# Patient Record
Sex: FEMALE | Race: WHITE | NOT HISPANIC OR LATINO | Employment: FULL TIME | URBAN - NONMETROPOLITAN AREA
[De-identification: names, ages, dates, MRNs, and addresses within clinical notes are randomized per-mention and may not be internally consistent; named-entity substitution may affect disease eponyms.]

---

## 2017-04-20 ENCOUNTER — TRANSCRIBE ORDERS (OUTPATIENT)
Dept: ADMINISTRATIVE | Facility: HOSPITAL | Age: 57
End: 2017-04-20

## 2017-04-20 DIAGNOSIS — Z12.31 ENCOUNTER FOR SCREENING MAMMOGRAM FOR MALIGNANT NEOPLASM OF BREAST: Primary | ICD-10-CM

## 2017-04-20 DIAGNOSIS — M85.80 OSTEOPENIA: Primary | ICD-10-CM

## 2017-04-27 ENCOUNTER — HOSPITAL ENCOUNTER (OUTPATIENT)
Dept: MAMMOGRAPHY | Facility: HOSPITAL | Age: 57
Discharge: HOME | End: 2017-04-27
Attending: INTERNAL MEDICINE | Admitting: INTERNAL MEDICINE

## 2017-04-27 ENCOUNTER — HOSPITAL ENCOUNTER (OUTPATIENT)
Dept: BONE DENSITY | Facility: HOSPITAL | Age: 57
Discharge: HOME | End: 2017-04-27
Attending: INTERNAL MEDICINE

## 2017-04-27 DIAGNOSIS — Z12.31 ENCOUNTER FOR SCREENING MAMMOGRAM FOR MALIGNANT NEOPLASM OF BREAST: ICD-10-CM

## 2017-04-27 DIAGNOSIS — M85.80 OSTEOPENIA: ICD-10-CM

## 2017-04-27 PROCEDURE — 77080 DXA BONE DENSITY AXIAL: CPT

## 2017-04-27 PROCEDURE — G0202 SCR MAMMO BI INCL CAD: HCPCS

## 2017-04-27 PROCEDURE — 77063 BREAST TOMOSYNTHESIS BI: CPT

## 2017-05-24 ENCOUNTER — HOSPITAL ENCOUNTER (OUTPATIENT)
Dept: ULTRASOUND IMAGING | Age: 57
Discharge: HOME | End: 2017-05-24
Payer: COMMERCIAL

## 2017-05-24 DIAGNOSIS — R74.01 ELEVATED TRANSAMINASE LEVEL: ICD-10-CM

## 2017-05-24 DIAGNOSIS — R74.8 ELEVATED LIVER ENZYMES: ICD-10-CM

## 2017-05-24 LAB
HAV IGM SER IA-ACNC: NORMAL
HEPATITIS B CORE IGM ANTIBODY: NORMAL
HEPATITIS B SURFACE ANTIGEN INTERPRETATION: NORMAL
HEPATITIS C ANTIBODY INTERPRETATION: NORMAL

## 2017-05-24 PROCEDURE — 76705 ECHO EXAM OF ABDOMEN: CPT

## 2017-06-08 ENCOUNTER — OFFICE VISIT (OUTPATIENT)
Dept: GASTROENTEROLOGY | Age: 57
End: 2017-06-08
Payer: COMMERCIAL

## 2017-06-08 VITALS
HEIGHT: 63 IN | OXYGEN SATURATION: 96 % | HEART RATE: 88 BPM | WEIGHT: 123 LBS | SYSTOLIC BLOOD PRESSURE: 130 MMHG | BODY MASS INDEX: 21.79 KG/M2 | DIASTOLIC BLOOD PRESSURE: 80 MMHG

## 2017-06-08 DIAGNOSIS — R74.01 TRANSAMINITIS: ICD-10-CM

## 2017-06-08 DIAGNOSIS — R74.01 TRANSAMINITIS: Primary | ICD-10-CM

## 2017-06-08 DIAGNOSIS — K76.0 FATTY LIVER DISEASE, NONALCOHOLIC: ICD-10-CM

## 2017-06-08 LAB
ALBUMIN SERPL-MCNC: 3.7 G/DL (ref 3.5–5.2)
ALP BLD-CCNC: 67 U/L (ref 35–104)
ALPHA FETOPROTEIN: 4.9 NG/ML (ref 0–8.3)
ALT SERPL-CCNC: 87 U/L (ref 5–33)
ANION GAP SERPL CALCULATED.3IONS-SCNC: 16 MMOL/L (ref 7–19)
AST SERPL-CCNC: 82 U/L (ref 5–32)
BILIRUB SERPL-MCNC: <0.2 MG/DL (ref 0.2–1.2)
BILIRUBIN DIRECT: 0.1 MG/DL (ref 0–0.3)
BILIRUBIN, INDIRECT: 0.1 MG/DL (ref 0.1–1)
BUN BLDV-MCNC: 9 MG/DL (ref 6–20)
CALCIUM SERPL-MCNC: 8.9 MG/DL (ref 8.6–10)
CHLORIDE BLD-SCNC: 105 MMOL/L (ref 98–111)
CO2: 25 MMOL/L (ref 22–29)
CREAT SERPL-MCNC: 0.5 MG/DL (ref 0.5–0.9)
FERRITIN: 70.3 NG/ML (ref 13–150)
GAMMA GLUTAMYL TRANSFERASE: 18 U/L (ref 7–54)
GFR NON-AFRICAN AMERICAN: >60
GLUCOSE BLD-MCNC: 64 MG/DL (ref 74–109)
HCT VFR BLD CALC: 40.6 % (ref 37–47)
HEMOGLOBIN: 12.5 G/DL (ref 12–16)
INR BLD: 0.97 (ref 0.88–1.18)
IRON SATURATION: 30 % (ref 14–50)
IRON: 84 UG/DL (ref 37–145)
MCH RBC QN AUTO: 27.4 PG (ref 27–31)
MCHC RBC AUTO-ENTMCNC: 30.8 G/DL (ref 33–37)
MCV RBC AUTO: 89 FL (ref 81–99)
PDW BLD-RTO: 12.9 % (ref 11.5–14.5)
PLATELET # BLD: 244 K/UL (ref 130–400)
PMV BLD AUTO: 9.4 FL (ref 9.4–12.3)
POTASSIUM SERPL-SCNC: 4.5 MMOL/L (ref 3.5–5)
PROTHROMBIN TIME: 12.8 SEC (ref 12–14.6)
RBC # BLD: 4.56 M/UL (ref 4.2–5.4)
SODIUM BLD-SCNC: 146 MMOL/L (ref 136–145)
TOTAL IRON BINDING CAPACITY: 279 UG/DL (ref 250–400)
TOTAL PROTEIN: 6.4 G/DL (ref 6.6–8.7)
TSH SERPL DL<=0.05 MIU/L-ACNC: 1.9 UIU/ML (ref 0.27–4.2)
WBC # BLD: 5.1 K/UL (ref 4.8–10.8)

## 2017-06-08 PROCEDURE — 99203 OFFICE O/P NEW LOW 30 MIN: CPT | Performed by: NURSE PRACTITIONER

## 2017-06-08 RX ORDER — IBANDRONATE SODIUM 150 MG/1
TABLET, FILM COATED ORAL
Refills: 11 | COMMUNITY
Start: 2017-04-28 | End: 2017-11-21 | Stop reason: SDUPTHER

## 2017-06-08 RX ORDER — ROSUVASTATIN CALCIUM 20 MG/1
20 TABLET, COATED ORAL DAILY
COMMUNITY
End: 2017-06-16 | Stop reason: SDUPTHER

## 2017-06-08 RX ORDER — VENLAFAXINE HYDROCHLORIDE 75 MG/1
75 CAPSULE, EXTENDED RELEASE ORAL DAILY
COMMUNITY
End: 2017-11-21 | Stop reason: SDUPTHER

## 2017-06-08 ASSESSMENT — ENCOUNTER SYMPTOMS
RECTAL PAIN: 0
SORE THROAT: 0
BACK PAIN: 0
NAUSEA: 0
ABDOMINAL PAIN: 0
COUGH: 0
PHOTOPHOBIA: 0
WHEEZING: 0
VOMITING: 0
CHOKING: 0
DIARRHEA: 0
CONSTIPATION: 0
ABDOMINAL DISTENTION: 0
ANAL BLEEDING: 0
BLOOD IN STOOL: 0

## 2017-06-09 LAB
ALPHA-1 ANTITRYPSIN: 154 MG/DL (ref 90–200)
ANA IGG, ELISA: NORMAL
CERULOPLASMIN: 27 MG/DL (ref 17–54)
F-ACTIN AB IGG: 12 UNITS (ref 0–19)
MITOCHONDRIAL M2 AB, IGG: 2 UNITS (ref 0–20)

## 2017-06-19 RX ORDER — ROSUVASTATIN CALCIUM 20 MG/1
20 TABLET, COATED ORAL DAILY
Qty: 90 TABLET | Refills: 1 | Status: SHIPPED | OUTPATIENT
Start: 2017-06-19 | End: 2017-11-21 | Stop reason: SDUPTHER

## 2017-09-02 ENCOUNTER — APPOINTMENT (OUTPATIENT)
Dept: GENERAL RADIOLOGY | Age: 57
End: 2017-09-02
Payer: COMMERCIAL

## 2017-09-02 ENCOUNTER — HOSPITAL ENCOUNTER (EMERGENCY)
Age: 57
Discharge: HOME | End: 2017-09-02
Payer: COMMERCIAL

## 2017-09-02 VITALS
TEMPERATURE: 98 F | OXYGEN SATURATION: 99 % | HEIGHT: 63 IN | BODY MASS INDEX: 21.26 KG/M2 | SYSTOLIC BLOOD PRESSURE: 132 MMHG | HEART RATE: 68 BPM | DIASTOLIC BLOOD PRESSURE: 78 MMHG | WEIGHT: 120 LBS | RESPIRATION RATE: 20 BRPM

## 2017-09-02 DIAGNOSIS — M72.2 PLANTAR FASCIITIS, BILATERAL: Primary | ICD-10-CM

## 2017-09-02 PROCEDURE — 73630 X-RAY EXAM OF FOOT: CPT

## 2017-09-02 PROCEDURE — 99283 EMERGENCY DEPT VISIT LOW MDM: CPT | Performed by: NURSE PRACTITIONER

## 2017-09-02 PROCEDURE — 99283 EMERGENCY DEPT VISIT LOW MDM: CPT

## 2017-09-02 RX ORDER — METHYLPREDNISOLONE 4 MG/1
TABLET ORAL
Qty: 1 KIT | Refills: 0 | Status: SHIPPED | OUTPATIENT
Start: 2017-09-02 | End: 2017-09-08

## 2017-09-02 ASSESSMENT — PAIN SCALES - GENERAL: PAINLEVEL_OUTOF10: 0

## 2017-11-04 PROBLEM — E55.9 VITAMIN D DEFICIENCY: Status: ACTIVE | Noted: 2017-11-04

## 2017-11-04 PROBLEM — Z12.12 SCREENING FOR COLORECTAL CANCER: Status: ACTIVE | Noted: 2017-11-04

## 2017-11-04 PROBLEM — F41.1 GENERALIZED ANXIETY DISORDER: Status: ACTIVE | Noted: 2017-11-04

## 2017-11-04 PROBLEM — M85.89 OSTEOPENIA OF MULTIPLE SITES: Status: ACTIVE | Noted: 2017-11-04

## 2017-11-04 PROBLEM — Z12.11 SCREENING FOR COLORECTAL CANCER: Status: ACTIVE | Noted: 2017-11-04

## 2017-11-04 PROBLEM — C48.0 RETROPERITONEAL SARCOMA (HCC): Status: ACTIVE | Noted: 2017-11-04

## 2017-11-04 PROBLEM — Z85.3 HISTORY OF BREAST CANCER: Status: ACTIVE | Noted: 2017-11-04

## 2017-11-04 PROBLEM — E78.00 PURE HYPERCHOLESTEROLEMIA: Status: ACTIVE | Noted: 2017-11-04

## 2017-11-04 PROBLEM — F33.2 ENDOGENOUS DEPRESSION (HCC): Status: ACTIVE | Noted: 2017-11-04

## 2017-11-08 DIAGNOSIS — R74.01 ELEVATED TRANSAMINASE LEVEL: ICD-10-CM

## 2017-11-08 DIAGNOSIS — E78.00 PURE HYPERCHOLESTEROLEMIA: ICD-10-CM

## 2017-11-08 DIAGNOSIS — E55.9 VITAMIN D DEFICIENCY: Primary | ICD-10-CM

## 2017-11-08 DIAGNOSIS — K76.0 FATTY LIVER DISEASE, NONALCOHOLIC: ICD-10-CM

## 2017-11-14 DIAGNOSIS — E55.9 VITAMIN D DEFICIENCY: ICD-10-CM

## 2017-11-14 DIAGNOSIS — E78.00 PURE HYPERCHOLESTEROLEMIA: ICD-10-CM

## 2017-11-14 DIAGNOSIS — R74.01 ELEVATED TRANSAMINASE LEVEL: ICD-10-CM

## 2017-11-14 DIAGNOSIS — K76.0 FATTY LIVER DISEASE, NONALCOHOLIC: ICD-10-CM

## 2017-11-14 LAB
ALBUMIN SERPL-MCNC: 4.1 G/DL (ref 3.5–5.2)
ALP BLD-CCNC: 71 U/L (ref 35–104)
ALT SERPL-CCNC: 22 U/L (ref 5–33)
ANION GAP SERPL CALCULATED.3IONS-SCNC: 13 MMOL/L (ref 7–19)
AST SERPL-CCNC: 27 U/L (ref 5–32)
BILIRUB SERPL-MCNC: 0.3 MG/DL (ref 0.2–1.2)
BUN BLDV-MCNC: 14 MG/DL (ref 6–20)
CALCIUM SERPL-MCNC: 9 MG/DL (ref 8.6–10)
CHLORIDE BLD-SCNC: 106 MMOL/L (ref 98–111)
CHOLESTEROL, TOTAL: 178 MG/DL (ref 160–199)
CO2: 25 MMOL/L (ref 22–29)
CREAT SERPL-MCNC: 0.6 MG/DL (ref 0.5–0.9)
GFR NON-AFRICAN AMERICAN: >60
GLUCOSE BLD-MCNC: 99 MG/DL (ref 74–109)
HDLC SERPL-MCNC: 66 MG/DL (ref 65–121)
LDL CHOLESTEROL CALCULATED: 97 MG/DL
POTASSIUM SERPL-SCNC: 4.7 MMOL/L (ref 3.5–5)
SODIUM BLD-SCNC: 144 MMOL/L (ref 136–145)
TOTAL PROTEIN: 6.8 G/DL (ref 6.6–8.7)
TRIGL SERPL-MCNC: 76 MG/DL (ref 0–149)
VITAMIN D 25-HYDROXY: 39.7 NG/ML

## 2017-11-21 ENCOUNTER — OFFICE VISIT (OUTPATIENT)
Dept: INTERNAL MEDICINE | Age: 57
End: 2017-11-21
Payer: COMMERCIAL

## 2017-11-21 VITALS
HEART RATE: 65 BPM | WEIGHT: 127 LBS | DIASTOLIC BLOOD PRESSURE: 80 MMHG | SYSTOLIC BLOOD PRESSURE: 130 MMHG | BODY MASS INDEX: 22.5 KG/M2 | HEIGHT: 63 IN | OXYGEN SATURATION: 97 %

## 2017-11-21 DIAGNOSIS — Z23 IMMUNIZATION DUE: ICD-10-CM

## 2017-11-21 DIAGNOSIS — F41.1 GENERALIZED ANXIETY DISORDER: ICD-10-CM

## 2017-11-21 DIAGNOSIS — F33.2 ENDOGENOUS DEPRESSION (HCC): ICD-10-CM

## 2017-11-21 DIAGNOSIS — M77.42 METATARSALGIA OF LEFT FOOT: ICD-10-CM

## 2017-11-21 DIAGNOSIS — R74.01 ELEVATED TRANSAMINASE LEVEL: ICD-10-CM

## 2017-11-21 DIAGNOSIS — E55.9 VITAMIN D DEFICIENCY: ICD-10-CM

## 2017-11-21 DIAGNOSIS — E78.00 PURE HYPERCHOLESTEROLEMIA: Primary | ICD-10-CM

## 2017-11-21 PROCEDURE — 90471 IMMUNIZATION ADMIN: CPT | Performed by: INTERNAL MEDICINE

## 2017-11-21 PROCEDURE — 99214 OFFICE O/P EST MOD 30 MIN: CPT | Performed by: INTERNAL MEDICINE

## 2017-11-21 PROCEDURE — 90686 IIV4 VACC NO PRSV 0.5 ML IM: CPT | Performed by: INTERNAL MEDICINE

## 2017-11-21 RX ORDER — ERGOCALCIFEROL 1.25 MG/1
50000 CAPSULE ORAL WEEKLY
Qty: 12 CAPSULE | Refills: 4 | Status: SHIPPED | OUTPATIENT
Start: 2017-11-21 | End: 2018-05-09 | Stop reason: SDUPTHER

## 2017-11-21 RX ORDER — ERGOCALCIFEROL 1.25 MG/1
50000 CAPSULE ORAL WEEKLY
COMMUNITY
End: 2017-11-21 | Stop reason: SDUPTHER

## 2017-11-21 RX ORDER — VENLAFAXINE HYDROCHLORIDE 75 MG/1
75 CAPSULE, EXTENDED RELEASE ORAL DAILY
Qty: 90 CAPSULE | Refills: 1 | Status: SHIPPED | OUTPATIENT
Start: 2017-11-21 | End: 2018-05-09 | Stop reason: SDUPTHER

## 2017-11-21 RX ORDER — IBANDRONATE SODIUM 150 MG/1
TABLET, FILM COATED ORAL
Qty: 3 TABLET | Refills: 4 | Status: SHIPPED | OUTPATIENT
Start: 2017-11-21 | End: 2018-05-09 | Stop reason: SDUPTHER

## 2017-11-21 RX ORDER — ROSUVASTATIN CALCIUM 20 MG/1
20 TABLET, COATED ORAL DAILY
Qty: 90 TABLET | Refills: 1 | Status: SHIPPED | OUTPATIENT
Start: 2017-11-21 | End: 2018-05-09 | Stop reason: SDUPTHER

## 2017-11-21 ASSESSMENT — PATIENT HEALTH QUESTIONNAIRE - PHQ9
SUM OF ALL RESPONSES TO PHQ9 QUESTIONS 1 & 2: 0
1. LITTLE INTEREST OR PLEASURE IN DOING THINGS: 0
SUM OF ALL RESPONSES TO PHQ QUESTIONS 1-9: 0
2. FEELING DOWN, DEPRESSED OR HOPELESS: 0

## 2017-11-21 ASSESSMENT — ENCOUNTER SYMPTOMS
COUGH: 0
ABDOMINAL PAIN: 0
CHEST TIGHTNESS: 0
WHEEZING: 0
SORE THROAT: 0
CONSTIPATION: 0

## 2017-11-21 NOTE — PROGRESS NOTES
Chief Complaint   Patient presents with    6 Month Follow-Up     History of presenting illness:  Janene Ayala is a 62 y.o. female who presents today for follow up on her chronic medical conditions as noted below. Pure hypercholesterolemia-Patient  has tried to follow diet recommendations. Has been taking  cholesterol lowering medication as prescribed and does not report any side effects.     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     Overview Note:     DX 2007; post extensive surgery/ hysterectomy      Osteopenia of multiple sites 11/04/2017     Overview Note:     2013 Lspine -2.4/hip neck -2.3      Screening for colorectal cancer 11/04/2017     Overview Note:     2016/ 5 yrs      History of breast cancer 11/04/2017     Overview Note:     2008 LEFT/ post partial mastectomy      Elevated transaminase level 06/08/2017    Fatty liver disease, nonalcoholic 08/21/3530    Hx of colonic polyps      Past Medical History:   Diagnosis Date    Breast cancer ([facility] [address]     Cancer ([facility] [address]     liposcaarcoma, peritoneal      Past Surgical History:   Procedure Laterality Date    BREAST LUMPECTOMY Left     CHOLECYSTECTOMY      COLONOSCOPY  12/09/2016    Dr Isamar Raza access, normall, 5 yr recall w/Dr Devang Finley    MASTECTOMY, PARTIAL Left 2008    left breast cancer    OR COLONOSCOPY FLX DX W/COLLJ SPEC WHEN PFRMD N/A 12/9/2016    COLONOSCOPY DIAGNOSTIC OR SCREENING performed by Mariano Saravia DO at [address] [facility]    CHRISTIANE AND BSO      TONSILLECTOMY       Current Outpatient Prescriptions   Medication Sig Dispense Refill    rosuvastatin (CRESTOR) 20 MG tablet Take 1 tablet by mouth daily 90 tablet 1    ibandronate (BONIVA) 150 MG tablet TAKE 1 TABLET BY MOUTH ONCE MONTHLY 3 tablet 4    venlafaxine (EFFEXOR XR) 75 MG extended release capsule Take 1 capsule by mouth daily 90 capsule 1    vitamin D (ERGOCALCIFEROL) 15385 units CAPS capsule Take 1 capsule by mouth once a week 12 capsule 4     No current facility-administered medications for this visit. Allergies   Allergen Reactions    Adhesive Tape Swelling    Sulfa Antibiotics Swelling     Social History   Substance Use Topics    Smoking status: Former Smoker     Packs/day: 0.50     Years: 35.00     Quit date: 2016    Smokeless tobacco: Never Used    Alcohol use Yes      Family History   Problem Relation Age of Onset    Alzheimer's Disease Mother     Breast Cancer Mother 48    Heart Disease Father     Diabetes Father     Cancer Brother     Colon Cancer Neg Hx     Colon Polyps Neg Hx     Liver Cancer Neg Hx     Liver Disease Neg Hx     Esophageal Cancer Neg Hx     Rectal Cancer Neg Hx     Stomach Cancer Neg Hx        Review of Systems   Constitutional: Negative for chills, fatigue and fever. HENT: Negative for congestion, ear pain, nosebleeds, postnasal drip and sore throat. Respiratory: Negative for cough, chest tightness and wheezing. Cardiovascular: Negative for chest pain, palpitations and leg swelling. Gastrointestinal: Negative for abdominal pain and constipation. Genitourinary: Negative for dysuria and urgency. Musculoskeletal: Negative. Negative for arthralgias. Skin: Negative for rash. Neurological: Negative for dizziness and headaches. Psychiatric/Behavioral: Negative.       Vitals:    11/21/17 0901   BP: 130/80   Site: Left Arm   Position: Sitting   Pulse: 65   SpO2: 97% Weight: 127 lb (57.6 kg)   Height: 5' 3\" (1.6 m)     Body mass index is 22.5 kg/m². Physical Exam   Constitutional: She is oriented to person, place, and time. She appears well-developed. No distress. HENT:   Head: Normocephalic and atraumatic. Nose: Nose normal.   Mouth/Throat: Oropharynx is clear and moist.   Eyes: Conjunctivae are normal. Pupils are equal, round, and reactive to light. Right eye exhibits no discharge. Left eye exhibits no discharge. No scleral icterus. Neck: Normal range of motion. No JVD present. No thyromegaly present. Cardiovascular: Normal rate and regular rhythm. No murmur heard. Pulmonary/Chest: Breath sounds normal. She has no wheezes. She has no rales. She exhibits no tenderness. Abdominal: Bowel sounds are normal. She exhibits no distension. There is no guarding. Musculoskeletal: She exhibits no edema. Co metatarsal area pain dorsally over left foot   Lymphadenopathy:     She has no cervical adenopathy. Neurological: She is oriented to person, place, and time. She has normal reflexes. No cranial nerve deficit. Coordination normal.   Skin: Skin is dry. No rash noted. She is not diaphoretic. No erythema.    Psychiatric: Her behavior is normal. Judgment and thought content normal.       Lab Review   Orders Only on 11/14/2017   Component Date Value    Sodium 11/14/2017 144     Potassium 11/14/2017 4.7     Chloride 11/14/2017 106     CO2 11/14/2017 25     Anion Gap 11/14/2017 13     Glucose 11/14/2017 99     BUN 11/14/2017 14     CREATININE 11/14/2017 0.6     GFR Non- 11/14/2017 >60     Calcium 11/14/2017 9.0     Total Protein 11/14/2017 6.8     Alb 11/14/2017 4.1     Total Bilirubin 11/14/2017 0.3     Alkaline Phosphatase 11/14/2017 71     ALT 11/14/2017 22     AST 11/14/2017 27     Cholesterol, Total 11/14/2017 178     Triglycerides 11/14/2017 76     HDL 11/14/2017 66     LDL Calculated 11/14/2017 97     Vit D, 25-Hydroxy

## 2017-11-21 NOTE — PATIENT INSTRUCTIONS
good.  How is high cholesterol treated? The goal of treatment is to reduce your chances of having a heart attack or stroke. The goal is not to lower your cholesterol numbers only. · You may make lifestyle changes, such as eating healthy foods, not smoking, losing weight, and being more active. · You may have to take medicine. Follow-up care is a key part of your treatment and safety. Be sure to make and go to all appointments, and call your doctor if you are having problems. It's also a good idea to know your test results and keep a list of the medicines you take. Where can you learn more? Go to [email or web address] org and sign in to your [employer] account. Enter G519 in the ApptheGame box to learn more about \"Learning About High Cholesterol. \"     If you do not have an account, please click on the \"Sign Up Now\" link. Current as of: April 3, 2017  Content Version: 11.3  © 0202-4044 [employer]. Care instructions adapted under license by [facility] ([facility]). If you have questions about a medical condition or this instruction, always ask your healthcare professional. [address] any warranty or liability for your use of this information.

## 2017-11-29 ENCOUNTER — TELEPHONE (OUTPATIENT)
Dept: INTERNAL MEDICINE | Age: 57
End: 2017-11-29

## 2017-11-29 NOTE — TELEPHONE ENCOUNTER
Voicemail: They received 2 rx for vit d, 50,000/week and 1,000/week.  Which one do they need to fill?  11/29 7680

## 2017-12-04 ENCOUNTER — TELEPHONE (OUTPATIENT)
Dept: INTERNAL MEDICINE | Age: 57
End: 2017-12-04

## 2017-12-04 NOTE — TELEPHONE ENCOUNTER
Voicemail:  The pharmacy has two Rx's for vit d with two different strengths and they need to know which one she is on.  12/4 6064

## 2017-12-13 ENCOUNTER — OFFICE VISIT (OUTPATIENT)
Dept: PODIATRY | Facility: CLINIC | Age: 57
End: 2017-12-13

## 2017-12-13 VITALS
BODY MASS INDEX: 23.19 KG/M2 | DIASTOLIC BLOOD PRESSURE: 88 MMHG | HEIGHT: 62 IN | HEART RATE: 77 BPM | SYSTOLIC BLOOD PRESSURE: 132 MMHG | OXYGEN SATURATION: 98 % | WEIGHT: 126 LBS

## 2017-12-13 DIAGNOSIS — G57.62 PLANTAR NEUROMA, LEFT: ICD-10-CM

## 2017-12-13 DIAGNOSIS — G57.61 PLANTAR NEUROMA, RIGHT: ICD-10-CM

## 2017-12-13 DIAGNOSIS — M25.80 SESAMOIDITIS: ICD-10-CM

## 2017-12-13 DIAGNOSIS — M21.622 TAILOR'S BUNION OF LEFT FOOT: ICD-10-CM

## 2017-12-13 DIAGNOSIS — M79.671 FOOT PAIN, BILATERAL: Primary | ICD-10-CM

## 2017-12-13 DIAGNOSIS — M21.621 TAILOR'S BUNION OF RIGHT FOOT: ICD-10-CM

## 2017-12-13 DIAGNOSIS — M79.672 FOOT PAIN, BILATERAL: Primary | ICD-10-CM

## 2017-12-13 PROCEDURE — 64455 NJX AA&/STRD PLTR COM DG NRV: CPT | Performed by: PODIATRIST

## 2017-12-13 PROCEDURE — 99203 OFFICE O/P NEW LOW 30 MIN: CPT | Performed by: PODIATRIST

## 2017-12-13 RX ORDER — TRIAMCINOLONE ACETONIDE 40 MG/ML
10 INJECTION, SUSPENSION INTRA-ARTICULAR; INTRAMUSCULAR ONCE
Status: DISCONTINUED | OUTPATIENT
Start: 2017-12-13 | End: 2017-12-13

## 2017-12-13 RX ORDER — DEXAMETHASONE SODIUM PHOSPHATE 10 MG/ML
5 INJECTION, SOLUTION INTRAMUSCULAR; INTRAVENOUS ONCE
Status: COMPLETED | OUTPATIENT
Start: 2017-12-13 | End: 2017-12-13

## 2017-12-13 RX ORDER — DEXAMETHASONE SODIUM PHOSPHATE 10 MG/ML
10 INJECTION, SOLUTION INTRAMUSCULAR; INTRAVENOUS ONCE
Status: COMPLETED | OUTPATIENT
Start: 2017-12-13 | End: 2017-12-13

## 2017-12-13 RX ORDER — BUPIVACAINE HYDROCHLORIDE 5 MG/ML
0.5 INJECTION, SOLUTION PERINEURAL ONCE
Status: COMPLETED | OUTPATIENT
Start: 2017-12-13 | End: 2017-12-13

## 2017-12-13 RX ORDER — TRIAMCINOLONE ACETONIDE 40 MG/ML
10 INJECTION, SUSPENSION INTRA-ARTICULAR; INTRAMUSCULAR ONCE
Status: COMPLETED | OUTPATIENT
Start: 2017-12-13 | End: 2017-12-13

## 2017-12-13 RX ADMIN — DEXAMETHASONE SODIUM PHOSPHATE 5 MG: 10 INJECTION, SOLUTION INTRAMUSCULAR; INTRAVENOUS at 11:47

## 2017-12-13 RX ADMIN — DEXAMETHASONE SODIUM PHOSPHATE 10 MG: 10 INJECTION, SOLUTION INTRAMUSCULAR; INTRAVENOUS at 11:45

## 2017-12-13 RX ADMIN — BUPIVACAINE HYDROCHLORIDE 0.5 ML: 5 INJECTION, SOLUTION PERINEURAL at 11:45

## 2017-12-13 RX ADMIN — TRIAMCINOLONE ACETONIDE 10 MG: 40 INJECTION, SUSPENSION INTRA-ARTICULAR; INTRAMUSCULAR at 16:27

## 2017-12-13 RX ADMIN — DEXAMETHASONE SODIUM PHOSPHATE 5 MG: 10 INJECTION, SOLUTION INTRAMUSCULAR; INTRAVENOUS at 11:46

## 2017-12-13 RX ADMIN — BUPIVACAINE HYDROCHLORIDE 0.5 ML: 5 INJECTION, SOLUTION PERINEURAL at 11:44

## 2017-12-13 RX ADMIN — TRIAMCINOLONE ACETONIDE 10 MG: 40 INJECTION, SUSPENSION INTRA-ARTICULAR; INTRAMUSCULAR at 16:30

## 2017-12-13 RX ADMIN — TRIAMCINOLONE ACETONIDE 10 MG: 40 INJECTION, SUSPENSION INTRA-ARTICULAR; INTRAMUSCULAR at 16:29

## 2017-12-13 NOTE — PROGRESS NOTES
Procedure   Nerve Block  Date/Time: 12/13/2017 5:21 PM  Performed by: CECILIA LO  Authorized by: CECILIA LO   Indications: pain relief  Body area: lower extremity  Nerve: plantar  Laterality: left (3rd interspace)    Sedation:  Patient sedated: no  Preparation: Alcohol.  Patient position: sitting  Needle size: 24 G  Location technique: anatomical landmarks  Local Anesthetic: bupivacaine 0.5% without epinephrine  Anesthetic total: 0.5 mL  Outcome: pain improved  Patient tolerance: Patient tolerated the procedure well with no immediate complications  Comments: 0.5cc Dexamethasone, 0.25 Kenalog 40

## 2017-12-13 NOTE — PROGRESS NOTES
Procedure   Nerve Block  Date/Time: 12/13/2017 5:19 PM  Performed by: CECILIA LO  Authorized by: CECILIA LO   Indications: pain relief  Body area: lower extremity  Nerve: plantar  Laterality: left (2nd interspace)    Sedation:  Patient sedated: no  Preparation: alcohol.  Patient position: sitting  Needle size: 24 G  Location technique: anatomical landmarks  Local Anesthetic: bupivacaine 0.5% without epinephrine  Anesthetic total: 0.5 mL  Outcome: pain improved  Patient tolerance: Patient tolerated the procedure well with no immediate complications  Comments: 0.5cc Dexamethasone, 0.25 Kenalog 40

## 2017-12-13 NOTE — PATIENT INSTRUCTIONS
Roberts Neuralgia  Roberts neuralgia is a type of foot pain in the area closest to your toes. This area is sometimes called the ball of your foot. Roberts neuralgia occurs when a branch of a nerve in your foot (digital nerve) becomes compressed.   When this happens over a long period of time, the nerve can thicken (neuroma) and cause pain. This usually occurs between the third and fourth toe. Roberts neuralgia can come and go but may get worse over time.   CAUSES  Your digital nerve can become compressed and stretched at a point where it passes under a thick band of tissue that connects your toes (intermetatarsal ligament). Roberts neuralgia can be caused by mild repetitive damage in this area. This type of damage can result from:   · Activities such as running or jumping.  · Wearing shoes that are too tight.  RISK FACTORS  You may be at risk for Roberts neuralgia if you:  · Are female.  · Wear high heels.  · Wear shoes that are narrow or tight.  · Participate in activities that stretch your toes. These include:  ¨ Running.  ¨ Ballet.  ¨ Long-distance walking.  SIGNS AND SYMPTOMS  The first symptom of Roberts neuralgia is pain that spreads from the ball of your foot to your toes. It may feel like you are walking on a marble. Pain usually gets worse with walking and goes away at night. Other symptoms may include numbness and cramping of your toes.  DIAGNOSIS   Your health care provider will do a physical exam. When doing the exam, your health care provider may:   · Squeeze your foot just behind your toe.  · Ask you to move your toes to check for pain.  You may also have tests on your foot to confirm the diagnosis. These may include:   · An X-ray.  · An MRI.  TREATMENT   Treatment for Roberts neuralgia may be as simple as changing the kind of shoes you wear. Other treatments may include:  · Wearing a supportive pad (orthosis) under the front of your foot. This lifts your toe bones and takes pressure off the nerve.  · Getting  injections of numbing medicine and anti-inflammatory medicine (steroid) in the nerve.  · Having surgery to remove part of the thickened nerve.  HOME CARE INSTRUCTIONS   · Take medicine only as directed by your health care provider.  · Wear soft-soled shoes with a wide toe area.  · Stop activities that may be causing pain.  · Elevate your foot when resting.  · Massage your foot.  · Apply ice to the injured area:      Put ice in a plastic bag.    Place a towel between your skin and the bag.    Leave the ice on for 20 minutes, 2-3 times a day.    · Keep all follow-up visits as directed by your health care provider. This is important.  SEEK MEDICAL CARE IF:  · Home care instructions are not helping you get better.  · Your symptoms change or get worse.     This information is not intended to replace advice given to you by your health care provider. Make sure you discuss any questions you have with your health care provider.     Document Released: 03/26/2002 Document Revised: 01/08/2016 Document Reviewed: 02/18/2015  [employer] Interactive Patient Education ©2017 [employer]

## 2017-12-13 NOTE — PROGRESS NOTES
[facility] - PODIATRY    Today's Date: 12/13/17    Patient Name: Jayla Graves  MRN: [id number]  CSN: [id number]  PCP: Malgorzata Avila MD  Referring Provider: Malgorzata Avila MD    SUBJECTIVE     Chief Complaint   Patient presents with   • Left Foot - Pain     Patient is complaining of severe pain since April. 6/10 on a pain scale. She describes the pain as stabbing. No previous treatment. Per Patient she lost balance in April and landed on the balls of her feet. Previously tried OTC inserts.    • Right Foot - Pain     HPI: Jayla Graves, a 57 y.o.female, comes to clinic as a(n) new patient complaining of foot pain. Patient has h/o anxiety, breast cancer, depression, fatty liver, hypercholesterolemia, osteopenia, retroperiotneal sarcoma, vit D Def. Relates that since April she has had increased pain to the forefoot of each foot. Notes that in April her mother was in a rehab facility and she would walk with her for most of the day. She also lost her balance at one point, landing on the ball of her feet. Since last time she has tried multiple different therapies such as different shoes, OTC inserts, metatarsal pads, NSAIDs, etc, all without relief. Notes that the left foot is slightly more painful than right. Admits pain at 6/10 level and described as stabbing and throbbing. Denies any constitutional symptoms. No other pedal complaints at this time.    Past Medical History:   Diagnosis Date   • Anxiety    • Breast cancer 2008    left   • Depression    • Fatty liver disease, nonalcoholic    • History of colon polyps    • Hx of radiation therapy 2008   • Hypercholesterolemia    • Osteopenia    • Retroperitoneal sarcoma    • Vitamin D deficiency      Past Surgical History:   Procedure Laterality Date   • BILATERAL SALPINGO OOPHORECTOMY     • BREAST BIOPSY Left 2008    positive   • BREAST LUMPECTOMY Left 2008   • BREAST LUMPECTOMY Left    • CHOLECYSTECTOMY     • COLONOSCOPY     • MASTECTOMY  PARTIAL / LUMPECTOMY Left    • TONSILLECTOMY     • TOTAL ABDOMINAL HYSTERECTOMY       Family History   Problem Relation Age of Onset   • Alzheimer's disease Mother    • Cancer Mother      breast   • Heart disease Father    • Diabetes Father    • Cancer Brother    • Breast cancer Neg Hx      Social History     Social History   • Marital status:      Spouse name: N/A   • Number of children: N/A   • Years of education: N/A     Occupational History   • Not on file.     Social History Main Topics   • Smoking status: Former Smoker     Packs/day: 0.75     Years: 25.00     Types: Cigarettes     Quit date: 10/13/2017   • Smokeless tobacco: Never Used   • Alcohol use Yes      Comment: Rarely   • Drug use: No   • Sexual activity: Defer     Other Topics Concern   • Not on file     Social History Narrative     Allergies   Allergen Reactions   • Adhesive Tape Swelling   • Sulfa Antibiotics Swelling     Current Outpatient Prescriptions   Medication Sig Dispense Refill   • ibandronate (BONIVA) 150 MG tablet TAKE 1 TABLET BY MOUTH ONCE MONTHLY     • rosuvastatin (CRESTOR) 20 MG tablet Take 20 mg by mouth.     • venlafaxine XR (EFFEXOR-XR) 75 MG 24 hr capsule Take 75 mg by mouth.     • vitamin D (ERGOCALCIFEROL) 62029 units capsule capsule Take  by mouth.       Current Facility-Administered Medications   Medication Dose Route Frequency Provider Last Rate Last Dose   • bupivacaine (MARCAINE) 0.5 % injection 0.5 mL  0.5 mL Injection Once Sadi Alberts, DPM       • bupivacaine (MARCAINE) 0.5 % injection 0.5 mL  0.5 mL Injection Once Cornwalljaret Alberts, DPM       • bupivacaine (MARCAINE) 0.5 % injection 0.5 mL  0.5 mL Injection Once Cornwalljaret Alberts, DPM       • dexamethasone sodium phosphate injection 5 mg  5 mg Intra-articular Once Sadijaret Alberts, DPM       • dexamethasone sodium phosphate injection 5 mg  5 mg Intra-articular Once Cornwalljaret Alberts, DPM       • triamcinolone acetonide (KENALOG) injection 10 mg  10 mg  Intra-articular Once Sadi Alberts DPM       • triamcinolone acetonide (KENALOG-40) injection 10 mg  10 mg Intra-articular Once Sadi Alberts DPM       • triamcinolone acetonide (KENALOG-40) injection 10 mg  10 mg Intra-articular Once Sadi Alberts DPM         Review of Systems   Constitutional: Negative for chills and fever.   HENT: Negative for congestion.    Respiratory: Negative for shortness of breath.    Cardiovascular: Negative for chest pain and leg swelling.   Gastrointestinal: Negative for constipation, diarrhea, nausea and vomiting.   Musculoskeletal:        Foot pain   Skin: Negative for wound.   Neurological: Negative for numbness.       OBJECTIVE     Vitals:    12/13/17 0943   BP: 132/88   Pulse: 77   SpO2: 98%       PHYSICAL EXAM  GEN:   A&Ox3, NAD. Pt presents to clinic ambulating without assistance and wearing Casual Shoes.      NEURO:   Protective sensation intact to 10/10 sites Right foot, 10/10 site Left foot using [place] monofilament  Light touch sensation present  No Tinel's or Villeux sign.    VASC:  Skin temperature Warm to Warm proximal to distal kristin  DP pulses 2/4 Right, 2/4 Left  PT pulses 2/4 Right, 2/4 Left  CFT <3 sec kristin  Pedal hair growth absent  no edema noted kristin  Varicosities absent kristin    MUSC/SKEL:  Muscle Strength Right foot Dorsiflexors 5/5, Plantarflexors 5/5, Evertors 5/5, Invertors 5/5  Muscle Strength Left foot Dorsiflexors 5/5, Plantarflexors 5/5, Evertors 5/5, Invertors 5/5  ROM of the 1st MTP is full without pain or crepitus  ROM of the MTJ is full without pain or crepitus    ROM of the STJ is full without pain or crepitus    ROM of the ankle joint is full without pain or crepitus    POP of plantar distal interspaces 2,3,4 bilateral L>R. Pain in forefoot compression. No deviation of digits or palpable click.  POP of plantar right 1st MTPJ at tibial sesamoid  POP of bilateral lateral 5th MTPJ with increase bony prominence.  Rectus foot type   Gait  pattern: Normal  No gross pedal musculoskeletal deformities noted.     DERM:  Pedal nails x10 are within normal limits of length and thickness  Webspaces are Clean, Dry, and Intact  Skin is normal in turgor and texture with no open wounds or sores appreciated.      RADIOLOGY/NUCLEAR:  No results found.    LABORATORY/CULTURE RESULTS:      PATHOLOGY RESULTS:       ASSESSMENT/PLAN     Jayla was seen today for pain and pain.    Diagnoses and all orders for this visit:    Foot pain, bilateral  -     XR Foot 3+ View Bilateral; Future  -     Nerve Block  -     Nerve Block  -     Nerve Block  -     bupivacaine (MARCAINE) 0.5 % injection 0.5 mL; Inject 0.5 mL as directed 1 (One) Time.  -     bupivacaine (MARCAINE) 0.5 % injection 0.5 mL; Inject 0.5 mL as directed 1 (One) Time.  -     bupivacaine (MARCAINE) 0.5 % injection 0.5 mL; Inject 0.5 mL as directed 1 (One) Time.  -     triamcinolone acetonide (KENALOG-40) injection 10 mg; Inject 0.25 mL into the joint 1 (One) Time.  -     triamcinolone acetonide (KENALOG-40) injection 10 mg; Inject 0.25 mL into the joint 1 (One) Time.  -     triamcinolone acetonide (KENALOG) injection 10 mg; Inject 1 mL into the joint 1 (One) Time.  -     dexamethasone sodium phosphate injection 5 mg; Inject 0.5 mL into the joint 1 (One) Time.  -     dexamethasone sodium phosphate injection 5 mg; Inject 0.5 mL into the joint 1 (One) Time.    Tailor's bunion of right foot  -     bupivacaine (MARCAINE) 0.5 % injection 0.5 mL; Inject 0.5 mL as directed 1 (One) Time.  -     bupivacaine (MARCAINE) 0.5 % injection 0.5 mL; Inject 0.5 mL as directed 1 (One) Time.  -     bupivacaine (MARCAINE) 0.5 % injection 0.5 mL; Inject 0.5 mL as directed 1 (One) Time.  -     triamcinolone acetonide (KENALOG-40) injection 10 mg; Inject 0.25 mL into the joint 1 (One) Time.  -     triamcinolone acetonide (KENALOG-40) injection 10 mg; Inject 0.25 mL into the joint 1 (One) Time.  -     triamcinolone acetonide (KENALOG)  injection 10 mg; Inject 1 mL into the joint 1 (One) Time.  -     dexamethasone sodium phosphate injection 5 mg; Inject 0.5 mL into the joint 1 (One) Time.  -     dexamethasone sodium phosphate injection 5 mg; Inject 0.5 mL into the joint 1 (One) Time.    Tailor's bunion of left foot  -     bupivacaine (MARCAINE) 0.5 % injection 0.5 mL; Inject 0.5 mL as directed 1 (One) Time.  -     bupivacaine (MARCAINE) 0.5 % injection 0.5 mL; Inject 0.5 mL as directed 1 (One) Time.  -     bupivacaine (MARCAINE) 0.5 % injection 0.5 mL; Inject 0.5 mL as directed 1 (One) Time.  -     triamcinolone acetonide (KENALOG-40) injection 10 mg; Inject 0.25 mL into the joint 1 (One) Time.  -     triamcinolone acetonide (KENALOG-40) injection 10 mg; Inject 0.25 mL into the joint 1 (One) Time.  -     triamcinolone acetonide (KENALOG) injection 10 mg; Inject 1 mL into the joint 1 (One) Time.  -     dexamethasone sodium phosphate injection 5 mg; Inject 0.5 mL into the joint 1 (One) Time.  -     dexamethasone sodium phosphate injection 5 mg; Inject 0.5 mL into the joint 1 (One) Time.    Plantar neuroma, left  -     Nerve Block  -     Nerve Block  -     Nerve Block  -     bupivacaine (MARCAINE) 0.5 % injection 0.5 mL; Inject 0.5 mL as directed 1 (One) Time.  -     bupivacaine (MARCAINE) 0.5 % injection 0.5 mL; Inject 0.5 mL as directed 1 (One) Time.  -     bupivacaine (MARCAINE) 0.5 % injection 0.5 mL; Inject 0.5 mL as directed 1 (One) Time.  -     triamcinolone acetonide (KENALOG-40) injection 10 mg; Inject 0.25 mL into the joint 1 (One) Time.  -     triamcinolone acetonide (KENALOG-40) injection 10 mg; Inject 0.25 mL into the joint 1 (One) Time.  -     triamcinolone acetonide (KENALOG) injection 10 mg; Inject 1 mL into the joint 1 (One) Time.  -     dexamethasone sodium phosphate injection 5 mg; Inject 0.5 mL into the joint 1 (One) Time.  -     dexamethasone sodium phosphate injection 5 mg; Inject 0.5 mL into the joint 1 (One) Time.    Plantar  neuroma, right  -     bupivacaine (MARCAINE) 0.5 % injection 0.5 mL; Inject 0.5 mL as directed 1 (One) Time.  -     bupivacaine (MARCAINE) 0.5 % injection 0.5 mL; Inject 0.5 mL as directed 1 (One) Time.  -     bupivacaine (MARCAINE) 0.5 % injection 0.5 mL; Inject 0.5 mL as directed 1 (One) Time.  -     triamcinolone acetonide (KENALOG-40) injection 10 mg; Inject 0.25 mL into the joint 1 (One) Time.  -     triamcinolone acetonide (KENALOG-40) injection 10 mg; Inject 0.25 mL into the joint 1 (One) Time.  -     triamcinolone acetonide (KENALOG) injection 10 mg; Inject 1 mL into the joint 1 (One) Time.  -     dexamethasone sodium phosphate injection 5 mg; Inject 0.5 mL into the joint 1 (One) Time.  -     dexamethasone sodium phosphate injection 5 mg; Inject 0.5 mL into the joint 1 (One) Time.    Sesamoiditis  -     bupivacaine (MARCAINE) 0.5 % injection 0.5 mL; Inject 0.5 mL as directed 1 (One) Time.  -     bupivacaine (MARCAINE) 0.5 % injection 0.5 mL; Inject 0.5 mL as directed 1 (One) Time.  -     bupivacaine (MARCAINE) 0.5 % injection 0.5 mL; Inject 0.5 mL as directed 1 (One) Time.  -     triamcinolone acetonide (KENALOG-40) injection 10 mg; Inject 0.25 mL into the joint 1 (One) Time.  -     triamcinolone acetonide (KENALOG-40) injection 10 mg; Inject 0.25 mL into the joint 1 (One) Time.  -     triamcinolone acetonide (KENALOG) injection 10 mg; Inject 1 mL into the joint 1 (One) Time.  -     dexamethasone sodium phosphate injection 5 mg; Inject 0.5 mL into the joint 1 (One) Time.  -     dexamethasone sodium phosphate injection 5 mg; Inject 0.5 mL into the joint 1 (One) Time.      Comprehensive lower extremity examination and evaluation was performed.  Discussed findings and treatment plan including risks, benefits, and treatment options with patient in detail. Patient agreed with treatment plan.  After written consent obtained, Neuroma injection x3 into left foot interspaces 2,3,4.    Advised to continue use of  metatarsal pads or gel inserts for added relief to plantar foot.   If pain improves, pt wishes to have same injections to right foot.  Discussed surgical excision if conservative therapy fails.  Xray to evaluate structural abnormality  An After Visit Summary was printed and given to the patient at discharge, including (if requested) any available informative/educational handouts regarding diagnosis, treatment, or medications. All questions were answered to patient/family satisfaction. Should symptoms fail to improve or worsen they agree to call or return to clinic or to go to the Emergency Department. Discussed the importance of following up with any needed screening tests/labs/specialist appointments and any requested follow-up recommended by me today. Importance of maintaining follow-up discussed and patient accepts that missed appointments can delay diagnosis and potentially lead to worsening of conditions.  Return in about 1 month (around 1/13/2018)., or sooner if acute issues arise.        This document has been electronically signed by Sadi Alberts DPM on December 13, 2017 11:32 AM

## 2017-12-13 NOTE — PROGRESS NOTES
Procedure   Nerve Block  Date/Time: 12/13/2017 5:22 PM  Performed by: CECILIA LO  Authorized by: CECILIA LO   Indications: pain relief  Body area: lower extremity  Nerve: plantar  Laterality: left (4th interspace)    Sedation:  Patient sedated: no  Preparation: alcohol.  Patient position: sitting  Needle size: 24 G  Location technique: anatomical landmarks  Local Anesthetic: bupivacaine 0.5% without epinephrine  Anesthetic total: 0.5 mL  Outcome: pain improved  Patient tolerance: Patient tolerated the procedure well with no immediate complications  Comments: 0.5cc Dexamethasone, 0.25 Kenalog 40

## 2018-01-26 ENCOUNTER — HOSPITAL ENCOUNTER (OUTPATIENT)
Dept: GENERAL RADIOLOGY | Facility: HOSPITAL | Age: 58
Discharge: HOME | End: 2018-01-26
Attending: PODIATRIST | Admitting: PODIATRIST

## 2018-01-26 DIAGNOSIS — M79.672 FOOT PAIN, BILATERAL: ICD-10-CM

## 2018-01-26 DIAGNOSIS — M79.671 FOOT PAIN, BILATERAL: ICD-10-CM

## 2018-01-26 PROCEDURE — 73630 X-RAY EXAM OF FOOT: CPT

## 2018-01-29 NOTE — PROGRESS NOTES
Patient was advised of xray results and verbalized understanding. She stated that all questions were answered. Patient was advised to call our office if she had any further questions or concerns. Patient scheduled follow up appt.

## 2018-02-09 ENCOUNTER — OFFICE VISIT (OUTPATIENT)
Dept: PODIATRY | Facility: CLINIC | Age: 58
End: 2018-02-09

## 2018-02-09 VITALS
OXYGEN SATURATION: 96 % | HEART RATE: 84 BPM | SYSTOLIC BLOOD PRESSURE: 102 MMHG | WEIGHT: 129 LBS | BODY MASS INDEX: 22.86 KG/M2 | DIASTOLIC BLOOD PRESSURE: 78 MMHG | HEIGHT: 63 IN

## 2018-02-09 DIAGNOSIS — G57.62 PLANTAR NEUROMA, LEFT: Primary | ICD-10-CM

## 2018-02-09 DIAGNOSIS — M21.622 TAILOR'S BUNION OF LEFT FOOT: ICD-10-CM

## 2018-02-09 DIAGNOSIS — M21.621 TAILOR'S BUNION OF RIGHT FOOT: ICD-10-CM

## 2018-02-09 DIAGNOSIS — G57.61 PLANTAR NEUROMA, RIGHT: ICD-10-CM

## 2018-02-09 PROCEDURE — 99212 OFFICE O/P EST SF 10 MIN: CPT | Performed by: PODIATRIST

## 2018-02-09 NOTE — PROGRESS NOTES
[facility] - PODIATRY    Today's Date: 02/09/18    Patient Name: Jayla Graves  MRN: [id number]  CSN: [id number]  PCP: Malgorzata Avila MD  Referring Provider: No ref. provider found    SUBJECTIVE     Chief Complaint   Patient presents with   • Left Foot - Follow-up     Patient states she is here for her follow up. She denies any foot pain today.    • Right Foot - Follow-up     HPI: Jayla Graves, a 57 y.o.female, comes to clinic as a(n) established patient complaining of foot pain. Patient has h/o anxiety, breast cancer, depression, fatty liver, hypercholesterolemia, osteopenia, retroperiotneal sarcoma, vit D Def. States that since her last appt when she had injections, her pain has almost completely resolved. States that evne her right foot pain has gone away and does not wish to have injections today. Had xrays done. Denies pain today. Denies any constitutional symptoms. No other pedal complaints at this time.    Past Medical History:   Diagnosis Date   • Anxiety    • Breast cancer 2008    left   • Depression    • Fatty liver disease, nonalcoholic    • History of colon polyps    • Hx of radiation therapy 2008   • Hypercholesterolemia    • Osteopenia    • Retroperitoneal sarcoma    • Vitamin D deficiency      Past Surgical History:   Procedure Laterality Date   • BILATERAL SALPINGO OOPHORECTOMY     • BREAST BIOPSY Left 2008    positive   • BREAST LUMPECTOMY Left 2008   • BREAST LUMPECTOMY Left    • CHOLECYSTECTOMY     • COLONOSCOPY     • MASTECTOMY PARTIAL / LUMPECTOMY Left    • TONSILLECTOMY     • TOTAL ABDOMINAL HYSTERECTOMY       Family History   Problem Relation Age of Onset   • Alzheimer's disease Mother    • Cancer Mother      breast   • Heart disease Father    • Diabetes Father    • Cancer Brother    • Breast cancer Neg Hx      Social History     Social History   • Marital status:      Spouse name: N/A   • Number of children: N/A   • Years of education: N/A      Occupational History   • Not on file.     Social History Main Topics   • Smoking status: Former Smoker     Packs/day: 0.75     Years: 25.00     Types: Cigarettes     Quit date: 10/13/2017   • Smokeless tobacco: Never Used   • Alcohol use Yes      Comment: Rarely   • Drug use: No   • Sexual activity: Defer     Other Topics Concern   • Not on file     Social History Narrative     Allergies   Allergen Reactions   • Adhesive Tape Swelling   • Sulfa Antibiotics Swelling     Current Outpatient Prescriptions   Medication Sig Dispense Refill   • ibandronate (BONIVA) 150 MG tablet TAKE 1 TABLET BY MOUTH ONCE MONTHLY     • rosuvastatin (CRESTOR) 20 MG tablet Take 20 mg by mouth.     • venlafaxine XR (EFFEXOR-XR) 75 MG 24 hr capsule Take 75 mg by mouth.     • vitamin D (ERGOCALCIFEROL) 00029 units capsule capsule Take  by mouth.       No current facility-administered medications for this visit.      Review of Systems   Constitutional: Negative for chills and fever.   HENT: Negative for congestion.    Respiratory: Negative for shortness of breath.    Cardiovascular: Negative for chest pain and leg swelling.   Gastrointestinal: Negative for constipation, diarrhea, nausea and vomiting.   Musculoskeletal:        Foot pain   Skin: Negative for wound.   Neurological: Negative for numbness.       OBJECTIVE     Vitals:    02/09/18 1100   BP: 102/78   Pulse: 84   SpO2: 96%       PHYSICAL EXAM  GEN:   A&Ox3, NAD. Pt presents to clinic ambulating without assistance and wearing Casual Shoes.      NEURO:   Protective sensation intact to 10/10 sites Right foot, 10/10 site Left foot using [place]onia monofilament  Light touch sensation present  No Tinel's or Villeux sign.    VASC:  Skin temperature Warm to Warm proximal to distal kristin  DP pulses 2/4 Right, 2/4 Left  PT pulses 2/4 Right, 2/4 Left  CFT <3 sec kristin  Pedal hair growth absent  no edema noted kristin  Varicosities absent kristin    MUSC/SKEL:  Muscle Strength Right foot  Dorsiflexors 5/5, Plantarflexors 5/5, Evertors 5/5, Invertors 5/5  Muscle Strength Left foot Dorsiflexors 5/5, Plantarflexors 5/5, Evertors 5/5, Invertors 5/5  ROM of the 1st MTP is full without pain or crepitus  ROM of the MTJ is full without pain or crepitus    ROM of the STJ is full without pain or crepitus    ROM of the ankle joint is full without pain or crepitus    No POP of plantar distal interspaces 2,3,4 bilateral L>R. No pain in forefoot compression. No deviation of digits or palpable click.  No POP of plantar right 1st MTPJ at tibial sesamoid  Minimal POP of bilateral lateral 5th MTPJ with increase bony prominence.  Rectus foot type   Gait pattern: Normal  No gross pedal musculoskeletal deformities noted.     DERM:  Pedal nails x10 are within normal limits of length and thickness  Webspaces are Clean, Dry, and Intact  Skin is normal in turgor and texture with no open wounds or sores appreciated.      RADIOLOGY/NUCLEAR:  Xr Foot 3+ View Bilateral    Result Date: 1/26/2018  Narrative: EXAMINATION: XR FOOT 3+ VW BILATERAL-  1/26/2018 3:18 PM EST  HISTORY: Foot pain. Tailors bunion  COMPARISON:None  TECHNIQUE:6 views: AP lateral and oblique projection imaging of both feet were obtained. Lateral imaging obtained obtained with weightbearing.  FINDINGS:  There is a right-sided tailors bunion with focal bony protuberance along the head of the fifth metatarsal.  There is mild left-sided tailors bunion changes also noted with mild bony protuberance along the head of the left fifth metatarsal.  The bony structures are intact. The joint spaces are maintained. There is no fracture.  There are no focal blastic or lytic lesions.      Impression: 1. No acute bony abnormality. 2. Small tailor bunions, greater on the right. This report was finalized on 01/26/2018 14:22 by Dr. Dc Pina MD.      LABORATORY/CULTURE RESULTS:      PATHOLOGY RESULTS:       ASSESSMENT/PLAN     Jayla was seen today for follow-up and  follow-up.    Diagnoses and all orders for this visit:    Plantar neuroma, left    Plantar neuroma, right    Tailor's bunion of right foot    Tailor's bunion of left foot    Comprehensive lower extremity examination and evaluation was performed.  Discussed findings and treatment plan including risks, benefits, and treatment options with patient in detail. Patient agreed with treatment plan.  No further treatment at this time as she is asymptomatic.   Advised to continue use of metatarsal pads or gel inserts for added relief to plantar foot.   Xrays reviewed with patient  No treatment at this time for tailor's bunions.   An After Visit Summary was printed and given to the patient at discharge, including (if requested) any available informative/educational handouts regarding diagnosis, treatment, or medications. All questions were answered to patient/family satisfaction. Should symptoms fail to improve or worsen they agree to call or return to clinic or to go to the Emergency Department. Discussed the importance of following up with any needed screening tests/labs/specialist appointments and any requested follow-up recommended by me today. Importance of maintaining follow-up discussed and patient accepts that missed appointments can delay diagnosis and potentially lead to worsening of conditions.  Return if symptoms worsen or fail to improve., or sooner if acute issues arise.        This document has been electronically signed by Sadi Alberts DPM on February 9, 2018 11:31 AM

## 2018-04-12 PROBLEM — Z12.12 SCREENING FOR COLORECTAL CANCER: Status: RESOLVED | Noted: 2017-11-04 | Resolved: 2018-04-12

## 2018-04-12 PROBLEM — Z12.11 SCREENING FOR COLORECTAL CANCER: Status: RESOLVED | Noted: 2017-11-04 | Resolved: 2018-04-12

## 2018-05-09 ENCOUNTER — OFFICE VISIT (OUTPATIENT)
Dept: INTERNAL MEDICINE | Age: 58
End: 2018-05-09
Payer: COMMERCIAL

## 2018-05-09 VITALS
SYSTOLIC BLOOD PRESSURE: 110 MMHG | WEIGHT: 128 LBS | RESPIRATION RATE: 18 BRPM | HEART RATE: 87 BPM | HEIGHT: 63 IN | BODY MASS INDEX: 22.68 KG/M2 | OXYGEN SATURATION: 97 % | DIASTOLIC BLOOD PRESSURE: 78 MMHG

## 2018-05-09 DIAGNOSIS — M85.89 OSTEOPENIA OF MULTIPLE SITES: ICD-10-CM

## 2018-05-09 DIAGNOSIS — Z12.31 SCREENING MAMMOGRAM, ENCOUNTER FOR: ICD-10-CM

## 2018-05-09 DIAGNOSIS — E55.9 VITAMIN D DEFICIENCY: ICD-10-CM

## 2018-05-09 DIAGNOSIS — Z00.00 ANNUAL PHYSICAL EXAM: Primary | ICD-10-CM

## 2018-05-09 DIAGNOSIS — L98.9 SKIN LESION OF BACK: ICD-10-CM

## 2018-05-09 DIAGNOSIS — E78.00 PURE HYPERCHOLESTEROLEMIA: ICD-10-CM

## 2018-05-09 PROCEDURE — 99396 PREV VISIT EST AGE 40-64: CPT | Performed by: INTERNAL MEDICINE

## 2018-05-09 RX ORDER — VENLAFAXINE HYDROCHLORIDE 75 MG/1
75 CAPSULE, EXTENDED RELEASE ORAL DAILY
Qty: 90 CAPSULE | Refills: 1 | Status: SHIPPED | OUTPATIENT
Start: 2018-05-09 | End: 2018-10-12 | Stop reason: SDUPTHER

## 2018-05-09 RX ORDER — IBANDRONATE SODIUM 150 MG/1
TABLET, FILM COATED ORAL
Qty: 3 TABLET | Refills: 4 | Status: ON HOLD
Start: 2018-05-09 | End: 2019-07-13 | Stop reason: ALTCHOICE

## 2018-05-09 RX ORDER — ROSUVASTATIN CALCIUM 20 MG/1
20 TABLET, COATED ORAL DAILY
Qty: 90 TABLET | Refills: 1 | Status: SHIPPED | OUTPATIENT
Start: 2018-05-09 | End: 2018-10-12 | Stop reason: SDUPTHER

## 2018-05-09 RX ORDER — ERGOCALCIFEROL 1.25 MG/1
50000 CAPSULE ORAL WEEKLY
Qty: 12 CAPSULE | Refills: 4 | Status: SHIPPED | OUTPATIENT
Start: 2018-05-09 | End: 2019-07-18 | Stop reason: SDUPTHER

## 2018-05-09 ASSESSMENT — ENCOUNTER SYMPTOMS
EYE REDNESS: 0
VOICE CHANGE: 0
EYE PAIN: 0
WHEEZING: 0
BLOOD IN STOOL: 0
SORE THROAT: 0
COUGH: 0
DIARRHEA: 0
VOMITING: 0
COLOR CHANGE: 0
ROS SKIN COMMENTS: SKIN LESIONS ON BACK
SINUS PRESSURE: 0
CONSTIPATION: 0
ABDOMINAL PAIN: 0
NAUSEA: 0
TROUBLE SWALLOWING: 0
CHEST TIGHTNESS: 0

## 2018-06-08 PROBLEM — Z12.31 SCREENING MAMMOGRAM, ENCOUNTER FOR: Status: RESOLVED | Noted: 2018-05-09 | Resolved: 2018-06-08

## 2018-06-08 PROBLEM — Z00.00 ANNUAL PHYSICAL EXAM: Status: RESOLVED | Noted: 2018-05-09 | Resolved: 2018-06-08

## 2018-06-18 ENCOUNTER — HOSPITAL ENCOUNTER (OUTPATIENT)
Age: 58
Setting detail: OBSERVATION
Discharge: HOME | End: 2018-06-19
Attending: EMERGENCY MEDICINE | Admitting: INTERNAL MEDICINE
Payer: COMMERCIAL

## 2018-06-18 ENCOUNTER — APPOINTMENT (OUTPATIENT)
Dept: GENERAL RADIOLOGY | Age: 58
End: 2018-06-18
Payer: COMMERCIAL

## 2018-06-18 DIAGNOSIS — R07.9 CHEST PAIN, UNSPECIFIED TYPE: Primary | ICD-10-CM

## 2018-06-18 LAB
ALBUMIN SERPL-MCNC: 4.2 G/DL (ref 3.5–5.2)
ALP BLD-CCNC: 87 U/L (ref 35–104)
ALT SERPL-CCNC: 63 U/L (ref 5–33)
ANION GAP SERPL CALCULATED.3IONS-SCNC: 16 MMOL/L (ref 7–19)
AST SERPL-CCNC: 139 U/L (ref 5–32)
BASOPHILS ABSOLUTE: 0 K/UL (ref 0–0.2)
BASOPHILS RELATIVE PERCENT: 0.5 % (ref 0–1)
BILIRUB SERPL-MCNC: <0.2 MG/DL (ref 0.2–1.2)
BUN BLDV-MCNC: 19 MG/DL (ref 6–20)
CALCIUM SERPL-MCNC: 9 MG/DL (ref 8.6–10)
CHLORIDE BLD-SCNC: 100 MMOL/L (ref 98–111)
CO2: 23 MMOL/L (ref 22–29)
CREAT SERPL-MCNC: 0.6 MG/DL (ref 0.5–0.9)
EOSINOPHILS ABSOLUTE: 0.2 K/UL (ref 0–0.6)
EOSINOPHILS RELATIVE PERCENT: 3.2 % (ref 0–5)
GFR NON-AFRICAN AMERICAN: >60
GLUCOSE BLD-MCNC: 107 MG/DL (ref 74–109)
HCT VFR BLD CALC: 41.3 % (ref 37–47)
HEMOGLOBIN: 13 G/DL (ref 12–16)
LYMPHOCYTES ABSOLUTE: 2.2 K/UL (ref 1.1–4.5)
LYMPHOCYTES RELATIVE PERCENT: 36.9 % (ref 20–40)
MCH RBC QN AUTO: 27.1 PG (ref 27–31)
MCHC RBC AUTO-ENTMCNC: 31.5 G/DL (ref 33–37)
MCV RBC AUTO: 86.2 FL (ref 81–99)
MONOCYTES ABSOLUTE: 0.4 K/UL (ref 0–0.9)
MONOCYTES RELATIVE PERCENT: 6.8 % (ref 0–10)
NEUTROPHILS ABSOLUTE: 3.1 K/UL (ref 1.5–7.5)
NEUTROPHILS RELATIVE PERCENT: 52.4 % (ref 50–65)
PDW BLD-RTO: 13 % (ref 11.5–14.5)
PERFORMED ON: NORMAL
PLATELET # BLD: 301 K/UL (ref 130–400)
PMV BLD AUTO: 9.4 FL (ref 9.4–12.3)
POC TROPONIN I: 0 NG/ML (ref 0–0.08)
POTASSIUM SERPL-SCNC: 3.7 MMOL/L (ref 3.5–5)
RBC # BLD: 4.79 M/UL (ref 4.2–5.4)
SODIUM BLD-SCNC: 139 MMOL/L (ref 136–145)
TOTAL PROTEIN: 6.5 G/DL (ref 6.6–8.7)
TROPONIN: 0.02 NG/ML (ref 0–0.03)
WBC # BLD: 5.9 K/UL (ref 4.8–10.8)

## 2018-06-18 PROCEDURE — 71045 X-RAY EXAM CHEST 1 VIEW: CPT

## 2018-06-18 PROCEDURE — 84484 ASSAY OF TROPONIN QUANT: CPT

## 2018-06-18 PROCEDURE — 93005 ELECTROCARDIOGRAM TRACING: CPT

## 2018-06-18 PROCEDURE — 85025 COMPLETE CBC W/AUTO DIFF WBC: CPT

## 2018-06-18 PROCEDURE — 36415 COLL VENOUS BLD VENIPUNCTURE: CPT

## 2018-06-18 PROCEDURE — 99285 EMERGENCY DEPT VISIT HI MDM: CPT

## 2018-06-18 PROCEDURE — 80053 COMPREHEN METABOLIC PANEL: CPT

## 2018-06-18 RX ORDER — ASPIRIN 325 MG
325 TABLET ORAL ONCE
Status: COMPLETED | OUTPATIENT
Start: 2018-06-18 | End: 2018-06-19

## 2018-06-18 RX ORDER — NITROGLYCERIN 0.4 MG/1
0.4 TABLET SUBLINGUAL EVERY 5 MIN PRN
Status: DISCONTINUED | OUTPATIENT
Start: 2018-06-18 | End: 2018-06-19 | Stop reason: HOSPADM

## 2018-06-18 ASSESSMENT — ENCOUNTER SYMPTOMS
RHINORRHEA: 0
VOMITING: 0
SINUS PRESSURE: 0
WHEEZING: 0
CHEST TIGHTNESS: 0
ABDOMINAL PAIN: 0
APNEA: 0
CONSTIPATION: 0
EYE PAIN: 0
SHORTNESS OF BREATH: 0
COUGH: 0
SORE THROAT: 0
DIARRHEA: 0
NAUSEA: 0
ABDOMINAL DISTENTION: 0

## 2018-06-18 ASSESSMENT — PAIN SCALES - GENERAL: PAINLEVEL_OUTOF10: 8

## 2018-06-19 VITALS
OXYGEN SATURATION: 95 % | BODY MASS INDEX: 23.07 KG/M2 | HEIGHT: 63 IN | DIASTOLIC BLOOD PRESSURE: 66 MMHG | RESPIRATION RATE: 18 BRPM | WEIGHT: 130.2 LBS | TEMPERATURE: 97.3 F | HEART RATE: 89 BPM | SYSTOLIC BLOOD PRESSURE: 116 MMHG

## 2018-06-19 PROBLEM — R07.89 CHEST PRESSURE: Status: ACTIVE | Noted: 2018-06-19

## 2018-06-19 LAB
TROPONIN: 0.02 NG/ML (ref 0–0.03)
TROPONIN: <0.01 NG/ML (ref 0–0.03)

## 2018-06-19 PROCEDURE — 99220 PR INITIAL OBSERVATION CARE/DAY 70 MINUTES: CPT | Performed by: INTERNAL MEDICINE

## 2018-06-19 PROCEDURE — G0378 HOSPITAL OBSERVATION PER HR: HCPCS

## 2018-06-19 PROCEDURE — 6370000000 HC RX 637 (ALT 250 FOR IP): Performed by: PHYSICIAN ASSISTANT

## 2018-06-19 PROCEDURE — 36415 COLL VENOUS BLD VENIPUNCTURE: CPT

## 2018-06-19 PROCEDURE — 6360000002 HC RX W HCPCS: Performed by: INTERNAL MEDICINE

## 2018-06-19 PROCEDURE — 2580000003 HC RX 258: Performed by: INTERNAL MEDICINE

## 2018-06-19 PROCEDURE — 93350 STRESS TTE ONLY: CPT

## 2018-06-19 PROCEDURE — 2580000003 HC RX 258: Performed by: PHYSICIAN ASSISTANT

## 2018-06-19 PROCEDURE — 99999 PR OFFICE/OUTPT VISIT,PROCEDURE ONLY: CPT | Performed by: INTERNAL MEDICINE

## 2018-06-19 PROCEDURE — 99285 EMERGENCY DEPT VISIT HI MDM: CPT | Performed by: PHYSICIAN ASSISTANT

## 2018-06-19 PROCEDURE — 84484 ASSAY OF TROPONIN QUANT: CPT

## 2018-06-19 RX ORDER — SODIUM CHLORIDE 0.9 % (FLUSH) 0.9 %
10 SYRINGE (ML) INJECTION PRN
Status: DISCONTINUED | OUTPATIENT
Start: 2018-06-19 | End: 2018-06-19 | Stop reason: HOSPADM

## 2018-06-19 RX ORDER — SODIUM CHLORIDE 9 MG/ML
INJECTION, SOLUTION INTRAVENOUS
Status: COMPLETED | OUTPATIENT
Start: 2018-06-19 | End: 2018-06-19

## 2018-06-19 RX ORDER — NITROGLYCERIN 0.4 MG/1
0.4 TABLET SUBLINGUAL EVERY 5 MIN PRN
Status: DISCONTINUED | OUTPATIENT
Start: 2018-06-19 | End: 2018-06-19 | Stop reason: HOSPADM

## 2018-06-19 RX ORDER — ASPIRIN 325 MG
325 TABLET ORAL DAILY
Status: DISCONTINUED | OUTPATIENT
Start: 2018-06-19 | End: 2018-06-19 | Stop reason: HOSPADM

## 2018-06-19 RX ORDER — ONDANSETRON 2 MG/ML
4 INJECTION INTRAMUSCULAR; INTRAVENOUS EVERY 6 HOURS PRN
Status: DISCONTINUED | OUTPATIENT
Start: 2018-06-19 | End: 2018-06-19 | Stop reason: HOSPADM

## 2018-06-19 RX ORDER — SODIUM CHLORIDE 0.9 % (FLUSH) 0.9 %
10 SYRINGE (ML) INJECTION EVERY 12 HOURS SCHEDULED
Status: DISCONTINUED | OUTPATIENT
Start: 2018-06-19 | End: 2018-06-19 | Stop reason: HOSPADM

## 2018-06-19 RX ORDER — DOBUTAMINE HYDROCHLORIDE 200 MG/100ML
10 INJECTION INTRAVENOUS CONTINUOUS PRN
Status: DISCONTINUED | OUTPATIENT
Start: 2018-06-19 | End: 2018-06-19

## 2018-06-19 RX ADMIN — ASPIRIN 325 MG ORAL TABLET 325 MG: 325 PILL ORAL at 00:06

## 2018-06-19 RX ADMIN — SODIUM CHLORIDE 250 ML: 9 INJECTION, SOLUTION INTRAVENOUS at 14:40

## 2018-06-19 RX ADMIN — Medication 10 ML: at 09:59

## 2018-06-19 RX ADMIN — NITROGLYCERIN 0.4 MG: 0.4 TABLET SUBLINGUAL at 00:06

## 2018-06-19 RX ADMIN — Medication 10 ML: at 14:30

## 2018-06-19 RX ADMIN — DOBUTAMINE HYDROCHLORIDE 10 MCG/KG/MIN: 200 INJECTION INTRAVENOUS at 14:42

## 2018-06-19 RX ADMIN — ASPIRIN 325 MG ORAL TABLET 325 MG: 325 PILL ORAL at 09:59

## 2018-06-19 ASSESSMENT — PAIN SCALES - GENERAL: PAINLEVEL_OUTOF10: 0

## 2018-06-22 LAB
EKG P AXIS: 63 DEGREES
EKG P AXIS: 72 DEGREES
EKG P-R INTERVAL: 110 MS
EKG P-R INTERVAL: 120 MS
EKG Q-T INTERVAL: 394 MS
EKG Q-T INTERVAL: 460 MS
EKG QRS DURATION: 100 MS
EKG QRS DURATION: 96 MS
EKG QTC CALCULATION (BAZETT): 429 MS
EKG QTC CALCULATION (BAZETT): 460 MS
EKG T AXIS: 54 DEGREES
EKG T AXIS: 58 DEGREES

## 2018-06-26 ENCOUNTER — HOSPITAL ENCOUNTER (OUTPATIENT)
Dept: GENERAL RADIOLOGY | Age: 58
Discharge: HOME | End: 2018-06-26
Payer: COMMERCIAL

## 2018-06-26 ENCOUNTER — OFFICE VISIT (OUTPATIENT)
Dept: INTERNAL MEDICINE | Age: 58
End: 2018-06-26
Payer: COMMERCIAL

## 2018-06-26 VITALS
HEIGHT: 63 IN | DIASTOLIC BLOOD PRESSURE: 72 MMHG | OXYGEN SATURATION: 96 % | BODY MASS INDEX: 23.18 KG/M2 | HEART RATE: 84 BPM | SYSTOLIC BLOOD PRESSURE: 118 MMHG | WEIGHT: 130.8 LBS

## 2018-06-26 DIAGNOSIS — K76.0 FATTY LIVER DISEASE, NONALCOHOLIC: ICD-10-CM

## 2018-06-26 DIAGNOSIS — R74.01 ELEVATED AST (SGOT): ICD-10-CM

## 2018-06-26 DIAGNOSIS — E55.9 VITAMIN D DEFICIENCY: ICD-10-CM

## 2018-06-26 DIAGNOSIS — R07.89 CHEST WALL PAIN: Primary | ICD-10-CM

## 2018-06-26 DIAGNOSIS — R07.89 OTHER CHEST PAIN: ICD-10-CM

## 2018-06-26 DIAGNOSIS — K21.9 GASTROESOPHAGEAL REFLUX DISEASE, ESOPHAGITIS PRESENCE NOT SPECIFIED: ICD-10-CM

## 2018-06-26 DIAGNOSIS — R07.89 CHEST WALL PAIN: ICD-10-CM

## 2018-06-26 LAB
ALBUMIN SERPL-MCNC: 4.2 G/DL (ref 3.5–5.2)
ALP BLD-CCNC: 78 U/L (ref 35–104)
ALT SERPL-CCNC: 36 U/L (ref 5–33)
AST SERPL-CCNC: 35 U/L (ref 5–32)
BILIRUB SERPL-MCNC: <0.2 MG/DL (ref 0.2–1.2)
BILIRUBIN DIRECT: 0.1 MG/DL (ref 0–0.3)
BILIRUBIN, INDIRECT: 0.1 MG/DL (ref 0.1–1)
GAMMA GLUTAMYL TRANSFERASE: 24 U/L (ref 7–54)
TOTAL PROTEIN: 6.4 G/DL (ref 6.6–8.7)
VITAMIN D 25-HYDROXY: 34.1 NG/ML

## 2018-06-26 PROCEDURE — 99214 OFFICE O/P EST MOD 30 MIN: CPT | Performed by: INTERNAL MEDICINE

## 2018-06-26 PROCEDURE — 71046 X-RAY EXAM CHEST 2 VIEWS: CPT

## 2018-06-26 ASSESSMENT — ENCOUNTER SYMPTOMS
ABDOMINAL PAIN: 0
CHEST TIGHTNESS: 0
WHEEZING: 0
COUGH: 0
SORE THROAT: 0
CONSTIPATION: 0

## 2018-07-19 ENCOUNTER — TELEPHONE (OUTPATIENT)
Dept: CARDIOLOGY | Age: 58
End: 2018-07-19

## 2018-07-20 ENCOUNTER — TELEPHONE (OUTPATIENT)
Dept: CARDIOLOGY | Age: 58
End: 2018-07-20

## 2018-07-27 ENCOUNTER — HOSPITAL ENCOUNTER (OUTPATIENT)
Dept: WOMENS IMAGING | Age: 58
Discharge: HOME | End: 2018-07-27
Payer: COMMERCIAL

## 2018-07-27 DIAGNOSIS — Z12.31 SCREENING MAMMOGRAM, ENCOUNTER FOR: ICD-10-CM

## 2018-07-27 PROCEDURE — 77063 BREAST TOMOSYNTHESIS BI: CPT

## 2018-08-31 ENCOUNTER — OFFICE VISIT (OUTPATIENT)
Dept: URGENT CARE | Age: 58
End: 2018-08-31
Payer: COMMERCIAL

## 2018-08-31 VITALS
BODY MASS INDEX: 23.14 KG/M2 | HEIGHT: 63 IN | WEIGHT: 130.6 LBS | OXYGEN SATURATION: 96 % | RESPIRATION RATE: 16 BRPM | TEMPERATURE: 98.2 F | HEART RATE: 88 BPM | SYSTOLIC BLOOD PRESSURE: 133 MMHG | DIASTOLIC BLOOD PRESSURE: 87 MMHG

## 2018-08-31 DIAGNOSIS — H65.01 RIGHT ACUTE SEROUS OTITIS MEDIA, RECURRENCE NOT SPECIFIED: Primary | ICD-10-CM

## 2018-08-31 PROCEDURE — 99214 OFFICE O/P EST MOD 30 MIN: CPT | Performed by: NURSE PRACTITIONER

## 2018-08-31 RX ORDER — LORATADINE AND PSEUDOEPHEDRINE 10; 240 MG/1; MG/1
1 TABLET, EXTENDED RELEASE ORAL DAILY
Status: ON HOLD | COMMUNITY
End: 2019-07-13 | Stop reason: ALTCHOICE

## 2018-08-31 RX ORDER — FLUTICASONE PROPIONATE 50 MCG
1 SPRAY, SUSPENSION (ML) NASAL DAILY
Qty: 1 BOTTLE | Refills: 0 | Status: SHIPPED | OUTPATIENT
Start: 2018-08-31 | End: 2022-03-21

## 2018-08-31 RX ORDER — AMOXICILLIN 500 MG/1
500 CAPSULE ORAL 2 TIMES DAILY
Qty: 20 CAPSULE | Refills: 0 | Status: SHIPPED | OUTPATIENT
Start: 2018-08-31 | End: 2018-09-10

## 2018-08-31 RX ORDER — IBUPROFEN 800 MG/1
800 TABLET ORAL EVERY 8 HOURS PRN
Qty: 30 TABLET | Refills: 0 | Status: SHIPPED | OUTPATIENT
Start: 2018-08-31 | End: 2019-07-18

## 2018-08-31 ASSESSMENT — ENCOUNTER SYMPTOMS
VOMITING: 0
DIARRHEA: 0
NAUSEA: 0
COUGH: 0
ABDOMINAL PAIN: 0
SORE THROAT: 1
RHINORRHEA: 0

## 2018-08-31 NOTE — PROGRESS NOTES
[address] [facility]  [address] [facility]  [address] [place] [id number]  Dept: [phone]  Loc: [phone]    Brittney Crook is a 62 y.o. female who presents today for her medical conditions/complaints as noted below. Brittney Crook is c/o of Otalgia (right)        HPI:     Otalgia    There is pain in the right ear. This is a new problem. The current episode started in the past 7 days. The problem occurs constantly. There has been no fever. The pain is at a severity of 7/10. Associated symptoms include a sore throat. Pertinent negatives include no abdominal pain, coughing, diarrhea, ear discharge, headaches, hearing loss, neck pain, rash, rhinorrhea or vomiting. She has tried nothing for the symptoms.        Past Medical History:   Diagnosis Date    Anxiety     Breast cancer ([address]     Cancer ([address]     liposcaarcoma, peritoneal    Hyperlipidemia       Past Surgical History:   Procedure Laterality Date    BREAST LUMPECTOMY Left     CHOLECYSTECTOMY  2008    COLONOSCOPY  12/09/2016    Dr Joel Terry access, normall, 5 yr recall w/Dr Adalberto Justice    MASTECTOMY, PARTIAL Left 2008    left breast cancer    [place] COLONOSCOPY FLX DX W/COLLJ SPEC WHEN PFRMD N/A 12/9/2016    COLONOSCOPY DIAGNOSTIC OR SCREENING performed by Francisco J Payan DO at [address] [facility]    CHRISTIANE AND ROMY drew normal PAP's prior to hysterectomy    TONSILLECTOMY         Family History   Problem Relation Age of Onset    Alzheimer's Disease Mother     Breast Cancer Mother 48    Heart Disease Father     Diabetes Father     Cancer Brother     Colon Cancer Neg Hx     Colon Polyps Neg Hx     Liver Cancer Neg Hx     Liver Disease Neg Hx     Esophageal Cancer Neg Hx     Rectal Cancer Neg Hx     Stomach Cancer Neg Hx        Social History   Substance Use Topics    Smoking status: Former Smoker     Packs/day: 0.50     Years: 35.00     Quit date: 2016    Smokeless tobacco: Never Used    Alcohol use Yes      Current Outpatient Prescriptions   Medication Sig Dispense Refill    loratadine-pseudoephedrine (CLARITIN-D 24 HOUR)  MG per extended release tablet Take 1 tablet by mouth daily      amoxicillin (AMOXIL) 500 MG capsule Take 1 capsule by mouth 2 times daily for 10 days 20 capsule 0    ibuprofen (ADVIL;MOTRIN) 800 MG tablet Take 1 tablet by mouth every 8 hours as needed for Pain 30 tablet 0    fluticasone (FLONASE) 50 MCG/ACT nasal spray 1 spray by Nasal route daily for 10 days 1 Bottle 0    ibandronate (BONIVA) 150 MG tablet TAKE 1 TABLET BY MOUTH ONCE MONTHLY 3 tablet 4    rosuvastatin (CRESTOR) 20 MG tablet Take 1 tablet by mouth daily 90 tablet 1    venlafaxine (EFFEXOR XR) 75 MG extended release capsule Take 1 capsule by mouth daily 90 capsule 1    vitamin D (ERGOCALCIFEROL) 54287 units CAPS capsule Take 1 capsule by mouth once a week 12 capsule 4     No current facility-administered medications for this visit. Allergies   Allergen Reactions    Adhesive Tape Swelling    Sulfa Antibiotics Swelling       Health Maintenance   Topic Date Due    HIV screen  05/17/1975    DTaP/Tdap/Td vaccine (1 - Tdap) 05/17/1979    Shingles Vaccine (1 of 2 - 2 Dose Series) 05/17/2010    Flu vaccine (1) 09/01/2018    Breast cancer screen  07/27/2019    Colon cancer screen colonoscopy  12/09/2021    Lipid screen  11/14/2022    Hepatitis C screen  Completed       Subjective:      Review of Systems   Constitutional: Negative for chills and fever. HENT: Positive for ear pain and sore throat. Negative for ear discharge, hearing loss and rhinorrhea. Respiratory: Negative for cough. Gastrointestinal: Negative for abdominal pain, diarrhea, nausea and vomiting. Musculoskeletal: Negative for neck pain. Skin: Negative for rash. Neurological: Negative for headaches. All other systems reviewed and are negative.       Objective:     Physical Exam   Constitutional: She is oriented to Si spray by Nasal route daily for 10 days     Dispense:  1 Bottle     Refill:  0       Patient given educational materials - see patient instructions. Discussed use, benefit, and side effects of prescribed medications. All patient questions answered. Pt voiced understanding. Reviewed health maintenance. Instructed to continue current medications, diet and exercise. Patient agreed with treatment plan. Follow up as directed. Patient Instructions       Patient Education        Middle Ear Fluid: Care Instructions  Your Care Instructions    Fluid often builds up inside the ear during a cold or allergies. Usually the fluid drains away, but sometimes a small tube in the ear, called the eustachian tube, stays blocked for months. Symptoms of fluid buildup may include:  · Popping, ringing, or a feeling of fullness or pressure in the ear. · Trouble hearing. · Balance problems and dizziness. In most cases, you can treat yourself at home. Follow-up care is a key part of your treatment and safety. Be sure to make and go to all appointments, and call your doctor if you are having problems. It's also a good idea to know your test results and keep a list of the medicines you take. How can you care for yourself at home? · In most cases, the fluid clears up within a few months without treatment. You may need more tests if the fluid does not clear up after 3 months. · If your doctor prescribed antibiotics, take them as directed. Do not stop taking them just because you feel better. You need to take the full course of antibiotics. When should you call for help? Call your doctor now or seek immediate medical care if:    · You have symptoms of infection, such as:  ¨ Increased pain, swelling, warmth, or redness. ¨ Pus draining from the area. ¨ A fever.    Watch closely for changes in your health, and be sure to contact your doctor if:    · You notice changes in hearing.     · You do not get better as expected.

## 2018-08-31 NOTE — PATIENT INSTRUCTIONS
Patient Education        Middle Ear Fluid: Care Instructions  Your Care Instructions    Fluid often builds up inside the ear during a cold or allergies. Usually the fluid drains away, but sometimes a small tube in the ear, called the eustachian tube, stays blocked for months. Symptoms of fluid buildup may include:  · Popping, ringing, or a feeling of fullness or pressure in the ear. · Trouble hearing. · Balance problems and dizziness. In most cases, you can treat yourself at home. Follow-up care is a key part of your treatment and safety. Be sure to make and go to all appointments, and call your doctor if you are having problems. It's also a good idea to know your test results and keep a list of the medicines you take. How can you care for yourself at home? · In most cases, the fluid clears up within a few months without treatment. You may need more tests if the fluid does not clear up after 3 months. · If your doctor prescribed antibiotics, take them as directed. Do not stop taking them just because you feel better. You need to take the full course of antibiotics. When should you call for help? Call your doctor now or seek immediate medical care if:    · You have symptoms of infection, such as:  ¨ Increased pain, swelling, warmth, or redness. ¨ Pus draining from the area. ¨ A fever.    Watch closely for changes in your health, and be sure to contact your doctor if:    · You notice changes in hearing.     · You do not get better as expected. Where can you learn more? Go to https://[employer]. org and sign in to your [employer] account. Enter Y686 in the [facility] box to learn more about \"Middle Ear Fluid: Care Instructions. \"     If you do not have an account, please click on the \"Sign Up Now\" link. Current as of: May 12, 2017  Content Version: 11.7  © 2203-6308 [employer]. Care instructions adapted under license by [facility] ([facility]).  If you have questions about a medical condition or this instruction, always ask your healthcare professional. [address]  any warranty or liability for your use of this information. 1. Continue claritin and start taking flonase nasal spray  2. Antibiotic as directed  3. Warm compresses  4. Ibuprofen 800mg three times a day as needed for pain   5. Return to clinic with new or worsening symptoms. Follow up with PCP as needed.

## 2018-10-12 RX ORDER — ROSUVASTATIN CALCIUM 20 MG/1
20 TABLET, COATED ORAL DAILY
Qty: 90 TABLET | Refills: 1 | Status: SHIPPED | OUTPATIENT
Start: 2018-10-12 | End: 2019-07-18 | Stop reason: SDUPTHER

## 2018-10-12 RX ORDER — VENLAFAXINE HYDROCHLORIDE 75 MG/1
75 CAPSULE, EXTENDED RELEASE ORAL DAILY
Qty: 30 CAPSULE | Refills: 0 | Status: SHIPPED | OUTPATIENT
Start: 2018-10-12 | End: 2018-10-12 | Stop reason: SDUPTHER

## 2018-10-12 RX ORDER — VENLAFAXINE HYDROCHLORIDE 75 MG/1
75 CAPSULE, EXTENDED RELEASE ORAL DAILY
Qty: 90 CAPSULE | Refills: 1 | Status: SHIPPED | OUTPATIENT
Start: 2018-10-12 | End: 2019-07-18 | Stop reason: SDUPTHER

## 2019-07-13 ENCOUNTER — HOSPITAL ENCOUNTER (OUTPATIENT)
Age: 59
Setting detail: OBSERVATION
LOS: 1 days | Discharge: HOME | End: 2019-07-14
Attending: EMERGENCY MEDICINE | Admitting: INTERNAL MEDICINE
Payer: COMMERCIAL

## 2019-07-13 ENCOUNTER — APPOINTMENT (OUTPATIENT)
Dept: CT IMAGING | Age: 59
End: 2019-07-13
Payer: COMMERCIAL

## 2019-07-13 ENCOUNTER — APPOINTMENT (OUTPATIENT)
Dept: GENERAL RADIOLOGY | Age: 59
End: 2019-07-13
Payer: COMMERCIAL

## 2019-07-13 DIAGNOSIS — R74.8 LIVER ENZYME ELEVATION: Primary | ICD-10-CM

## 2019-07-13 DIAGNOSIS — R77.8 ELEVATED TROPONIN: ICD-10-CM

## 2019-07-13 DIAGNOSIS — R07.9 CHEST PAIN, UNSPECIFIED TYPE: ICD-10-CM

## 2019-07-13 PROBLEM — I24.9 ACS (ACUTE CORONARY SYNDROME) (HCC): Status: ACTIVE | Noted: 2019-07-13

## 2019-07-13 LAB
ALBUMIN SERPL-MCNC: 3.9 G/DL (ref 3.5–5.2)
ALP BLD-CCNC: 107 U/L (ref 35–104)
ALT SERPL-CCNC: 122 U/L (ref 5–33)
ANION GAP SERPL CALCULATED.3IONS-SCNC: 12 MMOL/L (ref 7–19)
AST SERPL-CCNC: 328 U/L (ref 5–32)
BILIRUB SERPL-MCNC: 0.4 MG/DL (ref 0.2–1.2)
BUN BLDV-MCNC: 12 MG/DL (ref 6–20)
CALCIUM SERPL-MCNC: 8.6 MG/DL (ref 8.6–10)
CHLORIDE BLD-SCNC: 104 MMOL/L (ref 98–111)
CO2: 24 MMOL/L (ref 22–29)
CREAT SERPL-MCNC: <0.5 MG/DL (ref 0.5–0.9)
D DIMER: 0.69 UG/ML FEU (ref 0–0.48)
GFR NON-AFRICAN AMERICAN: >60
GLUCOSE BLD-MCNC: 123 MG/DL (ref 74–109)
HCT VFR BLD CALC: 41.7 % (ref 37–47)
HEMOGLOBIN: 13.3 G/DL (ref 12–16)
MCH RBC QN AUTO: 27.8 PG (ref 27–31)
MCHC RBC AUTO-ENTMCNC: 31.9 G/DL (ref 33–37)
MCV RBC AUTO: 87.2 FL (ref 81–99)
PDW BLD-RTO: 13 % (ref 11.5–14.5)
PLATELET # BLD: 264 K/UL (ref 130–400)
PMV BLD AUTO: 9.4 FL (ref 9.4–12.3)
POTASSIUM SERPL-SCNC: 4 MMOL/L (ref 3.5–5)
RBC # BLD: 4.78 M/UL (ref 4.2–5.4)
SODIUM BLD-SCNC: 140 MMOL/L (ref 136–145)
TOTAL PROTEIN: 6 G/DL (ref 6.6–8.7)
TROPONIN: 0.03 NG/ML (ref 0–0.03)
TROPONIN: 0.04 NG/ML (ref 0–0.03)
TROPONIN: 0.04 NG/ML (ref 0–0.03)
TROPONIN: 0.05 NG/ML (ref 0–0.03)
WBC # BLD: 6.4 K/UL (ref 4.8–10.8)

## 2019-07-13 PROCEDURE — 99285 EMERGENCY DEPT VISIT HI MDM: CPT | Performed by: EMERGENCY MEDICINE

## 2019-07-13 PROCEDURE — 2580000003 HC RX 258: Performed by: INTERNAL MEDICINE

## 2019-07-13 PROCEDURE — 84484 ASSAY OF TROPONIN QUANT: CPT

## 2019-07-13 PROCEDURE — 93005 ELECTROCARDIOGRAM TRACING: CPT

## 2019-07-13 PROCEDURE — 85379 FIBRIN DEGRADATION QUANT: CPT

## 2019-07-13 PROCEDURE — 6360000004 HC RX CONTRAST MEDICATION: Performed by: EMERGENCY MEDICINE

## 2019-07-13 PROCEDURE — G0378 HOSPITAL OBSERVATION PER HR: HCPCS

## 2019-07-13 PROCEDURE — 6360000002 HC RX W HCPCS: Performed by: INTERNAL MEDICINE

## 2019-07-13 PROCEDURE — 36415 COLL VENOUS BLD VENIPUNCTURE: CPT

## 2019-07-13 PROCEDURE — 96372 THER/PROPH/DIAG INJ SC/IM: CPT

## 2019-07-13 PROCEDURE — 80053 COMPREHEN METABOLIC PANEL: CPT

## 2019-07-13 PROCEDURE — 85027 COMPLETE CBC AUTOMATED: CPT

## 2019-07-13 PROCEDURE — 99285 EMERGENCY DEPT VISIT HI MDM: CPT

## 2019-07-13 PROCEDURE — 99220 PR INITIAL OBSERVATION CARE/DAY 70 MINUTES: CPT | Performed by: INTERNAL MEDICINE

## 2019-07-13 PROCEDURE — 71275 CT ANGIOGRAPHY CHEST: CPT

## 2019-07-13 PROCEDURE — 6370000000 HC RX 637 (ALT 250 FOR IP): Performed by: EMERGENCY MEDICINE

## 2019-07-13 PROCEDURE — 71046 X-RAY EXAM CHEST 2 VIEWS: CPT

## 2019-07-13 PROCEDURE — 6370000000 HC RX 637 (ALT 250 FOR IP): Performed by: INTERNAL MEDICINE

## 2019-07-13 RX ORDER — VENLAFAXINE HYDROCHLORIDE 75 MG/1
75 CAPSULE, EXTENDED RELEASE ORAL DAILY
Status: DISCONTINUED | OUTPATIENT
Start: 2019-07-14 | End: 2019-07-14 | Stop reason: HOSPADM

## 2019-07-13 RX ORDER — ASPIRIN 325 MG
325 TABLET ORAL ONCE
Status: COMPLETED | OUTPATIENT
Start: 2019-07-13 | End: 2019-07-13

## 2019-07-13 RX ORDER — ERGOCALCIFEROL 1.25 MG/1
50000 CAPSULE ORAL WEEKLY
Status: DISCONTINUED | OUTPATIENT
Start: 2019-07-13 | End: 2019-07-14 | Stop reason: HOSPADM

## 2019-07-13 RX ORDER — SODIUM CHLORIDE 0.9 % (FLUSH) 0.9 %
10 SYRINGE (ML) INJECTION PRN
Status: DISCONTINUED | OUTPATIENT
Start: 2019-07-13 | End: 2019-07-13 | Stop reason: SDUPTHER

## 2019-07-13 RX ORDER — SODIUM CHLORIDE 0.9 % (FLUSH) 0.9 %
10 SYRINGE (ML) INJECTION EVERY 12 HOURS SCHEDULED
Status: DISCONTINUED | OUTPATIENT
Start: 2019-07-13 | End: 2019-07-13 | Stop reason: SDUPTHER

## 2019-07-13 RX ORDER — SODIUM CHLORIDE 0.9 % (FLUSH) 0.9 %
10 SYRINGE (ML) INJECTION EVERY 12 HOURS SCHEDULED
Status: DISCONTINUED | OUTPATIENT
Start: 2019-07-13 | End: 2019-07-14 | Stop reason: SDUPTHER

## 2019-07-13 RX ORDER — ROSUVASTATIN CALCIUM 10 MG/1
20 TABLET, COATED ORAL DAILY
Status: DISCONTINUED | OUTPATIENT
Start: 2019-07-14 | End: 2019-07-14 | Stop reason: HOSPADM

## 2019-07-13 RX ORDER — IBUPROFEN 400 MG/1
800 TABLET ORAL EVERY 8 HOURS PRN
Status: DISCONTINUED | OUTPATIENT
Start: 2019-07-13 | End: 2019-07-14 | Stop reason: HOSPADM

## 2019-07-13 RX ORDER — SODIUM CHLORIDE 0.9 % (FLUSH) 0.9 %
10 SYRINGE (ML) INJECTION PRN
Status: DISCONTINUED | OUTPATIENT
Start: 2019-07-13 | End: 2019-07-14 | Stop reason: SDUPTHER

## 2019-07-13 RX ORDER — ASPIRIN 325 MG
325 TABLET ORAL DAILY
Status: DISCONTINUED | OUTPATIENT
Start: 2019-07-14 | End: 2019-07-14 | Stop reason: HOSPADM

## 2019-07-13 RX ORDER — FLUTICASONE PROPIONATE 50 MCG
1 SPRAY, SUSPENSION (ML) NASAL DAILY PRN
Status: DISCONTINUED | OUTPATIENT
Start: 2019-07-13 | End: 2019-07-14 | Stop reason: HOSPADM

## 2019-07-13 RX ADMIN — ENOXAPARIN SODIUM 40 MG: 40 INJECTION SUBCUTANEOUS at 13:51

## 2019-07-13 RX ADMIN — ERGOCALCIFEROL 50000 UNITS: 1.25 CAPSULE ORAL at 18:22

## 2019-07-13 RX ADMIN — IOPAMIDOL 90 ML: 755 INJECTION, SOLUTION INTRAVENOUS at 10:30

## 2019-07-13 RX ADMIN — ASPIRIN 325 MG: 325 TABLET, COATED ORAL at 12:37

## 2019-07-13 RX ADMIN — Medication 10 ML: at 20:15

## 2019-07-13 ASSESSMENT — ENCOUNTER SYMPTOMS
EYE PAIN: 0
NAUSEA: 1
VOMITING: 0
DIARRHEA: 0
ABDOMINAL PAIN: 0
SHORTNESS OF BREATH: 0

## 2019-07-13 ASSESSMENT — PAIN SCALES - GENERAL: PAINLEVEL_OUTOF10: 6

## 2019-07-13 ASSESSMENT — PAIN DESCRIPTION - LOCATION: LOCATION: CHEST

## 2019-07-13 NOTE — ED PROVIDER NOTES
Diagnosis Date    Anxiety     [facility]     Cancer ([facility]     liposcaarcoma, peritoneal    Hyperlipidemia          SURGICAL HISTORY       Past Surgical History:   Procedure Laterality Date    BREAST LUMPECTOMY Left     CHOLECYSTECTOMY  2008    COLONOSCOPY  12/09/2016    Dr Riccardo Hall access, normall, 5 yr recall w/Dr Denver Hopkins    MASTECTOMY, PARTIAL Left 2008    left breast cancer    [place] COLONOSCOPY FLX DX W/COLLJ SPEC WHEN PFRMD N/A 12/9/2016    COLONOSCOPY DIAGNOSTIC OR SCREENING performed by Coty Barclay DO at [facility]    CHRISTIANE AND ROMY      aldenice normal PAP's prior to hysterectomy    TONSILLECTOMY           CURRENT MEDICATIONS       Previous Medications    FLUTICASONE (FLONASE) 50 MCG/ACT NASAL SPRAY    1 spray by Nasal route daily for 10 days    IBANDRONATE (BONIVA) 150 MG TABLET    TAKE 1 TABLET BY MOUTH ONCE MONTHLY    IBUPROFEN (ADVIL;MOTRIN) 800 MG TABLET    Take 1 tablet by mouth every 8 hours as needed for Pain    LORATADINE-PSEUDOEPHEDRINE (CLARITIN-D 24 HOUR)  MG PER EXTENDED RELEASE TABLET    Take 1 tablet by mouth daily    ROSUVASTATIN (CRESTOR) 20 MG TABLET    Take 1 tablet by mouth daily    VENLAFAXINE (EFFEXOR XR) 75 MG EXTENDED RELEASE CAPSULE    Take 1 capsule by mouth daily    VITAMIN D (ERGOCALCIFEROL) 21781 UNITS CAPS CAPSULE    Take 1 capsule by mouth once a week       ALLERGIES     Adhesive tape and Sulfa antibiotics    FAMILY HISTORY       Family History   Problem Relation Age of Onset    Alzheimer's Disease Mother     Breast Cancer Mother 48    Heart Disease Father     Diabetes Father     Cancer Brother     Colon Cancer Neg Hx     Colon Polyps Neg Hx     Liver Cancer Neg Hx     Liver Disease Neg Hx     Esophageal Cancer Neg Hx     Rectal Cancer Neg Hx     Stomach Cancer Neg Hx           SOCIAL HISTORY       Social History     Socioeconomic History    Marital status:      Spouse name: Not on file    Number of children: Not on file    the following components:    MCHC 31.9 (*)     All other components within normal limits   COMPREHENSIVE METABOLIC PANEL - Abnormal; Notable for the following components:    Glucose 123 (*)     Total Protein 6.0 (*)     Alkaline Phosphatase 107 (*)      (*)      (*)     All other components within normal limits   TROPONIN - Abnormal; Notable for the following components:    Troponin 0.04 (*)     All other components within normal limits   TROPONIN - Abnormal; Notable for the following components:    Troponin 0.04 (*)     All other components within normal limits       All other labs were within normal range or not returned as of this dictation. EMERGENCY DEPARTMENT COURSE and DIFFERENTIALDIAGNOSIS/MDM:   Vitals:    Vitals:    07/13/19 0931 07/13/19 1050 07/13/19 1101 07/13/19 1131   BP: 135/86 139/84 (!) 141/94 133/84   Pulse: 84 83 84 75   Resp: 19 15     Temp:       TempSrc:       SpO2: 90% 94% 94% 91%   Weight:       Height:           MDM  Patient has no unilateral leg swelling or pain. No history of DVT or PE. No shortness of breath. I think PE very unlikely. Patient denies any abdominal pain. Liver enzymes are bit elevated. She has no right upper quadrant pain or tenderness. Abdomen soft and nontender on exam.  She does not take excess amounts of acetaminophen and does not drink alcohol. Tells me that she has been told by her PCP the liver enzymes have been high in the past.  She has had thorough work-up for this including liver scan and testing for hepatitis in the past but no etiology for elevated LFTs was ever found. She said that they continue to fluctuate up and down but no clear answer for this has ever been found. She has no abdominal pain right upper quadrant tenderness on exam today so I do not see indication for emergent imaging of the abdomen. Patient resting comfortably with no complaints at this time. Troponin borderline elevated at 0.04.   Patient's case discussed

## 2019-07-13 NOTE — ED NOTES
Pt and family updated on [facility], [address] [facility]  07/13/19 8068

## 2019-07-13 NOTE — ED NOTES
ASSESSMENT:    SKIN:  Warm, dry, pink. Cap refill < 2 sec  CARDIAC:  S1 S2 noted. Cp since 0830. No n/v  LUNGS: clear upper and lower lobes. Respirations even and unlabored. ABDOMEN: bowel sounds noted upper and lower quadrants. Soft and tender. EXTREMITIES: bilateral DP and PT. No edema noted. Pt alert and oriented x4. Pupils equal and reactive. No distress noted. Side rails up and call light within reach.        Candace Rodriguez RN  07/13/19 1502

## 2019-07-13 NOTE — ED NOTES
Patient placed in a gown  Patient placed on cardiac monitor, continuous pulse oximeter, and NIBP monitor.  Monitor alarms on.         Sandra Bullard RN  07/13/19 2496

## 2019-07-13 NOTE — H&P
1. Presenting problem / symptom    Chest discomfort of ? etiology  are worsening   The chest discomfort is was not exertional and does not appear to represent myocardial ischemia. Nonetheless, She has the following risk factors for the presence of coronary artery disease:    Risk Factor Yes / No / Unknown       Gender Female   Cigarette Use No, but did use in the past    Family History of Cardiovascular Disease Yes: heart disease, diabetes   Diabetes Mellitus no   Hypercholesteremia yes   Hypertension no         She also has the following cardiac history:    Specialty Problems        Cardiology Problems    Pure hypercholesterolemia               Yes: cardiac catheterization Continue current medications:     Yes:            2. Acute coronary syndrome Initial presentation during this evaluation   Review and summation of old records:    2016  SE negative for myocardial ischemia  18  SE negative for myocardial ischemia  19 ACS FERNANDO RISK Score 2 (angina, + troponin ), AUC indication 3, AUC score 7      Yes: as per the cardiac catheterization Continue current medications:    Yes:            3. Systemic arterial hypertension Initial presentation during this evaluation Systolic (34VTG), KT , Min:133 , DQQ:296    Diastolic (94HES), TQL:83, Min:74, Max:94   No Continue current medications:       yes         DISCUSSION AND PLAN:    1. I had a detailed discussion with the patient and / or family regarding the historical points, physical examination findings, and any diagnostic results supporting the admission diagnosis. The patient was educated on care and need for admission. questions were invited and answered. Patient and / or family shows understanding of admission information and agrees to follow. 2. Continue present medications except for changes as noted above    3. Continue to monitor rhythm    4. Further orders per clinical course.         Date of the Proposed Procedure:  19 Proposed Procedure:  Selective left heart and coronary arteriography with possible percutaneous coronary interventon, (femoral approach), 07/14/19      :  MARYANN Leigh MD    Indications:  7/13/19 ACS FERNANDO RISK Score 2 (angina, + troponin ), AUC indication 3, AUC score 7    American Society of Anesthesiologists (ASA) Classification:  III    Plan of Sedation:  Moderate Sedation    Mallampati Classification:  II    I have examined this patient on 07/13/19  in PCU in the presence of Juan Antonio Wilkins RN and find no interval changes since the original History and Physical / Consult as noted written by myself, on 07/13/19     With the constellation of symptoms and these findings, I recommend cardiac catheterization and possibility of percutaneous coronary intervention. I discussed with her  in detail  the risks, benefits and alternatives to this procedure. The risks mentioned to her include but are not limited to:  vascular complications in ~ 3%, stroke <1%, renal dysfunction <5%, myocardial infarction <1%, coronary dissection <1%, need for emergency open heart surgery <1, and death <1% . She appeared to understand, had no questions, and agreed to proceed with this plan. Additionally, there are risks associated with moderate sedation which includes transient drop in blood pressure and need for assisted ventilation. This procedure is scheduled for 07/14/19 .     Ihsan Lee MD

## 2019-07-14 VITALS
RESPIRATION RATE: 16 BRPM | BODY MASS INDEX: 22.98 KG/M2 | TEMPERATURE: 98.2 F | SYSTOLIC BLOOD PRESSURE: 140 MMHG | OXYGEN SATURATION: 96 % | WEIGHT: 129.7 LBS | DIASTOLIC BLOOD PRESSURE: 80 MMHG | HEART RATE: 78 BPM | HEIGHT: 63 IN

## 2019-07-14 LAB
ALBUMIN SERPL-MCNC: 3.6 G/DL (ref 3.5–5.2)
ALP BLD-CCNC: 100 U/L (ref 35–104)
ALT SERPL-CCNC: 162 U/L (ref 5–33)
ANION GAP SERPL CALCULATED.3IONS-SCNC: 10 MMOL/L (ref 7–19)
AST SERPL-CCNC: 157 U/L (ref 5–32)
BILIRUB SERPL-MCNC: <0.2 MG/DL (ref 0.2–1.2)
BUN BLDV-MCNC: 14 MG/DL (ref 6–20)
CALCIUM SERPL-MCNC: 8.3 MG/DL (ref 8.6–10)
CHLORIDE BLD-SCNC: 104 MMOL/L (ref 98–111)
CO2: 27 MMOL/L (ref 22–29)
CREAT SERPL-MCNC: 0.6 MG/DL (ref 0.5–0.9)
GFR NON-AFRICAN AMERICAN: >60
GLUCOSE BLD-MCNC: 98 MG/DL (ref 74–109)
LV EF: 60 %
LVEF MODALITY: NORMAL
POTASSIUM SERPL-SCNC: 4.4 MMOL/L (ref 3.5–5)
SODIUM BLD-SCNC: 141 MMOL/L (ref 136–145)
TOTAL PROTEIN: 5.4 G/DL (ref 6.6–8.7)
TROPONIN: 0.05 NG/ML (ref 0–0.03)

## 2019-07-14 PROCEDURE — 92928 PRQ TCAT PLMT NTRAC ST 1 LES: CPT | Performed by: INTERNAL MEDICINE

## 2019-07-14 PROCEDURE — 93458 L HRT ARTERY/VENTRICLE ANGIO: CPT | Performed by: INTERNAL MEDICINE

## 2019-07-14 PROCEDURE — 99152 MOD SED SAME PHYS/QHP 5/>YRS: CPT | Performed by: INTERNAL MEDICINE

## 2019-07-14 PROCEDURE — C1894 INTRO/SHEATH, NON-LASER: HCPCS

## 2019-07-14 PROCEDURE — C1760 CLOSURE DEV, VASC: HCPCS

## 2019-07-14 PROCEDURE — 80053 COMPREHEN METABOLIC PANEL: CPT

## 2019-07-14 PROCEDURE — 6370000000 HC RX 637 (ALT 250 FOR IP): Performed by: INTERNAL MEDICINE

## 2019-07-14 PROCEDURE — 99024 POSTOP FOLLOW-UP VISIT: CPT | Performed by: INTERNAL MEDICINE

## 2019-07-14 PROCEDURE — 6360000004 HC RX CONTRAST MEDICATION: Performed by: INTERNAL MEDICINE

## 2019-07-14 PROCEDURE — 2580000003 HC RX 258: Performed by: INTERNAL MEDICINE

## 2019-07-14 PROCEDURE — 36415 COLL VENOUS BLD VENIPUNCTURE: CPT

## 2019-07-14 PROCEDURE — 6360000002 HC RX W HCPCS

## 2019-07-14 PROCEDURE — 2709999900 HC NON-CHARGEABLE SUPPLY

## 2019-07-14 PROCEDURE — G0378 HOSPITAL OBSERVATION PER HR: HCPCS

## 2019-07-14 PROCEDURE — 84484 ASSAY OF TROPONIN QUANT: CPT

## 2019-07-14 PROCEDURE — 93005 ELECTROCARDIOGRAM TRACING: CPT

## 2019-07-14 RX ORDER — SODIUM CHLORIDE 0.9 % (FLUSH) 0.9 %
10 SYRINGE (ML) INJECTION PRN
Status: DISCONTINUED | OUTPATIENT
Start: 2019-07-14 | End: 2019-07-14 | Stop reason: HOSPADM

## 2019-07-14 RX ORDER — SODIUM CHLORIDE 0.9 % (FLUSH) 0.9 %
10 SYRINGE (ML) INJECTION EVERY 12 HOURS SCHEDULED
Status: DISCONTINUED | OUTPATIENT
Start: 2019-07-14 | End: 2019-07-14 | Stop reason: HOSPADM

## 2019-07-14 RX ADMIN — VENLAFAXINE HYDROCHLORIDE 75 MG: 75 CAPSULE, EXTENDED RELEASE ORAL at 07:46

## 2019-07-14 RX ADMIN — IOPAMIDOL 76 ML: 612 INJECTION, SOLUTION INTRAVENOUS at 11:30

## 2019-07-14 RX ADMIN — Medication 10 ML: at 07:46

## 2019-07-14 RX ADMIN — ROSUVASTATIN CALCIUM 20 MG: 10 TABLET, FILM COATED ORAL at 07:45

## 2019-07-14 ASSESSMENT — PAIN SCALES - GENERAL: PAINLEVEL_OUTOF10: 0

## 2019-07-14 NOTE — DISCHARGE SUMMARY
[address] [facility]    Discharge Summary          I personally saw the patient and rounded with:  Ray Chavez, on  7/14/19      The observations documented in this note, including the assessment and plan are mine              Patient ID: Rosie Chu      Patient's PCP: Shelly Calderón MD    Admit Date: 7/13/2019     Discharge Date:  07/14/19     Admitting Physician:  Mauricio Gifford MD       Discharge Physician: Mauricio Gifford MD     Discharge Diagnoses:    1. Chest discomfort of ? Etiology, doubt myocardial ischemia, cardiac catheterization, 7/14/19, mild CAD, normal left ventricular function    12/30/2016  SE negative for myocardial ischemia  6/19/18  SE negative for myocardial ischemia  7/13/19 ACS FERNANDO RISK Score 2 (angina, + troponin ), AUC indication 3, AUC score 7   7/14/19  Cath  Mild CAD, normal LVFX    2. Hypercholesteremia      Cardiac Specific Diagnoses:    Specialty Problems        Cardiology Problems    ACS (acute coronary syndrome) ([address]        Pure hypercholesterolemia                The patient was seen and examined on day of discharge and this discharge summary is in conjunction with any daily progress note from day of discharge. History of Present Illness:     Rosie Chu is a 61 y.o. female who presents to [facility] ED / PCU # [id number] with symptoms / signs / problem or diagnosis of chest discomfort.      She is normal mood and affect, alert and orientated x 3, judgement and insight appear appropriate.          Features of this History of Present Illness include (if applicable):     Context of the Presentation:  She presented with chest discomfort. She was feeding her Mother this am at a [address] [place] [address],[address] when she suddenly developed chest discomfort, diaphoresis, tingling in her hands. It was a pressure like sensation that went into her back. She sat down and it gradually went away after about 15-20 minutes.   She then went home and layed on the couch and the symptoms returned. Her  then asked her what to do and she said bring her to the ED. She is now pain free but the troponins are borderline at 0.04. The ekg shows non specific repolarization abnormalities.        1. Location: The chest discomfort was located in the central chest with radiation to the back, throat and left shoulder. 2. Duration: The chest discomfort began suddenly and lasted minutes   3. Quality: The chest discomfort was described as pressure, fullness and tightness. It was not crushing.          4. Severity: It was described as moderate. 5. Timing The symptoms began while feeding her mother. 6. Modifying Factors: The chest discomfort was not worse with activity. There was not partial relief with rest.        7. Associated Signs and Symptoms: There was  associated manifestations such as nausea, vomiting, diaphoresis, presyncope, syncope, dizzyness, weakness, cold sweats, or shortness of air. 8. Context: As noted above in the context of the presentation.         When being examined this afternoon, in PCU # [id number], there was not symptoms of exertional chest discomfort, unusual or change in shortness of air, presyncope or syncope. Hospital Course:     After admission, the patient's cardiac enzymes peaked at 0.05    the patient subsequently underwent cardiac catheteriztion according to the following criteria:  7/13/19 ACS FERNANDO RISK Score 2 (angina, + troponin ), AUC indication 3, AUC score 7 , Diagnostic Catheterization Appropriate Use Criteria Description, [facility] 2012;[id number]. Selective left heart and coronary arteriography was preformed, which revealed:    1. Successful femoral artery ultrasound  2. Successful femoral artery arteriogram  3. Supervision of the administration of moderate conscious sedation  4. Mild coronary artery disease  5. Left ventricular function is normal  6. Hypertensive blood pressure response. There were no apparent complications.

## 2019-07-16 ENCOUNTER — TELEPHONE (OUTPATIENT)
Dept: INTERNAL MEDICINE | Age: 59
End: 2019-07-16

## 2019-07-17 LAB
EKG P AXIS: 60 DEGREES
EKG P AXIS: 76 DEGREES
EKG P-R INTERVAL: 118 MS
EKG P-R INTERVAL: 120 MS
EKG Q-T INTERVAL: 400 MS
EKG Q-T INTERVAL: 428 MS
EKG QRS DURATION: 100 MS
EKG QRS DURATION: 98 MS
EKG QTC CALCULATION (BAZETT): 436 MS
EKG QTC CALCULATION (BAZETT): 449 MS
EKG T AXIS: 44 DEGREES
EKG T AXIS: 45 DEGREES

## 2019-07-18 ENCOUNTER — OFFICE VISIT (OUTPATIENT)
Dept: INTERNAL MEDICINE | Age: 59
End: 2019-07-18
Payer: COMMERCIAL

## 2019-07-18 VITALS
DIASTOLIC BLOOD PRESSURE: 74 MMHG | HEART RATE: 78 BPM | SYSTOLIC BLOOD PRESSURE: 126 MMHG | WEIGHT: 129 LBS | HEIGHT: 63 IN | OXYGEN SATURATION: 98 % | BODY MASS INDEX: 22.86 KG/M2

## 2019-07-18 DIAGNOSIS — K76.0 FATTY LIVER DISEASE, NONALCOHOLIC: ICD-10-CM

## 2019-07-18 DIAGNOSIS — I25.10 MILD CORONARY ARTERY DISEASE: Primary | ICD-10-CM

## 2019-07-18 DIAGNOSIS — R74.01 ELEVATED TRANSAMINASE LEVEL: ICD-10-CM

## 2019-07-18 DIAGNOSIS — R07.89 CHEST PRESSURE: ICD-10-CM

## 2019-07-18 DIAGNOSIS — E78.00 PURE HYPERCHOLESTEROLEMIA: ICD-10-CM

## 2019-07-18 PROCEDURE — 99496 TRANSJ CARE MGMT HIGH F2F 7D: CPT | Performed by: INTERNAL MEDICINE

## 2019-07-18 RX ORDER — IBANDRONATE SODIUM 150 MG/1
150 TABLET, FILM COATED ORAL
COMMUNITY
End: 2019-07-18 | Stop reason: SDUPTHER

## 2019-07-18 RX ORDER — ERGOCALCIFEROL 1.25 MG/1
50000 CAPSULE ORAL WEEKLY
Qty: 12 CAPSULE | Refills: 0 | Status: SHIPPED | OUTPATIENT
Start: 2019-07-18 | End: 2020-10-14 | Stop reason: SDUPTHER

## 2019-07-18 RX ORDER — VENLAFAXINE HYDROCHLORIDE 75 MG/1
75 CAPSULE, EXTENDED RELEASE ORAL DAILY
Qty: 90 CAPSULE | Refills: 0 | Status: SHIPPED | OUTPATIENT
Start: 2019-07-18 | End: 2020-10-14 | Stop reason: SDUPTHER

## 2019-07-18 RX ORDER — ROSUVASTATIN CALCIUM 20 MG/1
20 TABLET, COATED ORAL DAILY
Qty: 90 TABLET | Refills: 0 | Status: SHIPPED | OUTPATIENT
Start: 2019-07-18 | End: 2020-10-29 | Stop reason: SINTOL

## 2019-07-18 RX ORDER — IBANDRONATE SODIUM 150 MG/1
150 TABLET, FILM COATED ORAL
Qty: 6 TABLET | Refills: 0 | Status: SHIPPED | OUTPATIENT
Start: 2019-07-18 | End: 2020-10-14 | Stop reason: SDUPTHER

## 2019-07-18 ASSESSMENT — ENCOUNTER SYMPTOMS
SORE THROAT: 0
WHEEZING: 0
COUGH: 0
CONSTIPATION: 0
CHEST TIGHTNESS: 0
ABDOMINAL PAIN: 0

## 2019-07-18 NOTE — PROGRESS NOTES
ischemia  7/13/19 ACS FERNANDO RISK Score 2 (angina, + troponin ), AUC indication 3, AUC score 7   7/14/19  Cath  Mild CAD, normal LVFX     2. Hypercholesteremia    Medications are reconciled and reviewed. Inpatient course: Discharge summary reviewed- as above    Current status:stable    Review of Systems   Constitutional: Positive for fatigue. Negative for chills and fever. HENT: Negative for congestion, ear pain, nosebleeds, postnasal drip and sore throat. Respiratory: Negative for cough, chest tightness and wheezing. Cardiovascular: Positive for chest pain. Negative for palpitations and leg swelling. Gastrointestinal: Negative for abdominal pain and constipation. Genitourinary: Negative for dysuria and urgency. Musculoskeletal: Negative. Negative for arthralgias. Skin: Negative for rash. Neurological: Negative for dizziness and headaches. Psychiatric/Behavioral: Negative. Physical Exam   Constitutional: She is oriented to person, place, and time. She appears well-developed and well-nourished. HENT:   Right Ear: External ear normal.   Left Ear: External ear normal.   Mouth/Throat: Oropharynx is clear and moist. No oropharyngeal exudate. Eyes: Pupils are equal, round, and reactive to light. Conjunctivae are normal.   Neck: Neck supple. No JVD present. No thyromegaly present. Cardiovascular: Normal rate and normal heart sounds. No murmur heard. Pulmonary/Chest: Breath sounds normal. No respiratory distress. She has no wheezes. She has no rales. She exhibits no tenderness. Abdominal: Soft. Bowel sounds are normal.   Musculoskeletal: Normal range of motion. Lymphadenopathy:     She has no cervical adenopathy. Neurological: She is oriented to person, place, and time. Skin: Skin is warm. No rash noted.      Lab Results   Component Value Date     07/14/2019    K 4.4 07/14/2019     07/14/2019    CO2 27 07/14/2019    BUN 14 07/14/2019    CREATININE 0.6 07/14/2019    GLUCOSE 98 07/14/2019    CALCIUM 8.3 07/14/2019     Lab Results   Component Value Date    WBC 6.4 07/13/2019    WBC 5.9 06/18/2018    WBC 5.1 06/08/2017    HGB 13.3 07/13/2019    HGB 13.0 06/18/2018    HGB 12.5 06/08/2017    HCT 41.7 07/13/2019    HCT 41.3 06/18/2018    HCT 40.6 06/08/2017    MCV 87.2 07/13/2019    MCV 86.2 06/18/2018    MCV 89.0 06/08/2017     07/13/2019     06/18/2018     06/08/2017     Lab Results   Component Value Date    CHOL 178 11/14/2017    TRIG 76 11/14/2017    HDL 66 11/14/2017       Assessment/Plan:    Mild coronary artery disease  Admission for chest pressure  Cath ++ mild CAD  Asa 81 mg daily    Pure hypercholesterolemia- needs repeat lab  Plan ecpe in 8 weeks- ldl goal now 70-80 with new dx cad      Elevated transaminase level  Fatty liver disease, nonalcoholic  Previous us 0608 ++ fatty liver  Entire evaluation 2017 - labs negative for other liver issues  LFT elevation last week way above her BL  repeat today  May need repeat liver us if remains elevated        Diagnostic test results reviewed    Patient risk of morbidity and mortality: low    This is high complexity TCM visit  Patient was called within 2 business days of discharge    Family able to provide care to patient at home  No other needs including PT, OT needs detected at this time  No additional provider follow-ups needed at this time          Return in about 8 weeks (around 9/12/2019) for Medication check.

## 2019-07-19 DIAGNOSIS — R74.01 ELEVATED TRANSAMINASE LEVEL: Primary | ICD-10-CM

## 2019-07-19 LAB
ALBUMIN SERPL-MCNC: 4.3 G/DL (ref 3.5–5.2)
ALP BLD-CCNC: 94 U/L (ref 35–104)
ALT SERPL-CCNC: 44 U/L (ref 5–33)
AST SERPL-CCNC: 25 U/L (ref 5–32)
BILIRUB SERPL-MCNC: <0.2 MG/DL (ref 0.2–1.2)
BILIRUBIN DIRECT: 0.1 MG/DL (ref 0–0.3)
BILIRUBIN, INDIRECT: 0.1 MG/DL (ref 0.1–1)
TOTAL PROTEIN: 6.4 G/DL (ref 6.6–8.7)

## 2019-09-13 DIAGNOSIS — I25.10 MILD CORONARY ARTERY DISEASE: ICD-10-CM

## 2019-09-13 DIAGNOSIS — K76.0 FATTY LIVER DISEASE, NONALCOHOLIC: ICD-10-CM

## 2019-09-13 DIAGNOSIS — E78.00 PURE HYPERCHOLESTEROLEMIA: ICD-10-CM

## 2019-09-13 DIAGNOSIS — R07.89 CHEST PRESSURE: ICD-10-CM

## 2019-09-13 DIAGNOSIS — R74.01 ELEVATED TRANSAMINASE LEVEL: ICD-10-CM

## 2019-09-13 LAB
ALBUMIN SERPL-MCNC: 3.9 G/DL (ref 3.5–5.2)
ALP BLD-CCNC: 79 U/L (ref 35–104)
ALT SERPL-CCNC: 23 U/L (ref 5–33)
ANION GAP SERPL CALCULATED.3IONS-SCNC: 11 MMOL/L (ref 7–19)
AST SERPL-CCNC: 25 U/L (ref 5–32)
BASOPHILS ABSOLUTE: 0.1 K/UL (ref 0–0.2)
BASOPHILS RELATIVE PERCENT: 0.8 % (ref 0–1)
BILIRUB SERPL-MCNC: <0.2 MG/DL (ref 0.2–1.2)
BILIRUBIN URINE: NEGATIVE
BLOOD, URINE: NEGATIVE
BUN BLDV-MCNC: 15 MG/DL (ref 6–20)
CALCIUM SERPL-MCNC: 8.8 MG/DL (ref 8.6–10)
CHLORIDE BLD-SCNC: 105 MMOL/L (ref 98–111)
CHOLESTEROL, TOTAL: 156 MG/DL (ref 160–199)
CLARITY: CLEAR
CO2: 27 MMOL/L (ref 22–29)
COLOR: YELLOW
CREAT SERPL-MCNC: 0.6 MG/DL (ref 0.5–0.9)
EOSINOPHILS ABSOLUTE: 0.7 K/UL (ref 0–0.6)
EOSINOPHILS RELATIVE PERCENT: 10.8 % (ref 0–5)
GFR NON-AFRICAN AMERICAN: >60
GLUCOSE BLD-MCNC: 99 MG/DL (ref 74–109)
GLUCOSE URINE: NEGATIVE MG/DL
HCT VFR BLD CALC: 43.8 % (ref 37–47)
HDLC SERPL-MCNC: 67 MG/DL (ref 65–121)
HEMOGLOBIN: 13.4 G/DL (ref 12–16)
IMMATURE GRANULOCYTES #: 0 K/UL
KETONES, URINE: NEGATIVE MG/DL
LDL CHOLESTEROL CALCULATED: 71 MG/DL
LEUKOCYTE ESTERASE, URINE: NEGATIVE
LYMPHOCYTES ABSOLUTE: 1.4 K/UL (ref 1.1–4.5)
LYMPHOCYTES RELATIVE PERCENT: 21.3 % (ref 20–40)
MCH RBC QN AUTO: 27.8 PG (ref 27–31)
MCHC RBC AUTO-ENTMCNC: 30.6 G/DL (ref 33–37)
MCV RBC AUTO: 90.9 FL (ref 81–99)
MONOCYTES ABSOLUTE: 0.5 K/UL (ref 0–0.9)
MONOCYTES RELATIVE PERCENT: 8.2 % (ref 0–10)
NEUTROPHILS ABSOLUTE: 3.8 K/UL (ref 1.5–7.5)
NEUTROPHILS RELATIVE PERCENT: 58.7 % (ref 50–65)
NITRITE, URINE: NEGATIVE
PDW BLD-RTO: 12.9 % (ref 11.5–14.5)
PH UA: 6 (ref 5–8)
PLATELET # BLD: 274 K/UL (ref 130–400)
PMV BLD AUTO: 9.4 FL (ref 9.4–12.3)
POTASSIUM SERPL-SCNC: 4.7 MMOL/L (ref 3.5–5)
PROTEIN UA: NEGATIVE MG/DL
RBC # BLD: 4.82 M/UL (ref 4.2–5.4)
SODIUM BLD-SCNC: 143 MMOL/L (ref 136–145)
SPECIFIC GRAVITY UA: 1.02 (ref 1–1.03)
TOTAL PROTEIN: 6.1 G/DL (ref 6.6–8.7)
TRIGL SERPL-MCNC: 92 MG/DL (ref 0–149)
TSH SERPL DL<=0.05 MIU/L-ACNC: 2.05 UIU/ML (ref 0.27–4.2)
UROBILINOGEN, URINE: 0.2 E.U./DL
VITAMIN D 25-HYDROXY: 47.5 NG/ML
WBC # BLD: 6.4 K/UL (ref 4.8–10.8)

## 2020-07-25 ENCOUNTER — OFFICE VISIT (OUTPATIENT)
Age: 60
End: 2020-07-25
Payer: COMMERCIAL

## 2020-07-25 VITALS
OXYGEN SATURATION: 95 % | HEART RATE: 105 BPM | SYSTOLIC BLOOD PRESSURE: 122 MMHG | WEIGHT: 130 LBS | BODY MASS INDEX: 23.92 KG/M2 | HEIGHT: 62 IN | DIASTOLIC BLOOD PRESSURE: 84 MMHG | TEMPERATURE: 97.6 F

## 2020-07-25 PROCEDURE — 99213 OFFICE O/P EST LOW 20 MIN: CPT | Performed by: PHYSICIAN ASSISTANT

## 2020-07-25 RX ORDER — AMOXICILLIN AND CLAVULANATE POTASSIUM 875; 125 MG/1; MG/1
1 TABLET, FILM COATED ORAL 2 TIMES DAILY
Qty: 20 TABLET | Refills: 0 | Status: SHIPPED | OUTPATIENT
Start: 2020-07-25 | End: 2020-08-04

## 2020-07-25 ASSESSMENT — ENCOUNTER SYMPTOMS
SINUS PAIN: 1
SORE THROAT: 0
SINUS PRESSURE: 1
COUGH: 0
SHORTNESS OF BREATH: 0

## 2020-07-25 NOTE — PROGRESS NOTES
05/[birth year]/1979    Shingles Vaccine (1 of 2) 05/17/2010    Breast cancer screen  07/27/2019    Flu vaccine (1) 09/01/2020    Lipid screen  09/13/2020    Colon cancer screen colonoscopy  12/09/2021    Hepatitis C screen  Completed    Hepatitis A vaccine  Aged Out    Hepatitis B vaccine  Aged Out    Hib vaccine  Aged Out    Meningococcal (ACWY) vaccine  Aged Out    Pneumococcal 0-64 years Vaccine  Aged Out       Subjective:      Review of Systems   Constitutional: Negative for chills, diaphoresis, fatigue and fever. HENT: Positive for congestion, ear pain, postnasal drip, sinus pressure, sinus pain and sneezing. Negative for sore throat. Respiratory: Negative for cough and shortness of breath. All other systems reviewed and are negative. Objective:     Physical Exam  Constitutional:       Appearance: Normal appearance. HENT:      Head: Normocephalic and atraumatic. Right Ear: External ear normal. A middle ear effusion is present. Left Ear: Tympanic membrane, ear canal and external ear normal.      Nose:      Right Sinus: Maxillary sinus tenderness and frontal sinus tenderness present. Left Sinus: Maxillary sinus tenderness and frontal sinus tenderness present. Eyes:      Extraocular Movements: Extraocular movements intact. Conjunctiva/sclera: Conjunctivae normal.      Pupils: Pupils are equal, round, and reactive to light. Cardiovascular:      Rate and Rhythm: Normal rate and regular rhythm. Pulmonary:      Effort: Pulmonary effort is normal.      Breath sounds: Normal breath sounds. Skin:     General: Skin is warm and dry. Neurological:      General: No focal deficit present. Mental Status: She is alert and oriented to person, place, and time. Psychiatric:         Mood and Affect: Mood normal.         Behavior: Behavior normal.         Thought Content:  Thought content normal.         Judgment: Judgment normal.       /84   Pulse 105   Temp 97.6 °F (36.4

## 2020-09-29 ENCOUNTER — APPOINTMENT (OUTPATIENT)
Dept: CT IMAGING | Facility: HOSPITAL | Age: 60
End: 2020-09-29

## 2020-09-29 ENCOUNTER — APPOINTMENT (OUTPATIENT)
Dept: GENERAL RADIOLOGY | Facility: HOSPITAL | Age: 60
End: 2020-09-29

## 2020-09-29 ENCOUNTER — HOSPITAL ENCOUNTER (OUTPATIENT)
Facility: HOSPITAL | Age: 60
Setting detail: OBSERVATION
Discharge: HOME | End: 2020-10-01
Attending: INTERNAL MEDICINE | Admitting: INTERNAL MEDICINE

## 2020-09-29 DIAGNOSIS — J32.9 SINUSITIS, UNSPECIFIED CHRONICITY, UNSPECIFIED LOCATION: ICD-10-CM

## 2020-09-29 DIAGNOSIS — R42 DIZZINESS: Primary | ICD-10-CM

## 2020-09-29 DIAGNOSIS — I10 HYPERTENSION, UNSPECIFIED TYPE: ICD-10-CM

## 2020-09-29 DIAGNOSIS — T81.82XA SUBCUTANEOUS EMPHYSEMA RESULTING FROM A PROCEDURE, INITIAL ENCOUNTER: ICD-10-CM

## 2020-09-29 DIAGNOSIS — R42 LIGHTHEADED: ICD-10-CM

## 2020-09-29 DIAGNOSIS — R45.89 ANXIOUS APPEARANCE: ICD-10-CM

## 2020-09-29 DIAGNOSIS — R79.89 POSITIVE D DIMER: ICD-10-CM

## 2020-09-29 PROBLEM — R07.9 CHEST PAIN: Status: ACTIVE | Noted: 2020-09-29

## 2020-09-29 LAB
ALBUMIN SERPL-MCNC: 4.4 G/DL (ref 3.5–5.2)
ALBUMIN/GLOB SERPL: 2 G/DL
ALP SERPL-CCNC: 129 U/L (ref 39–117)
ALT SERPL W P-5'-P-CCNC: 224 U/L (ref 1–33)
AMPHET+METHAMPHET UR QL: NEGATIVE
AMPHETAMINES UR QL: NEGATIVE
ANION GAP SERPL CALCULATED.3IONS-SCNC: 11 MMOL/L (ref 5–15)
APTT PPP: 30.4 SECONDS (ref 24.1–35)
AST SERPL-CCNC: 608 U/L (ref 1–32)
BARBITURATES UR QL SCN: NEGATIVE
BASOPHILS # BLD AUTO: 0.06 10*3/MM3 (ref 0–0.2)
BASOPHILS NFR BLD AUTO: 0.6 % (ref 0–1.5)
BENZODIAZ UR QL SCN: NEGATIVE
BILIRUB SERPL-MCNC: 0.5 MG/DL (ref 0–1.2)
BILIRUB UR QL STRIP: NEGATIVE
BUN SERPL-MCNC: 12 MG/DL (ref 8–23)
BUN/CREAT SERPL: 26.1 (ref 7–25)
BUPRENORPHINE SERPL-MCNC: NEGATIVE NG/ML
CALCIUM SPEC-SCNC: 9.1 MG/DL (ref 8.6–10.5)
CANNABINOIDS SERPL QL: NEGATIVE
CHLORIDE SERPL-SCNC: 101 MMOL/L (ref 98–107)
CK SERPL-CCNC: 148 U/L (ref 20–180)
CLARITY UR: CLEAR
CO2 SERPL-SCNC: 24 MMOL/L (ref 22–29)
COCAINE UR QL: NEGATIVE
COLOR UR: YELLOW
CREAT SERPL-MCNC: 0.46 MG/DL (ref 0.57–1)
D DIMER PPP FEU-MCNC: 4.43 MG/L (FEU) (ref 0–0.5)
D-LACTATE SERPL-SCNC: 0.9 MMOL/L (ref 0.5–2)
DEPRECATED RDW RBC AUTO: 40.3 FL (ref 37–54)
EOSINOPHIL # BLD AUTO: 0.3 10*3/MM3 (ref 0–0.4)
EOSINOPHIL NFR BLD AUTO: 3.1 % (ref 0.3–6.2)
ERYTHROCYTE [DISTWIDTH] IN BLOOD BY AUTOMATED COUNT: 13.1 % (ref 12.3–15.4)
GFR SERPL CREATININE-BSD FRML MDRD: 139 ML/MIN/1.73
GLOBULIN UR ELPH-MCNC: 2.2 GM/DL
GLUCOSE SERPL-MCNC: 121 MG/DL (ref 65–99)
GLUCOSE UR STRIP-MCNC: NEGATIVE MG/DL
HCT VFR BLD AUTO: 44.5 % (ref 34–46.6)
HGB BLD-MCNC: 14.6 G/DL (ref 12–15.9)
HGB UR QL STRIP.AUTO: NEGATIVE
HOLD SPECIMEN: NORMAL
IMM GRANULOCYTES # BLD AUTO: 0.02 10*3/MM3 (ref 0–0.05)
IMM GRANULOCYTES NFR BLD AUTO: 0.2 % (ref 0–0.5)
INR PPP: 0.99 (ref 0.91–1.09)
KETONES UR QL STRIP: ABNORMAL
LEUKOCYTE ESTERASE UR QL STRIP.AUTO: NEGATIVE
LYMPHOCYTES # BLD AUTO: 1.04 10*3/MM3 (ref 0.7–3.1)
LYMPHOCYTES NFR BLD AUTO: 10.6 % (ref 19.6–45.3)
MAGNESIUM SERPL-MCNC: 2.2 MG/DL (ref 1.6–2.4)
MCH RBC QN AUTO: 27.8 PG (ref 26.6–33)
MCHC RBC AUTO-ENTMCNC: 32.8 G/DL (ref 31.5–35.7)
MCV RBC AUTO: 84.6 FL (ref 79–97)
METHADONE UR QL SCN: NEGATIVE
MONOCYTES # BLD AUTO: 0.71 10*3/MM3 (ref 0.1–0.9)
MONOCYTES NFR BLD AUTO: 7.3 % (ref 5–12)
NEUTROPHILS NFR BLD AUTO: 7.66 10*3/MM3 (ref 1.7–7)
NEUTROPHILS NFR BLD AUTO: 78.2 % (ref 42.7–76)
NITRITE UR QL STRIP: NEGATIVE
NRBC BLD AUTO-RTO: 0 /100 WBC (ref 0–0.2)
OPIATES UR QL: NEGATIVE
OXYCODONE UR QL SCN: NEGATIVE
PCP UR QL SCN: NEGATIVE
PH UR STRIP.AUTO: 5.5 [PH] (ref 5–8)
PLATELET # BLD AUTO: 323 10*3/MM3 (ref 140–450)
PMV BLD AUTO: 8.8 FL (ref 6–12)
POTASSIUM SERPL-SCNC: 4.2 MMOL/L (ref 3.5–5.2)
PROPOXYPH UR QL: NEGATIVE
PROT SERPL-MCNC: 6.6 G/DL (ref 6–8.5)
PROT UR QL STRIP: NEGATIVE
PROTHROMBIN TIME: 12.7 SECONDS (ref 11.9–14.6)
RBC # BLD AUTO: 5.26 10*6/MM3 (ref 3.77–5.28)
SARS-COV-2 RDRP RESP QL NAA+PROBE: NOT DETECTED
SODIUM SERPL-SCNC: 136 MMOL/L (ref 136–145)
SP GR UR STRIP: 1.02 (ref 1–1.03)
T4 FREE SERPL-MCNC: 1.49 NG/DL (ref 0.93–1.7)
TRICYCLICS UR QL SCN: NEGATIVE
TROPONIN T SERPL-MCNC: 0.02 NG/ML (ref 0–0.03)
TROPONIN T SERPL-MCNC: 0.02 NG/ML (ref 0–0.03)
TSH SERPL DL<=0.05 MIU/L-ACNC: 1.08 UIU/ML (ref 0.27–4.2)
UROBILINOGEN UR QL STRIP: ABNORMAL
WBC # BLD AUTO: 9.79 10*3/MM3 (ref 3.4–10.8)
WHOLE BLOOD HOLD SPECIMEN: NORMAL
WHOLE BLOOD HOLD SPECIMEN: NORMAL

## 2020-09-29 PROCEDURE — 96375 TX/PRO/DX INJ NEW DRUG ADDON: CPT

## 2020-09-29 PROCEDURE — 87040 BLOOD CULTURE FOR BACTERIA: CPT | Performed by: PHYSICIAN ASSISTANT

## 2020-09-29 PROCEDURE — C9803 HOPD COVID-19 SPEC COLLECT: HCPCS

## 2020-09-29 PROCEDURE — 84100 ASSAY OF PHOSPHORUS: CPT | Performed by: INTERNAL MEDICINE

## 2020-09-29 PROCEDURE — 25010000002 LORAZEPAM PER 2 MG: Performed by: PHYSICIAN ASSISTANT

## 2020-09-29 PROCEDURE — 70450 CT HEAD/BRAIN W/O DYE: CPT

## 2020-09-29 PROCEDURE — 71045 X-RAY EXAM CHEST 1 VIEW: CPT

## 2020-09-29 PROCEDURE — 82550 ASSAY OF CK (CPK): CPT | Performed by: INTERNAL MEDICINE

## 2020-09-29 PROCEDURE — 84439 ASSAY OF FREE THYROXINE: CPT | Performed by: INTERNAL MEDICINE

## 2020-09-29 PROCEDURE — G0378 HOSPITAL OBSERVATION PER HR: HCPCS

## 2020-09-29 PROCEDURE — 71275 CT ANGIOGRAPHY CHEST: CPT

## 2020-09-29 PROCEDURE — 80053 COMPREHEN METABOLIC PANEL: CPT | Performed by: PHYSICIAN ASSISTANT

## 2020-09-29 PROCEDURE — 84484 ASSAY OF TROPONIN QUANT: CPT | Performed by: PHYSICIAN ASSISTANT

## 2020-09-29 PROCEDURE — 93010 ELECTROCARDIOGRAM REPORT: CPT | Performed by: INTERNAL MEDICINE

## 2020-09-29 PROCEDURE — 74177 CT ABD & PELVIS W/CONTRAST: CPT

## 2020-09-29 PROCEDURE — 93005 ELECTROCARDIOGRAM TRACING: CPT | Performed by: EMERGENCY MEDICINE

## 2020-09-29 PROCEDURE — 84443 ASSAY THYROID STIM HORMONE: CPT | Performed by: INTERNAL MEDICINE

## 2020-09-29 PROCEDURE — 84439 ASSAY OF FREE THYROXINE: CPT | Performed by: PHYSICIAN ASSISTANT

## 2020-09-29 PROCEDURE — 83735 ASSAY OF MAGNESIUM: CPT | Performed by: PHYSICIAN ASSISTANT

## 2020-09-29 PROCEDURE — 84145 PROCALCITONIN (PCT): CPT | Performed by: INTERNAL MEDICINE

## 2020-09-29 PROCEDURE — 96365 THER/PROPH/DIAG IV INF INIT: CPT

## 2020-09-29 PROCEDURE — 85730 THROMBOPLASTIN TIME PARTIAL: CPT | Performed by: PHYSICIAN ASSISTANT

## 2020-09-29 PROCEDURE — 81003 URINALYSIS AUTO W/O SCOPE: CPT | Performed by: PHYSICIAN ASSISTANT

## 2020-09-29 PROCEDURE — 85379 FIBRIN DEGRADATION QUANT: CPT | Performed by: PHYSICIAN ASSISTANT

## 2020-09-29 PROCEDURE — 84443 ASSAY THYROID STIM HORMONE: CPT | Performed by: PHYSICIAN ASSISTANT

## 2020-09-29 PROCEDURE — 87635 SARS-COV-2 COVID-19 AMP PRB: CPT | Performed by: PHYSICIAN ASSISTANT

## 2020-09-29 PROCEDURE — 83605 ASSAY OF LACTIC ACID: CPT | Performed by: PHYSICIAN ASSISTANT

## 2020-09-29 PROCEDURE — 0 IOPAMIDOL PER 1 ML: Performed by: PHYSICIAN ASSISTANT

## 2020-09-29 PROCEDURE — 80306 DRUG TEST PRSMV INSTRMNT: CPT | Performed by: PHYSICIAN ASSISTANT

## 2020-09-29 PROCEDURE — 93005 ELECTROCARDIOGRAM TRACING: CPT | Performed by: PHYSICIAN ASSISTANT

## 2020-09-29 PROCEDURE — 85610 PROTHROMBIN TIME: CPT | Performed by: PHYSICIAN ASSISTANT

## 2020-09-29 PROCEDURE — 99285 EMERGENCY DEPT VISIT HI MDM: CPT

## 2020-09-29 PROCEDURE — 85025 COMPLETE CBC W/AUTO DIFF WBC: CPT | Performed by: PHYSICIAN ASSISTANT

## 2020-09-29 PROCEDURE — 25010000002 CEFTRIAXONE PER 250 MG: Performed by: PHYSICIAN ASSISTANT

## 2020-09-29 RX ORDER — ASPIRIN 81 MG/1
81 TABLET ORAL DAILY
COMMUNITY

## 2020-09-29 RX ORDER — LORAZEPAM 2 MG/ML
0.5 INJECTION INTRAMUSCULAR ONCE
Status: COMPLETED | OUTPATIENT
Start: 2020-09-29 | End: 2020-09-29

## 2020-09-29 RX ORDER — CLONIDINE HYDROCHLORIDE 0.1 MG/1
0.1 TABLET ORAL ONCE
Status: COMPLETED | OUTPATIENT
Start: 2020-09-29 | End: 2020-09-29

## 2020-09-29 RX ORDER — HYDROXYZINE HYDROCHLORIDE 50 MG/ML
50 INJECTION, SOLUTION INTRAMUSCULAR ONCE
Status: DISCONTINUED | OUTPATIENT
Start: 2020-09-29 | End: 2020-09-29

## 2020-09-29 RX ORDER — AZITHROMYCIN 250 MG/1
1000 TABLET, FILM COATED ORAL ONCE
Status: COMPLETED | OUTPATIENT
Start: 2020-09-29 | End: 2020-09-29

## 2020-09-29 RX ORDER — SODIUM CHLORIDE 0.9 % (FLUSH) 0.9 %
10 SYRINGE (ML) INJECTION AS NEEDED
Status: DISCONTINUED | OUTPATIENT
Start: 2020-09-29 | End: 2020-10-01 | Stop reason: HOSPADM

## 2020-09-29 RX ORDER — HYDRALAZINE HYDROCHLORIDE 20 MG/ML
20 INJECTION INTRAMUSCULAR; INTRAVENOUS ONCE
Status: DISCONTINUED | OUTPATIENT
Start: 2020-09-29 | End: 2020-09-29

## 2020-09-29 RX ORDER — ASPIRIN 81 MG/1
324 TABLET, CHEWABLE ORAL ONCE
Status: COMPLETED | OUTPATIENT
Start: 2020-09-29 | End: 2020-09-29

## 2020-09-29 RX ADMIN — IOPAMIDOL 100 ML: 755 INJECTION, SOLUTION INTRAVENOUS at 20:58

## 2020-09-29 RX ADMIN — AZITHROMYCIN 1000 MG: 250 TABLET, FILM COATED ORAL at 22:29

## 2020-09-29 RX ADMIN — CLONIDINE HYDROCHLORIDE 0.1 MG: 0.1 TABLET ORAL at 20:42

## 2020-09-29 RX ADMIN — SODIUM CHLORIDE 1000 ML: 9 INJECTION, SOLUTION INTRAVENOUS at 18:37

## 2020-09-29 RX ADMIN — ASPIRIN 81 MG 162 MG: 81 TABLET ORAL at 18:43

## 2020-09-29 RX ADMIN — SODIUM CHLORIDE 1 G: 900 INJECTION INTRAVENOUS at 21:41

## 2020-09-29 RX ADMIN — LORAZEPAM 0.5 MG: 2 INJECTION INTRAMUSCULAR; INTRAVENOUS at 22:11

## 2020-09-30 ENCOUNTER — APPOINTMENT (OUTPATIENT)
Dept: ULTRASOUND IMAGING | Facility: HOSPITAL | Age: 60
End: 2020-09-30

## 2020-09-30 ENCOUNTER — APPOINTMENT (OUTPATIENT)
Dept: CARDIOLOGY | Facility: HOSPITAL | Age: 60
End: 2020-09-30

## 2020-09-30 PROBLEM — Z85.3 HISTORY OF BREAST CANCER: Status: ACTIVE | Noted: 2017-11-04

## 2020-09-30 PROBLEM — I10 ESSENTIAL HYPERTENSION: Status: ACTIVE | Noted: 2020-09-30

## 2020-09-30 PROBLEM — K76.0 FATTY LIVER DISEASE, NONALCOHOLIC: Status: ACTIVE | Noted: 2017-06-08

## 2020-09-30 PROBLEM — R79.89 POSITIVE D DIMER: Status: ACTIVE | Noted: 2020-09-30

## 2020-09-30 PROBLEM — F33.2 ENDOGENOUS DEPRESSION: Status: ACTIVE | Noted: 2017-11-04

## 2020-09-30 PROBLEM — I25.10 MILD CORONARY ARTERY DISEASE: Status: ACTIVE | Noted: 2019-07-18

## 2020-09-30 PROBLEM — E55.9 VITAMIN D DEFICIENCY: Status: ACTIVE | Noted: 2017-11-04

## 2020-09-30 PROBLEM — J42 CHRONIC BRONCHITIS: Status: ACTIVE | Noted: 2020-09-30

## 2020-09-30 PROBLEM — E78.00 PURE HYPERCHOLESTEROLEMIA: Status: ACTIVE | Noted: 2017-11-04

## 2020-09-30 PROBLEM — R42 LIGHTHEADEDNESS: Status: ACTIVE | Noted: 2020-09-30

## 2020-09-30 PROBLEM — F41.1 GENERALIZED ANXIETY DISORDER: Status: ACTIVE | Noted: 2017-11-04

## 2020-09-30 PROBLEM — C48.0 RETROPERITONEAL SARCOMA (HCC): Status: ACTIVE | Noted: 2017-11-04

## 2020-09-30 PROBLEM — J44.1 COPD EXACERBATION (HCC): Status: ACTIVE | Noted: 2020-09-30

## 2020-09-30 PROBLEM — J32.9 SINUSITIS: Status: ACTIVE | Noted: 2020-09-30

## 2020-09-30 PROBLEM — R74.01 ELEVATED TRANSAMINASE LEVEL: Status: ACTIVE | Noted: 2017-06-08

## 2020-09-30 LAB
ALBUMIN SERPL-MCNC: 3.8 G/DL (ref 3.5–5.2)
ALBUMIN/GLOB SERPL: 2.1 G/DL
ALP SERPL-CCNC: 103 U/L (ref 39–117)
ALT SERPL W P-5'-P-CCNC: 160 U/L (ref 1–33)
ANION GAP SERPL CALCULATED.3IONS-SCNC: 9 MMOL/L (ref 5–15)
AST SERPL-CCNC: 173 U/L (ref 1–32)
BASOPHILS # BLD AUTO: 0.03 10*3/MM3 (ref 0–0.2)
BASOPHILS NFR BLD AUTO: 0.5 % (ref 0–1.5)
BH CV STRESS BP STAGE 1: NORMAL
BH CV STRESS BP STAGE 2: NORMAL
BH CV STRESS BP STAGE 3: NORMAL
BH CV STRESS DURATION MIN STAGE 1: 3
BH CV STRESS DURATION MIN STAGE 2: 3
BH CV STRESS DURATION MIN STAGE 3: 0
BH CV STRESS DURATION SEC STAGE 1: 0
BH CV STRESS DURATION SEC STAGE 2: 0
BH CV STRESS DURATION SEC STAGE 3: 12
BH CV STRESS ECHO POST STRESS EJECTION FRACTION EF: 65 %
BH CV STRESS GRADE STAGE 1: 10
BH CV STRESS GRADE STAGE 2: 12
BH CV STRESS GRADE STAGE 3: 14
BH CV STRESS HR STAGE 1: 109
BH CV STRESS HR STAGE 2: 125
BH CV STRESS HR STAGE 3: 131
BH CV STRESS METS STAGE 1: 5
BH CV STRESS METS STAGE 2: 7.5
BH CV STRESS METS STAGE 3: 10
BH CV STRESS PROTOCOL 1: NORMAL
BH CV STRESS RECOVERY BP: NORMAL MMHG
BH CV STRESS RECOVERY HR: 86 BPM
BH CV STRESS SPEED STAGE 1: 1.7
BH CV STRESS SPEED STAGE 2: 2.5
BH CV STRESS SPEED STAGE 3: 3.4
BH CV STRESS STAGE 1: 1
BH CV STRESS STAGE 2: 2
BH CV STRESS STAGE 3: 3
BILIRUB SERPL-MCNC: 0.3 MG/DL (ref 0–1.2)
BUN SERPL-MCNC: 10 MG/DL (ref 8–23)
BUN/CREAT SERPL: 21.3 (ref 7–25)
CALCIUM SPEC-SCNC: 8.5 MG/DL (ref 8.6–10.5)
CHLORIDE SERPL-SCNC: 105 MMOL/L (ref 98–107)
CHOLEST SERPL-MCNC: 206 MG/DL (ref 0–200)
CO2 SERPL-SCNC: 24 MMOL/L (ref 22–29)
CREAT SERPL-MCNC: 0.47 MG/DL (ref 0.57–1)
DEPRECATED RDW RBC AUTO: 39.9 FL (ref 37–54)
EOSINOPHIL # BLD AUTO: 0.55 10*3/MM3 (ref 0–0.4)
EOSINOPHIL NFR BLD AUTO: 9.6 % (ref 0.3–6.2)
ERYTHROCYTE [DISTWIDTH] IN BLOOD BY AUTOMATED COUNT: 13 % (ref 12.3–15.4)
GFR SERPL CREATININE-BSD FRML MDRD: 135 ML/MIN/1.73
GLOBULIN UR ELPH-MCNC: 1.8 GM/DL
GLUCOSE SERPL-MCNC: 114 MG/DL (ref 65–99)
HAV IGM SERPL QL IA: NORMAL
HBA1C MFR BLD: 5.8 % (ref 4.8–5.6)
HBV CORE IGM SERPL QL IA: NORMAL
HBV SURFACE AG SERPL QL IA: NORMAL
HCT VFR BLD AUTO: 39.8 % (ref 34–46.6)
HCV AB SER DONR QL: NORMAL
HDLC SERPL-MCNC: 63 MG/DL (ref 40–60)
HGB BLD-MCNC: 12.9 G/DL (ref 12–15.9)
HOLD SPECIMEN: NORMAL
IMM GRANULOCYTES # BLD AUTO: 0.01 10*3/MM3 (ref 0–0.05)
IMM GRANULOCYTES NFR BLD AUTO: 0.2 % (ref 0–0.5)
LDLC SERPL CALC-MCNC: 123 MG/DL (ref 0–100)
LDLC/HDLC SERPL: 1.96 {RATIO}
LV EF 2D ECHO EST: 55 %
LYMPHOCYTES # BLD AUTO: 1.13 10*3/MM3 (ref 0.7–3.1)
LYMPHOCYTES NFR BLD AUTO: 19.8 % (ref 19.6–45.3)
MAXIMAL PREDICTED HEART RATE: 160 BPM
MCH RBC QN AUTO: 27.3 PG (ref 26.6–33)
MCHC RBC AUTO-ENTMCNC: 32.4 G/DL (ref 31.5–35.7)
MCV RBC AUTO: 84.3 FL (ref 79–97)
MONOCYTES # BLD AUTO: 0.46 10*3/MM3 (ref 0.1–0.9)
MONOCYTES NFR BLD AUTO: 8.1 % (ref 5–12)
NEUTROPHILS NFR BLD AUTO: 3.52 10*3/MM3 (ref 1.7–7)
NEUTROPHILS NFR BLD AUTO: 61.8 % (ref 42.7–76)
NRBC BLD AUTO-RTO: 0 /100 WBC (ref 0–0.2)
PERCENT MAX PREDICTED HR: 81.88 %
PHOSPHATE SERPL-MCNC: 3.3 MG/DL (ref 2.5–4.5)
PLATELET # BLD AUTO: 282 10*3/MM3 (ref 140–450)
PMV BLD AUTO: 8.8 FL (ref 6–12)
POTASSIUM SERPL-SCNC: 3.9 MMOL/L (ref 3.5–5.2)
PROCALCITONIN SERPL-MCNC: 0.03 NG/ML (ref 0–0.25)
PROT SERPL-MCNC: 5.6 G/DL (ref 6–8.5)
RBC # BLD AUTO: 4.72 10*6/MM3 (ref 3.77–5.28)
SODIUM SERPL-SCNC: 138 MMOL/L (ref 136–145)
STRESS BASELINE BP: NORMAL MMHG
STRESS BASELINE HR: 77 BPM
STRESS PERCENT HR: 96 %
STRESS POST ESTIMATED WORKLOAD: 10 METS
STRESS POST EXERCISE DUR MIN: 6 MIN
STRESS POST EXERCISE DUR SEC: 12 SEC
STRESS POST PEAK BP: NORMAL MMHG
STRESS POST PEAK HR: 131 BPM
STRESS TARGET HR: 136 BPM
T4 FREE SERPL-MCNC: 1.22 NG/DL (ref 0.93–1.7)
TRIGL SERPL-MCNC: 98 MG/DL (ref 0–150)
TROPONIN T SERPL-MCNC: 0.02 NG/ML (ref 0–0.03)
TSH SERPL DL<=0.05 MIU/L-ACNC: 1.4 UIU/ML (ref 0.27–4.2)
VLDLC SERPL-MCNC: 19.6 MG/DL
WBC # BLD AUTO: 5.7 10*3/MM3 (ref 3.4–10.8)

## 2020-09-30 PROCEDURE — 94799 UNLISTED PULMONARY SVC/PX: CPT

## 2020-09-30 PROCEDURE — 93010 ELECTROCARDIOGRAM REPORT: CPT | Performed by: INTERNAL MEDICINE

## 2020-09-30 PROCEDURE — 84484 ASSAY OF TROPONIN QUANT: CPT | Performed by: INTERNAL MEDICINE

## 2020-09-30 PROCEDURE — 76705 ECHO EXAM OF ABDOMEN: CPT

## 2020-09-30 PROCEDURE — 94640 AIRWAY INHALATION TREATMENT: CPT

## 2020-09-30 PROCEDURE — G0378 HOSPITAL OBSERVATION PER HR: HCPCS

## 2020-09-30 PROCEDURE — 85025 COMPLETE CBC W/AUTO DIFF WBC: CPT | Performed by: INTERNAL MEDICINE

## 2020-09-30 PROCEDURE — 80074 ACUTE HEPATITIS PANEL: CPT | Performed by: INTERNAL MEDICINE

## 2020-09-30 PROCEDURE — 93350 STRESS TTE ONLY: CPT | Performed by: INTERNAL MEDICINE

## 2020-09-30 PROCEDURE — 96375 TX/PRO/DX INJ NEW DRUG ADDON: CPT

## 2020-09-30 PROCEDURE — 83036 HEMOGLOBIN GLYCOSYLATED A1C: CPT | Performed by: INTERNAL MEDICINE

## 2020-09-30 PROCEDURE — 25010000002 PERFLUTREN 6.52 MG/ML SUSPENSION: Performed by: INTERNAL MEDICINE

## 2020-09-30 PROCEDURE — 93356 MYOCRD STRAIN IMG SPCKL TRCK: CPT | Performed by: INTERNAL MEDICINE

## 2020-09-30 PROCEDURE — 25010000002 METHYLPREDNISOLONE PER 125 MG: Performed by: INTERNAL MEDICINE

## 2020-09-30 PROCEDURE — 93352 ADMIN ECG CONTRAST AGENT: CPT | Performed by: INTERNAL MEDICINE

## 2020-09-30 PROCEDURE — 93306 TTE W/DOPPLER COMPLETE: CPT

## 2020-09-30 PROCEDURE — 93005 ELECTROCARDIOGRAM TRACING: CPT | Performed by: INTERNAL MEDICINE

## 2020-09-30 PROCEDURE — 93356 MYOCRD STRAIN IMG SPCKL TRCK: CPT

## 2020-09-30 PROCEDURE — 93350 STRESS TTE ONLY: CPT

## 2020-09-30 PROCEDURE — 80061 LIPID PANEL: CPT | Performed by: INTERNAL MEDICINE

## 2020-09-30 PROCEDURE — 93976 VASCULAR STUDY: CPT

## 2020-09-30 PROCEDURE — 99203 OFFICE O/P NEW LOW 30 MIN: CPT | Performed by: NURSE PRACTITIONER

## 2020-09-30 PROCEDURE — 96372 THER/PROPH/DIAG INJ SC/IM: CPT

## 2020-09-30 PROCEDURE — 80053 COMPREHEN METABOLIC PANEL: CPT | Performed by: INTERNAL MEDICINE

## 2020-09-30 PROCEDURE — 96361 HYDRATE IV INFUSION ADD-ON: CPT

## 2020-09-30 PROCEDURE — 93306 TTE W/DOPPLER COMPLETE: CPT | Performed by: INTERNAL MEDICINE

## 2020-09-30 PROCEDURE — 93018 CV STRESS TEST I&R ONLY: CPT | Performed by: INTERNAL MEDICINE

## 2020-09-30 PROCEDURE — 63710000001 PREDNISONE PER 1 MG: Performed by: INTERNAL MEDICINE

## 2020-09-30 PROCEDURE — 25010000002 ENOXAPARIN PER 10 MG: Performed by: INTERNAL MEDICINE

## 2020-09-30 PROCEDURE — 93017 CV STRESS TEST TRACING ONLY: CPT

## 2020-09-30 RX ORDER — AZELASTINE 1 MG/ML
2 SPRAY, METERED NASAL 2 TIMES DAILY
Status: DISCONTINUED | OUTPATIENT
Start: 2020-09-30 | End: 2020-10-01 | Stop reason: HOSPADM

## 2020-09-30 RX ORDER — ROSUVASTATIN CALCIUM 20 MG/1
40 TABLET, COATED ORAL DAILY
COMMUNITY
End: 2020-10-01 | Stop reason: HOSPADM

## 2020-09-30 RX ORDER — ASPIRIN 81 MG/1
81 TABLET ORAL DAILY
Status: DISCONTINUED | OUTPATIENT
Start: 2020-09-30 | End: 2020-10-01 | Stop reason: HOSPADM

## 2020-09-30 RX ORDER — NITROGLYCERIN 0.4 MG/1
0.4 TABLET SUBLINGUAL
Status: DISCONTINUED | OUTPATIENT
Start: 2020-09-30 | End: 2020-10-01 | Stop reason: HOSPADM

## 2020-09-30 RX ORDER — SODIUM CHLORIDE 0.9 % (FLUSH) 0.9 %
10 SYRINGE (ML) INJECTION EVERY 12 HOURS SCHEDULED
Status: DISCONTINUED | OUTPATIENT
Start: 2020-09-30 | End: 2020-10-01 | Stop reason: HOSPADM

## 2020-09-30 RX ORDER — VENLAFAXINE 75 MG/1
75 TABLET ORAL 2 TIMES DAILY
COMMUNITY
End: 2020-10-01 | Stop reason: HOSPADM

## 2020-09-30 RX ORDER — FLUTICASONE PROPIONATE 50 MCG
2 SPRAY, SUSPENSION (ML) NASAL DAILY
Status: DISCONTINUED | OUTPATIENT
Start: 2020-09-30 | End: 2020-10-01 | Stop reason: HOSPADM

## 2020-09-30 RX ORDER — ONDANSETRON 2 MG/ML
4 INJECTION INTRAMUSCULAR; INTRAVENOUS EVERY 6 HOURS PRN
Status: DISCONTINUED | OUTPATIENT
Start: 2020-09-30 | End: 2020-10-01 | Stop reason: HOSPADM

## 2020-09-30 RX ORDER — ONDANSETRON 4 MG/1
4 TABLET, FILM COATED ORAL EVERY 6 HOURS PRN
Status: DISCONTINUED | OUTPATIENT
Start: 2020-09-30 | End: 2020-10-01 | Stop reason: HOSPADM

## 2020-09-30 RX ORDER — CEFDINIR 300 MG/1
300 CAPSULE ORAL EVERY 12 HOURS SCHEDULED
Status: DISCONTINUED | OUTPATIENT
Start: 2020-09-30 | End: 2020-09-30

## 2020-09-30 RX ORDER — GUAIFENESIN 600 MG/1
1200 TABLET, EXTENDED RELEASE ORAL EVERY 12 HOURS SCHEDULED
Status: DISCONTINUED | OUTPATIENT
Start: 2020-09-30 | End: 2020-10-01 | Stop reason: HOSPADM

## 2020-09-30 RX ORDER — METHYLPREDNISOLONE SODIUM SUCCINATE 125 MG/2ML
60 INJECTION, POWDER, LYOPHILIZED, FOR SOLUTION INTRAMUSCULAR; INTRAVENOUS ONCE
Status: COMPLETED | OUTPATIENT
Start: 2020-09-30 | End: 2020-09-30

## 2020-09-30 RX ORDER — SODIUM CHLORIDE 0.9 % (FLUSH) 0.9 %
10 SYRINGE (ML) INJECTION AS NEEDED
Status: DISCONTINUED | OUTPATIENT
Start: 2020-09-30 | End: 2020-10-01 | Stop reason: HOSPADM

## 2020-09-30 RX ORDER — SODIUM CHLORIDE 9 MG/ML
100 INJECTION, SOLUTION INTRAVENOUS CONTINUOUS
Status: DISCONTINUED | OUTPATIENT
Start: 2020-09-30 | End: 2020-09-30

## 2020-09-30 RX ORDER — ROSUVASTATIN CALCIUM 20 MG/1
40 TABLET, COATED ORAL DAILY
Status: DISCONTINUED | OUTPATIENT
Start: 2020-09-30 | End: 2020-09-30

## 2020-09-30 RX ORDER — AMOXICILLIN AND CLAVULANATE POTASSIUM 875; 125 MG/1; MG/1
1 TABLET, FILM COATED ORAL EVERY 12 HOURS SCHEDULED
Status: DISCONTINUED | OUTPATIENT
Start: 2020-09-30 | End: 2020-10-01 | Stop reason: HOSPADM

## 2020-09-30 RX ORDER — IPRATROPIUM BROMIDE AND ALBUTEROL SULFATE 2.5; .5 MG/3ML; MG/3ML
3 SOLUTION RESPIRATORY (INHALATION)
Status: DISCONTINUED | OUTPATIENT
Start: 2020-09-30 | End: 2020-09-30

## 2020-09-30 RX ORDER — ERGOCALCIFEROL 1.25 MG/1
50000 CAPSULE ORAL WEEKLY
COMMUNITY

## 2020-09-30 RX ORDER — VENLAFAXINE 37.5 MG/1
75 TABLET ORAL 2 TIMES DAILY WITH MEALS
Status: DISCONTINUED | OUTPATIENT
Start: 2020-09-30 | End: 2020-10-01 | Stop reason: HOSPADM

## 2020-09-30 RX ORDER — PREDNISONE 20 MG/1
40 TABLET ORAL
Status: DISCONTINUED | OUTPATIENT
Start: 2020-09-30 | End: 2020-10-01 | Stop reason: HOSPADM

## 2020-09-30 RX ADMIN — CEFDINIR 300 MG: 300 CAPSULE ORAL at 10:25

## 2020-09-30 RX ADMIN — IPRATROPIUM BROMIDE AND ALBUTEROL SULFATE 3 ML: 2.5; .5 SOLUTION RESPIRATORY (INHALATION) at 07:16

## 2020-09-30 RX ADMIN — ASPIRIN 81 MG: 81 TABLET ORAL at 09:15

## 2020-09-30 RX ADMIN — VENLAFAXINE HYDROCHLORIDE 75 MG: 37.5 TABLET ORAL at 09:15

## 2020-09-30 RX ADMIN — AZELASTINE HYDROCHLORIDE 2 SPRAY: 137 SPRAY, METERED NASAL at 20:51

## 2020-09-30 RX ADMIN — PREDNISONE 40 MG: 20 TABLET ORAL at 09:15

## 2020-09-30 RX ADMIN — PERFLUTREN 8.48 MG: 6.52 INJECTION, SUSPENSION INTRAVENOUS at 07:50

## 2020-09-30 RX ADMIN — GUAIFENESIN 1200 MG: 600 TABLET, EXTENDED RELEASE ORAL at 03:00

## 2020-09-30 RX ADMIN — GUAIFENESIN 1200 MG: 600 TABLET, EXTENDED RELEASE ORAL at 09:15

## 2020-09-30 RX ADMIN — ENOXAPARIN SODIUM 40 MG: 40 INJECTION SUBCUTANEOUS at 09:16

## 2020-09-30 RX ADMIN — SODIUM CHLORIDE, PRESERVATIVE FREE 10 ML: 5 INJECTION INTRAVENOUS at 20:52

## 2020-09-30 RX ADMIN — SODIUM CHLORIDE, PRESERVATIVE FREE 10 ML: 5 INJECTION INTRAVENOUS at 09:16

## 2020-09-30 RX ADMIN — FLUTICASONE PROPIONATE 2 SPRAY: 50 SPRAY, METERED NASAL at 09:15

## 2020-09-30 RX ADMIN — METHYLPREDNISOLONE SODIUM SUCCINATE 60 MG: 125 INJECTION, POWDER, FOR SOLUTION INTRAMUSCULAR; INTRAVENOUS at 03:00

## 2020-09-30 RX ADMIN — METOPROLOL TARTRATE 25 MG: 25 TABLET, FILM COATED ORAL at 03:00

## 2020-09-30 RX ADMIN — SODIUM CHLORIDE 100 ML/HR: 9 INJECTION, SOLUTION INTRAVENOUS at 03:01

## 2020-09-30 RX ADMIN — AMOXICILLIN AND CLAVULANATE POTASSIUM 1 TABLET: 875; 125 TABLET, FILM COATED ORAL at 20:51

## 2020-09-30 RX ADMIN — METOPROLOL TARTRATE 25 MG: 25 TABLET, FILM COATED ORAL at 20:51

## 2020-09-30 RX ADMIN — GUAIFENESIN 1200 MG: 600 TABLET, EXTENDED RELEASE ORAL at 20:51

## 2020-09-30 RX ADMIN — SODIUM CHLORIDE, PRESERVATIVE FREE 10 ML: 5 INJECTION INTRAVENOUS at 03:01

## 2020-09-30 RX ADMIN — METOPROLOL TARTRATE 25 MG: 25 TABLET, FILM COATED ORAL at 09:15

## 2020-09-30 RX ADMIN — VENLAFAXINE HYDROCHLORIDE 75 MG: 37.5 TABLET ORAL at 17:51

## 2020-10-01 VITALS
RESPIRATION RATE: 16 BRPM | HEIGHT: 63 IN | HEART RATE: 79 BPM | DIASTOLIC BLOOD PRESSURE: 78 MMHG | SYSTOLIC BLOOD PRESSURE: 145 MMHG | BODY MASS INDEX: 22.79 KG/M2 | OXYGEN SATURATION: 93 % | WEIGHT: 128.6 LBS | TEMPERATURE: 98.1 F

## 2020-10-01 LAB
ALBUMIN SERPL-MCNC: 3.9 G/DL (ref 3.5–5.2)
ALBUMIN/GLOB SERPL: 2.4 G/DL
ALP SERPL-CCNC: 81 U/L (ref 39–117)
ALT SERPL W P-5'-P-CCNC: 84 U/L (ref 1–33)
ANION GAP SERPL CALCULATED.3IONS-SCNC: 9 MMOL/L (ref 5–15)
AST SERPL-CCNC: 40 U/L (ref 1–32)
BH CV ECHO MEAS - AO MAX PG (FULL): 2.4 MMHG
BH CV ECHO MEAS - AO MAX PG: 6.4 MMHG
BH CV ECHO MEAS - AO MEAN PG (FULL): 2 MMHG
BH CV ECHO MEAS - AO MEAN PG: 4 MMHG
BH CV ECHO MEAS - AO ROOT AREA (BSA CORRECTED): 1.8
BH CV ECHO MEAS - AO ROOT AREA: 6.2 CM^2
BH CV ECHO MEAS - AO ROOT DIAM: 2.8 CM
BH CV ECHO MEAS - AO V2 MAX: 126 CM/SEC
BH CV ECHO MEAS - AO V2 MEAN: 91.7 CM/SEC
BH CV ECHO MEAS - AO V2 VTI: 33.6 CM
BH CV ECHO MEAS - AVA(I,A): 2.4 CM^2
BH CV ECHO MEAS - AVA(I,D): 2.4 CM^2
BH CV ECHO MEAS - AVA(V,A): 2.2 CM^2
BH CV ECHO MEAS - AVA(V,D): 2.2 CM^2
BH CV ECHO MEAS - BSA(HAYCOCK): 1.6 M^2
BH CV ECHO MEAS - BSA: 1.6 M^2
BH CV ECHO MEAS - BZI_BMI: 22.7 KILOGRAMS/M^2
BH CV ECHO MEAS - BZI_METRIC_HEIGHT: 160 CM
BH CV ECHO MEAS - BZI_METRIC_WEIGHT: 58.1 KG
BH CV ECHO MEAS - EDV(CUBED): 42.9 ML
BH CV ECHO MEAS - EDV(MOD-SP4): 62.7 ML
BH CV ECHO MEAS - EDV(TEICH): 50.9 ML
BH CV ECHO MEAS - EF(CUBED): 65.3 %
BH CV ECHO MEAS - EF(MOD-SP4): 65.7 %
BH CV ECHO MEAS - EF(TEICH): 57.8 %
BH CV ECHO MEAS - ESV(CUBED): 14.9 ML
BH CV ECHO MEAS - ESV(MOD-SP4): 21.5 ML
BH CV ECHO MEAS - ESV(TEICH): 21.4 ML
BH CV ECHO MEAS - FS: 29.7 %
BH CV ECHO MEAS - IVS/LVPW: 0.89
BH CV ECHO MEAS - IVSD: 1.1 CM
BH CV ECHO MEAS - LA DIMENSION: 3 CM
BH CV ECHO MEAS - LA/AO: 1.1
BH CV ECHO MEAS - LAT PEAK E' VEL: 8.9 CM/SEC
BH CV ECHO MEAS - LV DIASTOLIC VOL/BSA (35-75): 39.2 ML/M^2
BH CV ECHO MEAS - LV MASS(C)D: 137.5 GRAMS
BH CV ECHO MEAS - LV MASS(C)DI: 86 GRAMS/M^2
BH CV ECHO MEAS - LV MAX PG: 3.9 MMHG
BH CV ECHO MEAS - LV MEAN PG: 2 MMHG
BH CV ECHO MEAS - LV SYSTOLIC VOL/BSA (12-30): 13.4 ML/M^2
BH CV ECHO MEAS - LV V1 MAX: 99 CM/SEC
BH CV ECHO MEAS - LV V1 MEAN: 74.1 CM/SEC
BH CV ECHO MEAS - LV V1 VTI: 28.1 CM
BH CV ECHO MEAS - LVIDD: 3.5 CM
BH CV ECHO MEAS - LVIDS: 2.5 CM
BH CV ECHO MEAS - LVLD AP4: 6.8 CM
BH CV ECHO MEAS - LVLS AP4: 5.2 CM
BH CV ECHO MEAS - LVOT AREA (M): 2.8 CM^2
BH CV ECHO MEAS - LVOT AREA: 2.8 CM^2
BH CV ECHO MEAS - LVOT DIAM: 1.9 CM
BH CV ECHO MEAS - LVPWD: 1.3 CM
BH CV ECHO MEAS - MED PEAK E' VEL: 8.4 CM/SEC
BH CV ECHO MEAS - MR MAX PG: 128.1 MMHG
BH CV ECHO MEAS - MR MAX VEL: 566 CM/SEC
BH CV ECHO MEAS - MR MEAN PG: 94 MMHG
BH CV ECHO MEAS - MR MEAN VEL: 460 CM/SEC
BH CV ECHO MEAS - MR VTI: 207 CM
BH CV ECHO MEAS - MV A MAX VEL: 62.5 CM/SEC
BH CV ECHO MEAS - MV DEC SLOPE: 293 CM/SEC^2
BH CV ECHO MEAS - MV DEC TIME: 0.25 SEC
BH CV ECHO MEAS - MV E MAX VEL: 74.2 CM/SEC
BH CV ECHO MEAS - MV E/A: 1.2
BH CV ECHO MEAS - RAP SYSTOLE: 10 MMHG
BH CV ECHO MEAS - RVSP: 43.2 MMHG
BH CV ECHO MEAS - SI(AO): 129.3 ML/M^2
BH CV ECHO MEAS - SI(CUBED): 17.5 ML/M^2
BH CV ECHO MEAS - SI(LVOT): 49.8 ML/M^2
BH CV ECHO MEAS - SI(MOD-SP4): 25.8 ML/M^2
BH CV ECHO MEAS - SI(TEICH): 18.4 ML/M^2
BH CV ECHO MEAS - SV(AO): 206.9 ML
BH CV ECHO MEAS - SV(CUBED): 28 ML
BH CV ECHO MEAS - SV(LVOT): 79.7 ML
BH CV ECHO MEAS - SV(MOD-SP4): 41.2 ML
BH CV ECHO MEAS - SV(TEICH): 29.4 ML
BH CV ECHO MEAS - TR MAX VEL: 288 CM/SEC
BH CV ECHO MEASUREMENTS AVERAGE E/E' RATIO: 8.58
BILIRUB SERPL-MCNC: 0.2 MG/DL (ref 0–1.2)
BUN SERPL-MCNC: 19 MG/DL (ref 8–23)
BUN/CREAT SERPL: 35.8 (ref 7–25)
CALCIUM SPEC-SCNC: 8.3 MG/DL (ref 8.6–10.5)
CHLORIDE SERPL-SCNC: 110 MMOL/L (ref 98–107)
CO2 SERPL-SCNC: 23 MMOL/L (ref 22–29)
CREAT SERPL-MCNC: 0.53 MG/DL (ref 0.57–1)
GFR SERPL CREATININE-BSD FRML MDRD: 118 ML/MIN/1.73
GLOBULIN UR ELPH-MCNC: 1.6 GM/DL
GLUCOSE SERPL-MCNC: 146 MG/DL (ref 65–99)
LEFT ATRIUM VOLUME INDEX: 14.1 ML/M2
LEFT ATRIUM VOLUME: 22.5 CM3
MAXIMAL PREDICTED HEART RATE: 160 BPM
POTASSIUM SERPL-SCNC: 4.2 MMOL/L (ref 3.5–5.2)
PROT SERPL-MCNC: 5.5 G/DL (ref 6–8.5)
SODIUM SERPL-SCNC: 142 MMOL/L (ref 136–145)
STRESS TARGET HR: 136 BPM

## 2020-10-01 PROCEDURE — 63710000001 PREDNISONE PER 1 MG: Performed by: INTERNAL MEDICINE

## 2020-10-01 PROCEDURE — 25010000002 ENOXAPARIN PER 10 MG: Performed by: INTERNAL MEDICINE

## 2020-10-01 PROCEDURE — G0378 HOSPITAL OBSERVATION PER HR: HCPCS

## 2020-10-01 PROCEDURE — 80053 COMPREHEN METABOLIC PANEL: CPT | Performed by: INTERNAL MEDICINE

## 2020-10-01 PROCEDURE — 96372 THER/PROPH/DIAG INJ SC/IM: CPT

## 2020-10-01 RX ORDER — AMOXICILLIN AND CLAVULANATE POTASSIUM 875; 125 MG/1; MG/1
1 TABLET, FILM COATED ORAL EVERY 12 HOURS SCHEDULED
Qty: 26 TABLET | Refills: 0 | Status: SHIPPED | OUTPATIENT
Start: 2020-10-01 | End: 2020-10-14

## 2020-10-01 RX ORDER — PREDNISONE 10 MG/1
10 TABLET ORAL DAILY
Qty: 10 TABLET | Refills: 0 | Status: SHIPPED | OUTPATIENT
Start: 2020-10-01 | End: 2022-04-05

## 2020-10-01 RX ORDER — VENLAFAXINE 75 MG/1
75 TABLET ORAL 2 TIMES DAILY WITH MEALS
Qty: 30 TABLET | Refills: 2 | Status: SHIPPED | OUTPATIENT
Start: 2020-10-01 | End: 2022-04-05 | Stop reason: DRUGHIGH

## 2020-10-01 RX ORDER — GUAIFENESIN 600 MG/1
1200 TABLET, EXTENDED RELEASE ORAL EVERY 12 HOURS SCHEDULED
Qty: 60 TABLET | Refills: 0 | Status: SHIPPED | OUTPATIENT
Start: 2020-10-01 | End: 2022-04-05

## 2020-10-01 RX ORDER — FLUTICASONE PROPIONATE 50 MCG
2 SPRAY, SUSPENSION (ML) NASAL DAILY
Qty: 16 G | Refills: 3 | Status: SHIPPED | OUTPATIENT
Start: 2020-10-02 | End: 2020-10-30 | Stop reason: SDUPTHER

## 2020-10-01 RX ORDER — AZELASTINE 1 MG/ML
2 SPRAY, METERED NASAL 2 TIMES DAILY
Qty: 30 ML | Refills: 3 | Status: SHIPPED | OUTPATIENT
Start: 2020-10-01 | End: 2020-10-30 | Stop reason: SDUPTHER

## 2020-10-01 RX ADMIN — SODIUM CHLORIDE, PRESERVATIVE FREE 10 ML: 5 INJECTION INTRAVENOUS at 08:05

## 2020-10-01 RX ADMIN — GUAIFENESIN 1200 MG: 600 TABLET, EXTENDED RELEASE ORAL at 08:05

## 2020-10-01 RX ADMIN — METOPROLOL TARTRATE 25 MG: 25 TABLET, FILM COATED ORAL at 08:05

## 2020-10-01 RX ADMIN — AMOXICILLIN AND CLAVULANATE POTASSIUM 1 TABLET: 875; 125 TABLET, FILM COATED ORAL at 08:05

## 2020-10-01 RX ADMIN — VENLAFAXINE HYDROCHLORIDE 75 MG: 37.5 TABLET ORAL at 08:05

## 2020-10-01 RX ADMIN — PREDNISONE 40 MG: 20 TABLET ORAL at 08:05

## 2020-10-01 RX ADMIN — ASPIRIN 81 MG: 81 TABLET ORAL at 08:05

## 2020-10-01 RX ADMIN — FLUTICASONE PROPIONATE 2 SPRAY: 50 SPRAY, METERED NASAL at 08:05

## 2020-10-01 RX ADMIN — ENOXAPARIN SODIUM 40 MG: 40 INJECTION SUBCUTANEOUS at 08:05

## 2020-10-01 RX ADMIN — AZELASTINE HYDROCHLORIDE 2 SPRAY: 137 SPRAY, METERED NASAL at 08:05

## 2020-10-01 NOTE — PLAN OF CARE
Goal Outcome Evaluation:  Plan of Care Reviewed With: patient  Progress: improving  Outcome Summary: No C/O pain this shift. A & O x 4. Stable on RA. Safety maintained. Possible home today. Will continue to monitor.

## 2020-10-02 ENCOUNTER — TELEPHONE (OUTPATIENT)
Dept: INTERNAL MEDICINE | Age: 60
End: 2020-10-02

## 2020-10-02 ENCOUNTER — READMISSION MANAGEMENT (OUTPATIENT)
Dept: CALL CENTER | Facility: HOSPITAL | Age: 60
End: 2020-10-02

## 2020-10-02 NOTE — OUTREACH NOTE
Prep Survey      Responses   [facility] facility patient discharged from?  [facility]   Is LACE score < 7 ?  No   Eligibility  Readm Mgmt   Discharge diagnosis  Chest pain, transaminitis   Does the patient have one of the following disease processes/diagnoses(primary or secondary)?  Other   Does the patient have Home health ordered?  No   Is there a DME ordered?  No   Comments regarding appointments  f/u appts made   Prep survey completed?  Yes          Ann Collier RN

## 2020-10-02 NOTE — TELEPHONE ENCOUNTER
[facility] Trans[facility] Initial Follow Up Call    Outreach made within 2 business days of discharge: Yes    Patient: Vincent Aguilar   Patient : 1960 MRN: [id number]           Reason for Admission: Admitted 20 for dizziness, hypertension   Discharge Date: 10/01/20  Final Discharge Diagnoses: Active Hospital Problems   Diagnosis    Chest pain    Sinusitis    Chronic bronchitis (CMS/HCC)    Lightheadedness    Essential hypertension    Positive D dimer    Mild coronary artery disease    Pure hypercholesterolemia    Generalized anxiety disorder    Vitamin D deficiency    Fatty liver disease, nonalcoholic    Elevated transaminase level      Spoke with: 2nd attempt to reach patient, no answer.  I was unable to leave a message with intent of my call as voicemail has not been set up.      Discharge department/facility: [facility]      Scheduled appointment with PCP within 7-14 days    Follow Up  Future Appointments   Date Time Provider Noah Gaviria   10/8/2020 12:30 PM IRASEMA Sneed [facility] [address], [place]

## 2020-10-04 LAB
BACTERIA SPEC AEROBE CULT: NORMAL
BACTERIA SPEC AEROBE CULT: NORMAL

## 2020-10-06 ENCOUNTER — READMISSION MANAGEMENT (OUTPATIENT)
Dept: CALL CENTER | Facility: HOSPITAL | Age: 60
End: 2020-10-06

## 2020-10-06 ENCOUNTER — OFFICE VISIT (OUTPATIENT)
Dept: INTERNAL MEDICINE | Age: 60
End: 2020-10-06

## 2020-10-06 VITALS
WEIGHT: 129 LBS | DIASTOLIC BLOOD PRESSURE: 90 MMHG | SYSTOLIC BLOOD PRESSURE: 178 MMHG | HEART RATE: 64 BPM | HEIGHT: 62 IN | BODY MASS INDEX: 23.74 KG/M2

## 2020-10-06 DIAGNOSIS — K76.0 FATTY LIVER DISEASE, NONALCOHOLIC: ICD-10-CM

## 2020-10-06 LAB
ALBUMIN SERPL-MCNC: 3.8 G/DL (ref 3.5–5.2)
ALP BLD-CCNC: 74 U/L (ref 35–104)
ALT SERPL-CCNC: 42 U/L (ref 5–33)
ANION GAP SERPL CALCULATED.3IONS-SCNC: 12 MMOL/L (ref 7–19)
AST SERPL-CCNC: 15 U/L (ref 5–32)
BILIRUB SERPL-MCNC: <0.2 MG/DL (ref 0.2–1.2)
BUN BLDV-MCNC: 19 MG/DL (ref 8–23)
CALCIUM SERPL-MCNC: 8.5 MG/DL (ref 8.8–10.2)
CHLORIDE BLD-SCNC: 101 MMOL/L (ref 98–111)
CO2: 27 MMOL/L (ref 22–29)
CREAT SERPL-MCNC: 0.6 MG/DL (ref 0.5–0.9)
GFR AFRICAN AMERICAN: >59
GFR NON-AFRICAN AMERICAN: >60
GLUCOSE BLD-MCNC: 84 MG/DL (ref 74–109)
POTASSIUM SERPL-SCNC: 3.5 MMOL/L (ref 3.5–5)
SODIUM BLD-SCNC: 140 MMOL/L (ref 136–145)
TOTAL PROTEIN: 5.8 G/DL (ref 6.6–8.7)

## 2020-10-06 PROCEDURE — 1111F DSCHRG MED/CURRENT MED MERGE: CPT | Performed by: NURSE PRACTITIONER

## 2020-10-06 PROCEDURE — 99495 TRANSJ CARE MGMT MOD F2F 14D: CPT | Performed by: NURSE PRACTITIONER

## 2020-10-06 RX ORDER — ASPIRIN 81 MG/1
81 TABLET ORAL DAILY
COMMUNITY

## 2020-10-06 RX ORDER — LISINOPRIL 10 MG/1
10 TABLET ORAL DAILY
Qty: 90 TABLET | Refills: 1 | Status: SHIPPED | OUTPATIENT
Start: 2020-10-06 | End: 2020-10-14 | Stop reason: SDUPTHER

## 2020-10-06 RX ORDER — AMOXICILLIN AND CLAVULANATE POTASSIUM 875; 125 MG/1; MG/1
1 TABLET, FILM COATED ORAL EVERY 12 HOURS SCHEDULED
COMMUNITY
Start: 2020-10-01 | End: 2020-10-14 | Stop reason: CLARIF

## 2020-10-06 RX ORDER — BUSPIRONE HYDROCHLORIDE 10 MG/1
10 TABLET ORAL 3 TIMES DAILY
Qty: 90 TABLET | Refills: 0 | Status: SHIPPED | OUTPATIENT
Start: 2020-10-06 | End: 2020-11-05

## 2020-10-06 NOTE — PATIENT INSTRUCTIONS
1.  Hypertension;    Start lisinopril 10 mg daily   Start this right now; Then keep eye on blood pressure the next few days   If remains over 140/90 (either number) then start taking 2 in the am or even 3 if continues to be elevated ; If it goes below 100 (the sravani number) then cut the lisinopril in 1/2;    2. Anxiety;  buspar 10 mg ;  Start this tonight; Take the whole pill;  I just want to be sure you are not going to get sleepy from it; if you do you can 1/2 that ; So you can take up to 3 a day and you can decide when you want to take then;  I think likely it will be am then around lunch and then in the evening or at bedtime;    3.  Elevated LFT;   Recheck today hospitalist stopped the statin

## 2020-10-06 NOTE — PROGRESS NOTES
Post-Discharge Transitional Care Management Services or Hospital Follow Up      Jyothi De Leon   YOB: 1960    Date of Office Visit:  10/6/2020  Date of Hospital Admission: 9/29/2020  Date of Hospital Discharge: 10/2/2020    Care management risk score Rising risk (score 2-5) and Complex Care (Scores >=6): 1     Non face to face  following discharge, date last encounter closed (first attempt may have been earlier): 10/2/2020  1:16 PM 10/2/2020  1:16 PM    Call initiated 2 business days of discharge: Yes    Patient Active Problem List   Diagnosis    Hx of colonic polyps    Elevated transaminase level    Fatty liver disease, nonalcoholic    Vitamin D deficiency    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[facility]    Generalized anxiety disorder    Pure hypercholesterolemia    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[facility]    Osteopenia of multiple sites    History of breast cancer    Chest pain    Chest pressure    ACS (acute coronary syndrome) ([facility]    Mild coronary artery disease       Allergies   Allergen Reactions    Adhesive Tape Swelling    Sulfa Antibiotics Swelling     Other reaction(s): Unknown       Medications listed as ordered at the time of discharge from hospital  Yes     Medications marked \"taking\" at this time  Outpatient Medications Marked as Taking for the 10/6/20 encounter (Office Visit) with VAZQUEZ Paula   Medication Sig Dispense Refill    metoprolol tartrate (LOPRESSOR) 25 MG tablet Take 25 mg by mouth every 12 hours      aspirin EC 81 MG EC tablet Take 81 mg by mouth daily      amoxicillin-clavulanate (AUGMENTIN) 875-125 MG per tablet Take 1 tablet by mouth every 12 hours      lisinopril (PRINIVIL;ZESTRIL) 10 MG tablet Take 1 tablet by mouth daily 90 tablet 1    busPIRone (BUSPAR) 10 MG tablet Take 1 tablet by mouth 3 times daily 90 tablet 0    venlafaxine (EFFEXOR XR) 75 MG extended release capsule Take 1 capsule by mouth daily 90 capsule 0    vitamin D (ERGOCALCIFEROL) 05347 units CAPS capsule Take 1 capsule by mouth once a week 12 capsule 0    ibandronate (BONIVA) 150 MG tablet Take 1 tablet by mouth every 30 days 6 tablet 0    fluticasone (FLONASE) 50 MCG/ACT nasal spray 1 spray by Nasal route daily for 10 days (Patient taking differently: 1 spray by Nasal route daily as needed ) 1 Bottle 0        Medications patient taking as of now reconciled against medications ordered at time of hospital discharge: Yes    Chief Complaint   Patient presents with    Follow-Up from [address] [place] [address]     Patient is here for follow up from [facility] 9/29 for high blood pressure. Patient states cardiac workup was all normal per patient. History of Present illness - Follow up of Hospital diagnosis(es):   1. Hypertension  ; she was admitted with accelerated hypertension; She was started on metoprolol; She is still running high;   Ct head chest abd no clots with elevated d dimer;  Renal ultrasound was normal;   She had treadmill; that was normal   She has never had blp issues in the past   2. Acute sinusitis  Finishing augmentin   Inpatient course: Discharge summary reviewed- see chart. 3.  Anxiety feeling the day of the admission   4. Elevated lft; Was taken off statin     Interval history/Current status:   1. Hypertension;  Still anand   2. Acute sinusitis;  Continue meds   3. Anxiety; Worsening   4. Elevated LFT; Recheck today      Vitals:    10/06/20 0805 10/06/20 0807 10/06/20 0808   BP: (!) 182/101 (!) 178/99 (!) 178/90   Pulse: 65 64    Weight: 129 lb (58.5 kg)     Height: 5' 2\" (1.575 m)       Body mass index is 23.59 kg/m². Wt Readings from Last 3 Encounters:   10/06/20 129 lb (58.5 kg)   07/25/20 130 lb (59 kg)   07/18/19 129 lb (58.5 kg)     BP Readings from Last 3 Encounters:   10/06/20 (!) 178/90   07/25/20 122/84   07/18/19 126/74       A comprehensive review of systems was negative except for what was noted in the HPI.     Physical Exam:  General Appearance: alert and oriented to person, place and time, well developed and well- nourished, in no acute distress  Skin: warm and dry, no rash or erythema  Head: normocephalic and atraumatic  Eyes: pupils equal, round, and reactive to light, extraocular eye movements intact, conjunctivae normal  ENT: tympanic membrane, external ear and ear canal normal bilaterally, nose without deformity, nasal mucosa and turbinates normal without polyps  Neck: supple and non-tender without mass, no thyromegaly or thyroid nodules, no cervical lymphadenopathy  Pulmonary/Chest: clear to auscultation bilaterally- no wheezes, rales or rhonchi, normal air movement, no respiratory distress  Cardiovascular: normal rate, regular rhythm, normal S1 and S2, no murmurs, rubs, clicks, or gallops, distal pulses intact, no carotid bruits  Abdomen: soft, non-tender, non-distended, normal bowel sounds, no masses or organomegaly  Extremities: no cyanosis, clubbing or edema  Musculoskeletal: normal range of motion, no joint swelling, deformity or tenderness  Neurologic: reflexes normal and symmetric, no cranial nerve deficit, gait, coordination and speech normal    Assessment/Plan:  1. Fatty liver disease, nonalcoholic  Labs today   2. Elevated LFT's  Statin on hold       1. Hypertension;    Start lisinopril 10 mg daily   Start this right now; Then keep eye on blood pressure the next few days   If remains over 140/90 (either number) then start taking 2 in the am   If it goes below 100 (the sravani number) then cut the lisinopril in 1/2;    2. Anxiety;  buspar 10 mg ;  Start this tonight; Take the whole pill;  I just want to be sure you are not going to get sleepy from it; if you do you can 1/2 that ; So you can take up to 3 a day and you can decide when you want to take then;  I think likely it will be am then around lunch and then in the evening or at bedtime;    3.  Elevated LFT;   Recheck today hospitalist stopped the statin     Medical Decision Making: moderate complexity

## 2020-10-06 NOTE — OUTREACH NOTE
Medical Week 1 Survey      Responses   [facility] patient discharged from?  [place]   Does the patient have one of the following disease processes/diagnoses(primary or secondary)?  Other   Week 1 attempt successful?  Yes   Call start time  1621   Call end time  1627   Discharge diagnosis  Chest pain, transaminitis   Is patient permission given to speak with other caregiver?  No   List who call center can speak with  Speak with patient   Meds reviewed with patient/caregiver?  Yes   Is the patient having any side effects they believe may be caused by any medication additions or changes?  No   Does the patient have all medications ordered at discharge?  Yes   Is the patient taking all medications as directed (includes completed medication regime)?  Yes   Medication comments  New B/P medication started today, Lisinopril   Does the patient have a primary care provider?   Yes   Does the patient have an appointment with their PCP within 7 days of discharge?  Yes   Has the patient kept scheduled appointments due by today?  Yes   Comments  PCP today 10/06/2020 will f/u with PCP again next week   Has home health visited the patient within 72 hours of discharge?  N/A   Psychosocial issues?  No   Comments  207/117 last night     184/105 today in office   Did the patient receive a copy of their discharge instructions?  Yes   Nursing interventions  Reviewed instructions with patient   What is the patient's perception of their health status since discharge?  Improving   Is the patient/caregiver able to teach back signs and symptoms related to disease process for when to call PCP?  Yes   Is the patient/caregiver able to teach back signs and symptoms related to disease process for when to call 911?  Yes   Is the patient/caregiver able to teach back the hierarchy of who to call/visit for symptoms/problems? PCP, Specialist, Home health nurse, Urgent Care, ED, 911  Yes   Week 1 call completed?  Yes          Ofelia Marie RN

## 2020-10-14 ENCOUNTER — OFFICE VISIT (OUTPATIENT)
Dept: INTERNAL MEDICINE | Age: 60
End: 2020-10-14
Payer: COMMERCIAL

## 2020-10-14 VITALS
OXYGEN SATURATION: 98 % | WEIGHT: 130 LBS | SYSTOLIC BLOOD PRESSURE: 128 MMHG | HEART RATE: 89 BPM | RESPIRATION RATE: 18 BRPM | BODY MASS INDEX: 23.04 KG/M2 | DIASTOLIC BLOOD PRESSURE: 78 MMHG | HEIGHT: 63 IN

## 2020-10-14 DIAGNOSIS — R79.89 ELEVATED LIVER FUNCTION TESTS: ICD-10-CM

## 2020-10-14 DIAGNOSIS — Z00.00 ANNUAL PHYSICAL EXAM: ICD-10-CM

## 2020-10-14 DIAGNOSIS — R59.9 ENLARGED LYMPH NODES: ICD-10-CM

## 2020-10-14 DIAGNOSIS — I10 ESSENTIAL HYPERTENSION: ICD-10-CM

## 2020-10-14 DIAGNOSIS — M85.89 OSTEOPENIA OF MULTIPLE SITES: ICD-10-CM

## 2020-10-14 DIAGNOSIS — R03.0 ELEVATED BLOOD PRESSURE READING: ICD-10-CM

## 2020-10-14 DIAGNOSIS — Z12.31 ENCOUNTER FOR SCREENING MAMMOGRAM FOR BREAST CANCER: ICD-10-CM

## 2020-10-14 DIAGNOSIS — R93.89 ABNORMAL CT OF THE CHEST: ICD-10-CM

## 2020-10-14 DIAGNOSIS — I25.10 MILD CORONARY ARTERY DISEASE: ICD-10-CM

## 2020-10-14 LAB
ALBUMIN SERPL-MCNC: 3.9 G/DL (ref 3.5–5.2)
ALP BLD-CCNC: 86 U/L (ref 35–104)
ALT SERPL-CCNC: 21 U/L (ref 5–33)
ANION GAP SERPL CALCULATED.3IONS-SCNC: 10 MMOL/L (ref 7–19)
AST SERPL-CCNC: 18 U/L (ref 5–32)
BILIRUB SERPL-MCNC: <0.2 MG/DL (ref 0.2–1.2)
BUN BLDV-MCNC: 11 MG/DL (ref 8–23)
CALCIUM SERPL-MCNC: 8.7 MG/DL (ref 8.8–10.2)
CHLORIDE BLD-SCNC: 102 MMOL/L (ref 98–111)
CO2: 29 MMOL/L (ref 22–29)
CREAT SERPL-MCNC: 0.5 MG/DL (ref 0.5–0.9)
GFR AFRICAN AMERICAN: >59
GFR NON-AFRICAN AMERICAN: >60
GLUCOSE BLD-MCNC: 94 MG/DL (ref 74–109)
POTASSIUM SERPL-SCNC: 4.5 MMOL/L (ref 3.5–5)
SODIUM BLD-SCNC: 141 MMOL/L (ref 136–145)
TOTAL PROTEIN: 6.1 G/DL (ref 6.6–8.7)

## 2020-10-14 PROCEDURE — 99396 PREV VISIT EST AGE 40-64: CPT | Performed by: INTERNAL MEDICINE

## 2020-10-14 PROCEDURE — 99214 OFFICE O/P EST MOD 30 MIN: CPT | Performed by: INTERNAL MEDICINE

## 2020-10-14 RX ORDER — ERGOCALCIFEROL 1.25 MG/1
50000 CAPSULE ORAL WEEKLY
Qty: 12 CAPSULE | Refills: 1 | Status: SHIPPED | OUTPATIENT
Start: 2020-10-14 | End: 2021-01-19 | Stop reason: SDUPTHER

## 2020-10-14 RX ORDER — IBANDRONATE SODIUM 150 MG/1
150 TABLET, FILM COATED ORAL
Qty: 3 TABLET | Refills: 1 | Status: SHIPPED | OUTPATIENT
Start: 2020-10-14 | End: 2021-01-19 | Stop reason: SDUPTHER

## 2020-10-14 RX ORDER — LISINOPRIL 10 MG/1
10 TABLET ORAL 2 TIMES DAILY
Qty: 180 TABLET | Refills: 0 | Status: SHIPPED | OUTPATIENT
Start: 2020-10-14 | End: 2020-11-04 | Stop reason: SDUPTHER

## 2020-10-14 RX ORDER — VENLAFAXINE HYDROCHLORIDE 75 MG/1
75 CAPSULE, EXTENDED RELEASE ORAL 2 TIMES DAILY
Qty: 180 CAPSULE | Refills: 1 | Status: SHIPPED | OUTPATIENT
Start: 2020-10-14 | End: 2020-12-29

## 2020-10-14 ASSESSMENT — ENCOUNTER SYMPTOMS
VOMITING: 0
NAUSEA: 0
COUGH: 0
EYE PAIN: 0
COLOR CHANGE: 0
SINUS PRESSURE: 0
BLOOD IN STOOL: 0
WHEEZING: 0
CHEST TIGHTNESS: 0
SORE THROAT: 0
VOICE CHANGE: 0
EYE REDNESS: 0
TROUBLE SWALLOWING: 0
DIARRHEA: 0
ABDOMINAL PAIN: 0
CONSTIPATION: 0

## 2020-10-14 NOTE — PROGRESS NOTES
report that during this meeting she was feeling anxious, and even stated \"I do not know if it was a panic attack\". Patient was found to have markedly elevated liver function tests with an AST of 608 and an ALT of 224. Total bilirubin and alkaline phosphatase were essentially normal. Her d-dimer was also found to be elevated at 4.43. In the emergency room a CT scan of the head in addition to a CT angiogram of the chest and CT of the abdomen and pelvis were performed. Those results are noted above. Patient was admitted to the hospitalist service. Continuous telemetry monitoring was implemented and patient had no acute events on telemetry. She was taken for cardiac stress testing in addition to echocardiogram. Stress testing was low risk for ischemia. Echocardiogram results were also relatively unremarkable, and I did discuss the \"abnormal left ventricular strain of -16.6%\" with Dr. Lulú Joseph this morning. He reports that this is an acceptable value, and does not require additional work-up at this time, but would provide value in the future if a repeat echocardiogram shows a change in this value. Patient was started on a low-dose of beta-blocker during this hospitalization, not only to assist with blood pressure control, but also in the hopes that it may have some benefit with anxiety. Her most recent blood pressure trend is as follows:    Patient does have a home blood pressure cuff and will keep an eye on her blood pressures as an outpatient. She will be prescribed a low-dose of metoprolol. Given her elevated d-dimer and abnormal liver function test, I did perform an abdominal vascular ultrasound which is also negative. Her liver function tests have continued to improve on a daily basis, however this will require follow-up on an outpatient basis. An acute hepatitis panel was also negative. She did have an element of hepatic steatosis on imaging.  I have not started her back on her statin at this time in the setting of transaminitis. I would like her to follow-up with her primary care provider in 1 week for reassessment. Patient also reports that she has been plagued by sinus troubles for the past few months that have not been relieved despite using different over-the-counter remedies. ENT did evaluate patient during this hospitalization. She will complete a 14-day course of Augmentin, a short prednisone taper, in addition to Flonase and Astelin nasal sprays. Plans for follow-up in the ENT clinic in 3-4 weeks. All in all, patient is doing significantly better and has had no recurrence of symptoms of lightheadedness, dizziness, chest pain, or chest pressure. Plan for discharge today with close outpatient follow-up with her primary care provider within 1 week for posthospitalization assessment regarding the above-mentioned issues. If she has ongoing symptoms of lightheadedness moving forward, would consider outpatient Holter monitoring as well. She has had no events on telemetry during this hospitalization. She returns here fu  She brings in blood pressure readings  Her blood pressures have been in good range and she has been taking lisinopril 10 twice daily and metoprolol 25 twice daily  She overall has been feeling well and has had no recurrent symptoms that triggered hospital admission end of September 2020    She is also here for follow-up regarding her chronic medical problems  Pure hypercholesterolemia-Patient  has tried to follow diet recommendations. Has been taking  cholesterol lowering medication as prescribed and does not report any side effects.     Vitamin D deficiency-She has been taking vitamin D supplement 1000 IU daily     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HCC)  Generalized anxiety disorder-she has been taking Effexor 75 mg daily      Diagnostic test result review from [facility] September 2020  Echocardiogram  · Left ventricular ejection fraction appears to be 56 - 60%.  Left   ventricular systolic function is normal.  · Abnormal left ventricular global longitudinal left ventricular strain of   -16.6%  · Indeterminate left ventricular diastolic function  · Mild pulmonary hypertension    Ultrasound abdominal, vascular  Impression:    1.  Patent portal and hepatic veins. 2.  Normal-appearing liver. No biliary ductal dilatation. This report was finalized on 09/30/2020 14:27 by Dr. Selena Peng MD.    Ultrasound abdomen, limited  Impression:    1.  Patent portal and hepatic veins. 2.  Normal-appearing liver. No biliary ductal dilatation. This report was finalized on 09/30/2020 14:27 by Dr. Selena Peng MD.    Stress echocardiogram  · Estimated left ventricular EF = 55% Left ventricular systolic function   is normal.  · Low risk stress test for stress induced myocardial ischemia    CT abdomen and pelvis  1. Wall thickening of the stomach and duodenum a.m. part. Artifactual  due to underdistention and peristalsis, however an infectious  inflammatory process is also considered. No gastric outlet obstruction. No free air or abscess. 2. Hepatic steatosis. Focal fatty infiltration increased along the  falciform ligament. 3. Prior cholecystectomy. Similar prominence of the bile ducts when  compared to a 2012 CT exam likely reservoir effect. No common bile duct  stone identified. 4. Enhancement of the bilateral uroepithelium of the nondilated renal  collecting systems. Correlation for urinary tract infection recommended. This report was finalized on 09/29/2020 21:31 by Dr. Anurag Bonner MD.    CT head  Paranasal sinusitis    CT angiogram of chest  1. No pulmonary emboli. 2. Increasing peribronchial thickening with borderline prominent hilar  lymph nodes. Bronchitis favored. Basilar atelectasis. 3. Moderate emphysema.   This report was finalized on 09/29/2020 21:25 by Dr. Anurag Bonner MD.    Lab results  Significantly elevated ALT and AST on admission to 224/608  At the time of discharge on 10/1/2084/40  Kidney function normal    A1c elevated 5.8  CBC normal    Hepatitis panel negative    Lipid panel     Thyroid levels normal    Urinalysis normal  Patient Active Problem List    Diagnosis Date Noted    ACS (acute coronary syndrome) ([facility] 07/13/2019     Priority: High    Chest pressure 06/19/2018     Priority: High    Mild coronary artery disease 07/18/2019    Chest pain 06/18/2018 12/30/2016  SE negative for myocardial ischemia  6/19/18  SE negative for myocardial ischemia  7/13/19 ACS FERNANDO RISK Score 2 (angina, + troponin ), AUC indication 3, AUC score 7   7/14/19  Cath  Mild CAD, normal LVFX      Vitamin D deficiency 11/04/2017    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[facility] 11/04/2017    Generalized anxiety disorder 11/04/2017    Pure hypercholesterolemia 11/04/2017    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; post extensive surgery/ hysterectomy      Osteopenia of multiple sites 11/04/2017 2013 Lspine -2.4/hip neck -2.3; 5/17 Lspine -1.1; hip neck -2.2      History of breast cancer 11/04/2017     2008 LEFT/ post partial mastectomy      Elevated transaminase level 06/08/2017    Fatty liver disease, nonalcoholic 99/13/4332    Hx of colonic polyps        Past Medical History:   Diagnosis Date    Anxiety     Breast cancer ([facility]     Cancer ([facility]     liposcaarcoma, peritoneal    Hyperlipidemia     Mild coronary artery disease 7/18/2019       Past Surgical History:   Procedure Laterality Date    BREAST LUMPECTOMY Left     CHOLECYSTECTOMY  2008    COLONOSCOPY  12/09/2016    Dr Chaitanya Dugan access, [place], 5 yr recall w/Dr Fleming Sample    MASTECTOMY, PARTIAL Left 2008    left breast cancer    OR COLONOSCOPY FLX DX W/COLLJ SPEC WHEN PFRMD N/A 12/9/2016    COLONOSCOPY DIAGNOSTIC OR SCREENING performed by Latha Sung DO at [address] [facility]    CHRISTIANE AND ROMY drew normal PAP's prior to hysterectomy    TONSILLECTOMY         Current Outpatient Medications   Medication Sig Relationships    Social connections     Talks on phone: None     Gets together: None     Attends [employer] service: None     Active member of club or organization: None     Attends meetings of clubs or organizations: None     Relationship status: None    Intimate partner violence     Fear of current or ex partner: None     Emotionally abused: None     Physically abused: None     Forced sexual activity: None   Other Topics Concern    None   Social History Narrative    None     Family History   Problem Relation Age of Onset    Alzheimer's Disease Mother     Breast Cancer Mother 48    Heart Disease Father     Diabetes Father     Cancer Brother     Colon Cancer Neg Hx     Colon Polyps Neg Hx     Liver Cancer Neg Hx     Liver Disease Neg Hx     Esophageal Cancer Neg Hx     Rectal Cancer Neg Hx     Stomach Cancer Neg Hx           Past Surgical History:   Procedure Laterality Date    BREAST LUMPECTOMY Left     CHOLECYSTECTOMY  2008    COLONOSCOPY  12/09/2016    Dr Dominic Enriquez access, [place], 5 yr recall w/Dr Iman Sarmiento    MASTECTOMY, PARTIAL Left 2008    left breast cancer    IN COLONOSCOPY FLX DX W/COLLJ SPEC WHEN PFRMD N/A 12/9/2016    COLONOSCOPY DIAGNOSTIC OR SCREENING performed by Adolfo Varela DO at [address] [facility]    CHRISTIANE AND BSO      aldenice normal PAP's prior to hysterectomy    TONSILLECTOMY           Lab Review   Orders Only on 10/06/2020   Component Date Value    Sodium 10/06/2020 140     Potassium 10/06/2020 3.5     Chloride 10/06/2020 101     CO2 10/06/2020 27     Anion Gap 10/06/2020 12     Glucose 10/06/2020 84     BUN 10/06/2020 19     CREATININE 10/06/2020 0.6     GFR Non- 10/06/2020 >60     GFR  10/06/2020 >59     Calcium 10/06/2020 8.5*    Total Protein 10/06/2020 5.8*    Alb 10/06/2020 3.8     Total Bilirubin 10/06/2020 <0.2     Alkaline Phosphatase 10/06/2020 74     ALT 10/06/2020 42*    AST 10/06/2020 15          Review of Systems Constitutional: Positive for fatigue. Negative for chills and fever. HENT: Negative for congestion, ear pain, postnasal drip, sinus pressure, sore throat, trouble swallowing and voice change. Eyes: Negative for pain, redness and visual disturbance. Respiratory: Negative for cough, chest tightness and wheezing. Cardiovascular: Negative for chest pain, palpitations and leg swelling. Gastrointestinal: Negative for abdominal pain, blood in stool, constipation, diarrhea, nausea and vomiting. Endocrine: Negative for polydipsia and polyuria. Genitourinary: Negative for dysuria, enuresis, flank pain, frequency and urgency. Musculoskeletal: Negative for arthralgias, gait problem and joint swelling. Skin: Negative for color change and rash. Neurological: Negative for dizziness, tremors, syncope, facial asymmetry, speech difficulty, weakness, numbness and headaches. Psychiatric/Behavioral: Negative for agitation, behavioral problems, confusion, sleep disturbance and suicidal ideas. The patient is not nervous/anxious. Vitals:    10/14/20 1402   BP: 128/78   Site: Left Upper Arm   Position: Sitting   Cuff Size: Large Adult   Pulse: 89   Resp: 18   SpO2: 98%   Weight: 130 lb (59 kg)   Height: 5' 3\" (1.6 m)      Wt Readings from Last 3 Encounters:   10/14/20 130 lb (59 kg)   10/06/20 129 lb (58.5 kg)   07/25/20 130 lb (59 kg)   Body mass index is 23.03 kg/m². BP Readings from Last 3 Encounters:   10/14/20 128/78   10/06/20 (!) 178/90   07/25/20 122/84       Physical Exam  Constitutional:       General: She is not in acute distress. Appearance: She is well-developed. She is not diaphoretic. HENT:      Head: Normocephalic and atraumatic. Nose: Nose normal.   Eyes:      General: No scleral icterus. Right eye: No discharge. Left eye: No discharge. Conjunctiva/sclera: Conjunctivae normal.      Pupils: Pupils are equal, round, and reactive to light.    Neck: Musculoskeletal: Normal range of motion. Thyroid: No thyromegaly. Vascular: No JVD. Cardiovascular:      Rate and Rhythm: Normal rate and regular rhythm. Heart sounds: No murmur. Pulmonary:      Breath sounds: Normal breath sounds. No wheezing or rales. Chest:      Chest wall: No tenderness. Abdominal:      General: Bowel sounds are normal. There is no distension. Tenderness: There is no guarding. Lymphadenopathy:      Cervical: No cervical adenopathy. Skin:     General: Skin is dry. Findings: No erythema or rash. Neurological:      Mental Status: She is oriented to person, place, and time. Cranial Nerves: No cranial nerve deficit. Coordination: Coordination normal.      Deep Tendon Reflexes: Reflexes are normal and symmetric. Psychiatric:         Behavior: Behavior normal.         Thought Content: Thought content normal.         Judgment: Judgment normal.     Breast exam  Bilateral breast exam- symmetric, no nodules, no lymphadenopathy, no nipple discharge              ASSESSMENT/PLAN    ANNUAL PHYSICAL  * mammogram- schedule  * PAP not needed  * cscope 12/16- repeat 5 yrs  *  BD 5/17 - repeat due- schedule    LIVER FX ELEVATION/ABNORMAL CT abd-pelvis  Prior history of retroperitoneal sarcoma 2007    Recent admission with vague complaint of sudden onset of nausea/vague chest discomfort and at admission significant elevation of liver function tests with  and  which significantly improved during admission and remained slightly elevated at the time of the discharge  This patient also had similar scenario during the admission to [facility] summer 2019 when she had presented with vague chest discomfort and with significantly elevated liver function test that subsequently completely normalized  No clear etiology for the liver function test elevation was found  Patient also has abnormal CT abdomen and pelvis that shows  1.  Wall thickening of the stomach and duodenum a.m. part. Artifactual  due to underdistention and peristalsis, however an infectious  inflammatory process is also considered. No gastric outlet obstruction. No free air or abscess. 2. Hepatic steatosis. Focal fatty infiltration increased along the  falciform ligament. Vascular abdominal ultrasound and abdominal ultrasound both did not show any significant liver/vascular abnormalities  At this time we will proceed with GI referral for opinion regarding   #1 unusual presentation for significant elevated liver function tests that now has happened twice-July 2019 and September 2020  #2. Abnormal CT scan showing wall thickening of the stomach and duodenum  ( note-has history of retroperitoneal sarcoma 2007/underwent surgeries and radiation)    Bilateral hilar lymph node enlargement- borderline  1. No pulmonary emboli. 2. Increasing peribronchial thickening with borderline prominent hilar  lymph nodes. Bronchitis favored. Basilar atelectasis.   3. Moderate emphysema    PLAN:  Obtain Chest with contrast for evaluation in 3 months    Abnormal left ventricular global longitudinal left ventricular strain of   -16.6% per echo 9/2021  Mild Coronary artery disease as per cardiac cath July 2019  Obtain cardiology consultation for opinion    Hypertension with elevated blood pressure readings during recent admission  New diagnosis  Blood pressure now is in good range  Patient will continue lisinopril 10 twice daily and metoprolol 25 twice daily  Asked her to send me blood pressure readings in about 2 weeks     Pure hypercholesterolemia-  LDL during recent admission 9/2020-elevated at 123  Patient admits that she has not been taking her Crestor regularly  Her baseline LDL prior to starting lipid-lowering treatment was over 200  Her Crestor was placed on hold by hospitalist since her liver tests at the time was significantly elevated  Will await until patient has seen gastroenterology and then restart her Crestor since heart cath has shown mild chronic disease in the past and her baseline LDL prior to starting lipid-lowering therapy was over 190      Vitamin D deficiency-vitamin D level was well, 39.7, patient will continue current vitamin D dose 50,000 IU weekly will have lab drawn next week     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[address]  Generalized anxiety disorder-  she has been doing better, she will continue Effexor 75 mg daily         Orders Placed This Encounter   Procedures    AMANDA DIGITAL SCREEN W OR WO CAD BILATERAL    DEXA BONE DENSITY 2 SITES    CT CHEST W CONTRAST    Comprehensive Metabolic Panel    Comprehensive Metabolic Panel    CBC Auto Differential    Lipid Panel    Vitamin D 25 Hydroxy   Estrellita Haines MD, Gastroenterology, Malia Funes MD, Cardiology, [place]     New Prescriptions    No medications on file      There are no Patient Instructions on file for this visit. Return in about 3 months (around 1/14/2021) for Medication check. EMR Dragon/transcription disclaimer:Significant part of this  encounter note is electronic transcription/translation of spoken language to printed text. The electronic translation of spoken language may beerroneous, or at times, nonsensical words or phrases may be inadvertently transcribed.  Although I have reviewed the note for such errors, some may still exist.

## 2020-10-27 ENCOUNTER — HOSPITAL ENCOUNTER (OUTPATIENT)
Dept: WOMENS IMAGING | Age: 60
Discharge: HOME | End: 2020-10-27
Payer: COMMERCIAL

## 2020-10-27 PROCEDURE — 77080 DXA BONE DENSITY AXIAL: CPT

## 2020-10-27 PROCEDURE — 77063 BREAST TOMOSYNTHESIS BI: CPT

## 2020-10-29 ENCOUNTER — OFFICE VISIT (OUTPATIENT)
Dept: CARDIOLOGY | Age: 60
End: 2020-10-29
Payer: COMMERCIAL

## 2020-10-29 VITALS
HEIGHT: 63 IN | WEIGHT: 131 LBS | DIASTOLIC BLOOD PRESSURE: 82 MMHG | BODY MASS INDEX: 23.21 KG/M2 | SYSTOLIC BLOOD PRESSURE: 138 MMHG | HEART RATE: 64 BPM

## 2020-10-29 PROCEDURE — 99214 OFFICE O/P EST MOD 30 MIN: CPT | Performed by: CLINICAL NURSE SPECIALIST

## 2020-10-29 PROCEDURE — 93000 ELECTROCARDIOGRAM COMPLETE: CPT | Performed by: CLINICAL NURSE SPECIALIST

## 2020-10-29 NOTE — PATIENT INSTRUCTIONS
Continue ongoing good blood pressure control with goal less than 130/80 at rest  Stay off statin - work with PCP to monitor your liver enzymes   Follow up in 6-9 mos With Dr. Parth Griggs   Call with any questions or concerns  Follow up with Sharon Anna MD for non cardiac problems  Report any new problems  Cardiovascular Fitness-Exercise as tolerated. Strive for 30 minutes of exercise most days of the week. Cardiac / Healthy Diet  Continue current medications as directed  Continue plan of treatment  It is always recommended that you bring your medications bottles with you to each visit - this is for your safety!

## 2020-10-29 NOTE — PROGRESS NOTES
[id number] [facility]  [facility] [place] [address] [address]  Phone: [phone]  Fax: [phone]    OFFICE VISIT:  10/29/2020    Christy Saas - : 1960    Reason For Visit:  Manny Caldwell is a 61 y.o. female who is here for New Patient (NTP     no cardiac symptoms) and Hypertension  Patient had acute coronary syndrome 2019 underwent heart catheterization 2019 that showed mild nonocclusive coronary disease with normal LV systolic function. Patient was admitted to [facility] [place] 2020 with episode of chest pain, lightheadedness while at work. Patient presented to urgent care and transferred to emergency room. Main complaint was dizziness. Found to have very elevated AST//224 elevated D-dimer  Patient has stress test that was negative for ischemia and 2D echo that was unremarkable with the exception of left ventricular strain of -16.6%. Cardiologist there did not think this was a concern. Beta-blocker was added due to hypertension. Her statin was withheld and liver enzymes lowering    Seen in follow-up by her primary care 10/6/2020. Blood pressure still elevated and lisinopril increased  Here in follow-up to discuss echo. 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 No chemotherapy. She was followed at [facility] for 10 years with annual CT scans. No recurrence and doing well    She reports the summer she has had some intermittent headache especially behind her left eye. She did not check her blood pressure. She had been on cholesterol medicine however this is  2nd time she had elevation in her liver enzymes. Now off the Crestor  Headaches have resolved. Overall she is feeling better.   Blood pressures been running 130-140/70-80s      Subjective  Manny Caldwell denies exertional chest pain, shortness of breath, orthopnea, paroxysmal Cancer Neg Hx     Stomach Cancer Neg Hx      Social History     Tobacco Use    Smoking status: Former Smoker     Packs/day: 0.50     Years: 35.00     Pack years: 17.50     Last attempt to quit: 2016     Years since quittin.8    Smokeless tobacco: Never Used   Substance Use Topics    Alcohol use: Yes      Current Outpatient Medications   Medication Sig Dispense Refill    ibandronate (BONIVA) 150 MG tablet Take 1 tablet by mouth every 30 days 3 tablet 1    vitamin D (ERGOCALCIFEROL) 1.25 MG (89846 UT) CAPS capsule Take 1 capsule by mouth once a week 12 capsule 1    venlafaxine (EFFEXOR XR) 75 MG extended release capsule Take 1 capsule by mouth 2 times daily 180 capsule 1    lisinopril (PRINIVIL;ZESTRIL) 10 MG tablet Take 1 tablet by mouth 2 times daily 180 tablet 0    metoprolol tartrate (LOPRESSOR) 25 MG tablet Take 25 mg by mouth every 12 hours      aspirin EC 81 MG EC tablet Take 81 mg by mouth daily      busPIRone (BUSPAR) 10 MG tablet Take 1 tablet by mouth 3 times daily 90 tablet 0    fluticasone (FLONASE) 50 MCG/ACT nasal spray 1 spray by Nasal route daily for 10 days (Patient taking differently: 1 spray by Nasal route daily as needed ) 1 Bottle 0     No current facility-administered medications for this visit. Allergies: Adhesive tape and Sulfa antibiotics    Review of Systems  Constitutional - no significant activity change, appetite change, or unexpected weight change. No fever, chills or diaphoresis. No fatigue. HEENT - no significant rhinorrhea or epistaxis. No tinnitus or significant hearing loss. Eyes - no sudden vision change or amaurosis. Respiratory - no significant wheezing, stridor, apnea or cough. No dyspnea on exertion or shortness of breath. Cardiovascular - no exertional chest pain, orthopnea or PND. No sensation of arrhythmia or slow heart rate. No claudication or leg edema. Gastrointestinal - no abdominal swelling or pain. No blood in stool.  No severe constipation, diarrhea, nausea, or vomiting. Genitourinary - no difficulty urinating, dysuria, frequency, or urgency. No flank pain or hematuria. Musculoskeletal - no back pain, gait disturbance, or myalgia. Skin - no color change or rash. No pallor. No new surgical incision. Neurologic - no speech difficulty, facial asymmetry or lateralizing weakness. No seizures, presyncope, syncope, or significant dizziness. Hematologic - no easy bruising or excessive bleeding. Psychiatric - no severe anxiety or insomnia. No confusion. All other review of systems are negative. Objective  Vital Signs - /82   Pulse 64   Ht 5' 3\" (1.6 m)   Wt 131 lb (59.4 kg)   BMI 23.21 kg/m²   General - Leesa Monteiro is alert, cooperative, and pleasant. Well groomed. No acute distress. Body habitus is normal.  HEENT - The head is normocephalic. No circumoral cyanosis. Dentition is normal.   EYES -  No Xanthelasma, no arcus senilis, no conjunctival hemorrhages or discharge. Neck - Supple, without increased jugular venous pressures. No carotid bruits. No mass. Respiratory - Lungs are clear bilaterally. No wheezes or rales. Normal effort without use of accessory muscles. Cardiovascular - Heart has regular rhythm and rate. No murmurs, rubs or gallops. + pedal pulses and no varicosities. Abdominal -  Soft, nontender, nondistended. Bowel sounds are intact. Extremities - No clubbing, cyanosis, or  edema. Musculoskeletal -  No clubbing . No Osler's nodes. Gait normal .  No kyphosis or scoliosis. Skin -  no statis ulcers or dermatitis. Neurological - No focal signs are identified. Oriented to person, place and time. Psychiatric -  Appropriate affect and mood. Assessment:     Diagnosis Orders   1. Essential hypertension  EKG 12 lead   2. Pure hypercholesterolemia     3. Mild coronary artery disease  EKG 12 lead   4.  Abnormal echocardiogram       Data:  BP Readings from Last 3 Encounters: 10/29/20 138/82   10/14/20 128/78   10/06/20 (!) 178/90    Pulse Readings from Last 3 Encounters:   10/29/20 64   10/14/20 89   10/06/20 64        Wt Readings from Last 3 Encounters:   10/29/20 131 lb (59.4 kg)   10/14/20 130 lb (59 kg)   10/06/20 129 lb (58.5 kg)     From [facility] in September   Left ventricular ejection fraction appears to be 56 - 60%. Left   ventricular systolic function is normal.  · Abnormal left ventricular global longitudinal left ventricular strain of   -16.6%  · Indeterminate left ventricular diastolic function  · Mild pulmonary hypertension    Reviewed echo with Dr. Eleazar Cuevas in office. Reserved LV function with questionable relevance of strain. She does have some diastolic dysfunction and recommendations would be ongoing good blood pressure control  Currently on beta-blocker and ACE inhibitor- recently up titrated    EKG today shows normal sinus rhythm with a rate of 64    Agree with her staying off statins at this time. She is had 2 experiences with extremely high liver enzymes on statin. Continue to follow with primary care for management    States taking medications as prescribed  Stable cardiovascular status. No evidence of overt heart failure, angina or dysrhythmia. Plan  Continue ongoing good blood pressure control with goal less than 130/80 at rest  Stay off statin -   Follow up in 6-9 mos With Dr. Rita Cartagena  Call with any questions or concerns  Follow up with Nadir Bryson MD for non cardiac problems  Report any new problems  Cardiovascular Fitness-Exercise as tolerated. Strive for 30 minutes of exercise most days of the week. Cardiac / Healthy Diet  Continue current medications as directed  Continue plan of treatment  It is always recommended that you bring your medications bottles with you to each visit - this is for your safety!        VAZQUE[facility] Man    EMR dragon/transcription disclaimer: Much of this encounter note is electronic transcription/translation of spoken language to printed tach. Electronic translation of spoken language may be erroneous, or at times, nonsensical words or phrases may be inadvertently transcribed.  Although, I have reviewed the note for such errors, some may still exist.

## 2020-10-30 ENCOUNTER — OFFICE VISIT (OUTPATIENT)
Dept: OTOLARYNGOLOGY | Facility: CLINIC | Age: 60
End: 2020-10-30

## 2020-10-30 VITALS
DIASTOLIC BLOOD PRESSURE: 87 MMHG | TEMPERATURE: 97.3 F | WEIGHT: 130.4 LBS | SYSTOLIC BLOOD PRESSURE: 137 MMHG | HEART RATE: 83 BPM | BODY MASS INDEX: 23.11 KG/M2 | HEIGHT: 63 IN

## 2020-10-30 DIAGNOSIS — J30.9 ALLERGIC RHINITIS, UNSPECIFIED SEASONALITY, UNSPECIFIED TRIGGER: ICD-10-CM

## 2020-10-30 DIAGNOSIS — J32.4 PANSINUSITIS, UNSPECIFIED CHRONICITY: Primary | ICD-10-CM

## 2020-10-30 PROCEDURE — 99213 OFFICE O/P EST LOW 20 MIN: CPT | Performed by: NURSE PRACTITIONER

## 2020-10-30 RX ORDER — AZELASTINE 1 MG/ML
2 SPRAY, METERED NASAL 2 TIMES DAILY
Qty: 1 EACH | Refills: 11 | Status: SHIPPED | OUTPATIENT
Start: 2020-10-30 | End: 2020-11-14

## 2020-10-30 RX ORDER — FLUTICASONE PROPIONATE 50 MCG
2 SPRAY, SUSPENSION (ML) NASAL DAILY
Qty: 16 G | Refills: 11 | Status: SHIPPED | OUTPATIENT
Start: 2020-10-30 | End: 2022-04-05 | Stop reason: DRUGHIGH

## 2020-10-30 NOTE — PROGRESS NOTES
PRIMARY CARE PROVIDER: Malgorzata Avila MD  REFERRING PROVIDER: No ref. provider found    Chief Complaint   Patient presents with   • Sinus Problem       Subjective   History of Present Illness:  Jayla Graves is a  60 y.o. female who we initially saw in the hospital as a consult. She had complaints of left sided nasal congestion, nasal obstruction, nasal drainage, and postnasal drip.  She complained of facial pressure and facial pain localized to the left maxillary sinus.  Her symptoms began in May.  They had been relatively constant and moderate to severe in severity.  She was previously treated with Afrin, Claritin, nasal saline spray, and a 7-day course of amoxicillin without any improvement in symptoms. She has a history of sinusitis approximately once per year for the last few years. This usually resolves with an antibiotic and steroid shot.  She also reports a history of seasonal allergies.  While in the hospital, CT scan of the head was performed revealing paranasal sinusitis.  She was treated with a 14-day course of Augmentin, oral steroids, Flonase, Astelin, and Mucinex.  She reports a complete resolution in symptoms after completion of these medications.  She is still currently taking Flonase and Astelin which she feels has significantly improved her symptoms.  Today, she denies any current related complaints.      Review of Systems:  Review of Systems   Constitutional: Negative for chills and fever.   HENT: Negative for congestion, ear discharge, ear pain, rhinorrhea and sore throat.    Respiratory: Negative for cough and shortness of breath.    Cardiovascular: Negative for chest pain.   Gastrointestinal: Negative for diarrhea, nausea and vomiting.       Past History:  Past Medical History:   Diagnosis Date   • Anxiety    • Breast cancer (CMS/HCC) 2008    left   • Depression    • Fatty liver disease, nonalcoholic    • History of colon polyps    • Hx of radiation therapy 2008   • Hypercholesterolemia     • Osteopenia    • 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CMS/HCC)    • Vitamin D deficiency      Past Surgical History:   Procedure Laterality Date   • BILATERAL SALPINGO OOPHORECTOMY     • BREAST BIOPSY Left 2008    positive   • BREAST LUMPECTOMY Left 2008   • BREAST LUMPECTOMY Left    • CARDIAC CATHETERIZATION      2018/2019 pt unsure. done by dr calderon @ [facility]   • CHOLECYSTECTOMY     • COLONOSCOPY     • MASTECTOMY PARTIAL / LUMPECTOMY Left    • TONSILLECTOMY     • TOTAL ABDOMINAL HYSTERECTOMY       Family History   Problem Relation Age of Onset   • Alzheimer's disease Mother    • Cancer Mother         breast   • Heart disease Father    • Diabetes Father    • Cancer Brother    • Breast cancer Neg Hx      Social History     Tobacco Use   • Smoking status: Former Smoker     Packs/day: 0.75     Years: 25.00     Pack years: 18.75     Types: Cigarettes     Quit date: 10/13/2017     Years since quitting: 3.0   • Smokeless tobacco: Never Used   Substance Use Topics   • Alcohol use: Yes     Comment: Rarely   • Drug use: No     Allergies:  Adhesive tape and Sulfa antibiotics    Current Outpatient Medications:   •  aspirin 81 MG EC tablet, Take 81 mg by mouth Daily., Disp: , Rfl:   •  azelastine (ASTELIN) 0.1 % nasal spray, 2 sprays into the nostril(s) as directed by provider 2 (Two) Times a Day for 30 doses., Disp: 1 each, Rfl: 11  •  fluticasone (FLONASE) 50 MCG/ACT nasal spray, 2 sprays by Each Nare route Daily for 30 days., Disp: 16 g, Rfl: 11  •  guaiFENesin (MUCINEX) 600 MG 12 hr tablet, Take 2 tablets by mouth Every 12 (Twelve) Hours., Disp: 60 tablet, Rfl: 0  •  Ibandronate Sodium (BONIVA PO), Take 150 mg by mouth Every 30 (Thirty) Days., Disp: , Rfl:   •  metoprolol tartrate (LOPRESSOR) 25 MG tablet, Take 1 tablet by mouth Every 12 (Twelve) Hours., Disp: 60 tablet, Rfl: 2  •  predniSONE (DELTASONE) 10 MG tablet, Take 4 tablets by mouth X 1 day, 3 tabs by mouth X 1 day, 2 tablets X 1 day, 1 tablet X 1 day, stop, Disp: 10  tablet, Rfl: 0  •  venlafaxine (EFFEXOR) 75 MG tablet, Take 1 tablet by mouth 2 (Two) Times a Day With Meals., Disp: 30 tablet, Rfl: 2  •  vitamin D (ERGOCALCIFEROL) 1.25 MG (11679 UT) capsule capsule, Take 50,000 Units by mouth 1 (One) Time Per Week. Monday, Disp: , Rfl:       Objective     Vital Signs:  Temp:  [97.3 °F (36.3 °C)] 97.3 °F (36.3 °C)  Heart Rate:  [83] 83  BP: (137)/(87) 137/87    Physical Exam:  Physical Exam  CONSTITUTIONAL: well nourished, well-developed, alert, oriented, in no acute distress   COMMUNICATION AND VOICE: able to communicate normally, normal voice quality  HEAD: normocephalic, no lesions, atraumatic, no tenderness, no masses   FACE: appearance normal, no lesions, no tenderness, no deformities, facial motion symmetric  SALIVARY GLANDS: parotid glands with no tenderness, no swelling, no masses, submandibular glands with normal size, nontender  EYES: ocular motility normal, eyelids normal, orbits normal, no proptosis, conjunctiva normal , pupils equal, round   EARS:  Hearing: response to conversational voice normal bilaterally   External Ears: auricles without lesions  Otoscopic: tympanic membrane appearance normal, no lesions, no perforation, normal mobility, no fluid  NOSE:  External Nose: structure normal, no tenderness on palpation, no nasal discharge, no lesions, no evidence of trauma, nostrils patent   Intranasal Exam: nasal mucosa inflammation vestibule within normal limits, inferior turbinate normal, nasal septum with mild left-sided deviation  ORAL:  Lips: upper and lower lips without lesion   Teeth: dentition within normal limits for age   Gums: gingivae healthy   Oral Mucosa: oral mucosa normal, no mucosal lesions   Floor of Mouth: Warthin’s duct patent, mucosa normal  Tongue: lingual mucosa normal without lesions, normal tongue mobility   Palate: soft and hard palates with normal mucosa and structure  Oropharynx: oropharyngeal mucosa normal  NECK: neck appearance normal, no  masses or tenderness  LYMPH NODES: no lymphadenopathy  CHEST/RESPIRATORY: respiratory effort normal, normal breath sounds   CARDIOVASCULAR: rate and rhythm normal, extremities without cyanosis or edema    NEUROLOGIC/PSYCHIATRIC: oriented to time, place and person, mood normal, affect appropriate, CN II-XII intact grossly    Results Reviewed:          Assessment   Assessment:  1. Pansinusitis, unspecified chronicity    2. Allergic rhinitis, unspecified seasonality, unspecified trigger        Plan   Plan:    New Medications Ordered This Visit   Medications   • azelastine (ASTELIN) 0.1 % nasal spray     Si sprays into the nostril(s) as directed by provider 2 (Two) Times a Day for 30 doses.     Dispense:  1 each     Refill:  11   • fluticasone (FLONASE) 50 MCG/ACT nasal spray     Si sprays by Each Nare route Daily for 30 days.     Dispense:  16 g     Refill:  11     Continue nasal sprays. The proper use of nasal inhalers was discussed including the need for regular use and build up of drug levels before full effects.  We will continue to monitor for recurrent symptoms.  She was instructed to call or return for any problems or worsening symptoms.  Follow-up in 1 year or sooner should problems arise    There are no Patient Instructions on file for this visit.  Return in about 1 year (around 10/30/2021), or if symptoms worsen or fail to improve, for Recheck.    My findings and recommendations were discussed and questions were answered.     Beckie Dodd, MAHOGANY  10/30/20  13:02 CDT

## 2020-11-02 ENCOUNTER — TELEPHONE (OUTPATIENT)
Dept: CARDIOLOGY | Age: 60
End: 2020-11-02

## 2020-11-02 NOTE — TELEPHONE ENCOUNTER
PT called stating her BP has been running high   168/94  175/105  170/103  165/105  Pt wants to know if she needs to increase her meds.

## 2020-11-02 NOTE — TELEPHONE ENCOUNTER
Adrian Jesnen,  Can you confirm that she is taking Lisinopril 10 mg BID. If this is correct, advise to take (1) tab AM and (2) tabs PM.  Have her call back BP readings in one week or can do telephone visit to discuss.   Thanks, Bettie Egan

## 2020-11-02 NOTE — TELEPHONE ENCOUNTER
Called and spoke with patient, confirmed patient is taking Lisinopril 10 mg BID, advised to change to 1 tablet in AM and 2 tablet in PM.  Patient verbally understood and will let us know how her bp is running in 1 week.

## 2020-11-04 RX ORDER — LISINOPRIL 20 MG/1
20 TABLET ORAL 2 TIMES DAILY
Qty: 180 TABLET | Refills: 3 | Status: SHIPPED | OUTPATIENT
Start: 2020-11-04 | End: 2020-11-17 | Stop reason: ALTCHOICE

## 2020-11-04 NOTE — TELEPHONE ENCOUNTER
Patient notified to take lisinopril 20 mg BID and call back readings in 1 week. She voiced understanding. Med updated and refill sent to [facility] CO[facility] to sign.

## 2020-11-04 NOTE — TELEPHONE ENCOUNTER
Britni Centeno,  Have her increase Lisinopril to 20 mg twice daily. Advise to call back BP log in one week.   Thanks, Carole Evans

## 2020-11-04 NOTE — TELEPHONE ENCOUNTER
Patient states that BP today is as follows:    0600 156/101  1000 186/110  1230 178/111    Only symptom is a headache. Not taking any decongestants.     Takes medicine between 4498-3587 and 1503-7452    Lisinopril 10 mg QAM and 20 mg QPM  Metoprolol 25 mg BID     Please advise

## 2020-11-17 ENCOUNTER — PATIENT MESSAGE (OUTPATIENT)
Dept: INTERNAL MEDICINE | Age: 60
End: 2020-11-17

## 2020-11-17 RX ORDER — IRBESARTAN 300 MG/1
300 TABLET ORAL DAILY
Qty: 30 TABLET | Refills: 5 | Status: SHIPPED | OUTPATIENT
Start: 2020-11-17 | End: 2021-05-19

## 2020-11-17 NOTE — TELEPHONE ENCOUNTER
From: Delma Sebastian  To: Selena Puentes MD  Sent: 11/17/2020 12:28 PM CST  Subject: Non-Urgent Medical Question    Dr. Erica Lilly contacted the Cardiological group twice regarding my blood pressure and they have not responded. Attached is my BP readings for the last 10 days. I get bad headaches in the afternoons and alot of pressure behind my left eye until I take my 6pm meds. Do i need yo increase my medication?

## 2020-11-17 NOTE — TELEPHONE ENCOUNTER
Spoke to pt and advised they have responded to her and what they said she is going to log in and respond to them

## 2020-11-18 ENCOUNTER — TELEPHONE (OUTPATIENT)
Dept: GASTROENTEROLOGY | Age: 60
End: 2020-11-18

## 2020-11-18 ENCOUNTER — OFFICE VISIT (OUTPATIENT)
Dept: GASTROENTEROLOGY | Age: 60
End: 2020-11-18
Payer: COMMERCIAL

## 2020-11-18 VITALS
HEIGHT: 63 IN | SYSTOLIC BLOOD PRESSURE: 138 MMHG | OXYGEN SATURATION: 99 % | WEIGHT: 129 LBS | BODY MASS INDEX: 22.86 KG/M2 | HEART RATE: 67 BPM | DIASTOLIC BLOOD PRESSURE: 80 MMHG

## 2020-11-18 DIAGNOSIS — R74.01 TRANSAMINITIS: ICD-10-CM

## 2020-11-18 PROBLEM — Z78.9 ALCOHOL CONSUMPTION OF ONE TO FOUR DRINKS PER WEEK: Status: ACTIVE | Noted: 2020-11-18

## 2020-11-18 PROBLEM — K76.0 FATTY LIVER: Status: ACTIVE | Noted: 2020-11-18

## 2020-11-18 PROBLEM — R93.5 ABNORMAL CT OF THE ABDOMEN: Status: ACTIVE | Noted: 2020-11-18

## 2020-11-18 LAB
ALBUMIN SERPL-MCNC: 4.5 G/DL (ref 3.5–5.2)
ALP BLD-CCNC: 82 U/L (ref 35–104)
ALT SERPL-CCNC: 13 U/L (ref 5–33)
ANION GAP SERPL CALCULATED.3IONS-SCNC: 10 MMOL/L (ref 7–19)
AST SERPL-CCNC: 17 U/L (ref 5–32)
BILIRUB SERPL-MCNC: <0.2 MG/DL (ref 0.2–1.2)
BILIRUBIN DIRECT: 0.1 MG/DL (ref 0–0.3)
BILIRUBIN, INDIRECT: 0.1 MG/DL (ref 0.1–1)
BUN BLDV-MCNC: 23 MG/DL (ref 8–23)
CALCIUM SERPL-MCNC: 8.5 MG/DL (ref 8.8–10.2)
CHLORIDE BLD-SCNC: 101 MMOL/L (ref 98–111)
CO2: 25 MMOL/L (ref 22–29)
CREAT SERPL-MCNC: 0.8 MG/DL (ref 0.5–0.9)
FERRITIN: 58.1 NG/ML (ref 13–150)
GAMMA GLUTAMYL TRANSFERASE: 18 U/L (ref 7–54)
GFR AFRICAN AMERICAN: >59
GFR NON-AFRICAN AMERICAN: >60
GLUCOSE BLD-MCNC: 86 MG/DL (ref 74–109)
HAV IGM SER IA-ACNC: NORMAL
HCT VFR BLD CALC: 42.6 % (ref 37–47)
HEMOGLOBIN: 13.3 G/DL (ref 12–16)
HEPATITIS B CORE IGM ANTIBODY: NORMAL
HEPATITIS B SURFACE ANTIGEN INTERPRETATION: NORMAL
HEPATITIS C ANTIBODY INTERPRETATION: NORMAL
INR BLD: 1.06 (ref 0.88–1.18)
IRON SATURATION: 24 % (ref 14–50)
IRON: 78 UG/DL (ref 37–145)
MCH RBC QN AUTO: 27.8 PG (ref 27–31)
MCHC RBC AUTO-ENTMCNC: 31.2 G/DL (ref 33–37)
MCV RBC AUTO: 88.9 FL (ref 81–99)
PDW BLD-RTO: 12.7 % (ref 11.5–14.5)
PLATELET # BLD: 286 K/UL (ref 130–400)
PMV BLD AUTO: 9.6 FL (ref 9.4–12.3)
POTASSIUM SERPL-SCNC: 3.7 MMOL/L (ref 3.5–5)
PROTHROMBIN TIME: 13.7 SEC (ref 12–14.6)
RBC # BLD: 4.79 M/UL (ref 4.2–5.4)
SODIUM BLD-SCNC: 136 MMOL/L (ref 136–145)
TOTAL IRON BINDING CAPACITY: 323 UG/DL (ref 250–400)
TOTAL PROTEIN: 6.5 G/DL (ref 6.6–8.7)
WBC # BLD: 6.7 K/UL (ref 4.8–10.8)

## 2020-11-18 PROCEDURE — 99215 OFFICE O/P EST HI 40 MIN: CPT | Performed by: NURSE PRACTITIONER

## 2020-11-18 ASSESSMENT — ENCOUNTER SYMPTOMS
VOICE CHANGE: 0
DIARRHEA: 0
SORE THROAT: 0
NAUSEA: 0
BLOOD IN STOOL: 0
ABDOMINAL DISTENTION: 0
PHOTOPHOBIA: 0
BACK PAIN: 0
CONSTIPATION: 0
VOMITING: 0
ABDOMINAL PAIN: 0
SHORTNESS OF BREATH: 0
COUGH: 0
TROUBLE SWALLOWING: 0
RECTAL PAIN: 0
ANAL BLEEDING: 0

## 2020-11-18 NOTE — TELEPHONE ENCOUNTER
No she most certainly does NOT need a paracentesis. She needs a liver biopsy. Let Dondra Schirmer take care of this if she needs to.

## 2020-11-18 NOTE — TELEPHONE ENCOUNTER
I went to schedule this pt's liver bx as she was checking out from todays visit. I spoke to Bernard at the scheduling dept and she had to call over to prep and holding to schedule the biopsy. Bernard came back and said that the woman she spoke to (she believes her name was Mohan Armenta) said that the [address] that is put into the chart is wrong, it needs to be [address] W/OUT ALBUMIN. Bernard said that she kept telling Mohan [facility] that it was for a liver bx, but Volney List kept repeating that it needs to be this other order. I asked Kwadwo Kvng about it due to [facility] being in a room with a pt at the time and Kwadwo Calderon said to hold off on scheduling and wait to ask [facility]. We want to make sure the order is correct before scheduling pt. The pt voiced understanding. I gave her a liver bx packet on instructions and what to expect. Also, the pt stated that she can do the liver bx anytime after Dec. 10th and we can contact her through [address] [place] [address] [phone] at anytime.

## 2020-11-18 NOTE — PROGRESS NOTES
Subjective:      Ortiz Davis is a60 y.o. female  Chief Complaint   Patient presents with    Abnormal CT     and elevated liver enzymes, referred from PCP       HPI  PCP: Jes Mcdonald MD  Referring Provider: Giancarlo Rosales MD  Pt referred from PCP. For abnormal CT of stomach and elevated liver enzymes. CT at [facility] 9/2020 revealed wall thickening of stomach and duodenum. Also revealed a fatty liver. Labs 9/2020 at [facility] revealed ALT of 224, , total bili and alk phos essentially normal. Pt admits to drinking ETOH prior to her labs. Normally drinks wine on the weekends . 1-2 glasses. But prior to the labs she had been drinking more than normal, a few glasses of wine every single night for over a week. Pt reports to me her liver enzymes have fluctuated between normal and high on and off for 3 years. Review of labs in 7/2019 reveals ALT of 122, AST of 328, and essentially normal alk phos and total bili. She denies any known liver disease. No familial hx of liver disease. No herbal supplement use. No antibiotics prior to recent transaminitis episode or any new med that would raise suspicion for etiology being DILI related. She has no GI complaints at all. GI scope reports in history per MA per OV policy, I reviewed this. Family HX:    Pt denies family hx of colon polyps, colon CA, inflammatory bowel dx, gastric CA and esophageal CA.     Past Medical History:   Diagnosis Date    Anxiety     [facility] ([facility]     Cancer ([facility]     liposcaarcoma, peritoneal    Hyperlipidemia     Mild coronary artery disease 7/18/2019          Past Surgical History:   Procedure Laterality Date    BREAST LUMPECTOMY Left     CHOLECYSTECTOMY  2008    MASTECTOMY, PARTIAL Left 2008    left breast cancer    PA COLONOSCOPY FLX DX W/COLLJ SPEC WHEN PFRMD N/A 12/9/2016    Dr Gualberto Shah access, normall, 5 yr recall w/Dr Haven GRANDE AND ROMY drew normal PAP's prior to hysterectomy    TONSILLECTOMY         Social History     Socioeconomic History    Marital status:      Spouse name: None    Number of children: None    Years of education: None    Highest education level: None   Occupational History    None   Social Needs    Financial resource strain: None    Food insecurity     Worry: None     Inability: None    Transportation needs     Medical: None     Non-medical: None   Tobacco Use    Smoking status: Former Smoker     Packs/day: 0.50     Years: 35.00     Pack years: 17.50     Last attempt to quit: 2016     Years since quittin.8    Smokeless tobacco: Never Used   Substance and Sexual Activity    Alcohol use:  Yes     Alcohol/week: 2.0 standard drinks     Types: 2 Glasses of wine per week     Comment: weekends    Drug use: No    Sexual activity: None   Lifestyle    Physical activity     Days per week: None     Minutes per session: None    Stress: None   Relationships    Social connections     Talks on phone: None     Gets together: None     Attends Mandaen service: None     Active member of club or organization: None     Attends meetings of clubs or organizations: None     Relationship status: None    Intimate partner violence     Fear of current or ex partner: None     Emotionally abused: None     Physically abused: None     Forced sexual activity: None   Other Topics Concern    None   Social History Narrative    None       Allergies   Allergen Reactions    Adhesive Tape Swelling    Sulfa Antibiotics Swelling     Other reaction(s): Unknown       Current Outpatient Medications   Medication Sig Dispense Refill    irbesartan (AVAPRO) 300 MG tablet Take 1 tablet by mouth daily 30 tablet 5    ibandronate (BONIVA) 150 MG tablet Take 1 tablet by mouth every 30 days 3 tablet 1    vitamin D (ERGOCALCIFEROL) 1.25 MG (23743 UT) CAPS capsule Take 1 capsule by mouth once a week 12 capsule 1    venlafaxine (EFFEXOR XR) 75 MG extended release capsule Take 1 capsule by mouth 2 times daily 180 capsule 1    metoprolol tartrate (LOPRESSOR) 25 MG tablet Take 25 mg by mouth every 12 hours      aspirin EC 81 MG EC tablet Take 81 mg by mouth daily      fluticasone (FLONASE) 50 MCG/ACT nasal spray 1 spray by Nasal route daily for 10 days (Patient taking differently: 1 spray by Nasal route daily as needed ) 1 Bottle 0     No current facility-administered medications for this visit. Review of Systems   Constitutional: Negative for appetite change, fatigue, fever and unexpected weight change. HENT: Negative for sore throat, trouble swallowing and voice change. Eyes: Negative for photophobia and visual disturbance. Respiratory: Negative for cough and shortness of breath. Cardiovascular: Negative for chest pain, palpitations and leg swelling. Gastrointestinal: Negative for abdominal distention, abdominal pain, anal bleeding, blood in stool, constipation, diarrhea, nausea, rectal pain and vomiting. Genitourinary: Negative for hematuria. Musculoskeletal: Negative for arthralgias, back pain and neck pain. Allergic/Immunologic: Negative for immunocompromised state. Neurological: Negative for syncope and weakness. Hematological: Negative for adenopathy. Does not bruise/bleed easily. Psychiatric/Behavioral: Positive for dysphoric mood. Negative for sleep disturbance. The patient is nervous/anxious. All other systems reviewed and are negative. Objective:     Physical Exam  Vitals signs and nursing note reviewed. Constitutional:       General: She is not in acute distress. Appearance: She is well-developed. Comments: /80   Pulse 67   Ht 5' 3\" (1.6 m)   Wt 129 lb (58.5 kg)   SpO2 99%   BMI 22.85 kg/m²    HENT:      Head: Normocephalic and atraumatic. Right Ear: External ear normal.      Left Ear: External ear normal.   Eyes:      General: No scleral icterus.      Conjunctiva/sclera: Conjunctivae normal.      Pupils: Pupils are equal, round, and reactive to light. Neck:      Musculoskeletal: Normal range of motion and neck supple. Thyroid: No thyromegaly. Cardiovascular:      Rate and Rhythm: Normal rate and regular rhythm. Heart sounds: Normal heart sounds. No murmur. No friction rub. No gallop. Comments: No edema noted to abhay lower extremities   Pulmonary:      Effort: Pulmonary effort is normal. No respiratory distress. Breath sounds: Normal breath sounds. Abdominal:      General: Bowel sounds are normal. There is no distension. Palpations: Abdomen is soft. Tenderness: There is no abdominal tenderness. There is no rebound. Comments: Hepatosplenomegaly not appreciated   Musculoskeletal: Normal range of motion. General: No deformity. Comments: Normal gait on exam   Neurological:      Mental Status: She is alert and oriented to person, place, and time. Cranial Nerves: No cranial nerve deficit. Psychiatric:         Judgment: Judgment normal.           Assessment:       Diagnosis Orders   1. Transaminitis  EBV ANTIBODY PANEL 2    CMV IgM    Alpha-1-Antitrypsin    Sarah Titer IgG by IFA Reflex    Basic Metabolic Panel    CBC    Ceruloplasmin    F-actin (smooth muscle) antibody w/ reflex to titer    Ferritin    Gamma GT    Protime-INR    Mitochondrial antibodies, M2    Liver-Kidney Microsome (LKM-1) Ab (IgG)    Iron and TIBC    Hepatitis Panel, Acute    Hepatic Function Panel    US BIOPSY LIVER PERCUTANEOUS   2. Abnormal CT of the abdomen  ESOPHAGOSCOPY / EGD   3. Fatty liver     4. Alcohol consumption of one to four drinks per week           Plan:      1. Will do EGD for eval of abnormal CT  Schedule outpatient endoscopy. Patient advised no Aspirin, Fish Oil, Vit E or NSAIDs 5 (five) days before procedure. Follow-up Visit: per Dr. Melanie Terry  Pt Education:   Risks, benefits, and alternatives to endoscopy were discussed.  Patient voices understanding of risks of, but not limited to, perforation, bleeding, and infection. The risk of perforation is increased with esophageal dilatation. All questions answered to patient's satisfaction. Patient is agreable to proceed. 2. Labs and liver bx. F/u in 5-6 weeks to discuss results. I discussed with her the liver biopsy procedure in detail as well as the potential risks. Also advised her if labs unrevealing I will be referring her to hepatology for expert opinion and also for a hepatology pathologist to review the liver biopsy slides. She voices understanding. 3. Avoid ETOH.

## 2020-11-18 NOTE — PATIENT INSTRUCTIONS
You are going to have an Endoscopy and here are some basic instructions:    Nothing to eat or drink after midnight EXCEPT:  PLEASE TAKE MEDICATION(S) FOR HIGH BLOOD PRESSURE, SEIZURES, HEART, AND THYROID WITH A SIP OF WATER AT LEAST 2 HOURS PRIOR TO ARRIVAL TIME.   YOU MAY ALSO TAKE ANY INHALERS YOU ARE PRESCRIBED. You will not be able to drive for 24 hours after the procedure due to sedation. Bring a  with you the day of the procedure. No aspirin, ibuprofen, naproxen, fish oil or vitamin E for 5 days before procedure. Continue current medications. If you are on blood thinners, clearance from the prescribing physician will be obtained before your procedure is scheduled. Increased [email or web address] may be associated with discontinuation of your blood thinner and include, but not limited to, stroke, TIA, or cardiac event. If biopsies are taken during the procedure they will be sent to a pathologist for analysis. You will be notified by mail of the pathology results in 2-3 weeks. Your physician may also schedule a follow up appointment with the nurse practitioner to discuss pathology, symptoms or to check if you have had any problems related to your procedure. If you prefer not to return to the office after your procedure please discuss this with your physician on the day of your procedure.

## 2020-11-19 NOTE — TELEPHONE ENCOUNTER
Thank you Samra.  Please let us know if that happens again and we can enter in a safe care for this if need be- AA

## 2020-11-19 NOTE — TELEPHONE ENCOUNTER
I called and talked to [facility] at the Scheduling Dept and she talked to Jaylen Shukla at [employer]. She said she isn't sure why that happened yesterday, but we got the pt scheduled for 12/14/20 due to the pt wanting the appt after 12/10/20.

## 2020-11-20 LAB
ALPHA-1 ANTITRYPSIN: 143 MG/DL (ref 90–200)
CERULOPLASMIN: 25 MG/DL (ref 16–45)
LIVER-KIDNEY MICROSOME-1 AB IGG: 1.1 U (ref 0–24.9)

## 2020-11-21 LAB
ANTINUCLEAR AB INTERPRETIVE COMMENT: NORMAL
ANTINUCLEAR ANTIBODY, HEP-2, IGG: NORMAL
CYTOMEGALOVIRUS IGM ANTIBODY: 25.9 AU/ML
EPSTEIN-BARR VCA IGG: 443 U/ML (ref 0–21.9)
EPSTEIN-BARR VCA IGM: <10 U/ML (ref 0–43.9)
F-ACTIN AB IGG: 13 UNITS (ref 0–19)
MITOCHONDRIAL M2 AB, IGG: 1.8 UNITS (ref 0–24.9)

## 2020-12-01 ENCOUNTER — TELEMEDICINE (OUTPATIENT)
Dept: CARDIOLOGY | Age: 60
End: 2020-12-01
Payer: COMMERCIAL

## 2020-12-01 PROCEDURE — 99213 OFFICE O/P EST LOW 20 MIN: CPT | Performed by: CLINICAL NURSE SPECIALIST

## 2020-12-01 RX ORDER — METOPROLOL TARTRATE 50 MG/1
50 TABLET, FILM COATED ORAL EVERY 12 HOURS SCHEDULED
Qty: 60 TABLET | Refills: 5 | Status: SHIPPED | OUTPATIENT
Start: 2020-12-01 | End: 2021-03-03 | Stop reason: SDUPTHER

## 2020-12-01 RX ORDER — METOPROLOL TARTRATE 50 MG/1
25 TABLET, FILM COATED ORAL EVERY 12 HOURS SCHEDULED
Qty: 60 TABLET | Refills: 5 | Status: SHIPPED | OUTPATIENT
Start: 2020-12-01 | End: 2020-12-01

## 2020-12-01 NOTE — PROGRESS NOTES
2020    TELEHEALTH EVALUATION -- Audio/Visual (During JEICK-25 public health emergency)  Patient located in their home, provider located at [facility] cardiology office    HPI:    James Rahman (:  1960) has requested an audio/video evaluation for the following concern(s):    Patient was seen and evaluated as new patient 10/29/2020 by me. In September admitted to [facility] [place] with hypertension and dizziness. Elevated liver enzymes and D-dimer. At that time stress test was negative for ischemia and 2D echo that was unremarkable  And a blocker was added for hypertension  DAPT was held and liver enzymes were decreasing  Lisinopril was increased 10/6/2020 by primary care    Patient phone 11/10/2020 with blood pressure readings elevated. Addressed by VAZQUEZ Hernandez. Lisinopril was changed to Avapro 300 mg daily    Follow-up today for blood pressure management. She reports her   BP still running high  140-170/  Only 4 times her BP has been < 140  HR running 70-90  When her blood pressure is elevated she can feel it behind her eye and in her head. She denies any chest pain or shortness of breath. Denies palpitations or any significant edema or change in activity tolerance. Chief Complaint   Patient presents with    Follow-up     Patient has issues with htn    Hypertension         Review of Systems    Prior to Visit Medications    Medication Sig Taking?  Authorizing Provider   metoprolol tartrate (LOPRESSOR) 50 MG tablet Take 1 tablet by mouth every 12 hours Yes VAZQUEZ Gallardo   irbesartan (AVAPRO) 300 MG tablet Take 1 tablet by mouth daily Yes VAZQUEZ King   ibandronate (BONIVA) 150 MG tablet Take 1 tablet by mouth every 30 days Yes Jose Elias Duarte MD   vitamin D (ERGOCALCIFEROL) 1.25 MG (02092 UT) CAPS capsule Take 1 capsule by mouth once a week Yes Jose Elias Duarte MD   venlafaxine (EFFEXOR XR) 75 MG extended release capsule Take 1 capsule by mouth 2 times daily Yes Brandy Jessica Rizvi MD   aspirin EC 81 MG EC tablet Take 81 mg by mouth daily Yes Historical Provider, MD   fluticasone (FLONASE) 50 MCG/ACT nasal spray 1 spray by Nasal route daily for 10 days  Patient taking differently: 1 spray by Nasal route daily as needed  Yes VAZQUEZ Lim       Social History     Tobacco Use    Smoking status: Former Smoker     Packs/day: 0.50     Years: 35.00     Pack years: 17.50     Last attempt to quit: 2016     Years since quittin.9    Smokeless tobacco: Never Used   Substance Use Topics    Alcohol use:  Yes     Alcohol/week: 2.0 standard drinks     Types: 2 Glasses of wine per week     Comment: weekends    Drug use: No        Allergies   Allergen Reactions    Adhesive Tape Swelling    Sulfa Antibiotics Swelling     Other reaction(s): Unknown   ,   Past Medical History:   Diagnosis Date    Anxiety     [facility] ([facility]     Cancer ([facility]     liposcaarcoma, peritoneal    Hyperlipidemia     Mild coronary artery disease 2019   ,   Past Surgical History:   Procedure Laterality Date    BREAST LUMPECTOMY Left     CHOLECYSTECTOMY      MASTECTOMY, PARTIAL Left 2008    left breast cancer    OR COLONOSCOPY FLX DX W/COLLJ SPEC WHEN PFRMD N/A 2016    Dr Dejah alberts, [place], 5 yr recall w/Dr Georges [facility] AND [facility]      viki normal PAP's prior to hysterectomy    TONSILLECTOMY     ,   Family History   Problem Relation Age of Onset    Alzheimer's Disease Mother     Breast Cancer Mother 48    Heart Disease Father     Diabetes Father     Cancer Brother     Colon Cancer Neg Hx     Colon Polyps Neg Hx     Liver Cancer Neg Hx     Liver Disease Neg Hx     Esophageal Cancer Neg Hx     Rectal Cancer Neg Hx     Stomach Cancer Neg Hx        PHYSICAL EXAMINATION:  [ INSTRUCTIONS:  \"[x]\" Indicates a positive item  \"[]\" Indicates a negative item  -- DELETE ALL ITEMS NOT EXAMINED]  Vital Signs: (As obtained by patient/caregiver or practitioner observation)    Blood pressure- 160/90 Heart rate- 80   Respiratory rate-    Temperature-  Pulse oximetry-     Constitutional: [x] Appears well-developed and well-nourished [x] No apparent distress      [] Abnormal-   Mental status  [x] Alert and awake  [x] Oriented to person/place/time [x]Able to follow commands      Eyes:  EOM    [x]  Normal  [] Abnormal-  Sclera  [x]  Normal  [] Abnormal -         Discharge [x]  None visible  [] Abnormal -    HENT:   [x] Normocephalic, atraumatic. [] Abnormal   [] Mouth/Throat: Mucous membranes are moist.     External Ears [x] Normal  [] Abnormal-     Neck: [x] No visualized mass     Pulmonary/Chest: [x] Respiratory effort normal.  [x] No visualized signs of difficulty breathing or respiratory distress        [] Abnormal-      Musculoskeletal:   [x] Normal gait with no signs of ataxia         [x] Normal range of motion of neck        [] Abnormal-       Neurological:        [x] No Facial Asymmetry (Cranial nerve 7 motor function) (limited exam to video visit)          [x] No gaze palsy        [] Abnormal-         Skin:        [x] No significant exanthematous lesions or discoloration noted on facial skin         [] Abnormal-            Psychiatric:       [x] Normal Affect [x] No Hallucinations        [] Abnormal-     Other pertinent observable physical exam findings-     Due to this being a TeleHealth encounter, evaluation of the following organ systems is limited: Vitals/Constitutional/EENT/Resp/CV/GI//MS/Neuro/Skin/Heme-Lymph-Imm. ASSESSMENT/PLAN:  1. Essential hypertension  Blood pressure still not at goal despite changing her ACE inhibitor over to ARB. Currently on prothree 100 mg along with Lopressor 25 mg twice a day. We will crease to 50 mg twice a day and have her monitor    2. Mild coronary artery disease  Stable      Return in about 4 weeks (around 12/29/2020). An  electronic signature was used to authenticate this note.     --VAZQUEZ Valente on 12/1/2020 at

## 2020-12-06 ENCOUNTER — OFFICE VISIT (OUTPATIENT)
Age: 60
End: 2020-12-06

## 2020-12-06 VITALS — OXYGEN SATURATION: 97 % | TEMPERATURE: 97.8 F | HEART RATE: 58 BPM

## 2020-12-06 LAB — SARS-COV-2, PCR: NOT DETECTED

## 2020-12-09 ENCOUNTER — ANESTHESIA EVENT (OUTPATIENT)
Dept: OPERATING ROOM | Age: 60
End: 2020-12-09

## 2020-12-10 ENCOUNTER — HOSPITAL ENCOUNTER (OUTPATIENT)
Age: 60
Setting detail: OUTPATIENT SURGERY
Discharge: HOME | End: 2020-12-10
Attending: INTERNAL MEDICINE | Admitting: INTERNAL MEDICINE
Payer: COMMERCIAL

## 2020-12-10 ENCOUNTER — ANESTHESIA (OUTPATIENT)
Dept: OPERATING ROOM | Age: 60
End: 2020-12-10

## 2020-12-10 ENCOUNTER — HOSPITAL ENCOUNTER (OUTPATIENT)
Age: 60
Setting detail: SPECIMEN
Discharge: HOME | End: 2020-12-10
Payer: COMMERCIAL

## 2020-12-10 ENCOUNTER — APPOINTMENT (OUTPATIENT)
Dept: OPERATING ROOM | Age: 60
End: 2020-12-10

## 2020-12-10 VITALS
HEIGHT: 63 IN | SYSTOLIC BLOOD PRESSURE: 103 MMHG | OXYGEN SATURATION: 98 % | WEIGHT: 130 LBS | TEMPERATURE: 98.1 F | RESPIRATION RATE: 18 BRPM | HEART RATE: 55 BPM | BODY MASS INDEX: 23.04 KG/M2 | DIASTOLIC BLOOD PRESSURE: 54 MMHG

## 2020-12-10 VITALS — SYSTOLIC BLOOD PRESSURE: 144 MMHG | DIASTOLIC BLOOD PRESSURE: 87 MMHG | OXYGEN SATURATION: 97 %

## 2020-12-10 PROCEDURE — 88342 IMHCHEM/IMCYTCHM 1ST ANTB: CPT

## 2020-12-10 PROCEDURE — 88305 TISSUE EXAM BY PATHOLOGIST: CPT

## 2020-12-10 PROCEDURE — 43239 EGD BIOPSY SINGLE/MULTIPLE: CPT

## 2020-12-10 PROCEDURE — 43239 EGD BIOPSY SINGLE/MULTIPLE: CPT | Performed by: INTERNAL MEDICINE

## 2020-12-10 RX ORDER — LIDOCAINE HYDROCHLORIDE 10 MG/ML
INJECTION, SOLUTION INFILTRATION; PERINEURAL PRN
Status: DISCONTINUED | OUTPATIENT
Start: 2020-12-10 | End: 2020-12-10 | Stop reason: SDUPTHER

## 2020-12-10 RX ORDER — SODIUM CHLORIDE 9 MG/ML
INJECTION, SOLUTION INTRAVENOUS CONTINUOUS
Status: DISCONTINUED | OUTPATIENT
Start: 2020-12-10 | End: 2020-12-10 | Stop reason: HOSPADM

## 2020-12-10 RX ORDER — PROPOFOL 10 MG/ML
INJECTION, EMULSION INTRAVENOUS PRN
Status: DISCONTINUED | OUTPATIENT
Start: 2020-12-10 | End: 2020-12-10 | Stop reason: SDUPTHER

## 2020-12-10 RX ORDER — PANTOPRAZOLE SODIUM 40 MG/1
40 TABLET, DELAYED RELEASE ORAL DAILY
Qty: 30 TABLET | Refills: 11 | Status: SHIPPED | OUTPATIENT
Start: 2020-12-10 | End: 2021-01-19 | Stop reason: SDUPTHER

## 2020-12-10 RX ADMIN — SODIUM CHLORIDE: 9 INJECTION, SOLUTION INTRAVENOUS at 09:38

## 2020-12-10 RX ADMIN — PROPOFOL 150 MG: 10 INJECTION, EMULSION INTRAVENOUS at 10:28

## 2020-12-10 RX ADMIN — LIDOCAINE HYDROCHLORIDE 30 MG: 10 INJECTION, SOLUTION INFILTRATION; PERINEURAL at 10:28

## 2020-12-10 NOTE — ANESTHESIA PRE PROCEDURE
Department of Anesthesiology  Preprocedure Note       Name:  Andreea Quiroz   Age:  61 y.o.  :  1960                                          MRN:  [id number]         Date:  12/10/2020      Surgeon: Bi Gomez):  Negin Herring MD    Procedure: Procedure(s):  EGD ESOPHAGOGASTRODUODENOSCOPY    Medications prior to admission:   Prior to Admission medications    Medication Sig Start Date End Date Taking? Authorizing Provider   metoprolol tartrate (LOPRESSOR) 50 MG tablet Take 1 tablet by mouth every 12 hours 20  Yes VAZQUEZ Valente   irbesartan (AVAPRO) 300 MG tablet Take 1 tablet by mouth daily 20  Yes VAZQUEZ Antonio   venlafaxine (EFFEXOR XR) 75 MG extended release capsule Take 1 capsule by mouth 2 times daily 10/14/20  Yes Margarette Simon MD   aspirin EC 81 MG EC tablet Take 81 mg by mouth daily   Yes Historical Provider, MD   ibandronate (BONIVA) 150 MG tablet Take 1 tablet by mouth every 30 days 10/14/20   Mragarette Simon MD   vitamin D (ERGOCALCIFEROL) 1.25 MG (76944 UT) CAPS capsule Take 1 capsule by mouth once a week 10/14/20   Margarette Simon MD   fluticasone Obadiah Long) 50 MCG/ACT nasal spray 1 spray by Nasal route daily for 10 days  Patient taking differently: 1 spray by Nasal route daily as needed  18  VAZQUEZ Reddy       Current medications:    Current Facility-Administered Medications   Medication Dose Route Frequency Provider Last Rate Last Dose    0.9 % sodium chloride infusion   Intravenous Continuous Negin Herring MD           Allergies:     Allergies   Allergen Reactions    Adhesive Tape Swelling    Sulfa Antibiotics Swelling     Other reaction(s): Unknown       Problem List:    Patient Active Problem List   Diagnosis Code    Hx of colonic polyps Z86.010    Elevated transaminase level R74.01    Fatty liver disease, nonalcoholic N49.4    Vitamin D deficiency E55.9    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    Generalized anxiety disorder F41.1    Pure hypercholesterolemia E78.00    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 C48.0    Osteopenia of multiple sites M85.89    History of breast cancer Z85.3    Chest pain R07.9    Chest pressure R07.89    ACS (acute coronary syndrome) (HCC) I24.9    Mild coronary artery disease I25.10    Transaminitis R74.01    Abnormal CT of the abdomen R93.5    Fatty liver K76.0    Alcohol consumption of one to four drinks per week Z78.9       Past Medical History:        Diagnosis Date    Acid reflux     Anxiety     [facility]     Cancer ([facility]     liposcaarcoma, peritoneal    Hyperlipidemia     Hypertension     Mild coronary artery disease 2019       Past Surgical History:        Procedure Laterality Date    BREAST LUMPECTOMY Left     CHOLECYSTECTOMY      COLONOSCOPY      HYSTERECTOMY      MASTECTOMY, PARTIAL Left 2008    left breast cancer    OR COLONOSCOPY FLX DX W/COLLJ SPEC WHEN PFRMD N/A 2016    Dr Davis Push access, normall, 5 yr recall w/Dr Merrill Amos    [facility] AND [facility]      alwasy normal PAP's prior to hysterectomy    TONSILLECTOMY         Social History:    Social History     Tobacco Use    Smoking status: Former Smoker     Packs/day: 0.50     Years: 35.00     Pack years: 17.50     Last attempt to quit: 2016     Years since quittin.9    Smokeless tobacco: Never Used   Substance Use Topics    Alcohol use:  Yes     Alcohol/week: 2.0 standard drinks     Types: 2 Glasses of wine per week     Comment: weekends                                Counseling given: Not Answered      Vital Signs (Current):   Vitals:    12/10/20 0933   BP: 124/73   Pulse: 58   Resp: 18   Temp: 98.1 °F (36.7 °C)   TempSrc: Temporal   SpO2: 97%   Weight: 130 lb (59 kg)   Height: 5' 3\" (1.6 m)                                              BP Readings from Last 3 Encounters:   12/10/20 124/73   20 138/80   10/29/20 138/82       NPO Status: Time of last liquid consumption: 2300                        Time of last solid psychiatric history:            GI/Hepatic/Renal: Neg GI/Hepatic/Renal ROS  (+) GERD:, liver disease:,           Endo/Other: Negative Endo/Other ROS                    Abdominal:           Vascular: negative vascular ROS. Anesthesia Plan      general and TIVA     ASA 3       Induction: intravenous. Anesthetic plan and risks discussed with patient. Plan discussed with CRNA.                   VAZQUEZ Mayers - SHWETHA   12/10/2020

## 2020-12-10 NOTE — OP NOTE
Endoscopic Procedure Note    Patient: Erna Sanderson: 1960  Med Rec#: [id number] Acc#: [id number]     Primary Care Provider Terrance Houston MD  Referring Provider: Brent SHUKLA     Endoscopist: Mirtha Machado MD    Date of Procedure:  12/10/2020    Procedure:   1. EGD with biopsy    Indications:   1. Abnormal imaging on CT with \"thickening to stomach/duodenum\"  2. dyspepsia    Anesthesia:  Sedation was administered by anesthesia who monitored the patient during the procedure. Estimated Blood Loss: minimal    Procedure:   After reviewing the patient's chart and obtaining informed consent, the patient was placed in the left lateral decubitus position. A forward-viewing Olympus endoscope was lubricated and inserted through the mouth into the oropharynx. Under direct visualization, the upper esophagus was intubated. The scope was advanced to the level of the third portion of duodenum Scope was slowly withdrawn with careful inspection of the mucosal surfaces. The scope was retroflexed for inspection of the gastric fundus and incisura. Findings and maneuvers are listed in impression below. The patient tolerated the procedure well. The scope was removed. There were no immediate complications. Findings:   Esophagus: normal  There is no hiatal hernia present. Stomach:  abnormal: mild mucosal changes suggestive of gastritis noted -  Gastric biopsies were taken from the antrum and body to rule out Helicobacter pylori infection. Duodenum: normal      IMPRESSION:  1. No obvious mucosal disease noted- biopsied for gastritis and Hpylori       RECOMMENDATIONS:    1. Await path results, the patient will be contacted in 7-10 days with biopsy results. 2.  PPI daily  3. NSAID avoidance  4. Follow up with SHANNON SHUKLA in 3 months regarding elevated liver tests. The results were discussed with the patient and family. A copy of the images obtained were given to the patient.      Ival Lines, MD  12/10/2020  10:39 AM

## 2020-12-10 NOTE — H&P
30 days 10/14/20   Esthela Diaz MD   vitamin D (ERGOCALCIFEROL) 1.25 MG (23481 UT) CAPS capsule Take 1 capsule by mouth once a week 10/14/20   Esthela Diaz MD   fluticasone [facility]) 50 MCG/ACT nasal spray 1 spray by Nasal route daily for 10 days  Patient taking differently: 1 spray by Nasal route daily as needed  18  VAZQUEZ Tan       Past Medical History:  Past Medical History:   Diagnosis Date    Acid reflux     Anxiety     [facility] [address]     Cancer ([facility] [address]     liposcaarcoma, peritoneal    Hyperlipidemia     Hypertension     Mild coronary artery disease 2019       Past Surgical History:  Past Surgical History:   Procedure Laterality Date    BREAST LUMPECTOMY Left     CHOLECYSTECTOMY      COLONOSCOPY      HYSTERECTOMY      MASTECTOMY, PARTIAL Left 2008    left breast cancer    [place] COLONOSCOPY FLX DX W/COLLJ SPEC WHEN PFRMD N/A 2016    Dr Dejah Hurtado access, normall, 5 yr recall w/Dr Jasmina GRANDE AND ROMY drew normal PAP's prior to hysterectomy    TONSILLECTOMY         Social History:  Social History     Tobacco Use    Smoking status: Former Smoker     Packs/day: 0.50     Years: 35.00     Pack years: 17.50     Last attempt to quit: 2016     Years since quittin.9    Smokeless tobacco: Never Used   Substance Use Topics    Alcohol use: Yes     Alcohol/week: 2.0 standard drinks     Types: 2 Glasses of wine per week     Comment: weekends    Drug use: No       Vital Signs:   Vitals:    12/10/20 0933   BP: 124/73   Pulse: 58   Resp: 18   Temp: 98.1 °F (36.7 °C)   SpO2: 97%        Physical Exam:  Cardiac:  [x]WNL  []Comments:  Pulmonary:  [x]WNL   []Comments:  Neuro/Mental Status:  [x]WNL  []Comments:  Abdominal:  [x]WNL    []Comments:  Other:   []WNL  []Comments:    Informed Consent:  The risks and benefits of the procedure have been discussed with either the patient or if they cannot consent, their representative.     Assessment:  Patient examined and appropriate for planned sedation and procedure. Plan:  Proceed with planned sedation and procedure as above. Gonzalo Griffiths am scribing for and in the presence of Dr. Oswaldo Barron MD.  Electronically signed by Breanna Fiore RN on 12/10/2020 at 9:43 AM    I personally performed the services described in this documentation as scribed by Breanna Fiore, and it appears accurate and complete.      Oswaldo Barron MD  12/10/2020

## 2020-12-14 ENCOUNTER — HOSPITAL ENCOUNTER (OUTPATIENT)
Dept: ULTRASOUND IMAGING | Age: 60
Discharge: HOME | End: 2020-12-14
Payer: COMMERCIAL

## 2020-12-14 VITALS
SYSTOLIC BLOOD PRESSURE: 117 MMHG | WEIGHT: 129 LBS | DIASTOLIC BLOOD PRESSURE: 64 MMHG | BODY MASS INDEX: 22.86 KG/M2 | HEART RATE: 55 BPM | RESPIRATION RATE: 20 BRPM | HEIGHT: 63 IN | OXYGEN SATURATION: 98 %

## 2020-12-14 PROCEDURE — 88307 TISSUE EXAM BY PATHOLOGIST: CPT

## 2020-12-14 PROCEDURE — 47000 NEEDLE BIOPSY OF LIVER PERQ: CPT

## 2020-12-14 PROCEDURE — 88313 SPECIAL STAINS GROUP 2: CPT

## 2020-12-14 ASSESSMENT — PAIN - FUNCTIONAL ASSESSMENT: PAIN_FUNCTIONAL_ASSESSMENT: 0-10

## 2020-12-14 ASSESSMENT — PAIN DESCRIPTION - DESCRIPTORS
DESCRIPTORS: ACHING
DESCRIPTORS: ACHING

## 2020-12-14 ASSESSMENT — PAIN DESCRIPTION - LOCATION: LOCATION: SHOULDER

## 2020-12-14 ASSESSMENT — PAIN DESCRIPTION - ORIENTATION: ORIENTATION: RIGHT

## 2020-12-14 ASSESSMENT — PAIN SCALES - GENERAL: PAINLEVEL_OUTOF10: 3

## 2020-12-14 NOTE — PROGRESS NOTES
patient here today for ultrasound guided liver biopsy. With mask in place, the patient was escorted to room and procedure verified and medications and allergies reviewed and verified. Patient states her last asprin was  5 days ago. Procedure explained and patient oriented to room and call light. Labs were within the 30 day area and were not drawn today. Discharge instructions were reviewed with the patient and she verbalized understanding of instruction. [facility] in ultrasound notified of patient status and awaiting transport at this time.

## 2020-12-14 NOTE — PROGRESS NOTES
Patient returned from ultrasound on right side. Vitals stable on  Return and puncture site clean and dry. Patient complains of pain in right shoulder and a heated blanket was applied to her shoulder for comfort. Orders received and call light in place.

## 2020-12-29 ENCOUNTER — TELEPHONE (OUTPATIENT)
Dept: INTERNAL MEDICINE | Age: 60
End: 2020-12-29

## 2020-12-29 RX ORDER — VENLAFAXINE HYDROCHLORIDE 150 MG/1
150 CAPSULE, EXTENDED RELEASE ORAL DAILY
Qty: 30 CAPSULE | Refills: 5 | Status: SHIPPED | OUTPATIENT
Start: 2020-12-29 | End: 2021-01-19 | Stop reason: SDUPTHER

## 2020-12-29 NOTE — TELEPHONE ENCOUNTER
Pt's insurance will not pay for the 75mg BID of the Effexor, but will pay for the 150mg daily. Script has been pended for you to sign off on.

## 2021-01-04 ENCOUNTER — TELEPHONE (OUTPATIENT)
Dept: CARDIOLOGY CLINIC | Age: 61
End: 2021-01-04

## 2021-01-05 ENCOUNTER — TELEMEDICINE (OUTPATIENT)
Dept: CARDIOLOGY CLINIC | Age: 61
End: 2021-01-05
Payer: COMMERCIAL

## 2021-01-05 DIAGNOSIS — I25.10 MILD CORONARY ARTERY DISEASE: ICD-10-CM

## 2021-01-05 DIAGNOSIS — I10 ESSENTIAL HYPERTENSION: Primary | ICD-10-CM

## 2021-01-05 PROCEDURE — 99213 OFFICE O/P EST LOW 20 MIN: CPT | Performed by: CLINICAL NURSE SPECIALIST

## 2021-01-05 NOTE — PROGRESS NOTES
.  2021    TELEHEALTH EVALUATION -- Audio/Visual (During VOFVI-83 public health emergency)  Patient located in their home, provider located at [facility]    HPI:    Alba Melendrez (:  1960) has requested an audio/video evaluation for the following concern(s):  Patient was seen and evaluated as new patient 10/29/2020 by me. In September admitted to [facility] with hypertension and dizziness. Elevated liver enzymes and D-dimer. At that time stress test was negative for ischemia and 2D echo that was unremarkable    Lisinopril was increased by primary care and then with ongoing elevated blood pressure he was changed to Avapro 300 mg daily. Last visit 2020 patient still having elevated blood pressures with significant headaches when  blood pressure greater than 160  Metoprolol was increased to 50 mg twice a day. Follow-up video visit today. She reports  BP better 150-155/90s  Headaches better - none in 3 weeks     Denies any other symptoms such as shortness of breath chest pain or swelling. She denies any arrhythmia. Heart rate running in the 60s to 80s    Chief Complaint   Patient presents with    Follow-up     no cardiac symptoms    Hypertension         Review of Systems    Prior to Visit Medications    Medication Sig Taking? Authorizing Provider   venlafaxine (EFFEXOR XR) 150 MG extended release capsule Take 1 capsule by mouth daily Yes Chuckie Galeazzi, MD   pantoprazole (PROTONIX) 40 MG tablet Take 1 tablet by mouth daily Take daily first thing in the morning on an empty stomach.  Yes Germaine Christopher MD   metoprolol tartrate (LOPRESSOR) 50 MG tablet Take 1 tablet by mouth every 12 hours Yes VAZQUEZ Patterson   irbesartan (AVAPRO) 300 MG tablet Take 1 tablet by mouth daily Yes VAZQUEZ Hamilton   ibandronate (BONIVA) 150 MG tablet Take 1 tablet by mouth every 30 days Yes Chuckie Galeazzi, MD   vitamin D (ERGOCALCIFEROL) 1.25 MG (82611 UT) CAPS capsule Take 1 capsule by mouth once a week Yes Alan Arriaza MD   aspirin EC 81 MG EC tablet Take 81 mg by mouth daily Yes Historical Provider, MD   fluticasone (FLONASE) 50 MCG/ACT nasal spray 1 spray by Nasal route daily for 10 days  Patient taking differently: 1 spray by Nasal route daily as needed  Yes VAZQUEZ Serna       Social History     Tobacco Use    Smoking status: Former Smoker     Packs/day: 0.50     Years: 35.00     Pack years: 17.50     Quit date:      Years since quittin.0    Smokeless tobacco: Never Used   Substance Use Topics    Alcohol use:  Yes     Alcohol/week: 2.0 standard drinks     Types: 2 Glasses of wine per week     Comment: weekends    Drug use: No        Allergies   Allergen Reactions    Adhesive Tape Swelling    Sulfa Antibiotics Swelling     Other reaction(s): Unknown   ,   Past Medical History:   Diagnosis Date    Acid reflux     Anxiety     Breast [facility] ([facility] [address]     Cancer ([facility] [address]     liposcaarcoma, peritoneal    Hyperlipidemia     Hypertension     Mild coronary artery disease 2019   ,   Past Surgical History:   Procedure Laterality Date    BREAST LUMPECTOMY Left     CHOLECYSTECTOMY      COLONOSCOPY      HYSTERECTOMY      MASTECTOMY, PARTIAL Left 2008    left breast cancer    [place] COLONOSCOPY FLX DX W/COLLJ SPEC WHEN PFRMD N/A 2016    Dr Terell Zimmer access, normall, 5 yr recall w/Dr Em GRANDE AND SAMIAO      viki normal PAP's prior to hysterectomy    TONSILLECTOMY      UPPER GASTROINTESTINAL ENDOSCOPY N/A 12/10/2020    Dr KARSON Levy-Gastritis    US GUIDED LIVER BIOPSY PERCUTANEOUS  2020    US GUIDED LIVER BIOPSY PERCUTANEOUS 2020 [facility] ULTRASOUND   ,   Family History   Problem Relation Age of Onset    Alzheimer's Disease Mother     Breast Cancer Mother 48    Heart Disease Father     Diabetes Father     Cancer Brother     Colon Cancer Neg Hx     Colon Polyps Neg Hx     Liver Cancer Neg Hx     Liver Disease Neg Hx     Esophageal Cancer Neg Hx     Rectal Cancer Neg Hx     Stomach Cancer Neg Hx        PHYSICAL EXAMINATION:  [ INSTRUCTIONS:  \"[x]\" Indicates a positive item  \"[]\" Indicates a negative item  -- DELETE ALL ITEMS NOT EXAMINED]  Vital Signs: (As obtained by patient/caregiver or practitioner observation)    Blood pressure- 155/95 Heart rate- 65   Respiratory rate-    Temperature-  Pulse oximetry-     Constitutional: [x] Appears well-developed and well-nourished [] No apparent distress      [] Abnormal-   Mental status  [] Alert and awake  [x] Oriented to person/place/time []Able to follow commands      Eyes:  EOM    [x]  Normal  [] Abnormal-  Sclera  [x]  Normal  [] Abnormal -         Discharge [x]  None visible  [] Abnormal -    HENT:   [x] Normocephalic, atraumatic. [] Abnormal   [x] Mouth/Throat: Mucous membranes are moist.     External Ears [x] Normal  [] Abnormal-     Neck: [x] No visualized mass     Pulmonary/Chest: [x] Respiratory effort normal.  [x] No visualized signs of difficulty breathing or respiratory distress        [] Abnormal-      Musculoskeletal:   [x] Normal gait with no signs of ataxia         [x] Normal range of motion of neck        [] Abnormal-       Neurological:        [x] No Facial Asymmetry (Cranial nerve 7 motor function) (limited exam to video visit)          [] No gaze palsy        [] Abnormal-         Skin:        [x] No significant exanthematous lesions or discoloration noted on facial skin         [] Abnormal-            Psychiatric:       [x] Normal Affect [] No Hallucinations        [] Abnormal-     Other pertinent observable physical exam findings-     Due to this being a TeleHealth encounter, evaluation of the following organ systems is limited: Vitals/Constitutional/EENT/Resp/CV/GI//MS/Neuro/Skin/Heme-Lymph-Imm. ASSESSMENT/PLAN:  1. Essential hypertension  Blood pressure still not at goal but is improving. Currently on Avapro 300 and metoprolol 50 twice a day.   With heart rates adequate we will increase her metoprolol to 75 mg twice a day. She will continue to monitor her blood pressure and heart rate. If heart rate becomes bradycardic and symptomatic we can try adding HCTZ instead  2. Mild coronary artery disease  On medical management. Off statin due to elevated liver enzymes previously      BP Readings from Last 3 Encounters:   12/14/20 117/64   12/10/20 (!) 144/87   12/10/20 (!) 103/54    Pulse Readings from Last 3 Encounters:   12/14/20 55   12/10/20 55   12/06/20 58          Return in about 4 weeks (around 2/2/2021). An  electronic signature was used to authenticate this note. --VAZQUEZ Bradford on 1/5/2021 at 12:08 PM        Pursuant to the emergency declaration under the [facility] waiver authority and the [employer] Act, this Virtual  Visit was conducted, with patient's consent, to reduce the patient's risk of exposure to COVID-19 and provide continuity of care for an established patient. Medical assistantVianey phone patient prior to visit to verify medications and go over complaints and update chart. Services were provided through a video synchronous discussion virtually to substitute for in-person clinic visit.

## 2021-01-15 ENCOUNTER — HOSPITAL ENCOUNTER (OUTPATIENT)
Dept: CT IMAGING | Age: 61
Discharge: HOME | End: 2021-01-15
Payer: COMMERCIAL

## 2021-01-15 DIAGNOSIS — R59.9 ENLARGED LYMPH NODES: ICD-10-CM

## 2021-01-15 DIAGNOSIS — R93.89 ABNORMAL CT OF THE CHEST: ICD-10-CM

## 2021-01-15 PROCEDURE — 71260 CT THORAX DX C+: CPT

## 2021-01-15 PROCEDURE — 6360000004 HC RX CONTRAST MEDICATION: Performed by: INTERNAL MEDICINE

## 2021-01-15 RX ADMIN — IOPAMIDOL 60 ML: 755 INJECTION, SOLUTION INTRAVENOUS at 09:04

## 2021-01-19 ENCOUNTER — OFFICE VISIT (OUTPATIENT)
Dept: INTERNAL MEDICINE | Age: 61
End: 2021-01-19
Payer: COMMERCIAL

## 2021-01-19 VITALS
OXYGEN SATURATION: 97 % | DIASTOLIC BLOOD PRESSURE: 80 MMHG | RESPIRATION RATE: 18 BRPM | HEIGHT: 63 IN | WEIGHT: 137 LBS | HEART RATE: 61 BPM | BODY MASS INDEX: 24.27 KG/M2 | SYSTOLIC BLOOD PRESSURE: 130 MMHG

## 2021-01-19 DIAGNOSIS — R93.5 ABNORMAL CT OF THE ABDOMEN: ICD-10-CM

## 2021-01-19 DIAGNOSIS — R74.01 ELEVATED TRANSAMINASE LEVEL: ICD-10-CM

## 2021-01-19 DIAGNOSIS — R79.89 ELEVATED LIVER FUNCTION TESTS: ICD-10-CM

## 2021-01-19 DIAGNOSIS — Z12.31 ENCOUNTER FOR SCREENING MAMMOGRAM FOR BREAST CANCER: ICD-10-CM

## 2021-01-19 DIAGNOSIS — R59.9 ENLARGED LYMPH NODES: ICD-10-CM

## 2021-01-19 DIAGNOSIS — R93.1 ABNORMAL ECHOCARDIOGRAM: ICD-10-CM

## 2021-01-19 DIAGNOSIS — M85.89 OSTEOPENIA OF MULTIPLE SITES: ICD-10-CM

## 2021-01-19 DIAGNOSIS — I25.10 MILD CORONARY ARTERY DISEASE: ICD-10-CM

## 2021-01-19 DIAGNOSIS — E78.00 PURE HYPERCHOLESTEROLEMIA: ICD-10-CM

## 2021-01-19 DIAGNOSIS — E55.9 VITAMIN D DEFICIENCY: ICD-10-CM

## 2021-01-19 DIAGNOSIS — Z00.00 ANNUAL PHYSICAL EXAM: ICD-10-CM

## 2021-01-19 DIAGNOSIS — R91.1 NODULE OF RIGHT LUNG: ICD-10-CM

## 2021-01-19 DIAGNOSIS — I10 ESSENTIAL HYPERTENSION: Primary | ICD-10-CM

## 2021-01-19 DIAGNOSIS — R03.0 ELEVATED BLOOD PRESSURE READING: ICD-10-CM

## 2021-01-19 DIAGNOSIS — Z11.4 SCREENING FOR HIV (HUMAN IMMUNODEFICIENCY VIRUS): ICD-10-CM

## 2021-01-19 DIAGNOSIS — I10 ESSENTIAL HYPERTENSION: ICD-10-CM

## 2021-01-19 DIAGNOSIS — R93.89 ABNORMAL CT OF THE CHEST: ICD-10-CM

## 2021-01-19 DIAGNOSIS — K76.0 FATTY LIVER DISEASE, NONALCOHOLIC: ICD-10-CM

## 2021-01-19 LAB
ALBUMIN SERPL-MCNC: 4.2 G/DL (ref 3.5–5.2)
ALP BLD-CCNC: 80 U/L (ref 35–104)
ALT SERPL-CCNC: 16 U/L (ref 5–33)
ANION GAP SERPL CALCULATED.3IONS-SCNC: 8 MMOL/L (ref 7–19)
AST SERPL-CCNC: 19 U/L (ref 5–32)
BASOPHILS ABSOLUTE: 0 K/UL (ref 0–0.2)
BASOPHILS RELATIVE PERCENT: 0.4 % (ref 0–1)
BILIRUB SERPL-MCNC: <0.2 MG/DL (ref 0.2–1.2)
BUN BLDV-MCNC: 15 MG/DL (ref 8–23)
CALCIUM SERPL-MCNC: 8.8 MG/DL (ref 8.8–10.2)
CHLORIDE BLD-SCNC: 106 MMOL/L (ref 98–111)
CHOLESTEROL, TOTAL: 260 MG/DL (ref 160–199)
CO2: 28 MMOL/L (ref 22–29)
CREAT SERPL-MCNC: 0.5 MG/DL (ref 0.5–0.9)
EOSINOPHILS ABSOLUTE: 0.7 K/UL (ref 0–0.6)
EOSINOPHILS RELATIVE PERCENT: 8.8 % (ref 0–5)
GFR AFRICAN AMERICAN: >59
GFR NON-AFRICAN AMERICAN: >60
GLUCOSE BLD-MCNC: 93 MG/DL (ref 74–109)
HCT VFR BLD CALC: 42.2 % (ref 37–47)
HDLC SERPL-MCNC: 69 MG/DL (ref 65–121)
HEMOGLOBIN: 13.2 G/DL (ref 12–16)
IMMATURE GRANULOCYTES #: 0 K/UL
LDL CHOLESTEROL CALCULATED: 168 MG/DL
LYMPHOCYTES ABSOLUTE: 1.5 K/UL (ref 1.1–4.5)
LYMPHOCYTES RELATIVE PERCENT: 18.7 % (ref 20–40)
MCH RBC QN AUTO: 28.1 PG (ref 27–31)
MCHC RBC AUTO-ENTMCNC: 31.3 G/DL (ref 33–37)
MCV RBC AUTO: 90 FL (ref 81–99)
MONOCYTES ABSOLUTE: 0.6 K/UL (ref 0–0.9)
MONOCYTES RELATIVE PERCENT: 7.3 % (ref 0–10)
NEUTROPHILS ABSOLUTE: 5 K/UL (ref 1.5–7.5)
NEUTROPHILS RELATIVE PERCENT: 64.5 % (ref 50–65)
PDW BLD-RTO: 13.2 % (ref 11.5–14.5)
PLATELET # BLD: 274 K/UL (ref 130–400)
PMV BLD AUTO: 10 FL (ref 9.4–12.3)
POTASSIUM SERPL-SCNC: 4.3 MMOL/L (ref 3.5–5)
RBC # BLD: 4.69 M/UL (ref 4.2–5.4)
SODIUM BLD-SCNC: 142 MMOL/L (ref 136–145)
TOTAL PROTEIN: 6.4 G/DL (ref 6.6–8.7)
TRIGL SERPL-MCNC: 117 MG/DL (ref 0–149)
VITAMIN D 25-HYDROXY: 28.3 NG/ML
WBC # BLD: 7.8 K/UL (ref 4.8–10.8)

## 2021-01-19 PROCEDURE — 99214 OFFICE O/P EST MOD 30 MIN: CPT | Performed by: INTERNAL MEDICINE

## 2021-01-19 RX ORDER — PANTOPRAZOLE SODIUM 40 MG/1
40 TABLET, DELAYED RELEASE ORAL DAILY
Qty: 30 TABLET | Refills: 5 | Status: SHIPPED | OUTPATIENT
Start: 2021-01-19 | End: 2021-07-19 | Stop reason: CLARIF

## 2021-01-19 RX ORDER — VENLAFAXINE HYDROCHLORIDE 150 MG/1
150 CAPSULE, EXTENDED RELEASE ORAL DAILY
Qty: 30 CAPSULE | Refills: 5 | Status: SHIPPED | OUTPATIENT
Start: 2021-01-19 | End: 2021-05-21

## 2021-01-19 RX ORDER — ERGOCALCIFEROL 1.25 MG/1
50000 CAPSULE ORAL WEEKLY
Qty: 12 CAPSULE | Refills: 1 | Status: SHIPPED | OUTPATIENT
Start: 2021-01-19 | End: 2021-08-12

## 2021-01-19 RX ORDER — IBANDRONATE SODIUM 150 MG/1
150 TABLET, FILM COATED ORAL
Qty: 3 TABLET | Refills: 1 | Status: SHIPPED | OUTPATIENT
Start: 2021-01-19 | End: 2021-03-07 | Stop reason: SDUPTHER

## 2021-01-19 ASSESSMENT — ENCOUNTER SYMPTOMS
CHEST TIGHTNESS: 0
WHEEZING: 0
CONSTIPATION: 0
COUGH: 0
SORE THROAT: 0
ABDOMINAL PAIN: 0

## 2021-01-19 NOTE — PROGRESS NOTES
Chief Complaint   Patient presents with    3 Month Follow-Up     History of presenting illness:  Lisa Mcmahon is a60 y.o. female who presents today for follow up on her chronic medical conditions as noted below.     Pure hypercholesterolemia-Patient  has tried to follow diet recommendations.      Vitamin D deficiency-She has been taking vitamin D supplement 1000 IU daily     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HCC)  Generalized anxiety disorder-she has been taking Effexor 75 mg daily     Fatty liver per test results fall 2020  Liver tests were elevated during the admission fall 2020    Patient Active Problem List    Diagnosis Date Noted    ACS (acute coronary syndrome) ([facility] 07/13/2019     Priority: High    Chest pressure 06/19/2018     Priority: High    Transaminitis 11/18/2020    Abnormal CT of the abdomen 11/18/2020    Fatty liver 11/18/2020    Alcohol consumption of one to four drinks per week 11/18/2020    Mild coronary artery disease 07/18/2019    Chest pain 06/18/2018     Overview Note:     12/30/2016  SE negative for myocardial ischemia  6/19/18  SE negative for myocardial ischemia  7/13/19 ACS FERNANDO RISK Score 2 (angina, + troponin ), AUC indication 3, AUC score 7   7/14/19  Cath  Mild CAD, normal LVFX      Vitamin D deficiency 11/04/2017    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[facility] [address] 11/04/2017    Generalized anxiety disorder 11/04/2017    Pure hypercholesterolemia 11/04/2017    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     Overview Note:     DX 2007; post extensive surgery/ hysterectomy      Osteopenia of multiple sites 11/04/2017     Overview Note:     2013 Lspine -2.4/hip neck -2.3; 5/17 Lspine -1.1; hip neck -2.2  10/2020 hip neck -2.0/ L spine -2.1  boniva started 2017      History of breast cancer 11/04/2017     Overview Note:     2008 LEFT/ post partial mastectomy      Elevated transaminase level 06/08/2017    Fatty liver disease, nonalcoholic 52/37/2087    Hx of colonic polyps      Past Medical History:   Diagnosis Date    Acid reflux     Anxiety     Breast cancer ([facility] [address]     Cancer ([facility] [address]     liposcaarcoma, peritoneal    Hyperlipidemia     Hypertension     Mild coronary artery disease 7/18/2019      Past Surgical History:   Procedure Laterality Date    BREAST LUMPECTOMY Left     CHOLECYSTECTOMY  2008    COLONOSCOPY      HYSTERECTOMY      MASTECTOMY, PARTIAL Left 2008    left breast cancer    [place] COLONOSCOPY FLX DX W/COLLJ SPEC WHEN PFRMD N/A 12/09/2016    Dr Raul Middleton access, normall, 5 yr recall w/Dr Alex Cheryroom    CHRISTIANE AND BSO      alwasy normal PAP's prior to hysterectomy    TONSILLECTOMY      UPPER GASTROINTESTINAL ENDOSCOPY N/A 12/10/2020    Dr Abena Levy-Gastritis   Analia Forth LIVER BIOPSY PERCUTANEOUS  12/14/2020    US GUIDED LIVER BIOPSY PERCUTANEOUS 12/14/2020 [facility] ULTRASOUND     Current Outpatient Medications   Medication Sig Dispense Refill    vitamin D (ERGOCALCIFEROL) 1.25 MG (92623 UT) CAPS capsule Take 1 capsule by mouth once a week 12 capsule 1    venlafaxine (EFFEXOR XR) 150 MG extended release capsule Take 1 capsule by mouth daily 30 capsule 5    pantoprazole (PROTONIX) 40 MG tablet Take 1 tablet by mouth daily Take daily first thing in the morning on an empty stomach. 30 tablet 5    ibandronate (BONIVA) 150 MG tablet Take 1 tablet by mouth every 30 days 3 tablet 1    metoprolol tartrate (LOPRESSOR) 50 MG tablet Take 1 tablet by mouth every 12 hours (Patient taking differently: Take 75 mg by mouth every 12 hours ) 60 tablet 5    irbesartan (AVAPRO) 300 MG tablet Take 1 tablet by mouth daily 30 tablet 5    aspirin EC 81 MG EC tablet Take 81 mg by mouth daily      fluticasone (FLONASE) 50 MCG/ACT nasal spray 1 spray by Nasal route daily for 10 days (Patient taking differently: 1 spray by Nasal route daily as needed ) 1 Bottle 0     No current facility-administered medications for this visit.       Allergies   Allergen Reactions    Adhesive Tape Swelling    Sulfa Antibiotics Swelling     Other reaction(s): Unknown     Social History     Tobacco Use    Smoking status: Former Smoker     Packs/day: 0.50     Years: 35.00     Pack years: 17.50     Quit date:      Years since quittin.0    Smokeless tobacco: Never Used   Substance Use Topics    Alcohol use: Yes     Alcohol/week: 2.0 standard drinks     Types: 2 Glasses of wine per week     Comment: weekends      Family History   Problem Relation Age of Onset    Alzheimer's Disease Mother     Breast Cancer Mother 48    Heart Disease Father     Diabetes Father     Cancer Brother     Colon Cancer Neg Hx     Colon Polyps Neg Hx     Liver Cancer Neg Hx     Liver Disease Neg Hx     Esophageal Cancer Neg Hx     Rectal Cancer Neg Hx     Stomach Cancer Neg Hx        Review of Systems   Constitutional: Positive for fatigue. Negative for chills and fever. HENT: Negative for congestion, ear pain, nosebleeds, postnasal drip and sore throat. Respiratory: Negative for cough, chest tightness and wheezing. Cardiovascular: Negative for chest pain, palpitations and leg swelling. Gastrointestinal: Negative for abdominal pain and constipation. Genitourinary: Negative for dysuria and urgency. Musculoskeletal: Negative. Negative for arthralgias. Skin: Negative for rash. Neurological: Negative for dizziness and headaches. Psychiatric/Behavioral: Negative. Vitals:    21 0859   BP: 130/80   Site: Left Upper Arm   Position: Sitting   Cuff Size: Large Adult   Pulse: 61   Resp: 18   SpO2: 97%   Weight: 137 lb (62.1 kg)   Height: 5' 3\" (1.6 m)     Body mass index is 24.27 kg/m². Physical Exam  Constitutional:       Appearance: She is well-developed. HENT:      Right Ear: External ear normal.      Left Ear: External ear normal.      Mouth/Throat:      Pharynx: No oropharyngeal exudate. Eyes:      Conjunctiva/sclera: Conjunctivae normal.      Pupils: Pupils are equal, round, and reactive to light.    Neck: Musculoskeletal: Neck supple. Thyroid: No thyromegaly. Vascular: No JVD. Cardiovascular:      Rate and Rhythm: Normal rate. Heart sounds: Normal heart sounds. No murmur. Pulmonary:      Effort: No respiratory distress. Breath sounds: Normal breath sounds. No wheezing or rales. Chest:      Chest wall: No tenderness. Abdominal:      General: Bowel sounds are normal.      Palpations: Abdomen is soft. Musculoskeletal: Normal range of motion. Lymphadenopathy:      Cervical: No cervical adenopathy. Skin:     General: Skin is warm. Findings: No rash. Neurological:      Mental Status: She is oriented to person, place, and time.          Lab Review   Office Visit on 12/06/2020   Component Date Value    SARS-CoV-2, PCR 12/06/2020 Not Detected            ASSESSMENT/PLAN:     Bilateral hilar lymph node enlargement- borderline  Repeat CT scan 1/15/2021 shows chronic emphysema but no evidence of lymphadenopathy  Several tiny nodules right upper lobe  Repeat CT 1 year is recommended     Abnormal left ventricular global longitudinal left ventricular strain of   -16.6% per echo 9/2021  Mild Coronary artery disease as per cardiac cath July 2019  Management plans as per cardiology     Hypertension with elevated blood pressure readings during admission fall 2020  No blood pressure readings have been in good range  Kidney function is normal  Rx   Metoprolol 75 bid  Lisinopril 10 bid  Avapro 300 daily       Pure hypercholesterolemia-  Lab results reviewed with patient   LDL during recent admission 9/2020-elevated at 123  Patient admits that she has not been taking her Crestor regularly  Her baseline LDL prior to starting lipid-lowering treatment was over 200  Her Crestor was placed on hold by hospitalist 11/2020 since her liver tests at the time was significantly elevated  Her LDL today 1/2021 is significantly elevated  Liver function tests now are normal  Suggest to restart Crestor at lower dose 10 mg daily  With known finding of mild coronary artery disease restarting statin therapy is important       Vitamin D deficiency-  Lab results reviewed with patient   Vitamin D level is declined at 28.3  Previously level has been normal at 39  Restart vitamin D 50,000 IU weekly       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[address]  Generalized anxiety disorder-  she has been doing better,   RX  Effexor 75 mg daily    LIVER FX ELEVATION/ABNORMAL CT abd-pelvis  Prior history of retroperitoneal sarcoma 2007     Recent admission with vague complaint of sudden onset of nausea/vague chest discomfort and at admission significant elevation of liver function tests with  and  which significantly improved during admission and remained slightly elevated at the time of the discharge  This patient also had similar scenario during the admission to [facility] summer 2019 when she had presented with vague chest discomfort and with significantly elevated liver function test that subsequently completely normalized  No clear etiology for the liver function test elevation was found  Patient also has abnormal CT abdomen and pelvis that shows  1. Wall thickening of the stomach and duodenum a.m. part. Artifactual  due to underdistention and peristalsis, however an infectious  inflammatory process is also considered. No gastric outlet obstruction. No free air or abscess. 2. Hepatic steatosis. Focal fatty infiltration increased along the  falciform ligament.     Vascular abdominal ultrasound and abdominal ultrasound both did not show any significant liver/vascular abnormalities  At this time we will proceed with GI referral for opinion regarding   #1 unusual presentation for significant elevated liver function tests that now has happened twice-July 2019 and September 2020  #2.   Abnormal CT scan showing wall thickening of the stomach and duodenum  ( note-has history of retroperitoneal sarcoma 2007/underwent surgeries and radiation)    Has

## 2021-01-21 ENCOUNTER — TELEPHONE (OUTPATIENT)
Dept: INTERNAL MEDICINE | Age: 61
End: 2021-01-21

## 2021-01-21 RX ORDER — ROSUVASTATIN CALCIUM 10 MG/1
10 TABLET, COATED ORAL NIGHTLY
Qty: 30 TABLET | Refills: 3 | Status: SHIPPED | OUTPATIENT
Start: 2021-01-21 | End: 2021-05-21

## 2021-01-21 NOTE — TELEPHONE ENCOUNTER
----- Message from Jaxon Scruggs MD sent at 1/19/2021  5:42 PM CST -----  Notify patient  1. Her liver function tests now are normal  2. Her cholesterol is significantly elevated with   Crestor has been on hold for last few months but this time suggest we restart Crestor at lower dose, 10 mg daily  3.   Her vitamin D levels are very low, previously 47 and now 28  Please confirm that patient is currently taking her vitamin D 50,000 IU every week-it looks like she may have been forgetting it  If needed send a new prescription refill  If she has not been forgetting it we would need to dose/please let me know  Repeat lab prior to her appointment with me summer 2021

## 2021-02-02 ENCOUNTER — TELEPHONE (OUTPATIENT)
Dept: CARDIOLOGY CLINIC | Age: 61
End: 2021-02-02

## 2021-02-02 NOTE — TELEPHONE ENCOUNTER
Patient wanted to know if she could taje her metoprolol 3 hours early today due to have increased BP of 184/106 and headache. She said it did the same thing yesterday and it was 183/110. She said it starts at 228 systolic and creeps up as the day goes on. She has visit with you tomorrow. I did ask patient if she had rx for clonidine and she does not incase you wanted to prescribe that.

## 2021-02-02 NOTE — TELEPHONE ENCOUNTER
She can take her metoprolol early.   Have her keep track of blood pressures and will discuss further tomorrow and appointment

## 2021-02-03 ENCOUNTER — TELEMEDICINE (OUTPATIENT)
Dept: CARDIOLOGY CLINIC | Age: 61
End: 2021-02-03
Payer: COMMERCIAL

## 2021-02-03 DIAGNOSIS — I10 ESSENTIAL HYPERTENSION: Primary | ICD-10-CM

## 2021-02-03 DIAGNOSIS — I25.10 MILD CORONARY ARTERY DISEASE: ICD-10-CM

## 2021-02-03 PROCEDURE — 99214 OFFICE O/P EST MOD 30 MIN: CPT | Performed by: CLINICAL NURSE SPECIALIST

## 2021-02-03 RX ORDER — CLONIDINE HYDROCHLORIDE 0.1 MG/1
0.1 TABLET ORAL 2 TIMES DAILY PRN
Qty: 60 TABLET | Refills: 3 | Status: SHIPPED | OUTPATIENT
Start: 2021-02-03 | End: 2022-03-22

## 2021-02-03 RX ORDER — HYDROCHLOROTHIAZIDE 25 MG/1
25 TABLET ORAL EVERY MORNING
Qty: 90 TABLET | Refills: 1 | Status: SHIPPED | OUTPATIENT
Start: 2021-02-03 | End: 2021-06-04 | Stop reason: SDUPTHER

## 2021-02-03 NOTE — PROGRESS NOTES
2/3/2021    TELEHEALTH EVALUATION -- Audio/Visual (During DGHYP-32 public health emergency)  Patient located in their home, provider located at [facility] cardiology office    HPI:    Jeffery Choi (:  1960) has requested an audio/video evaluation for the following concern(s):    Chief Complaint   Patient presents with    Follow-up     patient is having issues with htn    Hypertension     Patient was seen and evaluated as new patient 10/29/2020 by me. Juliano Correia September admitted to [facility] with hypertension and dizziness.  Elevated liver enzymes and D-dimer.  At that time stress test was negative for ischemia and 2D echo that was unremarkable  Lisinopril was increased by primary care and then with ongoing elevated blood pressure he was changed to Avapro 300 mg daily. Visit 2020 patient still having elevated blood pressures with significant headaches when  blood pressure greater than 160  Metoprolol was increased to 50 mg twice a day. Follow-up video visit today. She reports  Blood pressure some better but never less than 130/80. For the last 4 days has noticed an increase and it was significant yesterday  This morning blood pressure was  152/96    In afternoon 168/104 yesterday and kept climbing - this was going on for the last 4   She reports no other significant changes. Drinks 4 to 6 glasses of water a day and 1 to 2 cups of coffee a day. Takes her medications regularly at 6 and 6. She has been working for home and no significant stressors. Review of Systems    Prior to Visit Medications    Medication Sig Taking?  Authorizing Provider   rosuvastatin (CRESTOR) 10 MG tablet Take 1 tablet by mouth nightly Yes Tr Whitley MD   vitamin D (ERGOCALCIFEROL) 1.25 MG (45080 UT) CAPS capsule Take 1 capsule by mouth once a week Yes Tr Whitley MD   venlafaxine (EFFEXOR XR) 150 MG extended release capsule Take 1 capsule by mouth daily Yes Tr Whitley MD   pantoprazole (PROTONIX) 40 MG tablet Take 1 tablet by mouth daily Take daily first thing in the morning on an empty stomach. Yes Ernie Lima MD   ibandronate (BONIVA) 150 MG tablet Take 1 tablet by mouth every 30 days Yes Ernie Lima MD   metoprolol tartrate (LOPRESSOR) 50 MG tablet Take 1 tablet by mouth every 12 hours  Patient taking differently: Take 75 mg by mouth every 12 hours  Yes VAZQUEZ Vora   irbesartan (AVAPRO) 300 MG tablet Take 1 tablet by mouth daily Yes VAZQUEZ [place]   aspirin EC 81 MG EC tablet Take 81 mg by mouth daily Yes Historical Provider, MD   fluticasone (FLONASE) 50 MCG/ACT nasal spray 1 spray by Nasal route daily for 10 days  Patient taking differently: 1 spray by Nasal route daily as needed  Yes VAZQUEZ Chi       Social History     Tobacco Use    Smoking status: Former Smoker     Packs/day: 0.50     Years: 35.00     Pack years: 17.50     Quit date:      Years since quittin.0    Smokeless tobacco: Never Used   Substance Use Topics    Alcohol use:  Yes     Alcohol/week: 2.0 standard drinks     Types: 2 Glasses of wine per week     Comment: weekends    Drug use: No        Allergies   Allergen Reactions    Adhesive Tape Swelling    Sulfa Antibiotics Swelling     Other reaction(s): Unknown   ,   Past Medical History:   Diagnosis Date    Acid reflux     Anxiety     [facility] [address]     Cancer ([facility] [address]     liposcaarcoma, peritoneal    Hyperlipidemia     Hypertension     Mild coronary artery disease 2019   ,   Past Surgical History:   Procedure Laterality Date    BREAST LUMPECTOMY Left     CHOLECYSTECTOMY      COLONOSCOPY      HYSTERECTOMY      MASTECTOMY, PARTIAL Left 2008    left breast cancer    CT COLONOSCOPY FLX DX W/COLLJ SPEC WHEN PFRMD N/A 2016    Dr Brooklynn Cohn access, normall, 5 yr recall w/Dr Maxime GRANDE AND BSO      viki normal PAP's prior to hysterectomy    TONSILLECTOMY      UPPER GASTROINTESTINAL ENDOSCOPY N/A 12/10/2020    Dr Leeanna Dominguez  US GUIDED LIVER BIOPSY PERCUTANEOUS  12/14/2020    US GUIDED LIVER BIOPSY PERCUTANEOUS 12/14/2020 [facility] ULTRASOUND   ,   Family History   Problem Relation Age of Onset    Alzheimer's Disease Mother     Breast Cancer Mother 48    Heart Disease Father     Diabetes Father     Cancer Brother     Colon Cancer Neg Hx     Colon Polyps Neg Hx     Liver Cancer Neg Hx     Liver Disease Neg Hx     Esophageal Cancer Neg Hx     Rectal Cancer Neg Hx     Stomach Cancer Neg Hx        PHYSICAL EXAMINATION:  [ INSTRUCTIONS:  \"[x]\" Indicates a positive item  \"[]\" Indicates a negative item  -- DELETE ALL ITEMS NOT EXAMINED]  Vital Signs: (As obtained by patient/caregiver or practitioner observation)    Blood pressure- 152/96 Heart rate- 60   Respiratory rate-    Temperature-  Pulse oximetry-     Constitutional: [x] Appears well-developed and well-nourished [] No apparent distress      [] Abnormal-   Mental status  [x] Alert and awake  [x] Oriented to person/place/time []Able to follow commands      Eyes:  EOM    [x]  Normal  [] Abnormal-  Sclera  [x]  Normal  [] Abnormal -         Discharge [x]  None visible  [] Abnormal -    HENT:   [x] Normocephalic, atraumatic.   [] Abnormal   [] Mouth/Throat: Mucous membranes are moist.     External Ears [] Normal  [] Abnormal-     Neck: [x] No visualized mass     Pulmonary/Chest: [x] Respiratory effort normal.  [] No visualized signs of difficulty breathing or respiratory distress        [] Abnormal-      Musculoskeletal:   [x] Normal gait with no signs of ataxia         [x] Normal range of motion of neck        [] Abnormal-       Neurological:        [x] No Facial Asymmetry (Cranial nerve 7 motor function) (limited exam to video visit)          [x] No gaze palsy        [] Abnormal-         Skin:        [x] No significant exanthematous lesions or discoloration noted on facial skin         [] Abnormal-            Psychiatric:       [x] Normal Affect [] No Hallucinations        [] Abnormal-     Other pertinent observable physical exam findings-     Due to this being a TeleHealth encounter, evaluation of the following organ systems is limited: Vitals/Constitutional/EENT/Resp/CV/GI//MS/Neuro/Skin/Heme-Lymph-Imm. BP Readings from Last 3 Encounters:   01/19/21 130/80   12/14/20 117/64   12/10/20 (!) 144/87       ASSESSMENT/PLAN:  1. Essential hypertension  Blood pressure still not at goal.  Heart rates running mostly in the 60s. We will keep her Toprol at 75 mg twice a day and Avapro 300 mg a day and add HCTZ. We will also get her clonidine 0.1 mg to take as needed for blood pressure greater than 180/100. We discussed when and how to take    Reviewed all previous testing including evaluation at [facility] with finding of elevated liver enzymes. 2. Mild coronary artery disease  Nonocclusive coronary disease. Normal echo      No follow-ups on file. An  electronic signature was used to authenticate this note. --VAZQUEZ Lazaro on 2/3/2021 at 10:07 AM        Pursuant to the emergency declaration under the [facility], 1135 waiver authority and the [employer] and [employer] Act, this Virtual  Visit was conducted, with patient's consent, to reduce the patient's risk of exposure to COVID-19 and provide continuity of care for an established patient. Medical assistantVenkat phone patient prior to visit to verify medications and go over complaints and update chart. Services were provided through a video synchronous discussion virtually to substitute for in-person clinic visit.

## 2021-03-03 ENCOUNTER — TELEMEDICINE (OUTPATIENT)
Dept: CARDIOLOGY CLINIC | Age: 61
End: 2021-03-03
Payer: COMMERCIAL

## 2021-03-03 DIAGNOSIS — I10 ESSENTIAL HYPERTENSION: Primary | ICD-10-CM

## 2021-03-03 PROCEDURE — 99212 OFFICE O/P EST SF 10 MIN: CPT | Performed by: CLINICAL NURSE SPECIALIST

## 2021-03-03 RX ORDER — METOPROLOL TARTRATE 50 MG/1
75 TABLET, FILM COATED ORAL EVERY 12 HOURS SCHEDULED
Qty: 90 TABLET | Refills: 5 | Status: SHIPPED | OUTPATIENT
Start: 2021-03-03 | End: 2021-05-24

## 2021-03-03 NOTE — PROGRESS NOTES
3/3/2021    TELEHEALTH EVALUATION -- Audio/Visual (During OYJRV-79 public health emergency)  Patient located in their home, provider located at [facility] cardiology office    HPI:    Fam Díaz (:  1960) has requested an audio/video evaluation for the following concern(s):    In September admitted to [facility] with hypertension and dizziness.  Elevated liver enzymes and D-dimer.  At that time stress test was negative for ischemia and 2D echo that was unremarkable  Lisinopril was increased by primary care and then with ongoing elevated blood pressure he was changed to Avapro 300 mg daily. Visit 2020 patient still having elevated blood pressures with significant headaches when  blood pressure greater than 160  Metoprolol was increased to 50 mg twice a day. Toprol was uptitrated to 75 mg twice a day. Added HCTZ at last video visit    BPthis AM was 129/80  Had to use the clonidine 1 time. She states overall her highest blood pressure was 145. She is feeling much better        Chief Complaint   Patient presents with    Follow-up     no cardiac symptoms    Hypertension         Review of Systems    Prior to Visit Medications    Medication Sig Taking?  Authorizing Provider   metoprolol tartrate (LOPRESSOR) 50 MG tablet Take 1.5 tablets by mouth every 12 hours Yes VAZQUEZ Quijano   hydroCHLOROthiazide (HYDRODIURIL) 25 MG tablet Take 1 tablet by mouth every morning Yes VAZQUEZ Quijano   cloNIDine (CATAPRES) 0.1 MG tablet Take 1 tablet by mouth 2 times daily as needed for High Blood Pressure (for BP < 180/100) Yes VAZQUEZ Quijano   rosuvastatin (CRESTOR) 10 MG tablet Take 1 tablet by mouth nightly Yes Elizabeth Pat MD   vitamin D (ERGOCALCIFEROL) 1.25 MG (96319 UT) CAPS capsule Take 1 capsule by mouth once a week Yes Elizabeth Pat MD   venlafaxine (EFFEXOR XR) 150 MG extended release capsule Take 1 capsule by mouth daily Yes Elizabeth Pat MD   pantoprazole (PROTONIX) 40 MG tablet Take 1 tablet by mouth daily Take daily first thing in the morning on an empty stomach. Yes Ernie Lima MD   ibandronate (BONIVA) 150 MG tablet Take 1 tablet by mouth every 30 days Yes Ernie Lima MD   irbesartan (AVAPRO) 300 MG tablet Take 1 tablet by mouth daily Yes VAZQUEZ [place]   aspirin EC 81 MG EC tablet Take 81 mg by mouth daily Yes Historical Provider, MD   fluticasone (FLONASE) 50 MCG/ACT nasal spray 1 spray by Nasal route daily for 10 days  Patient taking differently: 1 spray by Nasal route daily as needed  Yes VAZQUEZ Chi       Social History     Tobacco Use    Smoking status: Former Smoker     Packs/day: 0.50     Years: 35.00     Pack years: 17.50     Quit date:      Years since quittin.1    Smokeless tobacco: Never Used   Substance Use Topics    Alcohol use:  Yes     Alcohol/week: 2.0 standard drinks     Types: 2 Glasses of wine per week     Comment: weekends    Drug use: No        Allergies   Allergen Reactions    Adhesive Tape Swelling    Sulfa Antibiotics Swelling     Other reaction(s): Unknown   ,   Past Medical History:   Diagnosis Date    Acid reflux     Anxiety     [facility] ([facility] [address]     Cancer ([facility] [address]     liposcaarcoma, peritoneal    Hyperlipidemia     Hypertension     Mild coronary artery disease 2019   ,   Past Surgical History:   Procedure Laterality Date    BREAST LUMPECTOMY Left     CHOLECYSTECTOMY      COLONOSCOPY      HYSTERECTOMY      MASTECTOMY, PARTIAL Left 2008    left breast cancer    NE COLONOSCOPY FLX DX W/COLLJ SPEC WHEN PFRMD N/A 2016    Dr Brooklynn Cohn access, normall, 5 yr recall w/Dr Maxime Pacheco    CHRISTIANE AND BSO      viki normal PAP's prior to hysterectomy    TONSILLECTOMY      UPPER GASTROINTESTINAL ENDOSCOPY N/A 12/10/2020    Dr KARSON Levy-Gastritis    US GUIDED LIVER BIOPSY PERCUTANEOUS  2020    US GUIDED LIVER BIOPSY PERCUTANEOUS 2020 [facility] ULTRASOUND   ,   Family History   Problem Relation Age of Onset    Alzheimer's Disease Mother     Breast Cancer Mother 48    Heart Disease Father     Diabetes Father     Cancer Brother     Colon Cancer Neg Hx     Colon Polyps Neg Hx     Liver Cancer Neg Hx     Liver Disease Neg Hx     Esophageal Cancer Neg Hx     Rectal Cancer Neg Hx     Stomach Cancer Neg Hx        PHYSICAL EXAMINATION:  [ INSTRUCTIONS:  \"[x]\" Indicates a positive item  \"[]\" Indicates a negative item  -- DELETE ALL ITEMS NOT EXAMINED]  Vital Signs: (As obtained by patient/caregiver or practitioner observation)    Blood pressure- 129/80 Heart rate-    Respiratory rate-    Temperature-  Pulse oximetry-     Constitutional: [x] Appears well-developed and well-nourished [] No apparent distress      [] Abnormal-   Mental status  [x] Alert and awake  [] Oriented to person/place/time []Able to follow commands      Eyes:  EOM    [x]  Normal  [] Abnormal-  Sclera  []  Normal  [] Abnormal -         Discharge [x]  None visible  [] Abnormal -    HENT:   [x] Normocephalic, atraumatic.   [] Abnormal   [] Mouth/Throat: Mucous membranes are moist.     External Ears [x] Normal  [] Abnormal-     Neck: [] No visualized mass     Pulmonary/Chest: [x] Respiratory effort normal.  [] No visualized signs of difficulty breathing or respiratory distress        [] Abnormal-      Musculoskeletal:   [] Normal gait with no signs of ataxia         [] Normal range of motion of neck        [] Abnormal-       Neurological:        [x] No Facial Asymmetry (Cranial nerve 7 motor function) (limited exam to video visit)          [x] No gaze palsy        [] Abnormal-         Skin:        [x] No significant exanthematous lesions or discoloration noted on facial skin         [x] Abnormal-            Psychiatric:       [x] Normal Affect [] No Hallucinations        [] Abnormal-     Other pertinent observable physical exam findings-     Due to this being a TeleHealth encounter, evaluation of the following organ systems is limited: Vitals/Constitutional/EENT/Resp/CV/GI//MS/Neuro/Skin/Heme-Lymph-Imm. ASSESSMENT/PLAN:  1. Essential hypertension  Blood pressure finally better controlled. With the addition of HCTZ to her Avapro and beta-blocker she is doing well. Does have clonidine to take as needed. Will keep her follow-up appointment as scheduled in June Return in about 3 months (around 6/3/2021). An  electronic signature was used to authenticate this note. --VAZQUEZ Coyle on 3/3/2021 at 10:16 AM        Pursuant to the emergency declaration under the [facility], 1135 waiver authority and the [employer] and [employer] Act, this Virtual  Visit was conducted, with patient's consent, to reduce the patient's risk of exposure to COVID-19 and provide continuity of care for an established patient. Medical assistantLaverneome phone patient prior to visit to verify medications and go over complaints and update chart. Services were provided through a video synchronous discussion virtually to substitute for in-person clinic visit.

## 2021-03-05 ENCOUNTER — TELEMEDICINE (OUTPATIENT)
Dept: INTERNAL MEDICINE | Age: 61
End: 2021-03-05
Payer: COMMERCIAL

## 2021-03-05 ENCOUNTER — OFFICE VISIT (OUTPATIENT)
Age: 61
End: 2021-03-05

## 2021-03-05 VITALS — OXYGEN SATURATION: 97 % | HEART RATE: 53 BPM | TEMPERATURE: 98.3 F

## 2021-03-05 DIAGNOSIS — Z11.59 SCREENING FOR VIRAL DISEASE: Primary | ICD-10-CM

## 2021-03-05 DIAGNOSIS — R05.9 COUGH: ICD-10-CM

## 2021-03-05 DIAGNOSIS — J20.9 ACUTE BRONCHITIS AND BRONCHIOLITIS: Primary | ICD-10-CM

## 2021-03-05 DIAGNOSIS — R09.89 CHEST CONGESTION: ICD-10-CM

## 2021-03-05 DIAGNOSIS — J21.9 ACUTE BRONCHITIS AND BRONCHIOLITIS: Primary | ICD-10-CM

## 2021-03-05 PROCEDURE — 99999 PR OFFICE/OUTPT VISIT,PROCEDURE ONLY: CPT | Performed by: NURSE PRACTITIONER

## 2021-03-05 PROCEDURE — 99213 OFFICE O/P EST LOW 20 MIN: CPT | Performed by: INTERNAL MEDICINE

## 2021-03-05 RX ORDER — ALBUTEROL SULFATE 90 UG/1
2 AEROSOL, METERED RESPIRATORY (INHALATION) 4 TIMES DAILY PRN
Qty: 1 INHALER | Refills: 0 | Status: SHIPPED | OUTPATIENT
Start: 2021-03-05

## 2021-03-05 RX ORDER — CEFDINIR 300 MG/1
300 CAPSULE ORAL 2 TIMES DAILY
Qty: 16 CAPSULE | Refills: 0 | Status: SHIPPED | OUTPATIENT
Start: 2021-03-05 | End: 2021-03-13

## 2021-03-05 RX ORDER — PREDNISONE 10 MG/1
10 TABLET ORAL 2 TIMES DAILY
Qty: 6 TABLET | Refills: 0 | Status: SHIPPED | OUTPATIENT
Start: 2021-03-05 | End: 2021-03-08

## 2021-03-05 ASSESSMENT — ENCOUNTER SYMPTOMS
SINUS PRESSURE: 1
SINUS PAIN: 1
WHEEZING: 1
CONSTIPATION: 0
ABDOMINAL PAIN: 0
COUGH: 1
SORE THROAT: 0
CHEST TIGHTNESS: 0

## 2021-03-05 NOTE — PROGRESS NOTES
Chief Complaint   Patient presents with    Congestion    Cough     History of presenting illness:  Carolyne Downs is a60 y.o. female     TELEHEALTH EVALUATION -- Audio/Visual (During [id number]-71 public health emergency)  Patient location-home  Pursuant to the emergency declaration under the [facility] [place] [address] waiver authority and the [employer] Act, this Virtual  Visit was conducted, with patient's consent, to reduce the patient's risk of exposure to COVID-19 and provide continuity of care for an established patient. Services were provided through a video synchronous discussion virtually to substitute for in-person clinic visit.     Reason for VV:    Not feeling well 3 days  Sinus pressure  Postnasal drip  Sinus pressure  Chest congestion with coughing  ++ green sputum production  No sob  No f/c      Patient Active Problem List    Diagnosis Date Noted    ACS (acute coronary syndrome) ([facility] [address] 07/13/2019     Priority: High    Chest pressure 06/19/2018     Priority: High    Transaminitis 11/18/2020    Abnormal CT of the abdomen 11/18/2020    Fatty liver 11/18/2020    Alcohol consumption of one to four drinks per week 11/18/2020    Mild coronary artery disease 07/18/2019    Chest pain 06/18/2018     Overview Note:     12/30/2016  SE negative for myocardial ischemia  6/19/18  SE negative for myocardial ischemia  7/13/19 ACS FERNANDO RISK Score 2 (angina, + troponin ), AUC indication 3, AUC score 7   7/14/19  Cath  Mild CAD, normal LVFX      Vitamin D deficiency 11/04/2017    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    Generalized anxiety disorder 11/04/2017    Pure hypercholesterolemia 11/04/2017    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     Overview Note:     DX 2007; post extensive surgery/ hysterectomy      Osteopenia of multiple sites 11/04/2017     Overview Note:     2013 Lspine -2.4/hip neck -2.3; 5/17 Lspine -1.1; hip neck -2.2  10/2020 hip neck -2.0/ L spine -2.1  boniva started 2017      History of breast cancer 11/04/2017     Overview Note:     2008 LEFT/ post partial mastectomy      Elevated transaminase level 06/08/2017    Fatty liver disease, nonalcoholic 94/45/8004    Hx of colonic polyps      Past Medical History:   Diagnosis Date    Acid reflux     Anxiety     Breast cancer ([facility] [address]     Cancer ([facility] [address]     liposcaarcoma, peritoneal    Hyperlipidemia     Hypertension     Mild coronary artery disease 7/18/2019      Past Surgical History:   Procedure Laterality Date    BREAST LUMPECTOMY Left     CHOLECYSTECTOMY  2008    COLONOSCOPY  12/20/2010    Dr Garcia Members, PARTIAL Left 2008    left breast cancer    [place] COLONOSCOPY FLX DX W/COLLJ SPEC WHEN PFRMD N/A 12/09/2016    Dr Tootie alberts, normall, 5 yr recall w/Dr Leonardo GRANDE AND BSO      viki normal PAP's prior to hysterectomy    TONSILLECTOMY      UPPER GASTROINTESTINAL ENDOSCOPY N/A 12/10/2020    Dr Mima Stokes LIVER BIOPSY PERCUTANEOUS  12/14/2020    Benign hepatic parenchyma with patchy mild steatosis and focal mildly increased hepatocellular iron staining by special stain.      Current Outpatient Medications   Medication Sig Dispense Refill    cefdinir (OMNICEF) 300 MG capsule Take 1 capsule by mouth 2 times daily for 8 days 16 capsule 0    albuterol sulfate HFA (VENTOLIN HFA) 108 (90 Base) MCG/ACT inhaler Inhale 2 puffs into the lungs 4 times daily as needed for Wheezing 1 Inhaler 0    predniSONE (DELTASONE) 10 MG tablet Take 1 tablet by mouth 2 times daily for 3 days 6 tablet 0    metoprolol tartrate (LOPRESSOR) 50 MG tablet Take 1.5 tablets by mouth every 12 hours 90 tablet 5    hydroCHLOROthiazide (HYDRODIURIL) 25 MG tablet Take 1 tablet by mouth every morning 90 tablet 1    cloNIDine (CATAPRES) 0.1 MG tablet Take 1 tablet by mouth 2 times daily as needed for High Blood Pressure (for BP < 180/100) 60 tablet 3    rosuvastatin (CRESTOR) 10 MG tablet Take 1 tablet by mouth nightly 30 tablet 3    vitamin D (ERGOCALCIFEROL) 1.25 MG (30618 UT) CAPS capsule Take 1 capsule by mouth once a week 12 capsule 1    venlafaxine (EFFEXOR XR) 150 MG extended release capsule Take 1 capsule by mouth daily 30 capsule 5    pantoprazole (PROTONIX) 40 MG tablet Take 1 tablet by mouth daily Take daily first thing in the morning on an empty stomach. 30 tablet 5    ibandronate (BONIVA) 150 MG tablet Take 1 tablet by mouth every 30 days 3 tablet 1    irbesartan (AVAPRO) 300 MG tablet Take 1 tablet by mouth daily 30 tablet 5    aspirin EC 81 MG EC tablet Take 81 mg by mouth daily      fluticasone (FLONASE) 50 MCG/ACT nasal spray 1 spray by Nasal route daily for 10 days (Patient taking differently: 1 spray by Nasal route daily as needed ) 1 Bottle 0     No current facility-administered medications for this visit. Allergies   Allergen Reactions    Adhesive Tape Swelling    Sulfa Antibiotics Swelling     Other reaction(s): Unknown     Social History     Tobacco Use    Smoking status: Former Smoker     Packs/day: 0.50     Years: 35.00     Pack years: 17.50     Quit date:      Years since quittin.1    Smokeless tobacco: Never Used   Substance Use Topics    Alcohol use: Yes     Alcohol/week: 2.0 standard drinks     Types: 2 Glasses of wine per week     Comment: weekends      Family History   Problem Relation Age of Onset    Alzheimer's Disease Mother     Breast Cancer Mother 48    Heart Disease Father     Diabetes Father     Cancer Brother     Colon Cancer Neg Hx     Colon Polyps Neg Hx     Liver Cancer Neg Hx     Liver Disease Neg Hx     Esophageal Cancer Neg Hx     Rectal Cancer Neg Hx     Stomach Cancer Neg Hx        Review of Systems   Constitutional: Negative for chills, fatigue and fever. HENT: Positive for congestion, postnasal drip, sinus pressure and sinus pain.  Negative for ear pain, nosebleeds and sore throat. Respiratory: Positive for cough and wheezing. Negative for chest tightness. Cardiovascular: Negative for chest pain, palpitations and leg swelling. Gastrointestinal: Negative for abdominal pain and constipation. Genitourinary: Negative for dysuria and urgency. Musculoskeletal: Negative. Negative for arthralgias. Skin: Negative for rash. Neurological: Negative for dizziness and headaches. Psychiatric/Behavioral: Negative. There were no vitals filed for this visit. There is no height or weight on file to calculate BMI. Patient-Reported Vitals 3/5/2021   Patient-Reported Weight 137   Patient-Reported Height -   Patient-Reported Systolic -   Patient-Reported Diastolic -   Patient-Reported Pulse -   Patient-Reported Temperature -   Patient-Reported SpO2 -   Patient-Reported Peak Flow -        Physical Exam  PHYSICAL EXAMINATION:  [ INSTRUCTIONS:  \"[x]\" Indicates a positive item  \"[]\" Indicates a negative item  -- DELETE ALL ITEMS NOT EXAMINED]  [x] Alert  [x] Oriented to person/place/time    [x] No apparent distress  [] Toxic appearing    [] Face flushed appearing [] Sclera clear  [] Lips are cyanotic      [x] Breathing appears normal  [] Appears tachypneic      [] Rash on visible skin    [x] Cranial Nerves II-XII grossly intact    [x] Motor grossly intact in visible upper extremities    [x] Motor grossly intact in visible lower extremities    [x] Normal Mood  [] Anxious appearing    [] Depressed appearing  [] Confused appearing      [] Poor short term memory  [] Poor long term memory    [] OTHER:      Due to this being a TeleHealth encounter, evaluation of the following organ systems is limited: Vitals/Constitutional/EENT/Resp/CV/GI//MS/Neuro/Skin/Heme-Lymph-Imm.     Lab Review   Orders Only on 01/19/2021   Component Date Value    Vit D, 25-Hydroxy 01/19/2021 28.3*    Cholesterol, Total 01/19/2021 260*    Triglycerides 01/19/2021 117     HDL 01/19/2021 69  LDL Calculated 01/19/2021 168     WBC 01/19/2021 7.8     RBC 01/19/2021 4.69     Hemoglobin 01/19/2021 13.2     Hematocrit 01/19/2021 42.2     MCV 01/19/2021 90.0     MCH 01/19/2021 28.1     MCHC 01/19/2021 31.3*    RDW 01/19/2021 13.2     Platelets 09/00/7961 274     MPV 01/19/2021 10.0     Neutrophils % 01/19/2021 64.5     Lymphocytes % 01/19/2021 18.7*    Monocytes % 01/19/2021 7.3     Eosinophils % 01/19/2021 8.8*    Basophils % 01/19/2021 0.4     Neutrophils Absolute 01/19/2021 5.0     Immature Granulocytes # 01/19/2021 0.0     Lymphocytes Absolute 01/19/2021 1.5     Monocytes Absolute 01/19/2021 0.60     Eosinophils Absolute 01/19/2021 0.70*    Basophils Absolute 01/19/2021 0.00     Sodium 01/19/2021 142     Potassium 01/19/2021 4.3     Chloride 01/19/2021 106     CO2 01/19/2021 28     Anion Gap 01/19/2021 8     Glucose 01/19/2021 93     BUN 01/19/2021 15     CREATININE 01/19/2021 0.5     GFR Non- 01/19/2021 >60     GFR  01/19/2021 >59     Calcium 01/19/2021 8.8     Total Protein 01/19/2021 6.4*    Albumin 01/19/2021 4.2     Total Bilirubin 01/19/2021 <0.2     Alkaline Phosphatase 01/19/2021 80     ALT 01/19/2021 16     AST 01/19/2021 19            ASSESSMENT/PLAN:    Acute bronchitis and bronchiolitis      Cough      Chest congestion    Obtain:  -     COVID-19; Future  RX  -     cefdinir (OMNICEF) 300 MG capsule; Take 1 capsule by mouth 2 times daily for 8 days  -     albuterol sulfate HFA (VENTOLIN HFA) 108 (90 Base) MCG/ACT inhaler; Inhale 2 puffs into the lungs 4 times daily as needed for Wheezing  -     predniSONE (DELTASONE) 10 MG tablet;  Take 1 tablet by mouth 2 times daily for 3 days      If sx not improving and resolving , if sx continue or re-occur pt has been instructed to call us and / or return here for follow- up evaluation          Orders Placed This Encounter   Procedures    COVID-19     New Prescriptions    ALBUTEROL SULFATE HFA (VENTOLIN HFA) 108 (90 BASE) MCG/ACT INHALER    Inhale 2 puffs into the lungs 4 times daily as needed for Wheezing    CEFDINIR (OMNICEF) 300 MG CAPSULE    Take 1 capsule by mouth 2 times daily for 8 days    PREDNISONE (DELTASONE) 10 MG TABLET    Take 1 tablet by mouth 2 times daily for 3 days         No follow-ups on file. There are no Patient Instructions on file for this visit. EMR Dragon/transcription disclaimer:Significant part of this  encounter note is electronic transcription/translationof spoken language to printed text. The electronic translation of spoken language may be erroneous, or at times, nonsensical words or phrases may be inadvertently transcribed.  Although I have reviewed the note for sucherrors, some may still exist.

## 2021-03-06 LAB — SARS-COV-2, NAA: NOT DETECTED

## 2021-03-08 RX ORDER — IBANDRONATE SODIUM 150 MG/1
150 TABLET, FILM COATED ORAL
Qty: 3 TABLET | Refills: 1 | Status: SHIPPED | OUTPATIENT
Start: 2021-03-08 | End: 2022-03-21

## 2021-03-08 NOTE — TELEPHONE ENCOUNTER
Daryl Whitaker called requesting a refill of the below medication which has been pended for you:     Requested Prescriptions     Pending Prescriptions Disp Refills    ibandronate (BONIVA) 150 MG tablet 3 tablet 1     Sig: Take 1 tablet by mouth every 30 days       Last Appointment Date: 3/5/2021  Next Appointment Date: 7/19/2021    Allergies   Allergen Reactions    Adhesive Tape Swelling    Sulfa Antibiotics Swelling     Other reaction(s): Unknown

## 2021-05-19 RX ORDER — IRBESARTAN 300 MG/1
300 TABLET ORAL DAILY
Qty: 30 TABLET | Refills: 5 | Status: SHIPPED | OUTPATIENT
Start: 2021-05-19 | End: 2021-11-29

## 2021-05-24 RX ORDER — METOPROLOL TARTRATE 50 MG/1
TABLET, FILM COATED ORAL
Qty: 60 TABLET | Refills: 5 | Status: SHIPPED | OUTPATIENT
Start: 2021-05-24 | End: 2022-01-03

## 2021-06-04 ENCOUNTER — OFFICE VISIT (OUTPATIENT)
Dept: CARDIOLOGY CLINIC | Age: 61
End: 2021-06-04
Payer: COMMERCIAL

## 2021-06-04 VITALS
SYSTOLIC BLOOD PRESSURE: 116 MMHG | HEART RATE: 65 BPM | HEIGHT: 63 IN | BODY MASS INDEX: 24.63 KG/M2 | DIASTOLIC BLOOD PRESSURE: 74 MMHG | WEIGHT: 139 LBS

## 2021-06-04 DIAGNOSIS — I10 ESSENTIAL HYPERTENSION: Primary | ICD-10-CM

## 2021-06-04 PROCEDURE — 99214 OFFICE O/P EST MOD 30 MIN: CPT | Performed by: INTERNAL MEDICINE

## 2021-06-04 RX ORDER — HYDROCHLOROTHIAZIDE 25 MG/1
25 TABLET ORAL EVERY MORNING
Qty: 90 TABLET | Refills: 3 | Status: SHIPPED | OUTPATIENT
Start: 2021-06-04 | End: 2021-08-30 | Stop reason: SDUPTHER

## 2021-06-04 ASSESSMENT — ENCOUNTER SYMPTOMS
VOMITING: 0
SHORTNESS OF BREATH: 0
COUGH: 0
DIARRHEA: 0
CONSTIPATION: 0
EYE DISCHARGE: 0
ABDOMINAL DISTENTION: 0
BACK PAIN: 0
WHEEZING: 0
BLOOD IN STOOL: 0

## 2021-06-04 NOTE — PROGRESS NOTES
[id number] [facility]  Cardiology Office Note  [facility] [address] [address]  Phone: [phone]  Fax: [phone]                            Date:  6/4/2021  Patient: Jesus George  Age:  64 y.o., 1960    Referral: No ref. provider found      PROBLEM LIST:    Patient Active Problem List    Diagnosis Date Noted    ACS (acute coronary syndrome) ([facility] 07/13/2019     Priority: High    Chest pressure 06/19/2018     Priority: High    Transaminitis 11/18/2020     Priority: Low    Abnormal CT of the abdomen 11/18/2020     Priority: Low    Fatty liver 11/18/2020     Priority: Low    Alcohol consumption of one to four drinks per week 11/18/2020     Priority: Low    Mild coronary artery disease 07/18/2019     Priority: Low    Chest pain 06/18/2018     Priority: Low     Overview Note:     12/30/2016  SE negative for myocardial ischemia  6/19/18  SE negative for myocardial ischemia  7/13/19 ACS FERNANDO RISK Score 2 (angina, + troponin ), AUC indication 3, AUC score 7   7/14/19  Cath  Mild CAD, normal LVFX      Vitamin D deficiency 11/04/2017     Priority: Low    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 11/04/2017     Priority: Low    Generalized anxiety disorder 11/04/2017     Priority: Low    Pure hypercholesterolemia 11/04/2017     Priority: Low    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[facility] 11/04/2017     Priority: Low     Overview Note:     DX 2007; post extensive surgery/ hysterectomy      Osteopenia of multiple sites 11/04/2017     Priority: Low     Overview Note:     2013 Lspine -2.4/hip neck -2.3; 5/17 Lspine -1.1; hip neck -2.2  10/2020 hip neck -2.0/ L spine -2.1  boniva started 2017      History of breast cancer 11/04/2017     Priority: Low     Overview Note:     2008 LEFT/ post partial mastectomy      Elevated transaminase level 06/08/2017     Priority: Low    Fatty liver disease, nonalcoholic 91/99/7015     Priority: Low    Hx of colonic polyps      Priority: Low     1. Hypertension with normal LV systolic function, normal coronary arteries by catheterization 7/14/2019.  2. History of breast cancer, CHRISTIANE and BSO. 3. Prior history of tobacco use stopped 2011.  4. Family history of premature coronary artery disease. PRESENTATION: iMma Roberts is a 64y.o. year old female presents for follow-up evaluation. For the most part she has been doing well with no significant issues. Her blood pressure is finally under control. The addition of a diuretic has helped. No leg swelling. No chest pain or shortness of breath. No significant activity restriction. She does report that her father had CABG in his 62s. Mother side also with heart disease. REVIEW OF SYSTEMS:  Review of Systems   Constitutional: Negative for activity change, fatigue and fever. HENT: Negative for ear pain, hearing loss and tinnitus. Eyes: Negative for discharge and visual disturbance. Respiratory: Negative for cough, shortness of breath and wheezing. Cardiovascular: Negative for chest pain, palpitations and leg swelling. Gastrointestinal: Negative for abdominal distention, blood in stool, constipation, diarrhea and vomiting. Endocrine: Negative for cold intolerance, heat intolerance, polydipsia and polyuria. Genitourinary: Negative for dysuria and hematuria. Musculoskeletal: Negative for arthralgias, back pain and myalgias. Skin: Negative for pallor and rash. Neurological: Negative for seizures, syncope, weakness and headaches. Psychiatric/Behavioral: Negative for behavioral problems and dysphoric mood.        Past Medical History:      Diagnosis Date    Acid reflux     Anxiety     Breast cancer ([facility] [address]     Cancer ([facility] [address]     liposcaarcoma, peritoneal    Hyperlipidemia     Hypertension     Mild coronary artery disease 7/18/2019       Past Surgical History:      Procedure Laterality Date    BREAST LUMPECTOMY Left     CHOLECYSTECTOMY  2008    COLONOSCOPY  12/20/2010    Dr Reymundo Angulo HYSTERECTOMY      MASTECTOMY, PARTIAL Left 2008    left breast cancer    [place] COLONOSCOPY FLX DX W/COLLJ SPEC WHEN PFRMD N/A 12/09/2016    Dr Toi Salazar access, normall, 5 yr recall w/Dr Ezequiel GRANDE AND ROMY drew normal PAP's prior to hysterectomy    TONSILLECTOMY      UPPER GASTROINTESTINAL ENDOSCOPY N/A 12/10/2020    Dr Nicolasa Harley normal exam; NEG h pylori    US GUIDED LIVER BIOPSY PERCUTANEOUS  12/14/2020    steatosis, + mild iron staining, grade 0, stage 0       Medications:  Current Outpatient Medications   Medication Sig Dispense Refill    hydroCHLOROthiazide (HYDRODIURIL) 25 MG tablet Take 1 tablet by mouth every morning 90 tablet 3    metoprolol tartrate (LOPRESSOR) 50 MG tablet TAKE 1 TABLET BY MOUTH EVERY 12 HOURS (Patient taking differently: Take 75 mg by mouth 2 times daily ) 60 tablet 5    rosuvastatin (CRESTOR) 10 MG tablet TAKE 1 TABLET BY MOUTH NIGHTLY 30 tablet 0    venlafaxine (EFFEXOR XR) 150 MG extended release capsule TAKE ONE CAPSULE BY MOUTH EVERY DAY 30 capsule 0    irbesartan (AVAPRO) 300 MG tablet TAKE 1 TABLET BY MOUTH DAILY 30 tablet 5    ibandronate (BONIVA) 150 MG tablet Take 1 tablet by mouth every 30 days 3 tablet 1    albuterol sulfate HFA (VENTOLIN HFA) 108 (90 Base) MCG/ACT inhaler Inhale 2 puffs into the lungs 4 times daily as needed for Wheezing 1 Inhaler 0    cloNIDine (CATAPRES) 0.1 MG tablet Take 1 tablet by mouth 2 times daily as needed for High Blood Pressure (for BP < 180/100) 60 tablet 3    vitamin D (ERGOCALCIFEROL) 1.25 MG (01022 UT) CAPS capsule Take 1 capsule by mouth once a week 12 capsule 1    pantoprazole (PROTONIX) 40 MG tablet Take 1 tablet by mouth daily Take daily first thing in the morning on an empty stomach.  30 tablet 5    aspirin EC 81 MG EC tablet Take 81 mg by mouth daily      fluticasone (FLONASE) 50 MCG/ACT nasal spray 1 spray by Nasal route daily for 10 days (Patient taking differently: 1 spray by Nasal Heart Disease Father     Diabetes Father     Cancer Brother     Colon Cancer Neg Hx     Colon Polyps Neg Hx     Liver Cancer Neg Hx     Liver Disease Neg Hx     Esophageal Cancer Neg Hx     Rectal Cancer Neg Hx     Stomach Cancer Neg Hx          Physical Examination:  /74   Pulse 65   Ht 5' 3\" (1.6 m)   Wt 139 lb (63 kg)   BMI 24.62 kg/m²   Physical Exam  Constitutional:       General: She is not in acute distress. Appearance: She is not diaphoretic. Comments: Normal build  Blood pressure right arm sitting 120/80 mmHg, pulse 68 bpm regular   HENT:      Mouth/Throat:      Pharynx: No oropharyngeal exudate. Eyes:      General: No scleral icterus. Right eye: No discharge. Left eye: No discharge. Neck:      Thyroid: No thyromegaly. Vascular: No carotid bruit or JVD. Cardiovascular:      Rate and Rhythm: Normal rate and regular rhythm. No extrasystoles are present. Heart sounds: Normal heart sounds, S1 normal and S2 normal. No murmur heard. No systolic murmur is present. No diastolic murmur is present. No friction rub. No gallop. No S3 or S4 sounds. Comments: No JVD  No edema  No significant systolic or diastolic murmurs appreciated  No carotid bruits  Pulmonary:      Effort: Pulmonary effort is normal. No respiratory distress. Breath sounds: Normal breath sounds. No wheezing or rales. Chest:      Chest wall: No tenderness. Abdominal:      General: Bowel sounds are normal. There is no distension. Palpations: Abdomen is soft. There is no mass. Tenderness: There is no abdominal tenderness. There is no guarding or rebound. Hernia: No hernia is present. Comments: No palpable organomegaly   Musculoskeletal:         General: Normal range of motion. Skin:     General: Skin is warm. Coloration: Skin is not pale. Findings: No rash. Neurological:      Mental Status: She is alert and oriented to person, place, and time. Cranial Nerves: No cranial nerve deficit. Deep Tendon Reflexes: Reflexes normal.           Labs:   CBC: No results for input(s): WBC, HGB, HCT, PLT in the last 72 hours. BMP:No results for input(s): NA, K, CO2, BUN, CREATININE, LABGLOM, GLUCOSE in the last 72 hours. BNP: No results for input(s): BNP in the last 72 hours. PT/INR: No results for input(s): PROTIME, INR in the last 72 hours. APTT:No results for input(s): APTT in the last 72 hours. CARDIAC ENZYMES:No results for input(s): CKTOTAL, CKMB, CKMBINDEX, TROPONINI in the last 72 hours. FASTING LIPID PANEL:  Lab Results   Component Value Date    HDL 69 01/19/2021    LDLCALC 168 01/19/2021    TRIG 117 01/19/2021     LIVER PROFILE:No results for input(s): AST, ALT, LABALBU in the last 72 hours. Imaging:          ASSESSMENT and PLAN:    80-year-old female patient with past medical history of hypertension, normal coronary arteries by catheterization 7/14/2019, normal LV ejection fraction, prior CHRISTIANE/BSO with prior tobacco use here for follow-up evaluation. 1. She is doing well on her current medication regimen with good blood pressure control. This will continue unchanged. She is taking metoprolol tartrate 75 mg twice daily in addition to Avapro and hydrochlorothiazide. 2. Her angiograms were reviewed from 2019. No significant discernible angiographic CAD. Patent coronary arteries. She has been reassured in this regard. 3. She will follow-up with me in 8 months. Orders:  No orders of the defined types were placed in this encounter. Orders Placed This Encounter   Medications    hydroCHLOROthiazide (HYDRODIURIL) 25 MG tablet     Sig: Take 1 tablet by mouth every morning     Dispense:  90 tablet     Refill:  3             Return in about 8 months (around 2/4/2022). Electronically signed by Joseph Wilkins MD on 6/4/2021 at 11:26 AM    [facility]      Thisdictation was generated by voice recognition computer software. Although all attempts are made to edit the dictation for accuracy, there may be errors in the transcription that are not intended.

## 2021-06-21 RX ORDER — ROSUVASTATIN CALCIUM 10 MG/1
10 TABLET, COATED ORAL NIGHTLY
Qty: 90 TABLET | Refills: 1 | Status: SHIPPED | OUTPATIENT
Start: 2021-06-21 | End: 2021-07-23 | Stop reason: SDUPTHER

## 2021-06-21 NOTE — TELEPHONE ENCOUNTER
Scotty Dias called requesting a refill of the below medication which has been pended for you:     Requested Prescriptions     Pending Prescriptions Disp Refills    rosuvastatin (CRESTOR) 10 MG tablet [Pharmacy Med Name: ROSUVASTATIN CALCIUM 10 MG 10 Tablet] 90 tablet 1     Sig: TAKE 1 TABLET BY MOUTH NIGHTLY       Last Appointment Date: 3/5/2021  Next Appointment Date: 7/19/2021    Allergies   Allergen Reactions    Adhesive Tape Swelling    Sulfa Antibiotics Swelling     Other reaction(s): Unknown

## 2021-07-19 ENCOUNTER — OFFICE VISIT (OUTPATIENT)
Dept: INTERNAL MEDICINE | Age: 61
End: 2021-07-19
Payer: COMMERCIAL

## 2021-07-19 VITALS
SYSTOLIC BLOOD PRESSURE: 122 MMHG | WEIGHT: 140 LBS | OXYGEN SATURATION: 97 % | BODY MASS INDEX: 24.8 KG/M2 | HEART RATE: 56 BPM | DIASTOLIC BLOOD PRESSURE: 70 MMHG | RESPIRATION RATE: 18 BRPM | HEIGHT: 63 IN

## 2021-07-19 DIAGNOSIS — M85.89 OSTEOPENIA OF MULTIPLE SITES: ICD-10-CM

## 2021-07-19 DIAGNOSIS — I10 ESSENTIAL HYPERTENSION: Primary | ICD-10-CM

## 2021-07-19 DIAGNOSIS — R93.89 ABNORMAL CT OF THE CHEST: ICD-10-CM

## 2021-07-19 DIAGNOSIS — E78.00 PURE HYPERCHOLESTEROLEMIA: ICD-10-CM

## 2021-07-19 DIAGNOSIS — R59.9 ENLARGED LYMPH NODES: ICD-10-CM

## 2021-07-19 DIAGNOSIS — L98.9 SKIN LESION: ICD-10-CM

## 2021-07-19 DIAGNOSIS — E55.9 VITAMIN D DEFICIENCY: ICD-10-CM

## 2021-07-19 DIAGNOSIS — I25.10 MILD CORONARY ARTERY DISEASE: ICD-10-CM

## 2021-07-19 PROCEDURE — 99214 OFFICE O/P EST MOD 30 MIN: CPT | Performed by: INTERNAL MEDICINE

## 2021-07-19 SDOH — ECONOMIC STABILITY: FOOD INSECURITY: WITHIN THE PAST 12 MONTHS, THE FOOD YOU BOUGHT JUST DIDN'T LAST AND YOU DIDN'T HAVE MONEY TO GET MORE.: NEVER TRUE

## 2021-07-19 SDOH — ECONOMIC STABILITY: FOOD INSECURITY: WITHIN THE PAST 12 MONTHS, YOU WORRIED THAT YOUR FOOD WOULD RUN OUT BEFORE YOU GOT MONEY TO BUY MORE.: NEVER TRUE

## 2021-07-19 ASSESSMENT — ENCOUNTER SYMPTOMS
CHEST TIGHTNESS: 0
CONSTIPATION: 0
ABDOMINAL PAIN: 0
SORE THROAT: 0
WHEEZING: 0
COUGH: 0

## 2021-07-19 ASSESSMENT — SOCIAL DETERMINANTS OF HEALTH (SDOH): HOW HARD IS IT FOR YOU TO PAY FOR THE VERY BASICS LIKE FOOD, HOUSING, MEDICAL CARE, AND HEATING?: NOT HARD AT ALL

## 2021-07-19 NOTE — PROGRESS NOTES
Chief Complaint   Patient presents with    6 Month Follow-Up     History of presenting illness:  Kinza White is a59 y.o. female who presents today for follow up on her chronic medical conditions as noted below.     Pure hypercholesterolemia-Patient  has tried to follow diet recommendations.      Vitamin D deficiency-She has been taking vitamin D supplement 1000 IU daily     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HCC)  Generalized anxiety disorder-she has been taking Effexor 75 mg daily     Fatty liver per test results fall 2020      Patient Active Problem List    Diagnosis Date Noted    ACS (acute coronary syndrome) ([facility] 07/13/2019     Priority: High    Chest pressure 06/19/2018     Priority: High    Enlarged lymph nodes 07/19/2021    Essential hypertension 07/19/2021    Transaminitis 11/18/2020    Abnormal CT of the abdomen 11/18/2020    Fatty liver 11/18/2020    Alcohol consumption of one to four drinks per week 11/18/2020    Mild coronary artery disease 07/18/2019    Chest pain 06/18/2018     Overview Note:     12/30/2016  SE negative for myocardial ischemia  6/19/18  SE negative for myocardial ischemia  7/13/19 ACS FERNANDO RISK Score 2 (angina, + troponin ), AUC indication 3, AUC score 7   7/14/19  Cath  Mild CAD, normal LVFX      Vitamin D deficiency 11/04/2017    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[facility] 11/04/2017    Generalized anxiety disorder 11/04/2017    Pure hypercholesterolemia 11/04/2017    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     Overview Note:     DX 2007; post extensive surgery/ hysterectomy      Osteopenia of multiple sites 11/04/2017     Overview Note:     2013 Lspine -2.4/hip neck -2.3; 5/17 Lspine -1.1; hip neck -2.2  10/2020 hip neck -2.0/ L spine -2.1  boniva started 2017      History of breast cancer 11/04/2017     Overview Note:     2008 LEFT/ post partial mastectomy      Elevated transaminase level 06/08/2017    Fatty liver disease, nonalcoholic 51/75/4970    Hx of colonic polyps Past Medical History:   Diagnosis Date    Acid reflux     Anxiety     Breast cancer ([facility]     Cancer ([facility]     liposcaarcoma, peritoneal    Hyperlipidemia     Hypertension     Mild coronary artery disease 7/18/2019      Past Surgical History:   Procedure Laterality Date    BREAST LUMPECTOMY Left     CHOLECYSTECTOMY  2008    COLONOSCOPY  12/20/2010    Dr Melvin Ramírez, PARTIAL Left 2008    left breast cancer    TX COLONOSCOPY FLX DX W/COLLJ SPEC WHEN PFRMD N/A 12/09/2016    Dr Denis Chew access, normall, 5 yr recall w/Dr Herbie Dove    CHRISTIANE AND BSO      aldenice normal PAP's prior to hysterectomy    TONSILLECTOMY      UPPER GASTROINTESTINAL ENDOSCOPY N/A 12/10/2020    Dr Rodger Ge normal exam; NEG h pylori    US GUIDED LIVER BIOPSY PERCUTANEOUS  12/14/2020    steatosis, + mild iron staining, grade 0, stage 0     Current Outpatient Medications   Medication Sig Dispense Refill    rosuvastatin (CRESTOR) 10 MG tablet TAKE 1 TABLET BY MOUTH NIGHTLY 90 tablet 1    hydroCHLOROthiazide (HYDRODIURIL) 25 MG tablet Take 1 tablet by mouth every morning 90 tablet 3    metoprolol tartrate (LOPRESSOR) 50 MG tablet TAKE 1 TABLET BY MOUTH EVERY 12 HOURS (Patient taking differently: Take 75 mg by mouth 2 times daily ) 60 tablet 5    venlafaxine (EFFEXOR XR) 150 MG extended release capsule TAKE ONE CAPSULE BY MOUTH EVERY DAY 30 capsule 0    irbesartan (AVAPRO) 300 MG tablet TAKE 1 TABLET BY MOUTH DAILY 30 tablet 5    ibandronate (BONIVA) 150 MG tablet Take 1 tablet by mouth every 30 days 3 tablet 1    albuterol sulfate HFA (VENTOLIN HFA) 108 (90 Base) MCG/ACT inhaler Inhale 2 puffs into the lungs 4 times daily as needed for Wheezing 1 Inhaler 0    cloNIDine (CATAPRES) 0.1 MG tablet Take 1 tablet by mouth 2 times daily as needed for High Blood Pressure (for BP < 180/100) 60 tablet 3    vitamin D (ERGOCALCIFEROL) 1.25 MG (82747 UT) CAPS capsule Take 1 capsule by mouth once a week 12 capsule 1    aspirin EC 81 MG EC tablet Take 81 mg by mouth daily      fluticasone (FLONASE) 50 MCG/ACT nasal spray 1 spray by Nasal route daily for 10 days (Patient taking differently: 1 spray by Nasal route daily as needed ) 1 Bottle 0     No current facility-administered medications for this visit. Allergies   Allergen Reactions    Adhesive Tape Swelling    Sulfa Antibiotics Swelling     Other reaction(s): Unknown     Social History     Tobacco Use    Smoking status: Former Smoker     Packs/day: 0.50     Years: 35.00     Pack years: 17.50     Quit date:      Years since quittin.5    Smokeless tobacco: Never Used   Substance Use Topics    Alcohol use: Yes     Alcohol/week: 2.0 standard drinks     Types: 2 Glasses of wine per week     Comment: weekends      Family History   Problem Relation Age of Onset    Alzheimer's Disease Mother     Breast Cancer Mother 48    Heart Disease Father     Diabetes Father     Cancer Brother     Colon Cancer Neg Hx     Colon Polyps Neg Hx     Liver Cancer Neg Hx     Liver Disease Neg Hx     Esophageal Cancer Neg Hx     Rectal Cancer Neg Hx     Stomach Cancer Neg Hx        Review of Systems   Constitutional: Positive for fatigue. Negative for chills and fever. HENT: Negative for congestion, ear pain, nosebleeds, postnasal drip and sore throat. Respiratory: Negative for cough, chest tightness and wheezing. Cardiovascular: Negative for chest pain, palpitations and leg swelling. Gastrointestinal: Negative for abdominal pain and constipation. Genitourinary: Negative for dysuria and urgency. Musculoskeletal: Negative. Negative for arthralgias. Skin: Negative for rash. Neurological: Negative for dizziness and headaches. Psychiatric/Behavioral: Negative.       Vitals:    21 1027   BP: 122/70   Site: Left Upper Arm   Position: Sitting   Cuff Size: Large Adult   Pulse: 56   Resp: 18   SpO2: 97%   Weight: 140 lb (63.5 kg) Height: 5' 3\" (1.6 m)     Body mass index is 24.8 kg/m². Physical Exam  Constitutional:       Appearance: She is well-developed. HENT:      Right Ear: External ear normal.      Left Ear: External ear normal.      Mouth/Throat:      Pharynx: No oropharyngeal exudate. Eyes:      Conjunctiva/sclera: Conjunctivae normal.      Pupils: Pupils are equal, round, and reactive to light. Neck:      Thyroid: No thyromegaly. Vascular: No JVD. Cardiovascular:      Rate and Rhythm: Normal rate. Heart sounds: Normal heart sounds. No murmur heard. Pulmonary:      Effort: No respiratory distress. Breath sounds: Normal breath sounds. No wheezing or rales. Chest:      Chest wall: No tenderness. Abdominal:      General: Bowel sounds are normal.      Palpations: Abdomen is soft. Musculoskeletal:         General: Normal range of motion. Cervical back: Neck supple. Lymphadenopathy:      Cervical: No cervical adenopathy. Skin:     General: Skin is warm. Findings: No rash. Neurological:      Mental Status: She is oriented to person, place, and time. Lab Review   No visits with results within 2 Month(s) from this visit.    Latest known visit with results is:   Office Visit on 03/05/2021   Component Date Value    SARS-CoV-2, VENKAT 03/05/2021 NOT DETECTED            ASSESSMENT/PLAN:    Abnormal left ventricular global longitudinal left ventricular strain of   -16.6% per echo 9/2021  Mild Coronary artery disease as per cardiac cath July 2019  Management plans as per cardiology     Hypertension with elevated blood pressure readings during admission fall 2020  No blood pressure readings have been in good range  Kidney function is normal  Rx   Metoprolol 75 bid  Lisinopril 10 bid  Avapro 300 daily        Pure hypercholesterolemia-  Lab results called to patient after the appointment since patient did not have lab done prior to appointment  LDL is significantly elevated 187 in 7/2021  It also was significantly elevated in January 2021  Patient has known mild coronary artery disease from 2019 cardiac cath  She obviously has not been taking her Crestor as prescribed  Importance of statin to prevent progressive coronary disease/MI is very highly recommended  Liver function tests now are normal  Suggest to restart Crestor at lower dose 10 mg daily     Vitamin D deficiency-  Lab results reviewed with patient   Vitamin D level is 41 in 7/2021  Previously level low at 28  RXvitamin D 50,000 IU weekly        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HCC)  Generalized anxiety disorder-  she has been doing better,   RX  Effexor 75 mg daily     HISTORY OF LIVER FX ELEVATION/ABNORMAL CT abd-pelvis  Prior history of retroperitoneal sarcoma 2007 9/2020 admission with vague complaint of sudden onset of nausea/vague chest discomfort and at admission significant elevation of liver function tests with  and  which significantly improved during admission and remained slightly elevated at the time of the discharge  This patient also had similar scenario during the admission to [facility] summer 2019 when she had presented with vague chest discomfort and with significantly elevated liver function test that subsequently completely normalized  No clear etiology for the liver function test elevation was found  Patient also has abnormal CT abdomen and pelvis that shows  1. Wall thickening of the stomach and duodenum a.m. part. Artifactual  due to underdistention and peristalsis, however an infectious  inflammatory process is also considered. No gastric outlet obstruction. No free air or abscess. 2. Hepatic steatosis.  Focal fatty infiltration increased along the  falciform ligament.     Vascular abdominal ultrasound and abdominal ultrasound both did not show any significant liver/vascular abnormalities  Seen gastro  Worried about having cscope- but agrees to cscope that is due now ( 5 yrs )     Bilateral hilar lymph node enlargement- borderline  Repeat CT scan 1/15/2021 shows chronic emphysema but no evidence of lymphadenopathy  Several tiny nodules right upper lobe  Repeat CT 1 year is recommended- schedule for 1/2022       Lesion posterior left LE  Dermatology referral placed        Orders Placed This Encounter   Procedures    Comprehensive Metabolic Panel    Lipid Panel    External Referral To Dermatology     New Prescriptions    No medications on file         Return in about 4 months (around 11/19/2021) for Annual Physical.   There are no Patient Instructions on file for this visit. EMR Dragon/transcription disclaimer:Significant part of this  encounter note is electronic transcription/translationof spoken language to printed text. The electronic translation of spoken language may be erroneous, or at times, nonsensical words or phrases may be inadvertently transcribed.  Although I have reviewed the note for sucherrors, some may still exist.

## 2021-07-20 DIAGNOSIS — E55.9 VITAMIN D DEFICIENCY: ICD-10-CM

## 2021-07-20 DIAGNOSIS — Z11.4 SCREENING FOR HIV (HUMAN IMMUNODEFICIENCY VIRUS): ICD-10-CM

## 2021-07-20 DIAGNOSIS — R91.1 NODULE OF RIGHT LUNG: ICD-10-CM

## 2021-07-20 DIAGNOSIS — I10 ESSENTIAL HYPERTENSION: ICD-10-CM

## 2021-07-20 DIAGNOSIS — R79.89 ELEVATED LIVER FUNCTION TESTS: ICD-10-CM

## 2021-07-20 DIAGNOSIS — K76.0 FATTY LIVER DISEASE, NONALCOHOLIC: ICD-10-CM

## 2021-07-20 DIAGNOSIS — E78.00 PURE HYPERCHOLESTEROLEMIA: ICD-10-CM

## 2021-07-20 DIAGNOSIS — I25.10 MILD CORONARY ARTERY DISEASE: ICD-10-CM

## 2021-07-20 LAB
ALBUMIN SERPL-MCNC: 4.4 G/DL (ref 3.5–5.2)
ALP BLD-CCNC: 81 U/L (ref 35–104)
ALT SERPL-CCNC: 15 U/L (ref 5–33)
ANION GAP SERPL CALCULATED.3IONS-SCNC: 10 MMOL/L (ref 7–19)
AST SERPL-CCNC: 19 U/L (ref 5–32)
BASOPHILS ABSOLUTE: 0.1 K/UL (ref 0–0.2)
BASOPHILS RELATIVE PERCENT: 0.8 % (ref 0–1)
BILIRUB SERPL-MCNC: 0.3 MG/DL (ref 0.2–1.2)
BUN BLDV-MCNC: 15 MG/DL (ref 8–23)
CALCIUM SERPL-MCNC: 9.3 MG/DL (ref 8.8–10.2)
CHLORIDE BLD-SCNC: 100 MMOL/L (ref 98–111)
CHOLESTEROL, TOTAL: 272 MG/DL (ref 160–199)
CO2: 28 MMOL/L (ref 22–29)
CREAT SERPL-MCNC: 0.6 MG/DL (ref 0.5–0.9)
EOSINOPHILS ABSOLUTE: 0.9 K/UL (ref 0–0.6)
EOSINOPHILS RELATIVE PERCENT: 11.5 % (ref 0–5)
GFR AFRICAN AMERICAN: >59
GFR NON-AFRICAN AMERICAN: >60
GLUCOSE BLD-MCNC: 106 MG/DL (ref 74–109)
HBA1C MFR BLD: 5.6 % (ref 4–6)
HCT VFR BLD CALC: 44.1 % (ref 37–47)
HDLC SERPL-MCNC: 60 MG/DL (ref 65–121)
HEMOGLOBIN: 13.7 G/DL (ref 12–16)
HIV-1 P24 AG: NORMAL
IMMATURE GRANULOCYTES #: 0 K/UL
LDL CHOLESTEROL CALCULATED: 187 MG/DL
LYMPHOCYTES ABSOLUTE: 1.5 K/UL (ref 1.1–4.5)
LYMPHOCYTES RELATIVE PERCENT: 20.6 % (ref 20–40)
MCH RBC QN AUTO: 28.5 PG (ref 27–31)
MCHC RBC AUTO-ENTMCNC: 31.1 G/DL (ref 33–37)
MCV RBC AUTO: 91.7 FL (ref 81–99)
MONOCYTES ABSOLUTE: 0.6 K/UL (ref 0–0.9)
MONOCYTES RELATIVE PERCENT: 8.2 % (ref 0–10)
NEUTROPHILS ABSOLUTE: 4.3 K/UL (ref 1.5–7.5)
NEUTROPHILS RELATIVE PERCENT: 58.5 % (ref 50–65)
PDW BLD-RTO: 13.2 % (ref 11.5–14.5)
PLATELET # BLD: 418 K/UL (ref 130–400)
PMV BLD AUTO: 9.5 FL (ref 9.4–12.3)
POTASSIUM SERPL-SCNC: 4.3 MMOL/L (ref 3.5–5)
RAPID HIV 1&2: NORMAL
RBC # BLD: 4.81 M/UL (ref 4.2–5.4)
SODIUM BLD-SCNC: 138 MMOL/L (ref 136–145)
TOTAL PROTEIN: 7.2 G/DL (ref 6.6–8.7)
TRIGL SERPL-MCNC: 125 MG/DL (ref 0–149)
VITAMIN D 25-HYDROXY: 41.3 NG/ML
WBC # BLD: 7.4 K/UL (ref 4.8–10.8)

## 2021-07-23 ENCOUNTER — TELEPHONE (OUTPATIENT)
Dept: INTERNAL MEDICINE | Age: 61
End: 2021-07-23

## 2021-07-23 RX ORDER — ROSUVASTATIN CALCIUM 20 MG/1
20 TABLET, COATED ORAL NIGHTLY
Qty: 90 TABLET | Refills: 1 | Status: SHIPPED | OUTPATIENT
Start: 2021-07-23 | End: 2021-11-29

## 2021-08-12 RX ORDER — ERGOCALCIFEROL 1.25 MG/1
CAPSULE ORAL
Qty: 12 CAPSULE | Refills: 1 | Status: SHIPPED | OUTPATIENT
Start: 2021-08-12 | End: 2022-06-03

## 2021-08-12 NOTE — TELEPHONE ENCOUNTER
Rose Robles called requesting a refill of the below medication which has been pended for you:     Requested Prescriptions     Pending Prescriptions Disp Refills    vitamin D (ERGOCALCIFEROL) 1.25 MG (66152 UT) CAPS capsule [Pharmacy Med Name: VIT D2 1.25 MG (50,000 UNIT 1.25 MG Capsule] 12 capsule 1     Sig: TAKE ONE CAPSULE BY MOUTH ONCE A WEEK       Last Appointment Date: 7/19/2021  Next Appointment Date: 10/28/2021    Allergies   Allergen Reactions    Adhesive Tape Swelling    Sulfa Antibiotics Swelling     Other reaction(s): Unknown

## 2021-08-30 DIAGNOSIS — I10 ESSENTIAL HYPERTENSION: ICD-10-CM

## 2021-08-30 RX ORDER — HYDROCHLOROTHIAZIDE 25 MG/1
25 TABLET ORAL EVERY MORNING
Qty: 90 TABLET | Refills: 3 | Status: SHIPPED | OUTPATIENT
Start: 2021-08-30 | End: 2022-04-26 | Stop reason: SDUPTHER

## 2021-09-02 ENCOUNTER — APPOINTMENT (OUTPATIENT)
Dept: GENERAL RADIOLOGY | Age: 61
DRG: 302 | End: 2021-09-02
Payer: COMMERCIAL

## 2021-09-02 ENCOUNTER — HOSPITAL ENCOUNTER (INPATIENT)
Age: 61
LOS: 1 days | Discharge: HOME | DRG: 302 | End: 2021-09-03
Attending: EMERGENCY MEDICINE | Admitting: FAMILY MEDICINE
Payer: COMMERCIAL

## 2021-09-02 ENCOUNTER — APPOINTMENT (OUTPATIENT)
Dept: CT IMAGING | Age: 61
DRG: 302 | End: 2021-09-02
Payer: COMMERCIAL

## 2021-09-02 DIAGNOSIS — R06.02 SHORTNESS OF BREATH: ICD-10-CM

## 2021-09-02 DIAGNOSIS — J41.0 SIMPLE CHRONIC BRONCHITIS (HCC): ICD-10-CM

## 2021-09-02 DIAGNOSIS — J96.01 ACUTE RESPIRATORY FAILURE WITH HYPOXIA (HCC): Primary | ICD-10-CM

## 2021-09-02 DIAGNOSIS — J44.9 CHRONIC OBSTRUCTIVE PULMONARY DISEASE, UNSPECIFIED COPD TYPE (HCC): ICD-10-CM

## 2021-09-02 DIAGNOSIS — R94.31 ABNORMAL EKG: ICD-10-CM

## 2021-09-02 PROBLEM — R07.89 ATYPICAL CHEST PAIN: Status: ACTIVE | Noted: 2021-09-02

## 2021-09-02 LAB
ALBUMIN SERPL-MCNC: 4.2 G/DL (ref 3.5–5.2)
ALP BLD-CCNC: 69 U/L (ref 35–104)
ALT SERPL-CCNC: 24 U/L (ref 5–33)
ANION GAP SERPL CALCULATED.3IONS-SCNC: 9 MMOL/L (ref 7–19)
AST SERPL-CCNC: 26 U/L (ref 5–32)
BASOPHILS ABSOLUTE: 0.1 K/UL (ref 0–0.2)
BASOPHILS RELATIVE PERCENT: 1.1 % (ref 0–1)
BILIRUB SERPL-MCNC: 0.3 MG/DL (ref 0.2–1.2)
BUN BLDV-MCNC: 23 MG/DL (ref 8–23)
CALCIUM SERPL-MCNC: 8.9 MG/DL (ref 8.8–10.2)
CHLORIDE BLD-SCNC: 101 MMOL/L (ref 98–111)
CO2: 27 MMOL/L (ref 22–29)
CREAT SERPL-MCNC: 0.6 MG/DL (ref 0.5–0.9)
EOSINOPHILS ABSOLUTE: 0.8 K/UL (ref 0–0.6)
EOSINOPHILS RELATIVE PERCENT: 10.7 % (ref 0–5)
GFR AFRICAN AMERICAN: >59
GFR NON-AFRICAN AMERICAN: >60
GLUCOSE BLD-MCNC: 99 MG/DL (ref 74–109)
HCT VFR BLD CALC: 42.5 % (ref 37–47)
HEMOGLOBIN: 13.2 G/DL (ref 12–16)
IMMATURE GRANULOCYTES #: 0 K/UL
INR BLD: 0.99 (ref 0.88–1.18)
LIPASE: 31 U/L (ref 13–60)
LYMPHOCYTES ABSOLUTE: 1.5 K/UL (ref 1.1–4.5)
LYMPHOCYTES RELATIVE PERCENT: 20.1 % (ref 20–40)
MCH RBC QN AUTO: 28.4 PG (ref 27–31)
MCHC RBC AUTO-ENTMCNC: 31.1 G/DL (ref 33–37)
MCV RBC AUTO: 91.6 FL (ref 81–99)
MONOCYTES ABSOLUTE: 0.6 K/UL (ref 0–0.9)
MONOCYTES RELATIVE PERCENT: 8.9 % (ref 0–10)
NEUTROPHILS ABSOLUTE: 4.3 K/UL (ref 1.5–7.5)
NEUTROPHILS RELATIVE PERCENT: 58.9 % (ref 50–65)
PDW BLD-RTO: 13.2 % (ref 11.5–14.5)
PLATELET # BLD: 302 K/UL (ref 130–400)
PMV BLD AUTO: 9.3 FL (ref 9.4–12.3)
POTASSIUM SERPL-SCNC: 4.4 MMOL/L (ref 3.5–5)
PRO-BNP: 68 PG/ML (ref 0–900)
PROTHROMBIN TIME: 13.3 SEC (ref 12–14.6)
RBC # BLD: 4.64 M/UL (ref 4.2–5.4)
SARS-COV-2, NAAT: NOT DETECTED
SODIUM BLD-SCNC: 137 MMOL/L (ref 136–145)
TOTAL PROTEIN: 6.6 G/DL (ref 6.6–8.7)
TROPONIN: <0.01 NG/ML (ref 0–0.03)
WBC # BLD: 7.2 K/UL (ref 4.8–10.8)

## 2021-09-02 PROCEDURE — 6360000002 HC RX W HCPCS: Performed by: EMERGENCY MEDICINE

## 2021-09-02 PROCEDURE — 2580000003 HC RX 258: Performed by: EMERGENCY MEDICINE

## 2021-09-02 PROCEDURE — 99284 EMERGENCY DEPT VISIT MOD MDM: CPT

## 2021-09-02 PROCEDURE — 83880 ASSAY OF NATRIURETIC PEPTIDE: CPT

## 2021-09-02 PROCEDURE — 6360000002 HC RX W HCPCS: Performed by: NURSE PRACTITIONER

## 2021-09-02 PROCEDURE — 94640 AIRWAY INHALATION TREATMENT: CPT

## 2021-09-02 PROCEDURE — 6370000000 HC RX 637 (ALT 250 FOR IP): Performed by: EMERGENCY MEDICINE

## 2021-09-02 PROCEDURE — 96374 THER/PROPH/DIAG INJ IV PUSH: CPT

## 2021-09-02 PROCEDURE — 1210000000 HC MED SURG R&B

## 2021-09-02 PROCEDURE — 2580000003 HC RX 258: Performed by: NURSE PRACTITIONER

## 2021-09-02 PROCEDURE — 36415 COLL VENOUS BLD VENIPUNCTURE: CPT

## 2021-09-02 PROCEDURE — 80053 COMPREHEN METABOLIC PANEL: CPT

## 2021-09-02 PROCEDURE — 6370000000 HC RX 637 (ALT 250 FOR IP): Performed by: NURSE PRACTITIONER

## 2021-09-02 PROCEDURE — 6360000004 HC RX CONTRAST MEDICATION: Performed by: EMERGENCY MEDICINE

## 2021-09-02 PROCEDURE — 6370000000 HC RX 637 (ALT 250 FOR IP): Performed by: FAMILY MEDICINE

## 2021-09-02 PROCEDURE — 93005 ELECTROCARDIOGRAM TRACING: CPT

## 2021-09-02 PROCEDURE — 85025 COMPLETE CBC W/AUTO DIFF WBC: CPT

## 2021-09-02 PROCEDURE — 94660 CPAP INITIATION&MGMT: CPT

## 2021-09-02 PROCEDURE — 71275 CT ANGIOGRAPHY CHEST: CPT

## 2021-09-02 PROCEDURE — 99254 IP/OBS CNSLTJ NEW/EST MOD 60: CPT | Performed by: INTERNAL MEDICINE

## 2021-09-02 PROCEDURE — 85610 PROTHROMBIN TIME: CPT

## 2021-09-02 PROCEDURE — 84484 ASSAY OF TROPONIN QUANT: CPT

## 2021-09-02 PROCEDURE — 87635 SARS-COV-2 COVID-19 AMP PRB: CPT

## 2021-09-02 PROCEDURE — 83690 ASSAY OF LIPASE: CPT

## 2021-09-02 PROCEDURE — 71045 X-RAY EXAM CHEST 1 VIEW: CPT

## 2021-09-02 PROCEDURE — 2700000000 HC OXYGEN THERAPY PER DAY

## 2021-09-02 RX ORDER — HYDROCHLOROTHIAZIDE 25 MG/1
25 TABLET ORAL EVERY MORNING
Status: DISCONTINUED | OUTPATIENT
Start: 2021-09-03 | End: 2021-09-03 | Stop reason: HOSPADM

## 2021-09-02 RX ORDER — IBANDRONATE SODIUM 150 MG/1
150 TABLET, FILM COATED ORAL
Status: DISCONTINUED | OUTPATIENT
Start: 2021-09-02 | End: 2021-09-02

## 2021-09-02 RX ORDER — ACETAMINOPHEN 650 MG/1
650 SUPPOSITORY RECTAL EVERY 6 HOURS PRN
Status: DISCONTINUED | OUTPATIENT
Start: 2021-09-02 | End: 2021-09-03 | Stop reason: HOSPADM

## 2021-09-02 RX ORDER — AZITHROMYCIN 250 MG/1
500 TABLET, FILM COATED ORAL DAILY
Status: DISCONTINUED | OUTPATIENT
Start: 2021-09-02 | End: 2021-09-03 | Stop reason: HOSPADM

## 2021-09-02 RX ORDER — VENLAFAXINE HYDROCHLORIDE 75 MG/1
150 CAPSULE, EXTENDED RELEASE ORAL DAILY
Status: DISCONTINUED | OUTPATIENT
Start: 2021-09-02 | End: 2021-09-03 | Stop reason: HOSPADM

## 2021-09-02 RX ORDER — ONDANSETRON 4 MG/1
4 TABLET, ORALLY DISINTEGRATING ORAL EVERY 8 HOURS PRN
Status: DISCONTINUED | OUTPATIENT
Start: 2021-09-02 | End: 2021-09-03 | Stop reason: HOSPADM

## 2021-09-02 RX ORDER — NITROGLYCERIN 0.4 MG/1
0.4 TABLET SUBLINGUAL EVERY 5 MIN PRN
Status: DISCONTINUED | OUTPATIENT
Start: 2021-09-02 | End: 2021-09-03 | Stop reason: HOSPADM

## 2021-09-02 RX ORDER — ALBUTEROL SULFATE 90 UG/1
2 AEROSOL, METERED RESPIRATORY (INHALATION) 4 TIMES DAILY PRN
Status: DISCONTINUED | OUTPATIENT
Start: 2021-09-02 | End: 2021-09-02

## 2021-09-02 RX ORDER — POLYETHYLENE GLYCOL 3350 17 G/17G
17 POWDER, FOR SOLUTION ORAL DAILY PRN
Status: DISCONTINUED | OUTPATIENT
Start: 2021-09-02 | End: 2021-09-03 | Stop reason: HOSPADM

## 2021-09-02 RX ORDER — ATORVASTATIN CALCIUM 40 MG/1
80 TABLET, FILM COATED ORAL NIGHTLY
Status: DISCONTINUED | OUTPATIENT
Start: 2021-09-03 | End: 2021-09-03 | Stop reason: HOSPADM

## 2021-09-02 RX ORDER — ASPIRIN 81 MG/1
81 TABLET ORAL DAILY
Status: DISCONTINUED | OUTPATIENT
Start: 2021-09-02 | End: 2021-09-03 | Stop reason: HOSPADM

## 2021-09-02 RX ORDER — METHYLPREDNISOLONE SODIUM SUCCINATE 125 MG/2ML
60 INJECTION, POWDER, LYOPHILIZED, FOR SOLUTION INTRAMUSCULAR; INTRAVENOUS ONCE
Status: COMPLETED | OUTPATIENT
Start: 2021-09-02 | End: 2021-09-02

## 2021-09-02 RX ORDER — LORAZEPAM 0.5 MG/1
0.5 TABLET ORAL EVERY 6 HOURS PRN
Status: DISCONTINUED | OUTPATIENT
Start: 2021-09-02 | End: 2021-09-03 | Stop reason: HOSPADM

## 2021-09-02 RX ORDER — METHYLPREDNISOLONE SODIUM SUCCINATE 40 MG/ML
40 INJECTION, POWDER, LYOPHILIZED, FOR SOLUTION INTRAMUSCULAR; INTRAVENOUS EVERY 8 HOURS
Status: DISCONTINUED | OUTPATIENT
Start: 2021-09-02 | End: 2021-09-03 | Stop reason: HOSPADM

## 2021-09-02 RX ORDER — 0.9 % SODIUM CHLORIDE 0.9 %
500 INTRAVENOUS SOLUTION INTRAVENOUS ONCE
Status: COMPLETED | OUTPATIENT
Start: 2021-09-02 | End: 2021-09-02

## 2021-09-02 RX ORDER — MECOBALAMIN 5000 MCG
5 TABLET,DISINTEGRATING ORAL NIGHTLY
Status: DISCONTINUED | OUTPATIENT
Start: 2021-09-02 | End: 2021-09-03 | Stop reason: HOSPADM

## 2021-09-02 RX ORDER — LOSARTAN POTASSIUM 25 MG/1
100 TABLET ORAL DAILY
Status: DISCONTINUED | OUTPATIENT
Start: 2021-09-03 | End: 2021-09-03 | Stop reason: HOSPADM

## 2021-09-02 RX ORDER — SODIUM CHLORIDE 0.9 % (FLUSH) 0.9 %
5-40 SYRINGE (ML) INJECTION PRN
Status: DISCONTINUED | OUTPATIENT
Start: 2021-09-02 | End: 2021-09-03 | Stop reason: HOSPADM

## 2021-09-02 RX ORDER — ONDANSETRON 2 MG/ML
4 INJECTION INTRAMUSCULAR; INTRAVENOUS EVERY 6 HOURS PRN
Status: DISCONTINUED | OUTPATIENT
Start: 2021-09-02 | End: 2021-09-03 | Stop reason: HOSPADM

## 2021-09-02 RX ORDER — SODIUM CHLORIDE 0.9 % (FLUSH) 0.9 %
5-40 SYRINGE (ML) INJECTION EVERY 12 HOURS SCHEDULED
Status: DISCONTINUED | OUTPATIENT
Start: 2021-09-02 | End: 2021-09-03 | Stop reason: HOSPADM

## 2021-09-02 RX ORDER — IPRATROPIUM BROMIDE AND ALBUTEROL SULFATE 2.5; .5 MG/3ML; MG/3ML
1 SOLUTION RESPIRATORY (INHALATION) ONCE
Status: COMPLETED | OUTPATIENT
Start: 2021-09-02 | End: 2021-09-02

## 2021-09-02 RX ORDER — CLONIDINE HYDROCHLORIDE 0.1 MG/1
0.1 TABLET ORAL 2 TIMES DAILY PRN
Status: DISCONTINUED | OUTPATIENT
Start: 2021-09-02 | End: 2021-09-03 | Stop reason: HOSPADM

## 2021-09-02 RX ORDER — ALBUTEROL SULFATE 2.5 MG/3ML
2.5 SOLUTION RESPIRATORY (INHALATION) 4 TIMES DAILY PRN
Status: DISCONTINUED | OUTPATIENT
Start: 2021-09-02 | End: 2021-09-03 | Stop reason: HOSPADM

## 2021-09-02 RX ORDER — SODIUM CHLORIDE 9 MG/ML
25 INJECTION, SOLUTION INTRAVENOUS PRN
Status: DISCONTINUED | OUTPATIENT
Start: 2021-09-02 | End: 2021-09-03 | Stop reason: HOSPADM

## 2021-09-02 RX ORDER — ACETAMINOPHEN 325 MG/1
650 TABLET ORAL EVERY 6 HOURS PRN
Status: DISCONTINUED | OUTPATIENT
Start: 2021-09-02 | End: 2021-09-03 | Stop reason: HOSPADM

## 2021-09-02 RX ADMIN — SODIUM CHLORIDE 500 ML: 9 INJECTION, SOLUTION INTRAVENOUS at 08:49

## 2021-09-02 RX ADMIN — IOPAMIDOL 90 ML: 755 INJECTION, SOLUTION INTRAVENOUS at 09:43

## 2021-09-02 RX ADMIN — METOPROLOL TARTRATE 75 MG: 50 TABLET, FILM COATED ORAL at 21:50

## 2021-09-02 RX ADMIN — SODIUM CHLORIDE, PRESERVATIVE FREE 10 ML: 5 INJECTION INTRAVENOUS at 21:53

## 2021-09-02 RX ADMIN — Medication 5 MG: at 21:50

## 2021-09-02 RX ADMIN — METHYLPREDNISOLONE SODIUM SUCCINATE 60 MG: 125 INJECTION, POWDER, FOR SOLUTION INTRAMUSCULAR; INTRAVENOUS at 09:54

## 2021-09-02 RX ADMIN — NITROGLYCERIN 0.4 MG: 0.4 TABLET, ORALLY DISINTEGRATING SUBLINGUAL at 08:39

## 2021-09-02 RX ADMIN — AZITHROMYCIN MONOHYDRATE 500 MG: 250 TABLET ORAL at 17:50

## 2021-09-02 RX ADMIN — METHYLPREDNISOLONE SODIUM SUCCINATE 40 MG: 40 INJECTION, POWDER, FOR SOLUTION INTRAMUSCULAR; INTRAVENOUS at 17:50

## 2021-09-02 RX ADMIN — IPRATROPIUM BROMIDE AND ALBUTEROL SULFATE 1 AMPULE: .5; 3 SOLUTION RESPIRATORY (INHALATION) at 09:51

## 2021-09-02 RX ADMIN — LORAZEPAM 0.5 MG: 0.5 TABLET ORAL at 21:50

## 2021-09-02 ASSESSMENT — ENCOUNTER SYMPTOMS
EYE DISCHARGE: 0
BACK PAIN: 1
ABDOMINAL DISTENTION: 0
COUGH: 0
CONSTIPATION: 0
VOMITING: 0
SHORTNESS OF BREATH: 0
WHEEZING: 0
ABDOMINAL PAIN: 0
DIARRHEA: 0
BACK PAIN: 0
BLOOD IN STOOL: 0

## 2021-09-02 ASSESSMENT — PAIN DESCRIPTION - LOCATION: LOCATION: CHEST

## 2021-09-02 ASSESSMENT — PAIN DESCRIPTION - DESCRIPTORS: DESCRIPTORS: DULL

## 2021-09-02 ASSESSMENT — PAIN SCALES - GENERAL: PAINLEVEL_OUTOF10: 3

## 2021-09-02 NOTE — PROGRESS NOTES
4 Eyes Skin Assessment    Radha Hill is being assessed upon: Admission    I agree that Trevin Anthony RN, along with Marisa Burton RN (either 2 RN's or 1 LPN and 1 RN) have performed a thorough Head to Toe Skin Assessment on the patient. ALL assessment sites listed below have been assessed. Areas assessed by both nurses:     [x]   Head, Face, and Ears   [x]   Shoulders, Back, and Chest  [x]   Arms, Elbows, and Hands   [x]   Coccyx, Sacrum, and Ischium  [x]   Legs, Feet, and Heels    Does the Patient have Skin Breakdown?  No    Ravindra Prevention initiated: NA  Wound Care Orders initiated: NA    [facility] nurse consulted for Pressure Injury (Stage 3,4, Unstageable, DTI, NWPT, and Complex wounds) and New or Established Ostomies: NA        Primary Nurse eSignature: Abel Garrido RN on 9/2/2021 at 5:24 PM      Co-Signer eSignature: Electronically signed by Hollie Richard RN on 9/2/21 at 7:08 PM CDT

## 2021-09-02 NOTE — PROGRESS NOTES
Brandon Maldonado arrived to room # 314. Presented with: atypical chest pain, hypoxia  Mental Status: Patient is oriented, alert, coherent, logical, thought processes intact and able to concentrate and follow conversation. Vitals:    09/02/21 1714   BP: 135/85   Pulse: 75   Resp: 16   Temp: 96.1 °F (35.6 °C)   SpO2: 98%     Patient safety contract and falls prevention contract reviewed with patient Yes. Oriented Patient to room. Call light within reach. Yes.   Needs, issues or concerns expressed at this time: no.      Electronically signed by Rob Henry RN on 9/2/2021 at 5:23 PM

## 2021-09-02 NOTE — ED NOTES
Pt removed from bipap per dr request and placed on NC @ 2L/min o2.      Courtney Allred RN  09/02/21 1604

## 2021-09-02 NOTE — PROGRESS NOTES
PHARMACY NOTE  Lucretia Patel was ordered boniva. Per the [address], this medication is non-formulary and not stocked by pharmacy. It has been discontinued. The medication can be reordered at discharge.      DENIA LIRIANO, PHARM D, 9/2/2021, 5:29 PM

## 2021-09-02 NOTE — H&P
[facility]      Hospitalist - History & Physical      PCP: Antonina Jasmine MD    Date of Admission: 9/2/2021    Date of Service: 9/2/2021    Chief Complaint: Near syncope    History Of Present Illness: The patient is a 64 y.o. female who presented to [facility] emergency department after feeling lightheaded have admit up back pain that felt like pressure tightness when her shoulder blades. Patient states she had heart cath 2 years ago which was negative although her troponins were positive at time. Patient states she stopped smoking approximately 2015. Patient states today during chest x-ray she became extremely short of breath oxygen sats dropped to the 70s at which time she was given steroids, breathing treatment oxygen therapy which improved her situation. Patient did have slight ST depression in V3 V4 anterior leads. Patient states no sick as recently and no symptoms of COVID-19. Past Medical History:        Diagnosis Date    Acid reflux     Anxiety     [facility]     Cancer ([facility]     liposcaarcoma, peritoneal    Hyperlipidemia     Hypertension     Mild coronary artery disease 7/18/2019       Past Surgical History:        Procedure Laterality Date    BREAST LUMPECTOMY Left     CHOLECYSTECTOMY  2008    COLONOSCOPY  12/20/2010    Dr Waller Every, PARTIAL Left 2008    left breast cancer    AR COLONOSCOPY FLX DX W/COLLJ SPEC WHEN PFRMD N/A 12/09/2016    Dr Joy Neri access, normall, 5 yr recall w/Dr Burak GRANDE AND ROMY drew normal PAP's prior to hysterectomy    TONSILLECTOMY      UPPER GASTROINTESTINAL ENDOSCOPY N/A 12/10/2020    Dr Melissa Galeas normal exam; NEG h pylori    US GUIDED LIVER BIOPSY PERCUTANEOUS  12/14/2020    steatosis, + mild iron staining, grade 0, stage 0       Home Medications:  Prior to Admission medications    Medication Sig Start Date End Date Taking?  Authorizing Provider   hydroCHLOROthiazide (HYDRODIURIL) 25 MG tablet Take 1 tablet by mouth every morning 8/30/21   VAZQUEZ Davis   vitamin D (ERGOCALCIFEROL) 1.25 MG (76059 UT) CAPS capsule TAKE ONE CAPSULE BY MOUTH ONCE A WEEK 8/12/21   Gianfranco Narvaez MD   rosuvastatin (CRESTOR) 20 MG tablet Take 1 tablet by mouth nightly 7/23/21   Gianfranco Narvaez MD   metoprolol tartrate (LOPRESSOR) 50 MG tablet TAKE 1 TABLET BY MOUTH EVERY 12 HOURS  Patient taking differently: Take 75 mg by mouth 2 times daily  5/24/21   VAZQUEZ Oscar - CNP   venlafaxine (EFFEXOR XR) 150 MG extended release capsule TAKE ONE CAPSULE BY MOUTH EVERY DAY 5/21/21   Gianfranco Narvaez MD   irbesartan (AVAPRO) 300 MG tablet TAKE 1 TABLET BY MOUTH DAILY 5/19/21   VAZQUEZ Will   ibandronate (BONIVA) 150 MG tablet Take 1 tablet by mouth every 30 days 3/8/21   Gianfranco Narvaez MD   albuterol sulfate HFA (VENTOLIN HFA) 108 (90 Base) MCG/ACT inhaler Inhale 2 puffs into the lungs 4 times daily as needed for Wheezing 3/5/21   Gianfranco Narvaez MD   cloNIDine (CATAPRES) 0.1 MG tablet Take 1 tablet by mouth 2 times daily as needed for High Blood Pressure (for BP < 180/100) 2/3/21   VAZQUEZ Bravo   aspirin EC 81 MG EC tablet Take 81 mg by mouth daily    Historical Provider, MD   fluticasone (FLONASE) 50 MCG/ACT nasal spray 1 spray by Nasal route daily for 10 days  Patient taking differently: 1 spray by Nasal route daily as needed  8/31/18 7/19/21  VAZQUEZ Egan       Allergies:    Adhesive tape and Sulfa antibiotics    Social History:    The patient currently lives home  Tobacco:   reports that she quit smoking about 5 years ago. She has a 17.50 pack-year smoking history. She has never used smokeless tobacco.  Alcohol:   reports current alcohol use of about 2.0 standard drinks of alcohol per week.   Illicit Drugs: Never    Family History:      Problem Relation Age of Onset    Alzheimer's Disease Mother     Breast Cancer Mother 48    Heart Disease Father     Diabetes Father    [employer] Cancer Brother     Colon Cancer Neg Hx     Colon Polyps Neg Hx     Liver Cancer Neg Hx     Liver Disease Neg Hx     Esophageal Cancer Neg Hx     Rectal Cancer Neg Hx     Stomach Cancer Neg Hx        Review of Systems:   Review of Systems   Constitutional / general:  Denies fever / chills /positive for diaphoresis  Head:  Denies headache / neck stiffness / trauma / visual change  Eyes:  Denies blurry vision / acute visual change or loss / itching / redness  ENT: Denies sore throat / hoarseness / nasal drainage / ear pain  CV:  Denies chest pain / palpitations/ orthopnea. Positive for pressure between her shoulder blades. Respiratory:  Denies cough / shortness of breath / sputum / hemoptysis  GI: Denies nausea / vomiting / abdominal pain / diarrhea / constipation  :  Denies dysuria / hesitancy / urgency / hematuria   Neuro: Denies paralysis / syncope / seizure / dysphagia / headache / paresthesias  Musculoskeletal:  Denies muscle weakness /joint stiffness / pain  Vascular: Denies edema / claudication / varicosities  Heme / endocrine: Denies easy bruising / bleeding / excessive sweating / heat or cold intolerance  Psychiatric:  Denies depression / anxiety / insomnia / mood changes  Skin:  Denies new rashes / lesions / skin hair or nail changes        Physical Examination:  /80   Pulse 73   Temp 97.1 °F (36.2 °C) (Oral)   Resp 19   Ht 5' 3\" (1.6 m)   Wt 135 lb (61.2 kg)   SpO2 92%   BMI 23.91 kg/m²   Physical Exam   General appearance: alert, appears stated age, cooperative and no distress  Head: Normocephalic, without obvious abnormality, atraumatic  Eyes: conjunctivae/corneas clear. PERRL, EOM's intact. Ears: normal external ears and nose, throat without exudate  Neck: no adenopathy, no carotid bruit, no JVD, supple, symmetrical, trachea midline. Lungs: clear to auscultation bilaterally, no rales or wheezes. RRR.   Heart: regular rate and rhythm, S1, S2 normal, no murmur  Abdomen: soft, vasculature appears normal.    Chronic changes with minimal atelectasis. Associated hyperinflation. Signed by Dr Karon Daigle    CTA PULMONARY W CONTRAST    Result Date: 9/2/2021  CTA PULMONARY W CONTRAST 9/2/2021 10:49 AM History: Back pain and hypoxia. In order to have a CT radiation dose as low as reasonably achievable Automated Exposure Control was utilized for adjustment of the mA and/or KV according to patient size. DLP in mGycm= 202. CTA pulmonary with contrast. CT angiography protocol. CT imaging with bolus IV contrast injection. Under concurrent supervision axial, sagittal, coronal, and three-dimensional data sets were constructed. Comparison is made with January 15, 2021. No thoracic aortic aneurysm or dissection. Symmetric well opacified pulmonary arteries. No pulmonary embolism. Normal heart size. No mediastinal mass. Chronic lung changes with peribronchial thickening and the appearance of mild lower lobe mucus plugging. Hyperexpanded lungs. Chronic interstitial disease. Mild diffuse emphysema. Mild right convex thoracic scoliosis. No compression fracture or vertebral body malalignment. 1. No pulmonary embolism or thoracic aortic dissection. 2. Chronic lung changes. Signed by Dr Kathrine Cadet      Assessment/Plan:  Principal Problem:    Atypical chest pain  Observation  Telemetry  Cardiology consult  Serial troponins  Beta-blockers  Aspirin  Lovenox for DVT prophylaxis  A.m. Lexiscan  O2 as needed    Active Problems:    COPD (chronic obstructive pulmonary disease) (HCC)  Azithromycin 500 mg p.o. daily  Albuterol inhaler 2 puffs every 4 hours as needed  Solu-Medrol 40 mg IV every 4 hours      Hypertension  Monitor closely  Clonidine as needed 0.1 mg twice daily  Hydrochlorothiazide  Avapro  Metoprolol    Depression  Effexor 150 mg p.o. daily extended release      Resolved Problems:    * No resolved hospital problems.  *       Signed:  VAZQUEZ Morales CNP, 9/2/2021 1:10 PM

## 2021-09-02 NOTE — ED NOTES
Bed: 05  Expected date:   Expected time:   Means of arrival:   Comments:  EMS     Catherine Madison RN  09/02/21 2308

## 2021-09-02 NOTE — ED NOTES
Pt c/o SOB, pulse ox switched to a different finger,same reading of 76 % per Dr Cayla Welch, RN  09/02/21 3050

## 2021-09-02 NOTE — ED PROVIDER NOTES
[facility] EMERGENCY DEPT  eMERGENCY dEPARTMENT eNCOUnter      Pt Name: Tari Melgar  MRN: [id number]  [place] 1960  Date of evaluation: 9/2/2021  Provider: Kendall Sotelo MD    CHIEF COMPLAINT       Chief Complaint   Patient presents with    Loss of Consciousness     near syncope         HISTORY OF PRESENT ILLNESS   (Location/Symptom, Timing/Onset,Context/Setting, Quality, Duration, Modifying Factors, Severity)  Note limiting factors. Tari Melgar is a 64 y.o. female who presents to the emergency department with lightheadedness and mid upper back pain that felt like a pressure or tightness between her shoulder blade she states this happened 2 years ago she had a heart cath that was negative her troponins were positive. The patient presents with paramedics from the [facility]. She otherwise was in her usual state of health she did not injure herself or do anything. In the ER nitro relieved her pressure in her back. She never really had chest pain she was not short of breath on arrival at all she has remote history of smoking she does not smoke currently she states she is had the Covid vaccine she is not been sick with cough or fever. Patient was diaphoretic when all this started. The patient denies any nausea or abdominal pain. This started around 7-7 30 this morning. The history is provided by the patient, medical records and the EMS personnel. NursingNotes were reviewed. REVIEW OF SYSTEMS    (2-9 systems for level 4, 10 or more for level 5)     Review of Systems   Constitutional: Positive for diaphoresis. Negative for fever. Respiratory: Negative for cough and shortness of breath. Cardiovascular: Negative for chest pain. Gastrointestinal: Negative for abdominal pain and vomiting. Genitourinary: Negative for dysuria. Musculoskeletal: Positive for back pain. Neurological: Positive for light-headedness. Psychiatric/Behavioral: Negative for confusion.        A complete review of systems was performed and is negative except as noted above in the HPI.        PAST MEDICAL HISTORY     Past Medical History:   Diagnosis Date    Acid reflux     Anxiety     [facility] [address]     Cancer ([facility] [address]     liposcaarcoma, peritoneal    Hyperlipidemia     Hypertension     Mild coronary artery disease 7/18/2019         SURGICAL HISTORY       Past Surgical History:   Procedure Laterality Date    BREAST LUMPECTOMY Left     CHOLECYSTECTOMY  2008    COLONOSCOPY  12/20/2010    Dr Alex Pablo, PARTIAL Left 2008    left breast cancer    [place] COLONOSCOPY FLX DX W/COLLJ SPEC WHEN PFRMD N/A 12/09/2016    Dr Fabiola Haines access, normall, 5 yr recall w/Dr Jasmina GRANDE AND BSO      alwasy normal PAP's prior to hysterectomy    TONSILLECTOMY      UPPER GASTROINTESTINAL ENDOSCOPY N/A 12/10/2020    Dr Feliciano Medley normal exam; NEG h pylori    US GUIDED LIVER BIOPSY PERCUTANEOUS  12/14/2020    steatosis, + mild iron staining, grade 0, stage 0         CURRENT MEDICATIONS       Previous Medications    ALBUTEROL SULFATE HFA (VENTOLIN HFA) 108 (90 BASE) MCG/ACT INHALER    Inhale 2 puffs into the lungs 4 times daily as needed for Wheezing    ASPIRIN EC 81 MG EC TABLET    Take 81 mg by mouth daily    CLONIDINE (CATAPRES) 0.1 MG TABLET    Take 1 tablet by mouth 2 times daily as needed for High Blood Pressure (for BP < 180/100)    FLUTICASONE (FLONASE) 50 MCG/ACT NASAL SPRAY    1 spray by Nasal route daily for 10 days    HYDROCHLOROTHIAZIDE (HYDRODIURIL) 25 MG TABLET    Take 1 tablet by mouth every morning    IBANDRONATE (BONIVA) 150 MG TABLET    Take 1 tablet by mouth every 30 days    IRBESARTAN (AVAPRO) 300 MG TABLET    TAKE 1 TABLET BY MOUTH DAILY    METOPROLOL TARTRATE (LOPRESSOR) 50 MG TABLET    TAKE 1 TABLET BY MOUTH EVERY 12 HOURS    ROSUVASTATIN (CRESTOR) 20 MG TABLET    Take 1 tablet by mouth nightly    VENLAFAXINE (EFFEXOR XR) 150 MG EXTENDED RELEASE CAPSULE    TAKE with Friends and Family:     Attends Confucianism Services:     Active Member of Clubs or Organizations:     Attends Club or Organization Meetings:     Marital Status:    Intimate Partner Violence:     Fear of Current or Ex-Partner:     Emotionally Abused:     Physically Abused:     Sexually Abused:        SCREENINGS             PHYSICAL EXAM    (up to 7 for level 4, 8 or more for level 5)     ED Triage Vitals [09/02/21 0826]   BP Temp Temp Source Pulse Resp SpO2 Height Weight   (!) 141/87 97.1 °F (36.2 °C) Oral 68 17 97 % 5' 3\" (1.6 m) 135 lb (61.2 kg)       Physical Exam  Vitals and nursing note reviewed. Constitutional:       General: She is not in acute distress. Appearance: Normal appearance. She is normal weight. She is not ill-appearing, toxic-appearing or diaphoretic. Comments: On arrival the patient is in no distress she sitting up she is very pleasant she is cooperative interactive and in no distress whatsoever   HENT:      Head: Normocephalic and atraumatic. Eyes:      Extraocular Movements: Extraocular movements intact. Pupils: Pupils are equal, round, and reactive to light. Cardiovascular:      Rate and Rhythm: Normal rate and regular rhythm. Pulses: Normal pulses. Heart sounds: No murmur heard. Pulmonary:      Effort: Pulmonary effort is normal.      Breath sounds: Normal breath sounds. Comments: Initial breath sounds were totally clear  Abdominal:      General: Abdomen is flat. There is no distension. Palpations: Abdomen is soft. Tenderness: There is no abdominal tenderness. Musculoskeletal:         General: Normal range of motion. Cervical back: Normal range of motion and neck supple. Right lower leg: No edema. Left lower leg: No edema. Skin:     General: Skin is warm and dry. Capillary Refill: Capillary refill takes less than 2 seconds. Neurological:      General: No focal deficit present.       Mental Status: She is alert and oriented to person, place, and time. Sensory: No sensory deficit. Motor: No weakness. Psychiatric:         Mood and Affect: Mood normal.         Behavior: Behavior normal.         Thought Content: Thought content normal.         Judgment: Judgment normal.         DIAGNOSTIC RESULTS     EKG: All EKG's are interpreted by the Emergency Department Physician who either signs or Co-signs this chart in the absence of a cardiologist.    EKG #1 was sinus it appeared similar to the old EKG. EKG #2 was similar to prior EKG. EKG #3 the patient was completely asymptomatic she did not have any chest pressure or back pain. The patient has a hint of change with inferior lateral T wave biphasic and is especially in V3 V4 as well as some mild ST depression that is very subtle. Still in sinus rhythm. RADIOLOGY:   Non-plain film images such as CT, Ultrasound and MRI are read by the radiologist. Plainradiographic images are visualized and preliminarily interpreted by the emergency physician with the below findings:        Interpretation per the Radiologist below, if available at the time of this note:    CTA PULMONARY W CONTRAST   Final Result   1. No pulmonary embolism or thoracic aortic dissection. 2. Chronic lung changes. Signed by Dr Allison Phillips      XR CHEST PORTABLE   Final Result   Chronic changes with minimal atelectasis. Associated   hyperinflation.    Signed by Dr Yossi Nunez            ED BEDSIDE ULTRASOUND:   Performed by ED Physician - none    LABS:  Labs Reviewed   CBC WITH AUTO DIFFERENTIAL - Abnormal; Notable for the following components:       Result Value    MCHC 31.1 (*)     MPV 9.3 (*)     Eosinophils % 10.7 (*)     Basophils % 1.1 (*)     Eosinophils Absolute 0.80 (*)     All other components within normal limits   COVID-19, RAPID   COMPREHENSIVE METABOLIC PANEL   LIPASE   PROTIME-INR   TROPONIN   BRAIN NATRIURETIC PEPTIDE   TROPONIN   URINE RT REFLEX TO CULTURE       All other labs were within normal range or not returned as of this dictation. EMERGENCY DEPARTMENT COURSE and DIFFERENTIALDIAGNOSIS/MDM:   Vitals:    Vitals:    09/02/21 0942 09/02/21 0950 09/02/21 1002 09/02/21 1032   BP:  (!) 177/124 (!) 149/92 126/80   Pulse: 100 98 96 73   Resp: 19 27 17 19   Temp:       TempSrc:       SpO2: (!) 88% (!) 87% 90% 92%   Weight:       Height:           MDM  Number of Diagnoses or Management Options  Diagnosis management comments: Patient arrives with back pain. Differential included PE, dissection, musculoskeletal back pain, ACS. Initially on arrival I really do think this was an anginal equivalent given the fact that she has a history of elevated troponin with similar in the past. She has no known stents. The patient was given nitro her pain relief completely. EMS had given her 324 mg of aspirin. Patient was given a 500 cc bolus of normal saline in the ER. She ended up having acute change while in the emergency department with shortness of breath      9:44 AM  Pt became acutely hypoxic felt \"flushed\"    She had sat 75%     She had NO SOB on arrival and this is complete acute change    She had received nitro, asa and 500cc fluid bolus    Hx of normal ef and no CAD with stent on last cath 2 years ago. Started on NC 6 L and sent for stat CTA chest PE       The patient was started on a DuoNeb treatment, the patient was started on BiPAP. The patient's work of breathing greatly improved she was able to be weaned to 2 L nasal cannula saturating in the high 90s. I do feel there is been a slight change in her EKG cardiology has been consulted she has no ACS-like symptoms now she has no back pain chest pain. The patient did have wheezing she was given steroids and a breathing treatment this seemed to help. It is unclear to me what exactly happened she was just sitting in bed. CT scan shows there is no dissection or PE. The patient otherwise looks much improved.  Cardiology consult admit to medicine for continued trending of troponins and evaluation discussed with Dr. Luz Maria Rowell. Amount and/or Complexity of Data Reviewed  Clinical lab tests: ordered and reviewed  Tests in the radiology section of CPT®: ordered and reviewed  Decide to obtain previous medical records or to obtain history from someone other than the patient: yes  Obtain history from someone other than the patient: yes  Discuss the patient with other providers: yes  Independent visualization of images, tracings, or specimens: yes    Risk of Complications, Morbidity, and/or Mortality  Presenting problems: high  Management options: high    Critical Care  Total time providing critical care: 30-74 minutes    Patient Progress  Patient progress: improved    CRITICAL CARE TIME   Total Critical Care time was 35 minutes, excluding separately reportable procedures. There was a high probability of clinically significant/life threatening deterioration in the patient's condition which required my urgent intervention. CONSULTS:  IP CONSULT TO CARDIOLOGY    PROCEDURES:  Unless otherwise notedbelow, none     Procedures    FINAL IMPRESSION     1. Acute respiratory failure with hypoxia ([address]    2. Shortness of breath    3. Chronic obstructive pulmonary disease, unspecified COPD type ([address]    4. Abnormal EKG          DISPOSITION/PLAN   DISPOSITION Admitted 09/02/2021 12:47:50 PM      PATIENT REFERRED TO:  No follow-up provider specified.     DISCHARGE MEDICATIONS:  New Prescriptions    No medications on file          (Please note that portions of this note were completed with a voice recognition program.  Efforts were made to edit the dictations butoccasionally words are mis-transcribed.)    True Dominique MD (electronically signed)  AttendingEmergency Physician          True Dominique MD  09/02/21 2161

## 2021-09-03 ENCOUNTER — APPOINTMENT (OUTPATIENT)
Dept: NUCLEAR MEDICINE | Age: 61
DRG: 302 | End: 2021-09-03
Payer: COMMERCIAL

## 2021-09-03 VITALS
HEIGHT: 63 IN | DIASTOLIC BLOOD PRESSURE: 69 MMHG | TEMPERATURE: 97 F | RESPIRATION RATE: 17 BRPM | OXYGEN SATURATION: 94 % | WEIGHT: 135 LBS | HEART RATE: 79 BPM | SYSTOLIC BLOOD PRESSURE: 103 MMHG | BODY MASS INDEX: 23.92 KG/M2

## 2021-09-03 LAB
ALBUMIN SERPL-MCNC: 4.1 G/DL (ref 3.5–5.2)
ALP BLD-CCNC: 60 U/L (ref 35–104)
ALT SERPL-CCNC: 21 U/L (ref 5–33)
ANION GAP SERPL CALCULATED.3IONS-SCNC: 15 MMOL/L (ref 7–19)
AST SERPL-CCNC: 21 U/L (ref 5–32)
BASOPHILS ABSOLUTE: 0 K/UL (ref 0–0.2)
BASOPHILS RELATIVE PERCENT: 0.2 % (ref 0–1)
BILIRUB SERPL-MCNC: <0.2 MG/DL (ref 0.2–1.2)
BUN BLDV-MCNC: 28 MG/DL (ref 8–23)
CALCIUM SERPL-MCNC: 8.9 MG/DL (ref 8.8–10.2)
CHLORIDE BLD-SCNC: 100 MMOL/L (ref 98–111)
CHOLESTEROL, TOTAL: 180 MG/DL (ref 160–199)
CO2: 25 MMOL/L (ref 22–29)
CREAT SERPL-MCNC: 0.8 MG/DL (ref 0.5–0.9)
EOSINOPHILS ABSOLUTE: 0 K/UL (ref 0–0.6)
EOSINOPHILS RELATIVE PERCENT: 0.1 % (ref 0–5)
GFR AFRICAN AMERICAN: >59
GFR NON-AFRICAN AMERICAN: >60
GLUCOSE BLD-MCNC: 148 MG/DL (ref 74–109)
HCT VFR BLD CALC: 41.1 % (ref 37–47)
HDLC SERPL-MCNC: 68 MG/DL (ref 65–121)
HEMOGLOBIN: 12.9 G/DL (ref 12–16)
IMMATURE GRANULOCYTES #: 0.1 K/UL
LDL CHOLESTEROL CALCULATED: 97 MG/DL
LV EF: 72 %
LV EF: 75 %
LVEF MODALITY: NORMAL
LVEF MODALITY: NORMAL
LYMPHOCYTES ABSOLUTE: 1.3 K/UL (ref 1.1–4.5)
LYMPHOCYTES RELATIVE PERCENT: 12.6 % (ref 20–40)
MCH RBC QN AUTO: 28.9 PG (ref 27–31)
MCHC RBC AUTO-ENTMCNC: 31.4 G/DL (ref 33–37)
MCV RBC AUTO: 92.2 FL (ref 81–99)
MONOCYTES ABSOLUTE: 0.5 K/UL (ref 0–0.9)
MONOCYTES RELATIVE PERCENT: 4.5 % (ref 0–10)
NEUTROPHILS ABSOLUTE: 8.3 K/UL (ref 1.5–7.5)
NEUTROPHILS RELATIVE PERCENT: 82.1 % (ref 50–65)
PDW BLD-RTO: 13.2 % (ref 11.5–14.5)
PLATELET # BLD: 306 K/UL (ref 130–400)
PMV BLD AUTO: 9.5 FL (ref 9.4–12.3)
POTASSIUM REFLEX MAGNESIUM: 5.4 MMOL/L (ref 3.5–5)
POTASSIUM SERPL-SCNC: 4.7 MMOL/L (ref 3.5–5)
RBC # BLD: 4.46 M/UL (ref 4.2–5.4)
SODIUM BLD-SCNC: 140 MMOL/L (ref 136–145)
TOTAL PROTEIN: 6 G/DL (ref 6.6–8.7)
TRIGL SERPL-MCNC: 77 MG/DL (ref 0–149)
WBC # BLD: 10.1 K/UL (ref 4.8–10.8)

## 2021-09-03 PROCEDURE — 93306 TTE W/DOPPLER COMPLETE: CPT

## 2021-09-03 PROCEDURE — 85025 COMPLETE CBC W/AUTO DIFF WBC: CPT

## 2021-09-03 PROCEDURE — 93017 CV STRESS TEST TRACING ONLY: CPT

## 2021-09-03 PROCEDURE — 80053 COMPREHEN METABOLIC PANEL: CPT

## 2021-09-03 PROCEDURE — 36415 COLL VENOUS BLD VENIPUNCTURE: CPT

## 2021-09-03 PROCEDURE — 80061 LIPID PANEL: CPT

## 2021-09-03 PROCEDURE — 6360000002 HC RX W HCPCS: Performed by: NURSE PRACTITIONER

## 2021-09-03 PROCEDURE — 99232 SBSQ HOSP IP/OBS MODERATE 35: CPT | Performed by: INTERNAL MEDICINE

## 2021-09-03 PROCEDURE — 2700000000 HC OXYGEN THERAPY PER DAY

## 2021-09-03 PROCEDURE — 3430000000 HC RX DIAGNOSTIC RADIOPHARMACEUTICAL: Performed by: NURSE PRACTITIONER

## 2021-09-03 PROCEDURE — 6370000000 HC RX 637 (ALT 250 FOR IP): Performed by: NURSE PRACTITIONER

## 2021-09-03 PROCEDURE — A9500 TC99M SESTAMIBI: HCPCS | Performed by: NURSE PRACTITIONER

## 2021-09-03 PROCEDURE — 2580000003 HC RX 258: Performed by: NURSE PRACTITIONER

## 2021-09-03 PROCEDURE — 84132 ASSAY OF SERUM POTASSIUM: CPT

## 2021-09-03 RX ORDER — ISOSORBIDE MONONITRATE 30 MG/1
30 TABLET, EXTENDED RELEASE ORAL DAILY
Qty: 30 TABLET | Refills: 0 | Status: SHIPPED | OUTPATIENT
Start: 2021-09-03 | End: 2021-10-11 | Stop reason: SDUPTHER

## 2021-09-03 RX ORDER — ISOSORBIDE MONONITRATE 30 MG/1
30 TABLET, EXTENDED RELEASE ORAL DAILY
Status: DISCONTINUED | OUTPATIENT
Start: 2021-09-03 | End: 2021-09-03 | Stop reason: HOSPADM

## 2021-09-03 RX ORDER — AZITHROMYCIN 500 MG/1
500 TABLET, FILM COATED ORAL DAILY
Qty: 3 TABLET | Refills: 0 | Status: SHIPPED | OUTPATIENT
Start: 2021-09-04 | End: 2021-09-07

## 2021-09-03 RX ORDER — FLUTICASONE PROPIONATE 110 UG/1
2 AEROSOL, METERED RESPIRATORY (INHALATION) 2 TIMES DAILY
Qty: 1 EACH | Refills: 0 | Status: SHIPPED | OUTPATIENT
Start: 2021-09-03 | End: 2022-03-22 | Stop reason: SDUPTHER

## 2021-09-03 RX ADMIN — ATORVASTATIN CALCIUM 80 MG: 40 TABLET, FILM COATED ORAL at 10:23

## 2021-09-03 RX ADMIN — REGADENOSON 0.4 MG: 0.08 INJECTION, SOLUTION INTRAVENOUS at 09:00

## 2021-09-03 RX ADMIN — SODIUM CHLORIDE, PRESERVATIVE FREE 10 ML: 5 INJECTION INTRAVENOUS at 10:24

## 2021-09-03 RX ADMIN — METHYLPREDNISOLONE SODIUM SUCCINATE 40 MG: 40 INJECTION, POWDER, FOR SOLUTION INTRAMUSCULAR; INTRAVENOUS at 02:10

## 2021-09-03 RX ADMIN — VENLAFAXINE HYDROCHLORIDE 150 MG: 75 CAPSULE, EXTENDED RELEASE ORAL at 10:22

## 2021-09-03 RX ADMIN — METHYLPREDNISOLONE SODIUM SUCCINATE 40 MG: 40 INJECTION, POWDER, FOR SOLUTION INTRAMUSCULAR; INTRAVENOUS at 10:22

## 2021-09-03 RX ADMIN — ASPIRIN 81 MG: 81 TABLET, COATED ORAL at 10:23

## 2021-09-03 RX ADMIN — TETRAKIS(2-METHOXYISOBUTYLISOCYANIDE)COPPER(I) TETRAFLUOROBORATE 10 MILLICURIE: 1 INJECTION, POWDER, LYOPHILIZED, FOR SOLUTION INTRAVENOUS at 09:54

## 2021-09-03 RX ADMIN — HYDROCHLOROTHIAZIDE 25 MG: 25 TABLET ORAL at 10:23

## 2021-09-03 RX ADMIN — TETRAKIS(2-METHOXYISOBUTYLISOCYANIDE)COPPER(I) TETRAFLUOROBORATE 30 MILLICURIE: 1 INJECTION, POWDER, LYOPHILIZED, FOR SOLUTION INTRAVENOUS at 09:54

## 2021-09-03 RX ADMIN — LOSARTAN POTASSIUM 100 MG: 25 TABLET, FILM COATED ORAL at 10:23

## 2021-09-03 RX ADMIN — AZITHROMYCIN MONOHYDRATE 500 MG: 250 TABLET ORAL at 10:22

## 2021-09-03 ASSESSMENT — PAIN SCALES - GENERAL: PAINLEVEL_OUTOF10: 0

## 2021-09-03 NOTE — PLAN OF CARE
Problem: Anxiety:  Goal: Level of anxiety will decrease  Description: Level of anxiety will decrease  Outcome: Ongoing     Problem:  Activity:  Goal: Ability to tolerate increased activity will improve  Description: Ability to tolerate increased activity will improve  Outcome: Ongoing     Problem: Cardiac:  Goal: Hemodynamic stability will improve  Description: Hemodynamic stability will improve  Outcome: Ongoing  Goal: Complications related to the disease process, condition or treatment will be avoided or minimized  Description: Complications related to the disease process, condition or treatment will be avoided or minimized  Outcome: Ongoing  Goal: Cerebral tissue perfusion will improve  Description: Cerebral tissue perfusion will improve  Outcome: Ongoing     Problem: Coping:  Goal: Ability to identify and develop effective coping behavior will improve  Description: Ability to identify and develop effective coping behavior will improve  Outcome: Ongoing  Goal: Ability to identify strategies to decrease anxiety will improve  Description: Ability to identify strategies to decrease anxiety will improve  Outcome: Ongoing     Problem: Health Behavior:  Goal: Identification of resources available to assist in meeting health care needs will improve  Description: Identification of resources available to assist in meeting health care needs will improve  Outcome: Ongoing     Problem: Nutritional:  Goal: Ability to identify appropriate dietary choices will improve  Description: Ability to identify appropriate dietary choices will improve  Outcome: Ongoing  Goal: Ability to chew and swallow food without choking will improve  Description: Ability to chew and swallow food without choking will improve  Outcome: Ongoing  Goal: Ability to achieve adequate nutritional intake will improve  Description: Ability to achieve adequate nutritional intake will improve  Outcome: Ongoing  Goal: Ability to maintain an optimal weight for height and age will improve  Outcome: Ongoing     Problem:  Bowel/Gastric:  Goal: Will show no signs and symptoms of gastrointestinal bleeding  Description: Will show no signs and symptoms of gastrointestinal bleeding  Outcome: Ongoing     Problem: Physical Regulation:  Goal: Complications related to the disease process, condition or treatment will be avoided or minimized  Description: Complications related to the disease process, condition or treatment will be avoided or minimized  Outcome: Ongoing     Problem: Sensory:  Goal: General experience of comfort will improve  Description: General experience of comfort will improve  Outcome: Ongoing     Problem: Bleeding:  Goal: Will show no signs and symptoms of excessive bleeding  Description: Will show no signs and symptoms of excessive bleeding  Outcome: Ongoing     Problem: Discharge Planning:  Goal: Discharged to appropriate level of care  Description: Discharged to appropriate level of care  Outcome: Ongoing     Problem: Infection:  Goal: Will remain free from infection  Description: Will remain free from infection  Outcome: Ongoing     Problem: Safety:  Goal: Free from accidental physical injury  Description: Free from accidental physical injury  Outcome: Ongoing  Goal: Free from intentional harm  Description: Free from intentional harm  Outcome: Ongoing     Problem: Daily Care:  Goal: Daily care needs are met  Description: Daily care needs are met  Outcome: Ongoing     Problem: Pain:  Goal: Patient's pain/discomfort is manageable  Description: Patient's pain/discomfort is manageable  Outcome: Ongoing     Problem: Skin Integrity:  Goal: Skin integrity will stabilize  Description: Skin integrity will stabilize  Outcome: Ongoing     Problem: Discharge Planning:  Goal: Patients continuum of care needs are met  Description: Patients continuum of care needs are met  Outcome: Ongoing     Problem: Falls - Risk of:  Goal: Will remain free from falls  Description: Will remain free from falls  Outcome: Ongoing  Goal: Absence of physical injury  Description: Absence of physical injury  Outcome: Ongoing     Problem: ABCDS Injury Assessment  Goal: Absence of physical injury  Outcome: Ongoing     Problem:  Activity Intolerance:  Goal: Ability to tolerate increased activity will improve  Description: Ability to tolerate increased activity will improve  Outcome: Ongoing     Problem: Airway Clearance - Ineffective:  Goal: Ability to maintain a clear airway will improve  Description: Ability to maintain a clear airway will improve  Outcome: Ongoing     Problem: Breathing Pattern - Ineffective:  Goal: Ability to achieve and maintain a regular respiratory rate will improve  Description: Ability to achieve and maintain a regular respiratory rate will improve  Outcome: Ongoing     Problem: Gas Exchange - Impaired:  Goal: Levels of oxygenation will improve  Description: Levels of oxygenation will improve  Outcome: Ongoing

## 2021-09-03 NOTE — DISCHARGE SUMMARY
Lucretia Patel  :  1960  MRN:  [id number]    Admit date:  2021  Discharge date:      Discharging Physician:  Dr. Scruggs Media Directive: Full Code    Consults: [facility]     Primary Care Physician:  Antonina Jasmine MD    Discharge Diagnoses:  Principal Problem:    Atypical chest pain  Active Problems:    COPD (chronic obstructive pulmonary disease) ([address]  Resolved Problems:    * No resolved hospital problems. *      Portions of this note have been copied forward, however, changed to reflect the most current clinical status of this patient. Hospital Course: The patient is a 64 y.o. female who presented to [facility] emergency department after feeling lightheaded have admit up back pain that felt like pressure tightness when her shoulder blades. Patient states she had heart cath 2 years ago which was negative although her troponins were positive at time. Patient states she stopped smoking approximately . Patient states today during chest x-ray she became extremely short of breath oxygen sats dropped to the 70s at which time she was given steroids, breathing treatment oxygen therapy which improved her situation. Patient did have slight ST depression in V3 V4 anterior leads. Patient states no sick as recently and no symptoms of COVID-19.    9/3/2021-patient evaluated by cardiology. Troponins been negative. Echo and stress echo negative. Will send patient home this time she will continue beta-blockers and aspirin from home. Patient also has history of smoking and a COPD exacerbation on this admission. I will send patient home on azithromycin 500 mg p.o. daily x3 days, Flovent 110 mcg inhaled twice daily and she will continue home albuterol. Patient does not require home steroids at this point. Patient will follow up with her primary care practitioner within 1 week and cardiology.   Cardiology also recommended patient to go home on Imdur 30 mg p.o. daily which we have continued. Significant Diagnostic Studies:   XR CHEST PORTABLE    Result Date: 9/2/2021  EXAMINATION: XR CHEST PORTABLE 9/2/2021 9:53 AM HISTORY: Chest pain COMPARISON: 1/13/2019 FINDINGS: The heart and mediastinal contours are normal. Atherosclerotic calcifications are seen in the aorta. There is diffuse coarsening of the interstitial markings with hyperinflation. There is atelectasis in the medial right lung base. The lungs are otherwise clear. There are surgical clips projecting over the left midlung field. The pulmonary vasculature appears normal.    Chronic changes with minimal atelectasis. Associated hyperinflation. Signed by Dr Karla Salinas    CTA PULMONARY W CONTRAST    Result Date: 9/2/2021  CTA PULMONARY W CONTRAST 9/2/2021 10:49 AM History: Back pain and hypoxia. In order to have a CT radiation dose as low as reasonably achievable Automated Exposure Control was utilized for adjustment of the mA and/or KV according to patient size. DLP in mGycm= 202. CTA pulmonary with contrast. CT angiography protocol. CT imaging with bolus IV contrast injection. Under concurrent supervision axial, sagittal, coronal, and three-dimensional data sets were constructed. Comparison is made with January 15, 2021. No thoracic aortic aneurysm or dissection. Symmetric well opacified pulmonary arteries. No pulmonary embolism. Normal heart size. No mediastinal mass. Chronic lung changes with peribronchial thickening and the appearance of mild lower lobe mucus plugging. Hyperexpanded lungs. Chronic interstitial disease. Mild diffuse emphysema. Mild right convex thoracic scoliosis. No compression fracture or vertebral body malalignment. 1. No pulmonary embolism or thoracic aortic dissection. 2. Chronic lung changes.  Signed by Dr Arora    Result Date: 9/3/2021  [facility] Nuclear Stress Test Report Procedure date: 9/3/2021 Indications: chest pain Procedure: Stress was Interpreting Physician Caterina Blanco   BMI:          23.91 kg/m^2  Procedure Type of Study   TTE procedure:ECHO NO CONTRAST WITH DOP/COLR. Study Location: Echo Lab Technical Quality: Adequate visualization Patient Status: Inpatient BP: 96/54 mmHg Indications:Chest pain and Dyspnea/SOB. Conclusions   Summary  Normal left ventricular size with preserved LV function and a calculated  ejection fraction of 72%. No regional wall motion abnormalities. Normal  left ventricular wall thickness. Normal diastolic filling pattern for age. Normal right ventricular size with preserved RV function. Mild tricuspid regurgitation with normal estimated RVSP. Aortic root is within normal limits. No evidence of significant pericardial effusion is noted. The rhythm is sinus. Signature   ----------------------------------------------------------------  Electronically signed by Clarissa Verde(Interpreting physician)  on 09/03/2021 11:46 AM  ----------------------------------------------------------------   Findings   Mitral Valve  Structurally normal mitral valve with normal leaflet mobility. No evidence  of mitral valve stenosis. Trivial mitral regurgitation. Aortic Valve  Aortic valve is not well visualized. No significant aortic regurgitation or stenosis is noted. Tricuspid Valve  Tricuspid valve is structurally normal.  Mild tricuspid regurgitation with normal estimated RVSP. Pulmonic Valve  The pulmonic valve was not well visualized. Left Atrium  Normal size left atrium. No evidence of significant inter-atrial communications by color flow  Doppler only. Left Ventricle  Normal left ventricular size with preserved LV function and an estimated  ejection fraction of approximately 70-75%. No regional wall motion abnormalities. Normal left ventricular wall thickness. Normal diastolic filling pattern for age. Right Atrium  Normal right atrial dimension.    Right Ventricle  Normal right ventricular size with preserved RV function. Pericardial Effusion  No evidence of significant pericardial effusion is noted. Pleural Effusion  No evidence of pleural effusion. Miscellaneous  Aortic root is within normal limits. IVC normal.  Pulmonary vein Doppler suboptimal.  Allergies   - Adhesive tape. - Sulfa drugs. M-Mode Measurements (cm)   LVIDd: 3.86 cm                         LVIDs: 2.29 cm  IVSd: 0.93 cm  LVPWd: 0.94 cm                         AO Root Dimension: 1.4 cm  % Ejection Fraction: 72.2 %            LA: 3.1 cm                                         LVOT: 1.9 cm  Doppler Measurements:   AV Peak Velocity:131 cm/s             MV Peak E-Wave: 75.8 cm/s  AV Peak Gradient: 6.86 mmHg           MV Peak A-Wave: 55.7 cm/s  AV Mean Gradient: 3 mmHg              MV E/A Ratio: 1.36 %  AV Area (Continuity):2.31 cm^2        MV Peak Gradient: 2.3 mmHg  TR Velocity:259 cm/s                  MV P1/2t: 51 msec  TR Gradient:26.83 mmHg                MVA by PHT4.31 cm^2  Estimated RAP:3 mmHg  RVSP:31 mmHg        Pertinent Labs:   CBC:   Recent Labs     09/02/21  0835 09/03/21  0305   WBC 7.2 10.1   HGB 13.2 12.9    306     BMP:    Recent Labs     09/02/21  0835 09/03/21  0305 09/03/21  1010    140  --    K 4.4 5.4* 4.7    100  --    CO2 27 25  --    BUN 23 28*  --    CREATININE 0.6 0.8  --    GLUCOSE 99 148*  --      INR:   Recent Labs     09/02/21  0835   INR 0.99       Physical Exam:  Vital Signs: /69   Pulse 79   Temp 97 °F (36.1 °C) (Temporal)   Resp 17   Ht 5' 3\" (1.6 m)   Wt 135 lb (61.2 kg)   SpO2 94%   BMI 23.91 kg/m²   Physical Exam  General appearance: alert, appears stated age, cooperative and no distress  Head: Normocephalic, without obvious abnormality, atraumatic  Eyes: conjunctivae/corneas clear. PERRL, EOM's intact. Ears: normal external ears and nose, throat without exudate  Neck: no adenopathy, no carotid bruit, no JVD, supple, symmetrical, trachea midline.   Lungs: clear to auscultation bilaterally, no rales or wheezes. RRR. Heart: regular rate and rhythm, S1, S2 normal, no murmur  Abdomen: soft, non-tender; non-distended, normal bowel sounds no masses, no organomegaly  Extremities: No lower extremity edema,  No erythema, no tenderness to palpation  Skin: Skin color, texture, turgor normal. No rashes or lesions  Lymphatic: No palpable lymph node enlargment  Neurologic: Alert and oriented X 3, normal strength and tone. Normal symmetric reflexes.  Mental status: Alert, oriented, thought content appropriate  Cranial nerves:II-XII Grossly intact Sensory: normal Motor:grossly normal  Psychiatric: Alert and oriented, thought content appropriate, normal insight, mood appropriate    Discharge Medications:       Chay Flores \"Jania\"   Home Medication Instructions [id number]    Printed on:09/03/21 1532   Medication Information                      albuterol sulfate HFA (VENTOLIN HFA) 108 (90 Base) MCG/ACT inhaler  Inhale 2 puffs into the lungs 4 times daily as needed for Wheezing             aspirin EC 81 MG EC tablet  Take 81 mg by mouth daily             azithromycin (ZITHROMAX) 500 MG tablet  Take 1 tablet by mouth daily for 3 days             cloNIDine (CATAPRES) 0.1 MG tablet  Take 1 tablet by mouth 2 times daily as needed for High Blood Pressure (for BP < 180/100)             fluticasone (FLONASE) 50 MCG/ACT nasal spray  1 spray by Nasal route daily for 10 days             hydroCHLOROthiazide (HYDRODIURIL) 25 MG tablet  Take 1 tablet by mouth every morning             ibandronate (BONIVA) 150 MG tablet  Take 1 tablet by mouth every 30 days             irbesartan (AVAPRO) 300 MG tablet  TAKE 1 TABLET BY MOUTH DAILY             metoprolol tartrate (LOPRESSOR) 50 MG tablet  TAKE 1 TABLET BY MOUTH EVERY 12 HOURS             rosuvastatin (CRESTOR) 20 MG tablet  Take 1 tablet by mouth nightly             venlafaxine (EFFEXOR XR) 150 MG extended release capsule  TAKE ONE CAPSULE BY MOUTH EVERY

## 2021-09-03 NOTE — DISCHARGE INSTR - DIET

## 2021-09-03 NOTE — CONSULTS
[facility]  Cardiology Consult      Requesting MD:  Rabia Newsome MD   Admit Status:  Inpatient [101]       History obtained from:   ? Patient  ?  Other (specify):     PROBLEM LIST:    Patient Active Problem List    Diagnosis Date Noted    ACS (acute coronary syndrome) ([facility] 07/13/2019     Priority: High    Chest pressure 06/19/2018     Priority: High    Atypical chest pain 09/02/2021     Priority: Low    COPD (chronic obstructive pulmonary disease) ([facility] 09/02/2021     Priority: Low    Enlarged lymph nodes 07/19/2021     Priority: Low    Essential hypertension 07/19/2021     Priority: Low    Transaminitis 11/18/2020     Priority: Low    Abnormal CT of the abdomen 11/18/2020     Priority: Low    Fatty liver 11/18/2020     Priority: Low    Alcohol consumption of one to four drinks per week 11/18/2020     Priority: Low    Mild coronary artery disease 07/18/2019     Priority: Low    Chest pain 06/18/2018     Priority: Low     Overview Note:     12/30/2016  SE negative for myocardial ischemia  6/19/18  SE negative for myocardial ischemia  7/13/19 ACS FERNANDO RISK Score 2 (angina, + troponin ), AUC indication 3, AUC score 7   7/14/19  Cath  Mild CAD, normal LVFX      Vitamin D deficiency 11/04/2017     Priority: Low    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[facility] 11/04/2017     Priority: Low    Generalized anxiety disorder 11/04/2017     Priority: Low    Pure hypercholesterolemia 11/04/2017     Priority: Low    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 Hypertension with normal LV systolic function, normal coronary arteries by catheterization 7/14/2019.  2. History of breast cancer, CHRISTIANE and BSO. 3. Prior history of tobacco use stopped 2016.  4. Family history of premature coronary artery disease. PRESENTATION: Patricia Gandhi is a 64y.o. year old female who presents with initially feeling lightheaded at work. Reports of legs felt like jelly. She also had a tight band between her shoulder blades. She was seen by the medical personnel at work who did an EKG and then referred to the ER. In the ER her lightheadedness had resolved but she still had this bandlike sensation between her shoulder blades. Given a history of prior borderline troponins with negative catheterization 7/14/2019 she was given sublingual nitroglycerin with resolution of symptoms within 5 minutes. Following this she noticed her saturations were dropping and then she became short of breath. She was treated with IV steroids and inhaled bronchodilators. A CTA was done which was negative for PE or aortic pathology. No pericardial effusion noted. Serial troponins negative. EKGs did show some borderline ST-T changes in inferolateral leads during this event. REVIEW OF SYSTEMS:  Review of Systems   Constitutional: Negative for activity change, fatigue and fever. HENT: Negative for ear pain, hearing loss and tinnitus. Eyes: Negative for discharge and visual disturbance. Respiratory: Negative for cough, shortness of breath and wheezing. Cardiovascular: Negative for chest pain, palpitations and leg swelling. Gastrointestinal: Negative for abdominal distention, blood in stool, constipation, diarrhea and vomiting. Endocrine: Negative for cold intolerance, heat intolerance, polydipsia and polyuria.    Genitourinary: Negative for dysuria and hematuria. Musculoskeletal: Negative for arthralgias, back pain and myalgias. Skin: Negative for pallor and rash. Neurological: Positive for light-headedness. Negative for seizures, syncope, weakness and headaches. Psychiatric/Behavioral: Negative for behavioral problems and dysphoric mood. Past Medical History:      Diagnosis Date    Acid reflux     Anxiety     Breast cancer ([facility]     Cancer ([facility]     liposcaarcoma, peritoneal    Hyperlipidemia     Hypertension     Mild coronary artery disease 2019       Past Surgical History:      Procedure Laterality Date    BREAST LUMPECTOMY Left     CHOLECYSTECTOMY      COLONOSCOPY  2010    Dr Mariah Conte, PARTIAL Left 2008    left breast cancer    [facility] COLONOSCOPY FLX DX W/COLLJ SPEC WHEN PFRMD N/A 2016    Dr Jane Junior access, [place], 5 yr recall w/Dr Allison Ahr    CHRISTIANE AND BSO      alwasy normal PAP's prior to hysterectomy    TONSILLECTOMY      UPPER GASTROINTESTINAL ENDOSCOPY N/A 12/10/2020    Dr Fransisco Jimenes normal exam; NEG h pylori    US GUIDED LIVER BIOPSY PERCUTANEOUS  2020    steatosis, + mild iron staining, grade 0, stage 0       Allergies:  Adhesive tape and Sulfa antibiotics    Past Social History:  Social History     Socioeconomic History    Marital status:      Spouse name: Not on file    Number of children: Not on file    Years of education: Not on file    Highest education level: Not on file   Occupational History    Not on file   Tobacco Use    Smoking status: Former Smoker     Packs/day: 0.50     Years: 35.00     Pack years: 17.50     Quit date:      Years since quittin.6    Smokeless tobacco: Never Used   Vaping Use    Vaping Use: Never used   Substance and Sexual Activity    Alcohol use:  Yes     Alcohol/week: 2.0 standard drinks     Types: 2 Glasses of wine per week     Comment: weekends    Drug use: No    Sexual activity: Not on file   Other Topics Concern    Not on file   Social History Narrative    Not on file     Social Determinants of Health     Financial Resource Strain: Low Risk     Difficulty of Paying Living Expenses: Not hard at all   Food Insecurity: No Food Insecurity    Worried About Running Out of Food in the Last Year: Never true    [address] in the Last Year: Never true   Transportation Needs:     Lack of Transportation (Medical):  Lack of Transportation (Non-Medical):    Physical Activity:     Days of Exercise per Week:     Minutes of Exercise per Session:    Stress:     Feeling of Stress :    Social Connections:     Frequency of Communication with Friends and Family:     Frequency of Social Gatherings with Friends and Family:     Attends [employer] Services:     Active Member of Clubs or Organizations:     Attends Club or Organization Meetings:     Marital Status:    Intimate Partner Violence:     Fear of Current or Ex-Partner:     Emotionally Abused:     Physically Abused:     Sexually Abused:        Family History:       Problem Relation Age of Onset    Alzheimer's Disease Mother     Breast Cancer Mother 48    Heart Disease Father     Diabetes Father     Cancer Brother     Colon Cancer Neg Hx     Colon Polyps Neg Hx     Liver Cancer Neg Hx     Liver Disease Neg Hx     Esophageal Cancer Neg Hx     Rectal Cancer Neg Hx     Stomach Cancer Neg Hx        Home Meds:  Prior to Admission medications    Medication Sig Start Date End Date Taking?  Authorizing Provider   hydroCHLOROthiazide (HYDRODIURIL) 25 MG tablet Take 1 tablet by mouth every morning 8/30/21  Yes Verner Lyons, APRN   vitamin D (ERGOCALCIFEROL) 1.25 MG (55458 UT) CAPS capsule TAKE ONE CAPSULE BY MOUTH ONCE A WEEK  Patient taking differently: Take 50,000 Units by mouth once a week Takes on sunday 8/12/21  Yes Shai Evangelista MD   rosuvastatin (CRESTOR) 20 MG tablet Take 1 tablet by mouth nightly  Patient taking differently: Take 20 mg by mouth daily  7/23/21  Yes Selena Puentes MD   metoprolol tartrate (LOPRESSOR) 50 MG tablet TAKE 1 TABLET BY MOUTH EVERY 12 HOURS  Patient taking differently: Take 75 mg by mouth 2 times daily  5/24/21  Yes VAZQUEZ Bermudez - CNP   venlafaxine (EFFEXOR XR) 150 MG extended release capsule TAKE ONE CAPSULE BY MOUTH EVERY DAY 5/21/21  Yes Selena Puentes MD   irbesartan (AVAPRO) 300 MG tablet TAKE 1 TABLET BY MOUTH DAILY 5/19/21  Yes VAZQUEZ Ackerman   ibandronate (BONIVA) 150 MG tablet Take 1 tablet by mouth every 30 days 3/8/21  Yes Selena Puentes MD   albuterol sulfate HFA (VENTOLIN HFA) 108 (90 Base) MCG/ACT inhaler Inhale 2 puffs into the lungs 4 times daily as needed for Wheezing 3/5/21  Yes Selena Puentes MD   cloNIDine (CATAPRES) 0.1 MG tablet Take 1 tablet by mouth 2 times daily as needed for High Blood Pressure (for BP < 180/100) 2/3/21  Yes VAZQUEZ Beltran   aspirin EC 81 MG EC tablet Take 81 mg by mouth daily   Yes Historical Provider, MD   fluticasone (FLONASE) 50 MCG/ACT nasal spray 1 spray by Nasal route daily for 10 days  Patient taking differently: 1 spray by Nasal route daily as needed  8/31/18 7/19/21  VAZQUEZ Gallegos       Current Meds:   aspirin EC  81 mg Oral Daily    [START ON 9/3/2021] hydroCHLOROthiazide  25 mg Oral QAM    [START ON 9/3/2021] losartan  100 mg Oral Daily    metoprolol tartrate  75 mg Oral BID    venlafaxine  150 mg Oral Daily    sodium chloride flush  5-40 mL IntraVENous 2 times per day    enoxaparin  40 mg SubCUTAneous Q24H    methylPREDNISolone  40 mg IntraVENous Q8H    azithromycin  500 mg Oral Daily    [START ON 9/3/2021] atorvastatin  80 mg Oral Nightly    melatonin  5 mg Oral Nightly       Current Infused Meds:   sodium chloride         Physical Exam:  Vitals:    09/02/21 1820   BP: 116/72   Pulse: 82   Resp: 16   Temp: 96.3 °F (35.7 °C)   SpO2: 94%     No intake or output data in the 24 hours ending 09/02/21 2118  Estimated body mass index is 23.91 kg/m² as calculated from the following:    Height as of this encounter: 5' 3\" (1.6 m). Weight as of this encounter: 135 lb (61.2 kg). Physical Exam  Constitutional:       General: She is not in acute distress. Appearance: She is not diaphoretic. HENT:      Mouth/Throat:      Pharynx: No oropharyngeal exudate. Eyes:      General: No scleral icterus. Right eye: No discharge. Left eye: No discharge. Neck:      Thyroid: No thyromegaly. Vascular: No JVD. Cardiovascular:      Rate and Rhythm: Normal rate and regular rhythm. No extrasystoles are present. Heart sounds: Normal heart sounds, S1 normal and S2 normal. No murmur heard. No systolic murmur is present. No diastolic murmur is present. No friction rub. No gallop. No S3 or S4 sounds. Comments: No JVD  No edema  No significant systolic or diastolic murmurs noted  Pulmonary:      Effort: Pulmonary effort is normal. No respiratory distress. Breath sounds: Normal breath sounds. No wheezing or rales. Chest:      Chest wall: No tenderness. Abdominal:      General: Bowel sounds are normal. There is no distension. Palpations: Abdomen is soft. There is no mass. Tenderness: There is no abdominal tenderness. There is no guarding or rebound. Hernia: No hernia is present. Comments: No palpable organomegaly   Musculoskeletal:         General: Normal range of motion. Skin:     General: Skin is warm. Coloration: Skin is not pale. Findings: No rash. Neurological:      Mental Status: She is alert and oriented to person, place, and time. Cranial Nerves: No cranial nerve deficit.       Deep Tendon Reflexes: Reflexes normal.           Labs:  Recent Labs     09/02/21  0835   WBC 7.2   HGB 13.2          Recent Labs     09/02/21  0835      K 4.4      CO2 27   BUN 23   CREATININE 0.6   LABGLOM >60   CALCIUM 8.9       CK, CKMB, Troponin: @LABRCNT (CKTOTAL:3, CKMB:3, TROPONINI:3)@    Last 3 BNP:          IMAGING:  XR CHEST PORTABLE    Result Date: 9/2/2021  EXAMINATION: XR CHEST PORTABLE 9/2/2021 9:53 AM HISTORY: Chest pain COMPARISON: 1/13/2019 FINDINGS: The heart and mediastinal contours are normal. Atherosclerotic calcifications are seen in the aorta. There is diffuse coarsening of the interstitial markings with hyperinflation. There is atelectasis in the medial right lung base. The lungs are otherwise clear. There are surgical clips projecting over the left midlung field. The pulmonary vasculature appears normal.    Chronic changes with minimal atelectasis. Associated hyperinflation. Signed by Dr Tiffany Valencia    CTA PULMONARY W CONTRAST    Result Date: 9/2/2021  CTA PULMONARY W CONTRAST 9/2/2021 10:49 AM History: Back pain and hypoxia. In order to have a CT radiation dose as low as reasonably achievable Automated Exposure Control was utilized for adjustment of the mA and/or KV according to patient size. DLP in mGycm= 202. CTA pulmonary with contrast. CT angiography protocol. CT imaging with bolus IV contrast injection. Under concurrent supervision axial, sagittal, coronal, and three-dimensional data sets were constructed. Comparison is made with January 15, 2021. No thoracic aortic aneurysm or dissection. Symmetric well opacified pulmonary arteries. No pulmonary embolism. Normal heart size. No mediastinal mass. Chronic lung changes with peribronchial thickening and the appearance of mild lower lobe mucus plugging. Hyperexpanded lungs. Chronic interstitial disease. Mild diffuse emphysema. Mild right convex thoracic scoliosis. No compression fracture or vertebral body malalignment. 1. No pulmonary embolism or thoracic aortic dissection. 2. Chronic lung changes. Signed by Dr Kenny Stephen and Plan:     This is a 64y.o. year old female with past medical history of hypertension, normal coronary arteries by catheterization 7/14/2019, normal LV ejection fraction, prior CHRISTIANE/BSO with prior tobacco use presenting with atypical symptoms of upper back discomfort responding to nitroglycerin, lightheadedness with subsequent hypoxemic event of unclear etiology responding to bronchodilators and steroids, borderline ST-T changes with negative troponins, negative CTA for PE/aortic pathology, no pericardial effusion noted on CT. 1.  Cardiac catheterization films from 2019 reviewed. Arteries were small in caliber with possible spasm with engagement but no documentation. No significant angiographic CAD noted however. Unclear if this represents an element of coronary spasm. Serial troponins negative with no evidence of injury. Will obtain an echo as well as a stress nuclear study in a.m.  2.  On statin therapy, aspirin and blood pressure management. Can consider adding Imdur going forward.     Electronically signed by Judie Boateng MD on 9/2/2021 at 9:18 PM

## 2021-09-03 NOTE — PROGRESS NOTES
Cardiology Progress Note Thea Weaver MD      Patient:  Sarai Rae  [id number]    Patient Active Problem List    Diagnosis Date Noted    ACS (acute coronary syndrome) ([facility] [place] [facility] 07/13/2019     Priority: High    Chest pressure 06/19/2018     Priority: High    Atypical chest pain 09/02/2021     Priority: Low    COPD (chronic obstructive pulmonary disease) ([facility] [place] [facility] 09/02/2021     Priority: Low    Enlarged lymph nodes 07/19/2021     Priority: Low    Essential hypertension 07/19/2021     Priority: Low    Transaminitis 11/18/2020     Priority: Low    Abnormal CT of the abdomen 11/18/2020     Priority: Low    Fatty liver 11/18/2020     Priority: Low    Alcohol consumption of one to four drinks per week 11/18/2020     Priority: Low    Mild coronary artery disease 07/18/2019     Priority: Low    Chest pain 06/18/2018     Priority: Low     Overview Note:     12/30/2016  SE negative for myocardial ischemia  6/19/18  SE negative for myocardial ischemia  7/13/19 ACS FERNANDO RISK Score 2 (angina, + troponin ), AUC indication 3, AUC score 7   7/14/19  Cath  Mild CAD, normal LVFX      Vitamin D deficiency 11/04/2017     Priority: Low    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[facility] [place] [facility] 11/04/2017     Priority: Low    Generalized anxiety disorder 11/04/2017     Priority: Low    Pure hypercholesterolemia 11/04/2017     Priority: Low    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[facility] [place] [facility] 11/04/2017     Priority: Low     Overview Note:     DX 2007; post extensive surgery/ hysterectomy      Osteopenia of multiple sites 11/04/2017     Priority: Low     Overview Note:     2013 Lspine -2.4/hip neck -2.3; 5/17 Lspine -1.1; hip neck -2.2  10/2020 hip neck -2.0/ L spine -2.1  boniva started 2017      History of breast cancer 11/04/2017     Priority: Low     Overview Note:     2008 LEFT/ post partial mastectomy      Elevated transaminase level 06/08/2017     Priority: Low    Fatty liver disease, nonalcoholic 81/76/1375     Priority: Low    Hx of colonic polyps Priority: Low       Admit Date:  9/2/2021    Admission Problem List: Present on Admission:   Atypical chest pain   COPD (chronic obstructive pulmonary disease) ([facility]      Cardiac Specific Data:  Specialty Problems        Cardiology Problems    ACS (acute coronary syndrome) (HCC)        Pure hypercholesterolemia        Chest pain        Mild coronary artery disease        Essential hypertension        * (Principal) Atypical chest pain            1. Hypertension with normal LV systolic function, normal coronary arteries by catheterization 7/14/2019.  2. History of breast cancer, CHRISTIANE and BSO. 3. Prior history of tobacco use stopped 2016.  4. Family history of premature coronary artery disease.     Subjective:  Ms. Jenn Olivares reports no symptoms overnight. Feeling good this morning. Ambulating without difficulty. Objective:   /69   Pulse 79   Temp 97 °F (36.1 °C) (Temporal)   Resp 17   Ht 5' 3\" (1.6 m)   Wt 135 lb (61.2 kg)   SpO2 94%   BMI 23.91 kg/m²       Intake/Output Summary (Last 24 hours) at 9/3/2021 1532  Last data filed at 9/3/2021 1259  Gross per 24 hour   Intake 10 ml   Output --   Net 10 ml       Prior to Admission medications    Medication Sig Start Date End Date Taking?  Authorizing Provider   azithromycin (ZITHROMAX) 500 MG tablet Take 1 tablet by mouth daily for 3 days 9/4/21 9/7/21 Yes Renella Meckel, APRN - PJ   hydroCHLOROthiazide (HYDRODIURIL) 25 MG tablet Take 1 tablet by mouth every morning 8/30/21  Yes VAZQUEZ Jo   vitamin D (ERGOCALCIFEROL) 1.25 MG (98922 UT) CAPS capsule TAKE ONE CAPSULE BY MOUTH ONCE A WEEK  Patient taking differently: Take 50,000 Units by mouth once a week Takes on sunday 8/12/21  Yes Nicko Bell MD   rosuvastatin (CRESTOR) 20 MG tablet Take 1 tablet by mouth nightly  Patient taking differently: Take 20 mg by mouth daily  7/23/21  Yes Nicko Bell MD   metoprolol tartrate (LOPRESSOR) 50 MG tablet TAKE 1 TABLET BY MOUTH EVERY 12 HOURS  Patient taking differently: Take 75 mg by mouth 2 times daily  5/24/21  Yes VAZQUEZ Varghese - CNP   venlafaxine (EFFEXOR XR) 150 MG extended release capsule TAKE ONE CAPSULE BY MOUTH EVERY DAY 5/21/21  Yes Jes Mcdonald MD   irbesartan (AVAPRO) 300 MG tablet TAKE 1 TABLET BY MOUTH DAILY 5/19/21  Yes VAZQUEZ Day   ibandronate (BONIVA) 150 MG tablet Take 1 tablet by mouth every 30 days 3/8/21  Yes Jes Mcdonald MD   albuterol sulfate HFA (VENTOLIN HFA) 108 (90 Base) MCG/ACT inhaler Inhale 2 puffs into the lungs 4 times daily as needed for Wheezing 3/5/21  Yes Jes Mcdonald MD   cloNIDine (CATAPRES) 0.1 MG tablet Take 1 tablet by mouth 2 times daily as needed for High Blood Pressure (for BP < 180/100) 2/3/21  Yes VAZQUEZ Coyle   aspirin EC 81 MG EC tablet Take 81 mg by mouth daily   Yes Historical Provider, MD   fluticasone (FLONASE) 50 MCG/ACT nasal spray 1 spray by Nasal route daily for 10 days  Patient taking differently: 1 spray by Nasal route daily as needed  8/31/18 7/19/21  VAZQUEZ Esteves        aspirin EC  81 mg Oral Daily    hydroCHLOROthiazide  25 mg Oral QAM    losartan  100 mg Oral Daily    metoprolol tartrate  75 mg Oral BID    venlafaxine  150 mg Oral Daily    sodium chloride flush  5-40 mL IntraVENous 2 times per day    enoxaparin  40 mg SubCUTAneous Q24H    methylPREDNISolone  40 mg IntraVENous Q8H    azithromycin  500 mg Oral Daily    atorvastatin  80 mg Oral Nightly    melatonin  5 mg Oral Nightly       TELEMETRY: Sinus     Physical Exam:      Physical Exam  Constitutional:       General: She is not in acute distress. Appearance: She is not diaphoretic. HENT:      Mouth/Throat:      Pharynx: No oropharyngeal exudate. Eyes:      General: No scleral icterus. Right eye: No discharge. Left eye: No discharge. Neck:      Thyroid: No thyromegaly. Vascular: No JVD. Cardiovascular:      Rate and Rhythm: Normal rate and regular rhythm.   No extrasystoles are present. Heart sounds: Normal heart sounds, S1 normal and S2 normal. No murmur heard. No systolic murmur is present. No diastolic murmur is present. No friction rub. No gallop. No S3 or S4 sounds. Comments: No JVD  No edema    Pulmonary:      Effort: Pulmonary effort is normal. No respiratory distress. Breath sounds: Normal breath sounds. No wheezing or rales. Comments: No wheezing or crepitations  Chest:      Chest wall: No tenderness. Abdominal:      General: Bowel sounds are normal. There is no distension. Palpations: Abdomen is soft. There is no mass. Tenderness: There is no abdominal tenderness. There is no guarding or rebound. Hernia: No hernia is present. Comments: No palpable organomegaly   Musculoskeletal:         General: Normal range of motion. Skin:     General: Skin is warm. Coloration: Skin is not pale. Findings: No rash. Neurological:      Mental Status: She is alert and oriented to person, place, and time. Cranial Nerves: No cranial nerve deficit. Deep Tendon Reflexes: Reflexes normal.                 Lab Data:  CBC:   Recent Labs     09/02/21  0835 09/03/21  0305   WBC 7.2 10.1   HGB 13.2 12.9   HCT 42.5 41.1   MCV 91.6 92.2    306     BMP:   Recent Labs     09/02/21  0835 09/03/21  0305 09/03/21  1010    140  --    K 4.4 5.4* 4.7    100  --    CO2 27 25  --    BUN 23 28*  --    CREATININE 0.6 0.8  --      LIVER PROFILE:   Recent Labs     09/02/21  0835 09/03/21  0305   AST 26 21   ALT 24 21   LIPASE 31  --    BILITOT 0.3 <0.2   ALKPHOS 69 60     PT/INR:   Recent Labs     09/02/21 0835   PROTIME 13.3   INR 0.99     APTT: No results for input(s): APTT in the last 72 hours. BNP:  No results for input(s): BNP in the last 72 hours.   CK, CKMB, Troponin: @LABRCNT (CKTOTAL:3, CKMB:3, TROPONINI:3)@    IMAGING:  XR CHEST PORTABLE    Result Date: 9/2/2021  EXAMINATION: XR CHEST PORTABLE 9/2/2021 9:53 mCi and 32.50 mCi respectively for rest and stress imaging. The patient was discharged back to room 314 in stable condition. Results: Patient had symptoms of midsternal chest pressure during infusion that resolved in recovery. Baseline EKG showed normal sinus rhythm with nonspecific ST/T changes. During stress there were no significant EKG changes or rhythm changes. Baseline and peak blood pressures were 97/56, and 110/69 respectively. Baseline and peak heart rates were 63 and  93 respectively. Lexiscan/Cardiolyte Nuclear Medicine Report Date of Procedure: 9/3/2021 The patient was injected with 5.15 millicuries (mCi) of Technetium (Tc99m). After an appropriate level of stress the patient was re-injected with 84.50 millicuries (mCi) of Technetium (Tc99m). Repeat gated images were then performed per standard protocol. Findings: 1. Analysis of the the stress and rest images reveals no obvious defects on stress and rest images. 2.  Analysis of the gated images reveals grossly normal left ventricular function with a calculated ejection fraction of 75 %. Impression: There is no obvious infarct or ischemia, with a calculated ejection fraction of 75 %. Suggest: Clinical correlation with consideration for medical management. Signed by Dr Mariangel Mims    Result Date: 9/3/2021  Transthoracic Echocardiography Report (TTE)  Demographics   Patient Name  Basalt Corner Date of Study          09/03/2021   MRN           [id number]             Gender                 Female   Date of Birth 1960         Room Number            MHL-0314   Age           64 year(s)   Height:       63 inches          Referring Physician    Curt Jordan MD   Weight:       135 pounds         Sonographer            Rola Case [facility]   BSA:          1.64 m^2           Interpreting Physician Dimple Jaramillo   BMI:          23.91 kg/m^2  Procedure Type of Study   TTE procedure:ECHO NO CONTRAST WITH DOP/COLR.   Study Location: Echo Lab Technical Quality: Adequate visualization Patient Status: Inpatient BP: 96/54 mmHg Indications:Chest pain and Dyspnea/SOB. Conclusions   Summary  Normal left ventricular size with preserved LV function and a calculated  ejection fraction of 72%. No regional wall motion abnormalities. Normal  left ventricular wall thickness. Normal diastolic filling pattern for age. Normal right ventricular size with preserved RV function. Mild tricuspid regurgitation with normal estimated RVSP. Aortic root is within normal limits. No evidence of significant pericardial effusion is noted. The rhythm is sinus. Signature   ----------------------------------------------------------------  Electronically signed by Oliver Verde(Interpreting physician)  on 09/03/2021 11:46 AM  ----------------------------------------------------------------   Findings   Mitral Valve  Structurally normal mitral valve with normal leaflet mobility. No evidence  of mitral valve stenosis. Trivial mitral regurgitation. Aortic Valve  Aortic valve is not well visualized. No significant aortic regurgitation or stenosis is noted. Tricuspid Valve  Tricuspid valve is structurally normal.  Mild tricuspid regurgitation with normal estimated RVSP. Pulmonic Valve  The pulmonic valve was not well visualized. Left Atrium  Normal size left atrium. No evidence of significant inter-atrial communications by color flow  Doppler only. Left Ventricle  Normal left ventricular size with preserved LV function and an estimated  ejection fraction of approximately 70-75%. No regional wall motion abnormalities. Normal left ventricular wall thickness. Normal diastolic filling pattern for age. Right Atrium  Normal right atrial dimension. Right Ventricle  Normal right ventricular size with preserved RV function. Pericardial Effusion  No evidence of significant pericardial effusion is noted.    Pleural Effusion  No evidence of pleural effusion. Miscellaneous  Aortic root is within normal limits. IVC normal.  Pulmonary vein Doppler suboptimal.  Allergies   - Adhesive tape. - Sulfa drugs. M-Mode Measurements (cm)   LVIDd: 3.86 cm                         LVIDs: 2.29 cm  IVSd: 0.93 cm  LVPWd: 0.94 cm                         AO Root Dimension: 1.4 cm  % Ejection Fraction: 72.2 %            LA: 3.1 cm                                         LVOT: 1.9 cm  Doppler Measurements:   AV Peak Velocity:131 cm/s             MV Peak E-Wave: 75.8 cm/s  AV Peak Gradient: 6.86 mmHg           MV Peak A-Wave: 55.7 cm/s  AV Mean Gradient: 3 mmHg              MV E/A Ratio: 1.36 %  AV Area (Continuity):2.31 cm^2        MV Peak Gradient: 2.3 mmHg  TR Velocity:259 cm/s                  MV P1/2t: 51 msec  TR Gradient:26.83 mmHg                MVA by PHT4.31 cm^2  Estimated RAP:3 mmHg  RVSP:31 mmHg          Assessment and Plan: This is a 64y.o. year old female with past medical history of hypertension, normal coronary arteries by catheterization 7/14/2019, normal LV ejection fraction, prior CHRISTIANE/BSO with prior tobacco use presenting with atypical symptoms of upper back discomfort responding to nitroglycerin, lightheadedness with subsequent hypoxemic event of unclear etiology responding to bronchodilators and steroids, borderline ST-T changes with negative troponins, negative CTA for PE/aortic pathology, no pericardial effusion noted on CT. 1.  Symptoms appear to have resolved. Echo unremarkable with normal LV systolic function. [facility] study with no significant defects with normal EF. Unclear if event relates to coronary spasm but will maintain on Imdur 30 mg once daily as noted benefit with nitrate therapy. Can follow-up as an outpatient with cardiology.     Esthela Lopez MD, MD 9/3/2021 3:32 PM

## 2021-09-07 ENCOUNTER — TELEPHONE (OUTPATIENT)
Dept: INTERNAL MEDICINE | Age: 61
End: 2021-09-07

## 2021-09-07 ENCOUNTER — OFFICE VISIT (OUTPATIENT)
Dept: INTERNAL MEDICINE | Age: 61
End: 2021-09-07
Payer: COMMERCIAL

## 2021-09-07 VITALS
SYSTOLIC BLOOD PRESSURE: 118 MMHG | WEIGHT: 138 LBS | HEIGHT: 63 IN | DIASTOLIC BLOOD PRESSURE: 82 MMHG | OXYGEN SATURATION: 97 % | BODY MASS INDEX: 24.45 KG/M2 | HEART RATE: 67 BPM | RESPIRATION RATE: 18 BRPM

## 2021-09-07 DIAGNOSIS — R42 DIZZINESS: ICD-10-CM

## 2021-09-07 DIAGNOSIS — J44.9 COPD MIXED TYPE (HCC): Primary | ICD-10-CM

## 2021-09-07 DIAGNOSIS — Z00.00 ANNUAL PHYSICAL EXAM: ICD-10-CM

## 2021-09-07 DIAGNOSIS — J44.1 COPD EXACERBATION (HCC): ICD-10-CM

## 2021-09-07 DIAGNOSIS — E78.00 PURE HYPERCHOLESTEROLEMIA: ICD-10-CM

## 2021-09-07 DIAGNOSIS — I25.10 MILD CORONARY ARTERY DISEASE: ICD-10-CM

## 2021-09-07 LAB
EKG P AXIS: 39 DEGREES
EKG P AXIS: 65 DEGREES
EKG P AXIS: 66 DEGREES
EKG P AXIS: 78 DEGREES
EKG P-R INTERVAL: 132 MS
EKG P-R INTERVAL: 136 MS
EKG P-R INTERVAL: 138 MS
EKG P-R INTERVAL: 156 MS
EKG Q-T INTERVAL: 398 MS
EKG Q-T INTERVAL: 408 MS
EKG Q-T INTERVAL: 438 MS
EKG Q-T INTERVAL: 446 MS
EKG QRS DURATION: 100 MS
EKG QRS DURATION: 90 MS
EKG QRS DURATION: 94 MS
EKG QRS DURATION: 96 MS
EKG QTC CALCULATION (BAZETT): 443 MS
EKG QTC CALCULATION (BAZETT): 444 MS
EKG QTC CALCULATION (BAZETT): 452 MS
EKG QTC CALCULATION (BAZETT): 462 MS
EKG T AXIS: 24 DEGREES
EKG T AXIS: 46 DEGREES
EKG T AXIS: 58 DEGREES
EKG T AXIS: 70 DEGREES

## 2021-09-07 PROCEDURE — 99496 TRANSJ CARE MGMT HIGH F2F 7D: CPT | Performed by: INTERNAL MEDICINE

## 2021-09-07 ASSESSMENT — ENCOUNTER SYMPTOMS
SORE THROAT: 0
ABDOMINAL PAIN: 0
CONSTIPATION: 0
COUGH: 0
CHEST TIGHTNESS: 0
WHEEZING: 0

## 2021-09-07 NOTE — PROGRESS NOTES
Hospital Follow-up Visit      Radha Hill   YOB: 1960    Date of Visit:  9/7/2021    Vitals:    09/07/21 0932 09/07/21 0950   BP:  118/82   Pulse: 67    Resp: 18    SpO2: 97%    Weight: 138 lb (62.6 kg)    Height: 5' 3\" (1.6 m)      Body mass index is 24.45 kg/m².      Wt Readings from Last 3 Encounters:   09/07/21 138 lb (62.6 kg)   09/02/21 135 lb (61.2 kg)   07/19/21 140 lb (63.5 kg)     BP Readings from Last 3 Encounters:   09/07/21 118/82   09/03/21 103/69   07/19/21 122/70       Allergies   Allergen Reactions    Adhesive Tape Swelling    Sulfa Antibiotics Swelling     Other reaction(s): Unknown     Outpatient Medications Marked as Taking for the 9/7/21 encounter (Office Visit) with Jay Roldan MD   Medication Sig Dispense Refill    azithromycin (ZITHROMAX) 500 MG tablet Take 1 tablet by mouth daily for 3 days 3 tablet 0    fluticasone (FLOVENT HFA) 110 MCG/ACT inhaler Inhale 2 puffs into the lungs 2 times daily 1 each 0    isosorbide mononitrate (IMDUR) 30 MG extended release tablet Take 1 tablet by mouth daily 30 tablet 0    hydroCHLOROthiazide (HYDRODIURIL) 25 MG tablet Take 1 tablet by mouth every morning 90 tablet 3    vitamin D (ERGOCALCIFEROL) 1.25 MG (80408 UT) CAPS capsule TAKE ONE CAPSULE BY MOUTH ONCE A WEEK (Patient taking differently: Take 50,000 Units by mouth once a week Takes on sunday) 12 capsule 1    rosuvastatin (CRESTOR) 20 MG tablet Take 1 tablet by mouth nightly (Patient taking differently: Take 20 mg by mouth daily ) 90 tablet 1    metoprolol tartrate (LOPRESSOR) 50 MG tablet TAKE 1 TABLET BY MOUTH EVERY 12 HOURS (Patient taking differently: Take 75 mg by mouth 2 times daily ) 60 tablet 5    venlafaxine (EFFEXOR XR) 150 MG extended release capsule TAKE ONE CAPSULE BY MOUTH EVERY DAY 30 capsule 0    irbesartan (AVAPRO) 300 MG tablet TAKE 1 TABLET BY MOUTH DAILY 30 tablet 5    ibandronate (BONIVA) 150 MG tablet Take 1 tablet by mouth every 30 days 3 tablet 1    albuterol sulfate HFA (VENTOLIN HFA) 108 (90 Base) MCG/ACT inhaler Inhale 2 puffs into the lungs 4 times daily as needed for Wheezing 1 Inhaler 0    cloNIDine (CATAPRES) 0.1 MG tablet Take 1 tablet by mouth 2 times daily as needed for High Blood Pressure (for BP < 180/100) 60 tablet 3    aspirin EC 81 MG EC tablet Take 81 mg by mouth daily      fluticasone (FLONASE) 50 MCG/ACT nasal spray 1 spray by Nasal route daily for 10 days (Patient taking differently: 1 spray by Nasal route daily as needed ) 1 Bottle 0         CC:  Patient presents for hospital discharge follow-upafter admission   (following highlighted text has been copied from this specialist note)    Admit date:  9/2/2021                        Discharge date:       Discharging Physician:  Dr. David Baron     Advance Directive: Full Code     Consults: IP CONSULT TO CARDIOLOGY      Primary Care Physician:  Chantale Lancaster MD     Discharge Diagnoses:  Principal Problem:    Atypical chest pain  Active Problems:    COPD (chronic obstructive pulmonary disease) ([facility])  Resolved Problems:    * No resolved hospital problems. *        Portions of this note have been copied forward, however, changed to reflect the most current clinical status of this patient. Hospital Course: The patient is a 61 y.o. female who presented to [facility] emergency department after feeling lightheaded have admit up back pain that felt like pressure tightness when her shoulder blades. Patient states she had heart cath 2 years ago which was negative although her troponins were positive at time. Patient states she stopped smoking approximately 2015. Patient states today during chest x-ray she became extremely short of breath oxygen sats dropped to the 70s at which time she was given steroids, breathing treatment oxygen therapy which improved her situation. Patient did have slight ST depression in V3 V4 anterior leads.  Patient states no sick as recently and no symptoms of COVID-19.     9/3/2021-patient evaluated by cardiology. Troponins been negative. Echo and stress echo negative. Will send patient home this time she will continue beta-blockers and aspirin from home. Patient also has history of smoking and a COPD exacerbation on this admission. I will send patient home on azithromycin 500 mg p.o. daily x3 days, Flovent 110 mcg inhaled twice daily and she will continue home albuterol. Patient does not require home steroids at this point. Patient will follow up with her primary care practitioner within 1 week and cardiology. Cardiology also recommended patient to go home on Imdur 30 mg p.o. daily which we have continued.     Medications are reconciled and reviewed. Inpatient course: Discharge summary reviewed- see chart. Current status: stable    Test results  Nuclear stress test  Impression   Impression:   There is no obvious infarct or ischemia, with a calculated ejection   fraction of 75 %. Suggest: Clinical correlation with consideration for medical   management. Signed by Dr Juana Rogers   CTA of chest  Impression   1. No pulmonary embolism or thoracic aortic dissection. 2. Chronic lung changes. Signed by Dr Clover William normal  LDL 97 on 9/3/2021  Potassium elevated but then corrected to 4.7 at the time of discharge      Review of Systems   Constitutional: Negative for chills, fatigue and fever. HENT: Negative for congestion, ear pain, nosebleeds, postnasal drip and sore throat. Respiratory: Negative for cough, chest tightness and wheezing. Cardiovascular: Negative for chest pain, palpitations and leg swelling. Gastrointestinal: Negative for abdominal pain and constipation. Genitourinary: Negative for dysuria and urgency. Musculoskeletal: Negative. Negative for arthralgias. Skin: Negative for rash. Neurological: Negative for dizziness and headaches. Psychiatric/Behavioral: Negative. Physical Exam  Constitutional:       Appearance: She is well-developed. HENT:      Right Ear: External ear normal.      Left Ear: External ear normal.      Mouth/Throat:      Pharynx: No oropharyngeal exudate. Eyes:      Conjunctiva/sclera: Conjunctivae normal.      Pupils: Pupils are equal, round, and reactive to light. Neck:      Thyroid: No thyromegaly. Vascular: No JVD. Cardiovascular:      Rate and Rhythm: Normal rate. Heart sounds: No murmur heard. Pulmonary:      Effort: No respiratory distress. Breath sounds: Wheezing and rales present. Chest:      Chest wall: No tenderness. Abdominal:      General: Bowel sounds are normal.      Palpations: Abdomen is soft. Musculoskeletal:      Cervical back: Neck supple. Lymphadenopathy:      Cervical: No cervical adenopathy. Skin:     Findings: No rash. Neurological:      Mental Status: She is oriented to person, place, and time. Lab Results   Component Value Date     09/03/2021    K 4.7 09/03/2021    K 5.4 09/03/2021     09/03/2021    CO2 25 09/03/2021    BUN 28 09/03/2021    CREATININE 0.8 09/03/2021    GLUCOSE 148 09/03/2021    CALCIUM 8.9 09/03/2021     Lab Results   Component Value Date    WBC 10.1 09/03/2021    WBC 7.2 09/02/2021    WBC 7.4 07/20/2021    HGB 12.9 09/03/2021    HGB 13.2 09/02/2021    HGB 13.7 07/20/2021    HCT 41.1 09/03/2021    HCT 42.5 09/02/2021    HCT 44.1 07/20/2021    MCV 92.2 09/03/2021    MCV 91.6 09/02/2021    MCV 91.7 07/20/2021     09/03/2021     09/02/2021     07/20/2021     Lab Results   Component Value Date    CHOL 180 09/03/2021    TRIG 77 09/03/2021    HDL 68 09/03/2021       Assessment/Plan:      COPD mixed type (HCC)    COPD exacerbation ([address]? ?     Episode of low oxygen  72 at er ( it happened suddenly  when pt went to er her 02 was normal but then suddenly dropped)    Dizziness at the time of admission    No clear reason for severe sudden hypoxia that resolved [id number] min found during admission  \was started on ZPAK + Flovent  CTA negative  Complete these   Opinion from pulmonologist    Mild coronary artery disease per previous testing  Nuclear stress test negative    Pure hypercholesterolemia  Now has been taking her rx  Recent LDL 80  Has fu appt with cardiology        Diagnostic test results reviewed    Patient risk of morbidity and mortality: low  This is high complexity TCM visit  Patient was called within 2 business days of discharge    Family able to provide care to patient at home  No other needs including PT, OT needs detected at this time  No additional provider follow-ups needed at this time      Orders Placed This Encounter   Procedures   Marie Nichole MD, Pulmonary Disease, [facility]     Referral Priority:   Routine     Referral Type:   Eval and Treat     Referral Reason:   Specialty Services Required     Referred to Provider:   Rae Castro MD     Requested Specialty:   Pulmonary Disease     Number of Visits Requested:   1         No follow-ups on file.

## 2021-09-07 NOTE — TELEPHONE ENCOUNTER
[address] Transitions Initial Follow Up Call    Outreach made within 2 business days of discharge: Yes    Patient: Ortiz Davis   Patient : 1960 MRN: [id number]    Reason for Admission: Admitted 21 for chest pain  Discharge Date: 9/3/21    Discharge Diagnoses:  Principal Problem:    Atypical chest pain  Active Problems:    COPD (chronic obstructive pulmonary disease) [facility])    Discharge department/facility: [facility]       Patient seen in office today for TCM follow up. Appointment less than 2 business days from discharge, TCM call not needed.      Chris Miranda MA

## 2021-09-09 NOTE — PROGRESS NOTES
Physician Progress Note      PATIENT:               Curtis Peñaloza  CSN #:                  [id number]  :                       1960  ADMIT DATE:       2021 8:24 AM  Ronald Leigh DATE:        9/3/2021 5:08 PM  RESPONDING  PROVIDER #:        Cheng Ghosh MD          QUERY TEXT:    Pt admitted with shortness of breath and back pain. .  Pt noted to have COPD. If possible, please document in progress notes and discharge summary if you   are evaluating and/or treating any of the following: The medical record reflects the following:  Risk Factors: Hypertension, COPD,  Clinical Indicators: ST depression anterior leads V3, V4, Shortness of breath   Sat 72%. Upper back pain responding to Nitro. Treatment: cardiology consult, Imdur30 mg po daily, steroids, bronchodilators,    Azithromax 500 ml po. Oxygen. Thank you    Tad Solis RN, BSN, [facility]  [phone]  Thank you    Tad Solis RN,BSN,[facility]  [phone]  Options provided:  -- Chest pain due to CAD with unstable angina  -- Chest pain due to COPD  -- Other - I will add my own diagnosis  -- Disagree - Not applicable / Not valid  -- Disagree - Clinically unable to determine / Unknown  -- Refer to Clinical Documentation Reviewer    PROVIDER RESPONSE TEXT:    This patient has CAD with unstable angina.     Query created by: Nicholas Garcia on 2021 11:39 AM      Electronically signed by:  Cheng Ghosh MD 2021 3:45 PM

## 2021-09-20 NOTE — PROGRESS NOTES
Physician Progress Note      PATIENT:               Jd Rankin  CSN #:                  [id number]  :                       1960  ADMIT DATE:       2021 8:24 AM  Ronald Leigh DATE:        9/3/2021 5:08 PM  RESPONDING  PROVIDER #:        Campos Doss MD          QUERY TEXT:    Pt admitted with chest pain and shortness of breath. Pt noted to have acute   respiratory failure on admission by Dr. Arlyn Shah. . If possible, please document   in the progress notes and discharge summary if you are evaluating and/or   treating any of the following: The medical record reflects the following:  Risk Factors: CODP, chest pain  Clinical Indicators: On admission Sat in the 70's. She became acutely hypoxic   and felt flush. Treatment: Bipap, Duonebs, nasal cannula 2 - 6 liters. Thank you    Juice Cox RN, BSN, [facility]  [phone]  Options provided:  -- Acute respiratory failure with hypoxia  -- Acute respiratory failure with hypercapnia  -- Chronic respiratory failure with hypoxia  -- Chronic respiratory failure with hypercapnia  -- Acute on chronic respiratory failure with hypoxia  -- Acute on chronic respiratory failure with hypercapnia  -- Acute on chronic respiratory failure resolved: This patient has acute on   chronic respiratory failure resolved. -- Other - I will add my own diagnosis  -- Disagree - Not applicable / Not valid  -- Disagree - Clinically unable to determine / Unknown  -- Refer to Clinical Documentation Reviewer    PROVIDER RESPONSE TEXT:    This patient is in acute respiratory failure with hypercapnia.     Query created by: Dianne Roy on 2021 9:27 AM      Electronically signed by:  Campos Doss MD 2021 1:50 PM

## 2021-10-11 ENCOUNTER — OFFICE VISIT (OUTPATIENT)
Dept: CARDIOLOGY CLINIC | Age: 61
End: 2021-10-11
Payer: COMMERCIAL

## 2021-10-11 VITALS
HEART RATE: 75 BPM | HEIGHT: 63 IN | WEIGHT: 140 LBS | SYSTOLIC BLOOD PRESSURE: 110 MMHG | DIASTOLIC BLOOD PRESSURE: 60 MMHG | OXYGEN SATURATION: 97 % | BODY MASS INDEX: 24.8 KG/M2

## 2021-10-11 DIAGNOSIS — E78.2 MIXED HYPERLIPIDEMIA: ICD-10-CM

## 2021-10-11 DIAGNOSIS — I10 ESSENTIAL HYPERTENSION: Primary | ICD-10-CM

## 2021-10-11 PROCEDURE — 99213 OFFICE O/P EST LOW 20 MIN: CPT | Performed by: NURSE PRACTITIONER

## 2021-10-11 RX ORDER — ISOSORBIDE MONONITRATE 30 MG/1
30 TABLET, EXTENDED RELEASE ORAL DAILY
Qty: 90 TABLET | Refills: 3 | Status: SHIPPED | OUTPATIENT
Start: 2021-10-11 | End: 2022-04-26 | Stop reason: SDUPTHER

## 2021-10-11 NOTE — PROGRESS NOTES
[facility], [place]. [address] [facility]  [phone] office  [phone] fax      OFFICE VISIT:  10/11/2021    Kimber Newberry - : 1960  Reason For Visit:  Amy Chaudhary is a 64 y.o. female who is here for Follow-Up from Hospital (Patient is here for a hospital follow up for chest pain. Patient states since starting Imdur the chest pain is better. ), Hypertension, and Chest Pain    History:   Diagnosis Orders   1. Essential hypertension     2. Mixed hyperlipidemia       The patient presents today for cardiology hospital follow up. She presented to [facility] ED on 21. She reported being lightheaded with upper back pain. The patient stopped smoking approximately . Patient reported a history of becoming extremely short of breath during a chest x-ray with oxygen saturation dropping to 70's. Subsequently, she was given steroids, breathing treatment and oxygen therapy which improved her situation. . Patient reported no symptoms of acute illness to suggest Covid 19. On 9/3/21, the patient was evaluated by cardiology. Troponins been negative. Echo and stress echo were negative. ASA and beta blocker therapy were added as well as Imdur. The patient was discharged with treatment for COPD exacerbation to include azithromycin 500 mg p.o. daily x3 days, Flovent 110 mcg inhaled twice daily and albuterol. The patient did have a cardiac cath in 2019 showing normal EF with no occlusive coronary artery disease. The patient denies symptoms to suggest myocardial ischemia, heart failure or arrhythmia. BP is now controlled on current regimen. The patient's PCP monitors cholesterol. Subjective  mAy Chaudhary denies exertional chest pain, shortness of breath, orthopnea, paroxysmal nocturnal dyspnea, syncope, presyncope, sensed arrhythmia, edema and fatigue.   The patient denies numbness or weakness to suggest cerebrovascular accident or transient ischemic attack.      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    Osteopenia of multiple sites M85.89    History of breast cancer Z85.3    Chest pain R07.9    Chest pressure R07.89    ACS (acute coronary syndrome) ([facility] I24.9    Mild coronary artery disease I25.10    Transaminitis R74.01    Abnormal CT of the abdomen R93.5    Fatty liver K76.0    Alcohol consumption of one to four drinks per week Z78.9    Enlarged lymph nodes R59.9    Essential hypertension I10    Atypical chest pain R07.89    COPD (chronic obstructive pulmonary disease) (HCC) J44.9     Past Medical History:   Diagnosis Date    Acid reflux     Anxiety     Breast cancer ([facility]     Cancer ([facility]     liposcaarcoma, peritoneal    Hyperlipidemia     Hypertension     Mild coronary artery disease 7/18/2019     Past Surgical History:   Procedure Laterality Date    BREAST LUMPECTOMY Left     CHOLECYSTECTOMY  2008    COLONOSCOPY  12/20/2010    Dr Idalia Ladd, PARTIAL Left 2008    left breast cancer    [place] COLONOSCOPY FLX DX W/COLLJ SPEC WHEN PFRMD N/A 12/09/2016    Dr Prince Marina access, normall, 5 yr recall w/Dr Juan Pablo Hadley    CHRISTIANE AND BSO      alwasy normal PAP's prior to hysterectomy    TONSILLECTOMY      UPPER GASTROINTESTINAL ENDOSCOPY N/A 12/10/2020    Dr Jason Parks gastritis,otherwise normal exam; NEG h pylori    US GUIDED LIVER BIOPSY PERCUTANEOUS  12/14/2020    steatosis, + mild iron staining, grade 0, stage 0     Family History   Problem Relation Age of Onset    Alzheimer's Disease Mother     Breast Cancer Mother 48    Heart Disease Father     Diabetes Father     Cancer Brother     Colon Cancer Neg Hx     Colon Polyps Neg Hx     Liver Cancer Neg Hx     Liver Disease Neg Hx     Esophageal Cancer Neg Hx     Rectal Cancer Neg Hx     Stomach Cancer Neg Hx      Social History     Tobacco Use    Smoking status: Former Smoker     Packs/day: 0.50     Years: 35.00     Pack years: 17.50     Quit date:      Years since quittin.7    Smokeless tobacco: Never Used   Substance Use Topics    Alcohol use:  Yes     Alcohol/week: 2.0 standard drinks     Types: 2 Glasses of wine per week     Comment: weekends      Current Outpatient Medications   Medication Sig Dispense Refill    fluticasone (FLOVENT HFA) 110 MCG/ACT inhaler Inhale 2 puffs into the lungs 2 times daily 1 each 0    isosorbide mononitrate (IMDUR) 30 MG extended release tablet Take 1 tablet by mouth daily 30 tablet 0    hydroCHLOROthiazide (HYDRODIURIL) 25 MG tablet Take 1 tablet by mouth every morning 90 tablet 3    vitamin D (ERGOCALCIFEROL) 1.25 MG (34770 UT) CAPS capsule TAKE ONE CAPSULE BY MOUTH ONCE A WEEK (Patient taking differently: Take 50,000 Units by mouth once a week Takes on ) 12 capsule 1    rosuvastatin (CRESTOR) 20 MG tablet Take 1 tablet by mouth nightly (Patient taking differently: Take 20 mg by mouth daily ) 90 tablet 1    metoprolol tartrate (LOPRESSOR) 50 MG tablet TAKE 1 TABLET BY MOUTH EVERY 12 HOURS (Patient taking differently: Take 50 mg by mouth 2 times daily If needed) 60 tablet 5    venlafaxine (EFFEXOR XR) 150 MG extended release capsule TAKE ONE CAPSULE BY MOUTH EVERY DAY 30 capsule 0    irbesartan (AVAPRO) 300 MG tablet TAKE 1 TABLET BY MOUTH DAILY 30 tablet 5    ibandronate (BONIVA) 150 MG tablet Take 1 tablet by mouth every 30 days 3 tablet 1    albuterol sulfate HFA (VENTOLIN HFA) 108 (90 Base) MCG/ACT inhaler Inhale 2 puffs into the lungs 4 times daily as needed for Wheezing 1 Inhaler 0    cloNIDine (CATAPRES) 0.1 MG tablet Take 1 tablet by mouth 2 times daily as needed for High Blood Pressure (for BP < 180/100) 60 tablet 3    aspirin EC 81 MG EC tablet Take 81 mg by mouth daily      fluticasone (FLONASE) 50 MCG/ACT nasal spray 1 spray by Nasal route daily for 10 days (Patient taking differently: 1 spray by Nasal route daily as needed ) 1 Bottle 0     No current facility-administered medications for this visit. Allergies: Adhesive tape and Sulfa antibiotics    Review of Systems  Constitutional - no appetite change, or unexpected weight change. No fever, chills or diaphoresis. No significant change in activity level or new onset of fatigue. HEENT - no significant rhinorrhea or epistaxis. No tinnitus or significant hearing loss. Eyes - no sudden vision change or amaurosis. No corneal arcus, xantholasma, subconjunctival hemorrhage or discharge. Respiratory - no significant wheezing, stridor, apnea or cough. No dyspnea on exertion or shortness of air. Cardiovascular - no exertional chest pain to suggest myocardial ischemia. No orthopnea or PND. No sensation of sustained arrythmia. No occurrence of slow heart rate. No palpitations. No claudication. Gastrointestinal - no abdominal swelling or pain. No blood in stool. No severe constipation, diarrhea, nausea, or vomiting. Genitourinary - no dysuria, frequency, or urgency. No flank pain or hematuria. Musculoskeletal - no back pain or myalgia. No problems with gait. Extremities - no clubbing, cyanosis or extremity edema. Skin - no color change or rash. No pallor. No new surgical incision. Neurologic - no speech difficulty, facial asymmetry or lateralizing weakness. No seizures, presyncope or syncope. No significant dizziness. Hematologic - no easy bruising or excessive bleeding. Psychiatric - no severe anxiety or insomnia. No confusion. All other review of systems are negative.     Objective  Vital Signs - /60   Pulse 75 Ht 5' 3\" (1.6 m)   Wt 140 lb (63.5 kg)   SpO2 97%   BMI 24.80 kg/m²   General - Ying Means is alert, cooperative, and pleasant. Well groomed. No acute distress. Body habitus - Body mass index is 24.8 kg/m². HEENT - Head is normocephalic. No circumoral cyanosis. Dentition is normal.  EYES -   Lids normal without ptosis. No discharge, edema or subconjunctival hemorrhage. Neck - Symmetrical without apparent mass or lymphadenopathy. Respiratory - Normal respiratory effort without use of accessory muscles. Ausculatation reveals vesicular breath sounds without crackles, wheezes, rub or rhonchi. Cardiovascular - No jugular venous distention. Auscultation reveals regular rate and rhythm. No audible clicks, gallop or rub. No murmur. No lower extremity varicosities. No carotid bruits. Abdominal -  No visible distention, mass or pulsations. Extremities - No clubbing or cyanosis. No statis dermatitis or ulcers. No edema. Musculoskeletal -   No Osler's nodes. No kyphosis or scoliosis. Gait is even and regular without limp or shuffle. Ambulates without assistance. Skin -  Warm and dry; no rash or pallor. No new surgical wound. Neurological - No focal neurological deficits. Thought processes coherent. No apparent tremor. Oriented to person, place and time. Psychiatric -  Appropriate affect and mood. Data reviewed:  9/2/21 CTA lungs  Impression   1. No pulmonary embolism or thoracic aortic dissection. 2. Chronic lung changes. Signed by Dr Ling Plascencia     9/2/21 Lexiscan  Impression   Impression:   There is no obvious infarct or ischemia, with a calculated ejection   fraction of 75 %. Suggest: Clinical correlation with consideration for medical   management. Signed by Dr Marina Roberson     9/2/21 echo   Normal left ventricular size with preserved LV function and a calculated  ejection fraction of 72%. No regional wall motion abnormalities.  Normal  left ventricular wall thickness. Normal diastolic filling pattern for age. Normal right ventricular size with preserved RV function. Mild tricuspid regurgitation with normal estimated RVSP. Aortic root is within normal limits. No evidence of significant pericardial effusion is noted. The rhythm is sinus. Signature   Electronically signed by Treva Verde(Interpreting physician)   on 09/03/2021 11:46 AM    Lab Results   Component Value Date    WBC 10.1 09/03/2021    HGB 12.9 09/03/2021    HCT 41.1 09/03/2021    MCV 92.2 09/03/2021     09/03/2021     Lab Results   Component Value Date     09/03/2021    K 4.7 09/03/2021     09/03/2021    CO2 25 09/03/2021    BUN 28 (H) 09/03/2021    CREATININE 0.8 09/03/2021    GLUCOSE 148 (H) 09/03/2021    CALCIUM 8.9 09/03/2021    PROT 6.0 (L) 09/03/2021    LABALBU 4.1 09/03/2021    BILITOT <0.2 09/03/2021    ALKPHOS 60 09/03/2021    AST 21 09/03/2021    ALT 21 09/03/2021    LABGLOM >60 09/03/2021    GFRAA >59 09/03/2021    GLOB 2.4 12/30/2016     Lab Results   Component Value Date    CHOL 180 09/03/2021    CHOL 272 (H) 07/20/2021    CHOL 260 (H) 01/19/2021     Lab Results   Component Value Date    TRIG 77 09/03/2021    TRIG 125 07/20/2021    TRIG 117 01/19/2021     Lab Results   Component Value Date    HDL 68 09/03/2021    HDL 60 (L) 07/20/2021    HDL 69 01/19/2021     Lab Results   Component Value Date    LDLCALC 97 09/03/2021    LDLCALC 187 07/20/2021    LDLCALC 168 01/19/2021       Lab Results   Component Value Date    TROPONINI <0.01 09/02/2021     BP Readings from Last 3 Encounters:   10/11/21 110/60   09/07/21 118/82   09/03/21 103/69    Pulse Readings from Last 3 Encounters:   10/11/21 75   09/07/21 67   09/03/21 79        Wt Readings from Last 3 Encounters:   10/11/21 140 lb (63.5 kg)   09/07/21 138 lb (62.6 kg)   09/02/21 135 lb (61.2 kg)     Assessment/Plan:   Diagnosis Orders   1. Essential hypertension     2.  Mixed hyperlipidemia       Stable CV status without overt heart failure, sensed arrhythmia or angina. HTN - normotensive on current regimen to include Avapro, HCTZ, Imdur, Lopressor and Clonidine prn. Continue same. Hyperlipidemia - LDL 97 - continue Crestor. Labs followed by PCP. Patient is compliant with medication regimen. Previous cardiac history and records reviewed. Continue current medical management for cardiac related condition. Continue other current medications as directed. Continue to follow up with primary care provider for non cardiac medical problems. If your primary care provider is outside of [place] [facility] please request that your labs be faxed to this office at [phone]. BP goal 130/80 or less. Call the office with any problems, questions or concerns at [phone]. Cardiology follow up as scheduled in [address] appointments. Educational included in patient instructions. Heart health.       VAZQUEZ Tan

## 2021-10-11 NOTE — PATIENT INSTRUCTIONS
New instructions for today:      Patient Instructions:  Continue current medications as prescribed. Always keep a current medication list. Bring your medications to every office visit. Continue to follow up with primary care provider for non cardiac medical problems. Call the office with any problems, questions or concerns at [phone]. If you have been asked to keep a blood pressure log, do so for 2 weeks. Call the office to report readings to the triage nurse at [phone]. Follow up with cardiologist as scheduled. The following educational material has been included in this after visit summary for your review: Life simple 7. Heart health. Life simple 7  1) Manage blood pressure - high blood pressure is a major risk factor for heart disease and stroke. Keeping blood pressure in health range reduces strain on your heart, arteries and kidneys. Blood pressure goal is less than 130/80. 2) Control cholesterol - contributes to plaque, which can clog arteries and lead to heart disease and stroke. When you control your cholesterol you are giving your arteries their best chance to remain clear. It is recommended that you get cholesterol lab work done once a year. 3) Reduce blood sugar - most of the food we eat is turning into glucose or blood sugar that our body uses for energy. Over time, high levels of blood sugar can damage your heart, kidneys, eyes and nerves. 4) Get active - living an active life is one of the most rewarding gifts you can give yourself and those you love. Simply put, daily physical activity increases your length and quality of life. Strive to exercise 15 minutes most days of the week. 5)  Eat better - A healthy diet is one of your best weapons for fighting cardiovascular disease. When you eat a heart healthy diet, you improve your chances for feeling good and staying healthy for life.   6)  Lose weight - when you shed extra fat an unnecessary pounds, you reduce the burden on your hear, lungs, blood vessels and skeleton. You give yourself the gift of active living, you lower your blood pressure and help yourself feel better. 7) Stop smoking - cigarette smokers have a higher risk of developing cardiovascular disease. If  You smoke, quitting is the best thing you can do for your health. Check American Heart Association on line for more information on Life's Simple 7 and tips for healthy living. A Healthy Heart: Care Instructions  Your Care Instructions     Coronary artery disease, also called heart disease, occurs when a substance called plaque builds up in the vessels that supply oxygen-rich blood to your heart muscle. This can narrow the blood vessels and reduce blood flow. A heart attack happens when blood flow is completely blocked. A high-fat diet, smoking, and other factors increase the risk of heart disease. Your doctor has found that you have a chance of having heart disease. You can do lots of things to keep your heart healthy. It may not be easy, but you can change your diet, exercise more, and quit smoking. These steps really work to lower your chance of heart disease. Follow-up care is a key part of your treatment and safety. Be sure to make and go to all appointments, and call your doctor if you are having problems. It's also a good idea to know your test results and keep a list of the medicines you take. How can you care for yourself at home? Diet  · Use less salt when you cook and eat. This helps lower your blood pressure. Taste food before salting. Add only a little salt when you think you need it. With time, your taste buds will adjust to less salt. · Eat fewer snack items, fast foods, canned soups, and other high-salt, high-fat, processed foods. · Read food labels and try to avoid saturated and trans fats. They increase your risk of heart disease by raising cholesterol levels. · Limit the amount of solid fat-butter, margarine, and shortening-you eat.  Use olive, peanut, or canola oil when you cook. Bake, broil, and steam foods instead of frying them. · Eat a variety of fruit and vegetables every day. Dark green, deep orange, red, or yellow fruits and vegetables are especially good for you. Examples include spinach, carrots, peaches, and berries. · Foods high in fiber can reduce your cholesterol and provide important vitamins and minerals. High-fiber foods include whole-grain cereals and breads, oatmeal, beans, brown rice, citrus fruits, and apples. · Eat lean proteins. Heart-healthy proteins include seafood, lean meats and poultry, eggs, beans, peas, nuts, seeds, and soy products. · Limit drinks and foods with added sugar. These include candy, desserts, and soda pop. Lifestyle changes  · If your doctor recommends it, get more exercise. Walking is a good choice. Bit by bit, increase the amount you walk every day. Try for at least 30 minutes on most days of the week. You also may want to swim, bike, or do other activities. · Do not smoke. If you need help quitting, talk to your doctor about stop-smoking programs and medicines. These can increase your chances of quitting for good. Quitting smoking may be the most important step you can take to protect your heart. It is never too late to quit. · Limit alcohol to 2 drinks a day for men and 1 drink a day for women. Too much alcohol can cause health problems. · Manage other health problems such as diabetes, high blood pressure, and high cholesterol. If you think you may have a problem with alcohol or drug use, talk to your doctor. Medicines  · Take your medicines exactly as prescribed. Call your doctor if you think you are having a problem with your medicine. · If your doctor recommends aspirin, take the amount directed each day. Make sure you take aspirin and not another kind of pain reliever, such as acetaminophen (Tylenol). When should you call for help? [id number] if you have symptoms of a heart attack.  These may include:  · Chest pain or pressure, or a strange feeling in the chest.  · Sweating. · Shortness of breath. · Pain, pressure, or a strange feeling in the back, neck, jaw, or upper belly or in one or both shoulders or arms. · Lightheadedness or sudden weakness. · A fast or irregular heartbeat. After you call 911, the  may tell you to chew 1 adult-strength or 2 to 4 low-dose aspirin. Wait for an ambulance. Do not try to drive yourself. Watch closely for changes in your health, and be sure to contact your doctor if you have any problems. Where can you learn more? Go to https://[employer] Live.SueEasy. org and sign in to your [employer] account. Enter J373 in the MineWhat box to learn more about \"A Healthy Heart: Care Instructions. \"     If you do not have an account, please click on the \"Sign Up Now\" link. Current as of: December 16, 2019               Content Version: 12.5  © 3966-9530 [employer], [employer]. Care instructions adapted under license by [facility] ([facility]). If you have questions about a medical condition or this instruction, always ask your healthcare professional. [place] [address] any warranty or liability for your use of this information.

## 2021-10-20 ENCOUNTER — OFFICE VISIT (OUTPATIENT)
Dept: PULMONOLOGY | Age: 61
End: 2021-10-20
Payer: COMMERCIAL

## 2021-10-20 VITALS
HEIGHT: 63 IN | HEART RATE: 88 BPM | DIASTOLIC BLOOD PRESSURE: 76 MMHG | TEMPERATURE: 98.3 F | BODY MASS INDEX: 25.02 KG/M2 | OXYGEN SATURATION: 100 % | SYSTOLIC BLOOD PRESSURE: 118 MMHG | WEIGHT: 141.2 LBS

## 2021-10-20 DIAGNOSIS — Z87.891 HISTORY OF SMOKING: ICD-10-CM

## 2021-10-20 DIAGNOSIS — J43.1 PANLOBULAR EMPHYSEMA (HCC): Primary | ICD-10-CM

## 2021-10-20 DIAGNOSIS — K21.9 GASTROESOPHAGEAL REFLUX DISEASE WITHOUT ESOPHAGITIS: ICD-10-CM

## 2021-10-20 DIAGNOSIS — I25.10 MILD CORONARY ARTERY DISEASE: ICD-10-CM

## 2021-10-20 DIAGNOSIS — R07.89 OTHER CHEST PAIN: ICD-10-CM

## 2021-10-20 PROCEDURE — 99204 OFFICE O/P NEW MOD 45 MIN: CPT | Performed by: INTERNAL MEDICINE

## 2021-10-20 ASSESSMENT — ENCOUNTER SYMPTOMS
ABDOMINAL PAIN: 0
SHORTNESS OF BREATH: 0
WHEEZING: 0
ABDOMINAL DISTENTION: 0
COUGH: 0
APNEA: 0
ANAL BLEEDING: 0
CHEST TIGHTNESS: 1
BACK PAIN: 0
RHINORRHEA: 0

## 2021-10-20 NOTE — PROGRESS NOTES
Medications    Medication Sig Taking? Authorizing Provider   isosorbide mononitrate (IMDUR) 30 MG extended release tablet Take 1 tablet by mouth daily Yes VAZQUEZ Hogan   hydroCHLOROthiazide (HYDRODIURIL) 25 MG tablet Take 1 tablet by mouth every morning Yes VAZQUEZ Garcia   vitamin D (ERGOCALCIFEROL) 1.25 MG (06726 UT) CAPS capsule TAKE ONE CAPSULE BY MOUTH ONCE A WEEK  Patient taking differently: Take 50,000 Units by mouth once a week Takes on sunday Yes Lester Call MD   rosuvastatin (CRESTOR) 20 MG tablet Take 1 tablet by mouth nightly  Patient taking differently: Take 20 mg by mouth daily  Yes Lester Call MD   metoprolol tartrate (LOPRESSOR) 50 MG tablet TAKE 1 TABLET BY MOUTH EVERY 12 HOURS  Patient taking differently: Take 50 mg by mouth 2 times daily If needed Yes VAZQUEZ Santana CNP   venlafaxine (EFFEXOR XR) 150 MG extended release capsule TAKE ONE CAPSULE BY MOUTH EVERY DAY Yes Lester Call MD   irbesartan (AVAPRO) 300 MG tablet TAKE 1 TABLET BY MOUTH DAILY Yes VAZQUEZ Hogan   ibandronate (BONIVA) 150 MG tablet Take 1 tablet by mouth every 30 days Yes Lester Call MD   albuterol sulfate HFA (VENTOLIN HFA) 108 (90 Base) MCG/ACT inhaler Inhale 2 puffs into the lungs 4 times daily as needed for Wheezing Yes Lester Call MD   cloNIDine (CATAPRES) 0.1 MG tablet Take 1 tablet by mouth 2 times daily as needed for High Blood Pressure (for BP < 180/100) Yes VAZQUEZ Henson   aspirin EC 81 MG EC tablet Take 81 mg by mouth daily Yes Historical Provider, MD   fluticasone (FLOVENT HFA) 110 MCG/ACT inhaler Inhale 2 puffs into the lungs 2 times daily  VAZQUEZ Ashford CNP   fluticasone (FLONASE) 50 MCG/ACT nasal spray 1 spray by Nasal route daily for 10 days  Patient taking differently: 1 spray by Nasal route daily as needed   VAZQUEZ Rojas        Review of Systems   Constitutional: Negative for activity change, appetite change, chills, diaphoresis and fatigue.    HENT: Negative for congestion, dental problem, drooling, ear discharge, postnasal drip and rhinorrhea. Eyes: Negative for visual disturbance. Respiratory: Positive for chest tightness. Negative for apnea, cough, shortness of breath and wheezing. Gastrointestinal: Negative for abdominal distention, abdominal pain and anal bleeding. Endocrine: Negative for cold intolerance, heat intolerance and polydipsia. Genitourinary: Negative for difficulty urinating, dysuria, enuresis and flank pain. Musculoskeletal: Negative for arthralgias, back pain and gait problem. Allergic/Immunologic: Negative for environmental allergies. Neurological: Negative for dizziness, facial asymmetry, light-headedness and headaches. Vitals:    10/20/21 1426   BP: 118/76   Pulse: 88   Temp: 98.3 °F (36.8 °C)   SpO2: 100%     Physical Exam  Vitals reviewed. Constitutional:       Appearance: Normal appearance. HENT:      Head: Normocephalic and atraumatic. Nose: Nose normal.   Eyes:      Extraocular Movements: Extraocular movements intact. Conjunctiva/sclera: Conjunctivae normal.   Cardiovascular:      Rate and Rhythm: Normal rate and regular rhythm. Heart sounds: No murmur heard. No friction rub. Pulmonary:      Effort: Pulmonary effort is normal. No respiratory distress. Breath sounds: Normal breath sounds. No stridor. No wheezing, rhonchi or rales. Abdominal:      General: There is no distension. Palpations: There is no mass. Tenderness: There is no abdominal tenderness. There is no guarding or rebound. Musculoskeletal:      Cervical back: Normal range of motion and neck supple. Neurological:      Mental Status: She is alert and oriented to person, place, and time. This note was generated used a voice recognition software. Errors in voice recognition may have occurred. An electronic signature was used to authenticate this note.     --Raymodn Coleman MD

## 2021-11-15 DIAGNOSIS — L98.9 SKIN LESION: ICD-10-CM

## 2021-11-15 DIAGNOSIS — E55.9 VITAMIN D DEFICIENCY: ICD-10-CM

## 2021-11-15 DIAGNOSIS — E78.00 PURE HYPERCHOLESTEROLEMIA: ICD-10-CM

## 2021-11-15 DIAGNOSIS — R59.9 ENLARGED LYMPH NODES: ICD-10-CM

## 2021-11-15 DIAGNOSIS — Z00.00 ANNUAL PHYSICAL EXAM: ICD-10-CM

## 2021-11-15 DIAGNOSIS — I10 ESSENTIAL HYPERTENSION: ICD-10-CM

## 2021-11-15 DIAGNOSIS — I25.10 MILD CORONARY ARTERY DISEASE: ICD-10-CM

## 2021-11-15 DIAGNOSIS — M85.89 OSTEOPENIA OF MULTIPLE SITES: ICD-10-CM

## 2021-11-15 DIAGNOSIS — R93.89 ABNORMAL CT OF THE CHEST: ICD-10-CM

## 2021-11-15 LAB
ALBUMIN SERPL-MCNC: 4.1 G/DL (ref 3.5–5.2)
ALP BLD-CCNC: 65 U/L (ref 35–104)
ALT SERPL-CCNC: 19 U/L (ref 5–33)
ANION GAP SERPL CALCULATED.3IONS-SCNC: 12 MMOL/L (ref 7–19)
AST SERPL-CCNC: 19 U/L (ref 5–32)
BILIRUB SERPL-MCNC: 0.3 MG/DL (ref 0.2–1.2)
BILIRUBIN URINE: NEGATIVE
BLOOD, URINE: NEGATIVE
BUN BLDV-MCNC: 19 MG/DL (ref 8–23)
CALCIUM SERPL-MCNC: 8.6 MG/DL (ref 8.8–10.2)
CHLORIDE BLD-SCNC: 104 MMOL/L (ref 98–111)
CHOLESTEROL, TOTAL: 171 MG/DL (ref 160–199)
CLARITY: CLEAR
CO2: 24 MMOL/L (ref 22–29)
COLOR: YELLOW
CREAT SERPL-MCNC: 0.7 MG/DL (ref 0.5–0.9)
GFR AFRICAN AMERICAN: >59
GFR NON-AFRICAN AMERICAN: >60
GLUCOSE BLD-MCNC: 106 MG/DL (ref 74–109)
GLUCOSE URINE: NEGATIVE MG/DL
HDLC SERPL-MCNC: 55 MG/DL (ref 65–121)
KETONES, URINE: NEGATIVE MG/DL
LDL CHOLESTEROL CALCULATED: 91 MG/DL
LEUKOCYTE ESTERASE, URINE: NEGATIVE
NITRITE, URINE: NEGATIVE
PH UA: 5.5 (ref 5–8)
POTASSIUM SERPL-SCNC: 4.4 MMOL/L (ref 3.5–5)
PROTEIN UA: NEGATIVE MG/DL
SODIUM BLD-SCNC: 140 MMOL/L (ref 136–145)
SPECIFIC GRAVITY UA: 1.02 (ref 1–1.03)
TOTAL PROTEIN: 5.9 G/DL (ref 6.6–8.7)
TRIGL SERPL-MCNC: 125 MG/DL (ref 0–149)
TSH SERPL DL<=0.05 MIU/L-ACNC: 2.91 UIU/ML (ref 0.27–4.2)
UROBILINOGEN, URINE: 0.2 E.U./DL

## 2021-11-16 ENCOUNTER — OFFICE VISIT (OUTPATIENT)
Dept: INTERNAL MEDICINE | Age: 61
End: 2021-11-16
Payer: COMMERCIAL

## 2021-11-16 VITALS
OXYGEN SATURATION: 98 % | HEART RATE: 68 BPM | HEIGHT: 63 IN | DIASTOLIC BLOOD PRESSURE: 70 MMHG | RESPIRATION RATE: 18 BRPM | WEIGHT: 140 LBS | BODY MASS INDEX: 24.8 KG/M2 | SYSTOLIC BLOOD PRESSURE: 118 MMHG

## 2021-11-16 DIAGNOSIS — Z00.00 ANNUAL PHYSICAL EXAM: Primary | ICD-10-CM

## 2021-11-16 DIAGNOSIS — I25.10 MILD CORONARY ARTERY DISEASE: ICD-10-CM

## 2021-11-16 DIAGNOSIS — I10 ESSENTIAL HYPERTENSION: ICD-10-CM

## 2021-11-16 DIAGNOSIS — Z12.11 SCREENING FOR COLORECTAL CANCER: ICD-10-CM

## 2021-11-16 DIAGNOSIS — Z12.39 ENCOUNTER FOR BREAST CANCER SCREENING OTHER THAN MAMMOGRAM: ICD-10-CM

## 2021-11-16 DIAGNOSIS — M85.89 OSTEOPENIA OF MULTIPLE SITES: ICD-10-CM

## 2021-11-16 DIAGNOSIS — E55.9 VITAMIN D DEFICIENCY: ICD-10-CM

## 2021-11-16 DIAGNOSIS — R73.01 IFG (IMPAIRED FASTING GLUCOSE): ICD-10-CM

## 2021-11-16 DIAGNOSIS — E78.00 PURE HYPERCHOLESTEROLEMIA: ICD-10-CM

## 2021-11-16 DIAGNOSIS — Z12.12 SCREENING FOR COLORECTAL CANCER: ICD-10-CM

## 2021-11-16 PROCEDURE — 99396 PREV VISIT EST AGE 40-64: CPT | Performed by: INTERNAL MEDICINE

## 2021-11-16 PROCEDURE — 83036 HEMOGLOBIN GLYCOSYLATED A1C: CPT | Performed by: INTERNAL MEDICINE

## 2021-11-16 ASSESSMENT — PATIENT HEALTH QUESTIONNAIRE - PHQ9
2. FEELING DOWN, DEPRESSED OR HOPELESS: 0
1. LITTLE INTEREST OR PLEASURE IN DOING THINGS: 0
SUM OF ALL RESPONSES TO PHQ9 QUESTIONS 1 & 2: 0
SUM OF ALL RESPONSES TO PHQ QUESTIONS 1-9: 0

## 2021-11-16 ASSESSMENT — ENCOUNTER SYMPTOMS
CHEST TIGHTNESS: 0
CONSTIPATION: 0
COUGH: 0
ABDOMINAL PAIN: 0
WHEEZING: 0
SORE THROAT: 0

## 2021-11-16 NOTE — PROGRESS NOTES
Chief Complaint:   Rosamaria Toth is a 64 y.o. female who presents forcomplete physical exam.    History of Present Illness:      Rosamaria Toth is a 64 y.o. female who presents todayfor wellness visit AND follow up on her chronic medical conditions as noted below.     Patient Active Problem List    Diagnosis Date Noted    ACS (acute coronary syndrome) ([facility] 07/13/2019     Priority: High    Chest pressure 06/19/2018     Priority: High    Panlobular emphysema ([facility] 10/20/2021    History of smoking 10/20/2021    Gastroesophageal reflux disease without esophagitis 10/20/2021    Atypical chest pain 09/02/2021    COPD (chronic obstructive pulmonary disease) ([facility] 09/02/2021    Enlarged lymph nodes 07/19/2021    Essential hypertension 07/19/2021    Transaminitis 11/18/2020    Abnormal CT of the abdomen 11/18/2020    Fatty liver 11/18/2020    Alcohol consumption of one to four drinks per week 11/18/2020    Mild coronary artery disease 07/18/2019    Other chest pain 06/18/2018 12/30/2016  SE negative for myocardial ischemia  6/19/18  SE negative for myocardial ischemia  7/13/19 ACS FERNANDO RISK Score 2 (angina, + troponin ), AUC indication 3, AUC score 7   7/14/19  Cath  Mild CAD, normal LVFX      Vitamin D deficiency 11/04/2017    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; post extensive surgery/ hysterectomy      Osteopenia of multiple sites 11/04/2017 2013 Lspine -2.4/hip neck -2.3; 5/17 Lspine -1.1; hip neck -2.2  10/2020 hip neck -2.0/ L spine -2.1  boniva started 2017      History of breast cancer 11/04/2017     2008 LEFT/ post partial mastectomy      Elevated transaminase level 06/08/2017    Fatty liver disease, nonalcoholic 10/19/4962    Hx of colonic polyps        Past Medical History:   Diagnosis Date    Acid reflux     Anxiety     [facility] ([facility]     Cancer ([facility]     liposcaarcoma, peritoneal    Gastroesophageal reflux disease without esophagitis 10/20/2021    Hyperlipidemia     Hypertension     Mild coronary artery disease 7/18/2019       Past Surgical History:   Procedure Laterality Date    BREAST LUMPECTOMY Left     CHOLECYSTECTOMY  2008    COLONOSCOPY  12/20/2010    Dr Don Hutchinson, PARTIAL Left 2008    left breast cancer    GA COLONOSCOPY FLX DX W/COLLJ SPEC WHEN PFRMD N/A 12/09/2016    Dr Vincenzo alberts, [place], 5 yr recall w/Dr Genna Park    CHRISTIANE AND BSO      alwasy normal PAP's prior to hysterectomy    TONSILLECTOMY      UPPER GASTROINTESTINAL ENDOSCOPY N/A 12/10/2020    Dr Francine Barahona normal exam; NEG h pylori    US GUIDED LIVER BIOPSY PERCUTANEOUS  12/14/2020    steatosis, + mild iron staining, grade 0, stage 0       Current Outpatient Medications   Medication Sig Dispense Refill    isosorbide mononitrate (IMDUR) 30 MG extended release tablet Take 1 tablet by mouth daily 90 tablet 3    hydroCHLOROthiazide (HYDRODIURIL) 25 MG tablet Take 1 tablet by mouth every morning 90 tablet 3    vitamin D (ERGOCALCIFEROL) 1.25 MG (16782 UT) CAPS capsule TAKE ONE CAPSULE BY MOUTH ONCE A WEEK (Patient taking differently: Take 50,000 Units by mouth once a week Takes on sunday) 12 capsule 1    rosuvastatin (CRESTOR) 20 MG tablet Take 1 tablet by mouth nightly (Patient taking differently: Take 20 mg by mouth daily ) 90 tablet 1    metoprolol tartrate (LOPRESSOR) 50 MG tablet TAKE 1 TABLET BY MOUTH EVERY 12 HOURS (Patient taking differently: Take 50 mg by mouth daily If needed) 60 tablet 5    venlafaxine (EFFEXOR XR) 150 MG extended release capsule TAKE ONE CAPSULE BY MOUTH EVERY DAY 30 capsule 0    irbesartan (AVAPRO) 300 MG tablet TAKE 1 TABLET BY MOUTH DAILY 30 tablet 5    ibandronate (BONIVA) 150 MG tablet Take 1 tablet by mouth every 30 days 3 tablet 1    cloNIDine (CATAPRES) 0.1 MG tablet Take 1 tablet by mouth 2 times daily as needed for High Blood Pressure (for BP < 180/100) 60 tablet 3    aspirin EC 81 MG EC tablet Take 81 mg by mouth daily      fluticasone (FLOVENT HFA) 110 MCG/ACT inhaler Inhale 2 puffs into the lungs 2 times daily (Patient not taking: Reported on 2021) 1 each 0    albuterol sulfate HFA (VENTOLIN HFA) 108 (90 Base) MCG/ACT inhaler Inhale 2 puffs into the lungs 4 times daily as needed for Wheezing (Patient not taking: Reported on 2021) 1 Inhaler 0    fluticasone (FLONASE) 50 MCG/ACT nasal spray 1 spray by Nasal route daily for 10 days (Patient not taking: Reported on 2021) 1 Bottle 0     No current facility-administered medications for this visit. Allergies   Allergen Reactions    Adhesive Tape Swelling    Sulfa Antibiotics Swelling and Rash     Other reaction(s): Unknown       Social History     Socioeconomic History    Marital status:      Spouse name: None    Number of children: None    Years of education: None    Highest education level: None   Occupational History    None   Tobacco Use    Smoking status: Former Smoker     Packs/day: 0.75     Years: 35.00     Pack years: 26.25     Types: Cigarettes     Quit date: 2016     Years since quittin.8    Smokeless tobacco: Never Used   Vaping Use    Vaping Use: Never used   Substance and Sexual Activity    Alcohol use: Yes     Alcohol/week: 2.0 standard drinks     Types: 2 Glasses of wine per week     Comment: weekends    Drug use: No    Sexual activity: None   Other Topics Concern    None   Social History Narrative    None     Social Determinants of Health     Financial Resource Strain: Low Risk     Difficulty of Paying Living Expenses: Not hard at all   Food Insecurity: No Food Insecurity    Worried About Running Out of Food in the Last Year: Never true    Leodan of Food in the Last Year: Never true   Transportation Needs:     Lack of Transportation (Medical):  Not grade 0, stage 0         Lab Review   Orders Only on 11/15/2021   Component Date Value    TSH 11/15/2021 2.910     Color, UA 11/15/2021 YELLOW     Clarity, UA 11/15/2021 Clear     Glucose, Ur 11/15/2021 Negative     Bilirubin Urine 11/15/2021 Negative     Ketones, Urine 11/15/2021 Negative     Specific Gravity, UA 11/15/2021 1.018     Blood, Urine 11/15/2021 Negative     pH, UA 11/15/2021 5.5     Protein, UA 11/15/2021 Negative     Urobilinogen, Urine 11/15/2021 0.2     Nitrite, Urine 11/15/2021 Negative     Leukocyte Esterase, Urine 11/15/2021 Negative     Cholesterol, Total 11/15/2021 171     Triglycerides 11/15/2021 125     HDL 11/15/2021 55*    LDL Calculated 11/15/2021 91     Sodium 11/15/2021 140     Potassium 11/15/2021 4.4     Chloride 11/15/2021 104     CO2 11/15/2021 24     Anion Gap 11/15/2021 12     Glucose 11/15/2021 106     BUN 11/15/2021 19     CREATININE 11/15/2021 0.7     GFR Non- 11/15/2021 >60     GFR  11/15/2021 >59     Calcium 11/15/2021 8.6*    Total Protein 11/15/2021 5.9*    Albumin 11/15/2021 4.1     Total Bilirubin 11/15/2021 0.3     Alkaline Phosphatase 11/15/2021 65     ALT 11/15/2021 19     AST 11/15/2021 19          Review of Systems   Constitutional: Negative for chills, fatigue and fever. HENT: Negative for congestion, ear pain, nosebleeds, postnasal drip and sore throat. Respiratory: Negative for cough, chest tightness and wheezing. Cardiovascular: Negative for chest pain, palpitations and leg swelling. Gastrointestinal: Negative for abdominal pain and constipation. Genitourinary: Negative for dysuria and urgency. Musculoskeletal: Negative. Negative for arthralgias. Skin: Negative for rash. Neurological: Negative for dizziness and headaches. Psychiatric/Behavioral: Negative.            Vitals:    11/16/21 1120   BP: 118/70   Site: Left Upper Arm   Position: Sitting   Cuff Size: Large Adult   Pulse: 68   Resp: 18   SpO2: 98%   Weight: 140 lb (63.5 kg)   Height: 5' 3\" (1.6 m)      Wt Readings from Last 3 Encounters:   11/16/21 140 lb (63.5 kg)   10/20/21 141 lb 3.2 oz (64 kg)   10/11/21 140 lb (63.5 kg)   Body mass index is 24.8 kg/m². BP Readings from Last 3 Encounters:   11/16/21 118/70   10/20/21 118/76   10/11/21 110/60       Physical Exam  Constitutional:       Appearance: She is well-developed. HENT:      Right Ear: External ear normal.      Left Ear: External ear normal.      Mouth/Throat:      Pharynx: No oropharyngeal exudate. Eyes:      Conjunctiva/sclera: Conjunctivae normal.      Pupils: Pupils are equal, round, and reactive to light. Neck:      Thyroid: No thyromegaly. Vascular: No JVD. Cardiovascular:      Rate and Rhythm: Normal rate. Heart sounds: Normal heart sounds. No murmur heard. Pulmonary:      Effort: No respiratory distress. Breath sounds: Normal breath sounds. No wheezing or rales. Chest:      Chest wall: No tenderness. Abdominal:      General: Bowel sounds are normal.      Palpations: Abdomen is soft. Musculoskeletal:      Cervical back: Neck supple. Lymphadenopathy:      Cervical: No cervical adenopathy. Skin:     Findings: No rash.      Breast exam  Bilateral breast exam- symmetric, no nodules, no lymphadenopathy, no nipple discharge              ASSESSMENT/PLAN    ANNUAL PHYSICAL  * mammogram- schedule  * PAP not needed  * cscope 12/16- repeat 5 yrs- order placed  *  BD 11/2020- repeat 3 yrs  Abnormal left ventricular global longitudinal left ventricular strain of   -16.6% per echo 9/2021  Mild Coronary artery disease as per cardiac cath July 2019  Management plans as per cardiology     Hypertension with elevated blood pressure readings during admission fall 2020  No blood pressure readings have been in good range  Kidney function is normal  Rx   Metoprolol 75 bid  Lisinopril 10 bid  Avapro 300 daily  IMDUR 30 mg daily      CAD  Recently  imdur added  BP has been better        Pure hypercholesterolemia-  Lab results called to patient after the appointment since patient did not have lab done prior to appointment  LDL in 11/2021 91  LDL is significantly elevated 187 in 7/2021  Patient has known mild coronary artery disease from 2019 cardiac cath  She obviously has not been taking her Crestor as prescribed  Importance of statin to prevent progressive coronary disease/MI is very highly recommended  Liver function tests now are normal  RX Crestor at lower dose 10 mg daily     Vitamin D deficiency-  Lab results reviewed with patient   Vitamin D level is 41 in 7/2021  Previously level low at 28  RXvitamin D 50,000 IU weekly        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HCC)  Generalized anxiety disorder-  she has been doing better,   RX  Effexor 75 mg daily     Bilateral hilar lymph node enlargement- borderline  Repeat CT scan 1/15/2021 shows chronic emphysema but no evidence of lymphadenopathy  Several tiny nodules right upper lobe  Repeat CT 1 year is recommended- intially for 1/2022  Now per pulmonology- should be able to wait until 9/2021 since had CTA in 9/2022  She will dw her pulmonology         Panlobular emphysema  Pulmonology notes reviewed independently by me and discussed with patient  Pulmonologist had also recommended patient to have modified barium swallow which was ordered but has not been performed  Recently lung function tests were ordered but have not been done yet  Barium swallow study has been scheduled for this week 11/19 and PFT has been scheduled for January 2022      Orders Placed This Encounter   Procedures    AMANDA DIGITAL SCREEN W OR WO CAD BILATERAL    Hemoglobin A1C    Comprehensive Metabolic Panel    Lipid Panel    Vitamin D 25 Hydroxy    TSH without Reflex    T4, Free    [facility] Cher[facility] Hooks, MD, Gastroenterology, [facility] lily    POCT glycosylated hemoglobin (Hb A1C)     New Prescriptions    No medications on file      There are no Patient

## 2021-11-19 ENCOUNTER — HOSPITAL ENCOUNTER (OUTPATIENT)
Dept: GENERAL RADIOLOGY | Age: 61
Discharge: HOME | End: 2021-11-19
Payer: COMMERCIAL

## 2021-11-19 DIAGNOSIS — K21.9 GASTROESOPHAGEAL REFLUX DISEASE WITHOUT ESOPHAGITIS: ICD-10-CM

## 2021-11-19 PROCEDURE — 74220 X-RAY XM ESOPHAGUS 1CNTRST: CPT

## 2021-11-23 ENCOUNTER — OFFICE VISIT (OUTPATIENT)
Dept: PULMONOLOGY | Age: 61
End: 2021-11-23
Payer: COMMERCIAL

## 2021-11-23 VITALS
SYSTOLIC BLOOD PRESSURE: 122 MMHG | HEART RATE: 67 BPM | WEIGHT: 141.4 LBS | BODY MASS INDEX: 25.05 KG/M2 | OXYGEN SATURATION: 92 % | DIASTOLIC BLOOD PRESSURE: 70 MMHG | TEMPERATURE: 98.1 F | HEIGHT: 63 IN

## 2021-11-23 DIAGNOSIS — K22.4 ESOPHAGEAL DYSMOTILITIES: ICD-10-CM

## 2021-11-23 DIAGNOSIS — R07.89 OTHER CHEST PAIN: ICD-10-CM

## 2021-11-23 DIAGNOSIS — J43.1 PANLOBULAR EMPHYSEMA (HCC): Primary | ICD-10-CM

## 2021-11-23 DIAGNOSIS — I25.10 MILD CORONARY ARTERY DISEASE: ICD-10-CM

## 2021-11-23 DIAGNOSIS — Z87.891 HISTORY OF SMOKING: ICD-10-CM

## 2021-11-23 PROCEDURE — 99213 OFFICE O/P EST LOW 20 MIN: CPT | Performed by: INTERNAL MEDICINE

## 2021-11-23 ASSESSMENT — ENCOUNTER SYMPTOMS
ABDOMINAL PAIN: 0
COUGH: 0
WHEEZING: 0
CHEST TIGHTNESS: 0
BACK PAIN: 0
APNEA: 0
RHINORRHEA: 0
ANAL BLEEDING: 0
ABDOMINAL DISTENTION: 0
SHORTNESS OF BREATH: 0

## 2021-11-23 NOTE — PROGRESS NOTES
Pulmonary and Sleep Medicine    Vergia Matter (:  1960) is a 64 y.o. female,Established patient, here for evaluation of the following chief complaint(s):  Follow-up (Swallow study results.)      Referring physician:  No referring provider defined for this encounter. ASSESSMENT/PLAN:  1. Panlobular emphysema ([address]  2. Esophageal dysmotilities  3. Other chest pain subsided. 4. History of smoking  5. Mild coronary artery disease        Her symptoms had subsided. Will see her after the PFT in January. [facility]eda [facility]    Return in about 8 weeks (around 2022). SUBJECTIVE/OBJECTIVE:  She is here for follow up on chest pain. She denies any symptoms at this time. Denies any difficulty swallowing. Denies shortness of breath. Chest pain subsided. Swallow study did show evidence of dysmotility. Prior to Visit Medications    Medication Sig Taking?  Authorizing Provider   isosorbide mononitrate (IMDUR) 30 MG extended release tablet Take 1 tablet by mouth daily Yes VAZQUEZ Goins   hydroCHLOROthiazide (HYDRODIURIL) 25 MG tablet Take 1 tablet by mouth every morning Yes VAZQUEZ Cormier   vitamin D (ERGOCALCIFEROL) 1.25 MG (87508 UT) CAPS capsule TAKE ONE CAPSULE BY MOUTH ONCE A WEEK  Patient taking differently: Take 50,000 Units by mouth once a week Takes on  Yes Halley Perez MD   rosuvastatin (CRESTOR) 20 MG tablet Take 1 tablet by mouth nightly  Patient taking differently: Take 20 mg by mouth daily  Yes Halley Perez MD   metoprolol tartrate (LOPRESSOR) 50 MG tablet TAKE 1 TABLET BY MOUTH EVERY 12 HOURS  Patient taking differently: Take 50 mg by mouth daily If needed Yes VAZQUEZ Santana - CNP   venlafaxine (EFFEXOR XR) 150 MG extended release capsule TAKE ONE CAPSULE BY MOUTH EVERY DAY Yes Halley Perez MD   irbesartan (AVAPRO) 300 MG tablet TAKE 1 TABLET BY MOUTH DAILY Yes VAZQUEZ Goins   ibandronate (BONIVA) 150 MG tablet Take 1 tablet by mouth every 30 days Yes Joaquin Lunsford MD   albuterol sulfate HFA (VENTOLIN HFA) 108 (90 Base) MCG/ACT inhaler Inhale 2 puffs into the lungs 4 times daily as needed for Wheezing Yes Joaquin Lunsford MD   cloNIDine (CATAPRES) 0.1 MG tablet Take 1 tablet by mouth 2 times daily as needed for High Blood Pressure (for BP < 180/100) Yes VAZQUEZ Hong   aspirin EC 81 MG EC tablet Take 81 mg by mouth daily Yes Historical Provider, MD   fluticasone (FLOVENT HFA) 110 MCG/ACT inhaler Inhale 2 puffs into the lungs 2 times daily  Patient not taking: Reported on 11/16/2021  VAZQUEZ Szymanski - CNP   fluticasone [facility]) 50 MCG/ACT nasal spray 1 spray by Nasal route daily for 10 days  Patient not taking: Reported on 11/16/2021  VAZQUEZ Martínez        Review of Systems   Constitutional: Negative for activity change, appetite change, chills, diaphoresis and fatigue. HENT: Negative for congestion, dental problem, drooling, ear discharge, postnasal drip and rhinorrhea. Eyes: Negative for visual disturbance. Respiratory: Negative for apnea, cough, chest tightness, shortness of breath and wheezing. Gastrointestinal: Negative for abdominal distention, abdominal pain and anal bleeding. Endocrine: Negative for cold intolerance, heat intolerance and polydipsia. Genitourinary: Negative for difficulty urinating, dysuria, enuresis and flank pain. Musculoskeletal: Negative for arthralgias, back pain and gait problem. Allergic/Immunologic: Negative for environmental allergies. Neurological: Negative for dizziness, facial asymmetry, light-headedness and headaches. Vitals:    11/23/21 1436   BP: 122/70   Pulse: 67   Temp: 98.1 °F (36.7 °C)   SpO2: 92%     Physical Exam  Vitals reviewed. Constitutional:       Appearance: Normal appearance. HENT:      Head: Normocephalic and atraumatic. Nose: Nose normal.   Eyes:      Extraocular Movements: Extraocular movements intact.       Conjunctiva/sclera: Conjunctivae normal.   Cardiovascular:      Rate and Rhythm: Normal rate and regular rhythm. Heart sounds: No murmur heard. No friction rub. Pulmonary:      Effort: Pulmonary effort is normal. No respiratory distress. Breath sounds: Normal breath sounds. No stridor. No wheezing, rhonchi or rales. Abdominal:      General: There is no distension. Palpations: There is no mass. Tenderness: There is no abdominal tenderness. There is no guarding or rebound. Musculoskeletal:      Cervical back: Normal range of motion and neck supple. Neurological:      Mental Status: She is alert and oriented to person, place, and time. This note was generated used a voice recognition software. Errors in voice recognition may have occurred. An electronic signature was used to authenticate this note.     --Regine Michael MD

## 2021-11-24 ENCOUNTER — HOSPITAL ENCOUNTER (OUTPATIENT)
Dept: WOMENS IMAGING | Age: 61
Discharge: HOME | End: 2021-11-24
Payer: COMMERCIAL

## 2021-11-24 DIAGNOSIS — Z12.39 ENCOUNTER FOR BREAST CANCER SCREENING OTHER THAN MAMMOGRAM: ICD-10-CM

## 2021-11-24 PROCEDURE — 77063 BREAST TOMOSYNTHESIS BI: CPT

## 2021-11-29 RX ORDER — ROSUVASTATIN CALCIUM 20 MG/1
20 TABLET, COATED ORAL NIGHTLY
Qty: 90 TABLET | Refills: 1 | Status: SHIPPED | OUTPATIENT
Start: 2021-11-29 | End: 2022-07-12

## 2021-11-29 RX ORDER — IRBESARTAN 300 MG/1
300 TABLET ORAL DAILY
Qty: 30 TABLET | Refills: 5 | Status: SHIPPED | OUTPATIENT
Start: 2021-11-29 | End: 2022-07-13

## 2021-12-08 ENCOUNTER — OFFICE VISIT (OUTPATIENT)
Dept: URGENT CARE | Age: 61
End: 2021-12-08
Payer: COMMERCIAL

## 2021-12-08 VITALS
WEIGHT: 145.4 LBS | RESPIRATION RATE: 18 BRPM | TEMPERATURE: 97.8 F | OXYGEN SATURATION: 96 % | HEIGHT: 63 IN | SYSTOLIC BLOOD PRESSURE: 120 MMHG | DIASTOLIC BLOOD PRESSURE: 74 MMHG | BODY MASS INDEX: 25.76 KG/M2 | HEART RATE: 61 BPM

## 2021-12-08 DIAGNOSIS — H11.32 SUBCONJUNCTIVAL HEMORRHAGE OF LEFT EYE: Primary | ICD-10-CM

## 2021-12-08 PROCEDURE — 99213 OFFICE O/P EST LOW 20 MIN: CPT | Performed by: FAMILY MEDICINE

## 2021-12-08 ASSESSMENT — PATIENT HEALTH QUESTIONNAIRE - PHQ9
2. FEELING DOWN, DEPRESSED OR HOPELESS: 0
SUM OF ALL RESPONSES TO PHQ QUESTIONS 1-9: 0
1. LITTLE INTEREST OR PLEASURE IN DOING THINGS: 0
SUM OF ALL RESPONSES TO PHQ QUESTIONS 1-9: 0
SUM OF ALL RESPONSES TO PHQ9 QUESTIONS 1 & 2: 0
SUM OF ALL RESPONSES TO PHQ QUESTIONS 1-9: 0
DEPRESSION UNABLE TO ASSESS: FUNCTIONAL CAPACITY MOTIVATION LIMITS ACCURACY

## 2021-12-08 ASSESSMENT — ENCOUNTER SYMPTOMS
EYE REDNESS: 1
EYE DISCHARGE: 1
SHORTNESS OF BREATH: 0
EYE ITCHING: 0
PHOTOPHOBIA: 0
COUGH: 0
EYE PAIN: 0

## 2021-12-08 NOTE — PROGRESS NOTES
[address]  [address] [facility] [address] [id number]  Dept: [phone]  Dept Fax: [phone]  Loc: [phone]      Subjective:     Chief Complaint   Patient presents with    Eye Problem     left       HPI:  Celena Chapman is a 64 y.o. female presents today with  A L pink eye. She states she noted it 3 days ago after sneezing really hard. Mild tearing. No pain. No light sensitivity. No change in vision. ROS:   Review of Systems   Constitutional: Negative. HENT: Negative. Eyes: Positive for discharge (mild, watery) and redness. Negative for photophobia, pain, itching and visual disturbance. Respiratory: Negative for cough and shortness of breath. Cardiovascular: Negative for chest pain. Neurological: Negative for light-headedness and headaches.        PMHx:  Past Medical History:   Diagnosis Date    Acid reflux     Anxiety     Breast cancer ([address] 2009    DCIS    Cancer ([address]     liposcaarcoma, peritoneal    Gastroesophageal reflux disease without esophagitis 10/20/2021    History of therapeutic radiation 2009    Hyperlipidemia     Hypertension     Mild coronary artery disease 7/18/2019     Patient Active Problem List   Diagnosis    Hx of colonic polyps    Elevated transaminase level    Fatty liver disease, nonalcoholic    Vitamin D deficiency    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[address]    Generalized anxiety disorder    Pure hypercholesterolemia    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[address]    Osteopenia of multiple sites    History of breast cancer    Other chest pain    Chest pressure    ACS (acute coronary syndrome) (HCC)    Mild coronary artery disease    Transaminitis    Abnormal CT of the abdomen    Fatty liver    Alcohol consumption of one to four drinks per week    Enlarged lymph nodes    Essential hypertension    Atypical chest pain    COPD (chronic obstructive pulmonary disease) (HCC)    Panlobular emphysema ([address]    History of smoking    Gastroesophageal reflux disease without esophagitis       PSHx:  Past Surgical History:   Procedure Laterality Date    BREAST BIOPSY Left 2009    DCIS    BREAST LUMPECTOMY Left     CHOLECYSTECTOMY  2008    COLONOSCOPY  2010    Dr Adriana Casillas  2008    MASTECTOMY, PARTIAL Left 2008    left breast cancer    [place] COLONOSCOPY FLX DX W/COLLJ SPEC WHEN PFRMD N/A 2016    Dr Miguel Evans access, normall, 5 yr recall w/Dr Bhavana Adan    CHRISTIANE AND BSO Bilateral     alwasy normal PAP's prior to hysterectomy age 48    TONSILLECTOMY     [address] N/A 12/10/2020    Dr Barb Briggs normal exam; NEG h pylori    US GUIDED LIVER BIOPSY PERCUTANEOUS  2020    steatosis, + mild iron staining, grade 0, stage 0       PFHx:  Family History   Problem Relation Age of Onset    Alzheimer's Disease Mother     Breast Cancer Mother 48    Heart Disease Father     Diabetes Father     Cancer Brother     Colon Cancer Neg Hx     Colon Polyps Neg Hx     Liver Cancer Neg Hx     Liver Disease Neg Hx     Esophageal Cancer Neg Hx     Rectal Cancer Neg Hx     Stomach Cancer Neg Hx        SocialHx:  Social History     Tobacco Use    Smoking status: Former Smoker     Packs/day: 0.75     Years: 35.00     Pack years: 26.25     Types: Cigarettes     Quit date:      Years since quittin.9    Smokeless tobacco: Never Used   Substance Use Topics    Alcohol use: Yes     Alcohol/week: 2.0 standard drinks     Types: 2 Glasses of wine per week     Comment: weekends       Allergies:   Allergies   Allergen Reactions    Adhesive Tape Swelling    Sulfa Antibiotics Swelling and Rash     Other reaction(s): Unknown       Medications:  Current Outpatient Medications   Medication Sig Dispense Refill    irbesartan (AVAPRO) 300 MG tablet TAKE 1 TABLET BY MOUTH DAILY 30 tablet 5    rosuvastatin (CRESTOR) 20 MG tablet TAKE 1 TABLET BY MOUTH NIGHTLY 90 tablet 1    isosorbide mononitrate (IMDUR) 30 MG extended release tablet Take 1 tablet by mouth daily 90 tablet 3    hydroCHLOROthiazide (HYDRODIURIL) 25 MG tablet Take 1 tablet by mouth every morning 90 tablet 3    vitamin D (ERGOCALCIFEROL) 1.25 MG (71997 UT) CAPS capsule TAKE ONE CAPSULE BY MOUTH ONCE A WEEK (Patient taking differently: Take 50,000 Units by mouth once a week Takes on sunday) 12 capsule 1    metoprolol tartrate (LOPRESSOR) 50 MG tablet TAKE 1 TABLET BY MOUTH EVERY 12 HOURS (Patient taking differently: Take 50 mg by mouth daily If needed) 60 tablet 5    venlafaxine (EFFEXOR XR) 150 MG extended release capsule TAKE ONE CAPSULE BY MOUTH EVERY DAY 30 capsule 0    ibandronate (BONIVA) 150 MG tablet Take 1 tablet by mouth every 30 days 3 tablet 1    albuterol sulfate HFA (VENTOLIN HFA) 108 (90 Base) MCG/ACT inhaler Inhale 2 puffs into the lungs 4 times daily as needed for Wheezing 1 Inhaler 0    cloNIDine (CATAPRES) 0.1 MG tablet Take 1 tablet by mouth 2 times daily as needed for High Blood Pressure (for BP < 180/100) 60 tablet 3    aspirin EC 81 MG EC tablet Take 81 mg by mouth daily      fluticasone (FLOVENT HFA) 110 MCG/ACT inhaler Inhale 2 puffs into the lungs 2 times daily (Patient not taking: Reported on 11/16/2021) 1 each 0    fluticasone (FLONASE) 50 MCG/ACT nasal spray 1 spray by Nasal route daily for 10 days (Patient not taking: Reported on 11/16/2021) 1 Bottle 0     No current facility-administered medications for this visit. Objective:   PE:  /74   Pulse 61   Temp 97.8 °F (36.6 °C) (Temporal)   Resp 18   Ht 5' 3\" (1.6 m)   Wt 145 lb 6.4 oz (66 kg)   SpO2 96%   BMI 25.76 kg/m²   Physical Exam  Constitutional:       General: She is not in acute distress. Appearance: Normal appearance. She is not ill-appearing. HENT:      Head: Normocephalic and atraumatic. Eyes:      Comments: + sunconjunctival hemorrhage   Cardiovascular:      Rate and Rhythm: Regular rhythm. Heart sounds: Normal heart sounds. Pulmonary:      Effort: Pulmonary effort is normal.      Breath sounds: Normal breath sounds. Abdominal:      Palpations: Abdomen is soft. Neurological:      Mental Status: She is alert. Assessment & Plan   Chantell Millard was seen today for eye problem. Diagnoses and all orders for this visit:    Subconjunctival hemorrhage of left eye      Reassured  No abx needed  Pt is not infectious, okay to go back to work    Return if symptoms worsen or fail to improve. All questions were answered. Medications, including possible adverse effects, and instructions were reviewed and  understanding was confirmed. Follow-up recommendations, including when to contact or return to office (ie; if symptoms worsen or fail to improve), were discussed and acknowledged.     Electronically signed by Nichol Zhang MD on 12/8/21 at 7:58 AM CST

## 2022-01-03 RX ORDER — METOPROLOL TARTRATE 50 MG/1
TABLET, FILM COATED ORAL
Qty: 60 TABLET | Refills: 5 | Status: SHIPPED | OUTPATIENT
Start: 2022-01-03 | End: 2022-10-24

## 2022-01-07 ENCOUNTER — HOSPITAL ENCOUNTER (OUTPATIENT)
Dept: PULMONOLOGY | Age: 62
Discharge: HOME | End: 2022-01-07

## 2022-01-17 DIAGNOSIS — Z11.59 SCREENING FOR VIRAL DISEASE: Primary | ICD-10-CM

## 2022-01-31 RX ORDER — VENLAFAXINE HYDROCHLORIDE 150 MG/1
CAPSULE, EXTENDED RELEASE ORAL
Qty: 30 CAPSULE | Refills: 5 | Status: SHIPPED | OUTPATIENT
Start: 2022-01-31 | End: 2022-05-16 | Stop reason: SDUPTHER

## 2022-01-31 NOTE — TELEPHONE ENCOUNTER
Sherron Gallon called requesting a refill of the below medication which has been pended for you:     Requested Prescriptions     Pending Prescriptions Disp Refills    venlafaxine (EFFEXOR XR) 150 MG extended release capsule [Pharmacy Med Name: VENLAFAXINE HCL  MG C 150 Capsule] 30 capsule 5     Sig: TAKE 1 CAPSULE BY MOUTH DAILY       Last Appointment Date: 11/16/2021  Next Appointment Date: 5/16/2022    Allergies   Allergen Reactions    Adhesive Tape Swelling    Sulfa Antibiotics Swelling and Rash     Other reaction(s): Unknown

## 2022-02-14 ENCOUNTER — TELEPHONE (OUTPATIENT)
Dept: PULMONOLOGY | Age: 62
End: 2022-02-14

## 2022-02-14 NOTE — TELEPHONE ENCOUNTER
I left message with pt's  informing him that Dr. June Acevedo wanted to see pt in office after her PFT was complete and that is scheduled for 4/22/22. I wanted to know if the pt was okay with her appointment being moved until after the PFT was complete. Her  said to cancel this appointment and he will have the pt call back and reschedule. Appointment has been canceled.

## 2022-02-18 ENCOUNTER — HOSPITAL ENCOUNTER (OUTPATIENT)
Dept: CT IMAGING | Age: 62
Discharge: HOME | End: 2022-02-18
Payer: COMMERCIAL

## 2022-02-18 DIAGNOSIS — R91.1 NODULE OF RIGHT LUNG: ICD-10-CM

## 2022-02-18 PROCEDURE — 71260 CT THORAX DX C+: CPT

## 2022-02-18 PROCEDURE — 6360000004 HC RX CONTRAST MEDICATION: Performed by: INTERNAL MEDICINE

## 2022-02-18 RX ADMIN — IOPAMIDOL 60 ML: 755 INJECTION, SOLUTION INTRAVENOUS at 10:02

## 2022-02-23 DIAGNOSIS — R59.9 ENLARGED LYMPH NODES: Primary | ICD-10-CM

## 2022-02-23 DIAGNOSIS — J44.9 COPD MIXED TYPE (HCC): ICD-10-CM

## 2022-02-23 DIAGNOSIS — R93.89 ABNORMAL CT OF THE CHEST: ICD-10-CM

## 2022-02-25 ENCOUNTER — OFFICE VISIT (OUTPATIENT)
Dept: CARDIOLOGY CLINIC | Age: 62
End: 2022-02-25
Payer: COMMERCIAL

## 2022-02-25 VITALS
HEIGHT: 63 IN | SYSTOLIC BLOOD PRESSURE: 142 MMHG | BODY MASS INDEX: 25.87 KG/M2 | HEART RATE: 100 BPM | WEIGHT: 146 LBS | DIASTOLIC BLOOD PRESSURE: 100 MMHG

## 2022-02-25 DIAGNOSIS — I10 ESSENTIAL HYPERTENSION: Primary | ICD-10-CM

## 2022-02-25 PROCEDURE — 99214 OFFICE O/P EST MOD 30 MIN: CPT | Performed by: INTERNAL MEDICINE

## 2022-02-25 RX ORDER — AMLODIPINE BESYLATE 2.5 MG/1
2.5 TABLET ORAL DAILY
Qty: 30 TABLET | Refills: 5 | Status: SHIPPED | OUTPATIENT
Start: 2022-02-25 | End: 2022-05-16 | Stop reason: SDUPTHER

## 2022-02-25 ASSESSMENT — ENCOUNTER SYMPTOMS
EYE DISCHARGE: 0
CONSTIPATION: 0
SHORTNESS OF BREATH: 0
ABDOMINAL DISTENTION: 0
VOMITING: 0
DIARRHEA: 0
WHEEZING: 0
COUGH: 0
BLOOD IN STOOL: 0
BACK PAIN: 0

## 2022-02-25 NOTE — PROGRESS NOTES
[facility]  Cardiology Office Note  [address]  [id number]  Phone: [phone]  Fax: [phone]                            Date:  2/25/2022  Patient: Lawrence Neal  Age:  64 y.o., 1960    Referral: No ref.  provider found      PROBLEM LIST:    Patient Active Problem List    Diagnosis Date Noted    ACS [facility]acute coronary syndrome) ([facility] 07/13/2019     Priority: High    Chest pressure 06/19/2018     Priority: High    Panlobular emphysema ([facility] 10/20/2021     Priority: Low    History of smoking 10/20/2021     Priority: Low    Gastroesophageal reflux disease without esophagitis 10/20/2021     Priority: Low    Atypical chest pain 09/02/2021     Priority: Low    COPD (chronic obstructive pulmonary disease) ([facility] 09/02/2021     Priority: Low    Enlarged lymph nodes 07/19/2021     Priority: Low    Essential hypertension 07/19/2021     Priority: Low    Transaminitis 11/18/2020     Priority: Low    Abnormal CT of the abdomen 11/18/2020     Priority: Low    Fatty liver 11/18/2020     Priority: Low    Alcohol consumption of one to four drinks per week 11/18/2020     Priority: Low    Mild coronary artery disease 07/18/2019     Priority: Low    Other chest pain 06/18/2018     Priority: Low     Overview Note:     12/30/2016  SE negative for myocardial ischemia  6/19/18  SE negative for myocardial ischemia  7/13/19 ACS FERNANDO RISK Score 2 (angina, + troponin ), AUC indication 3, AUC score 7   7/14/19  Cath  Mild CAD, normal LVFX      Vitamin D deficiency 11/04/2017     Priority: Low    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[facility] 11/04/2017     Priority: Low    Generalized anxiety disorder 11/04/2017     Priority: Low    Pure hypercholesterolemia 11/04/2017     Priority: Low    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[facility] 11/04/2017     Priority: Low     Overview Note:     DX 2007; post extensive surgery/ hysterectomy      Osteopenia of multiple sites 11/04/2017     Priority: Low Overview Note:     2013 Lspine -2.4/hip neck -2.3; 5/17 Lspine -1.1; hip neck -2.2  10/2020 hip neck -2.0/ L spine -2.1  boniva started 2017      History of breast cancer 11/04/2017     Priority: Low     Overview Note:     2008 LEFT/ post partial mastectomy      Elevated transaminase level 06/08/2017     Priority: Low    Fatty liver disease, nonalcoholic 44/58/2090     Priority: Low    Hx of colonic polyps      Priority: Low     1. Hypertension with normal LV systolic function, normal coronary arteries by catheterization 7/14/2019.  2. History of breast cancer, CHRISTIANE and BSO. 3. Prior history of tobacco use stopped 2016.  4. Family history of premature coronary artery disease.       PRESENTATION: Edmond Lau is a 64y.o. year old female presents for follow-up evaluation. For the most part she has been doing fairly well since her hospitalization 9/2021. No further recurrent chest pains especially on Imdur. Blood pressure has been fairly well controlled. Today she is slightly stressed that she came in late. 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 No leg swelling. REVIEW OF SYSTEMS:  Review of Systems   Constitutional: Negative for activity change, fatigue and fever. HENT: Negative for ear pain, hearing loss and tinnitus. Eyes: Negative for discharge and visual disturbance. Respiratory: Negative for cough, shortness of breath and wheezing. Cardiovascular: Negative for chest pain, palpitations and leg swelling. Gastrointestinal: Negative for abdominal distention, blood in stool, constipation, diarrhea and vomiting. Endocrine: Negative for cold intolerance, heat intolerance, polydipsia and polyuria. Genitourinary: Negative for dysuria and hematuria. Musculoskeletal: Negative for arthralgias, back pain and myalgias. Skin: Negative for pallor and rash. Neurological: Negative for seizures, syncope, weakness and headaches.    Psychiatric/Behavioral: Negative for behavioral problems and dysphoric mood.        Past Medical History:      Diagnosis Date    Acid reflux     Anxiety     Breast cancer ([facility] 2009    DCIS    Cancer ([facility]     liposcaarcoma, peritoneal    Gastroesophageal reflux disease without esophagitis 10/20/2021    History of therapeutic radiation 2009    Hyperlipidemia     Hypertension     Mild coronary artery disease 7/18/2019       Past Surgical History:      Procedure Laterality Date    BREAST BIOPSY Left 2009    DCIS    BREAST LUMPECTOMY Left     CHOLECYSTECTOMY  2008    COLONOSCOPY  12/20/2010    Dr Karin Arellano  2008    MASTECTOMY, PARTIAL Left 2008    left breast cancer    [place] COLON[place] FLX DX W/COLLJ SPEC WHEN PFRMD N/A 12/09/2016    Dr Butler [facility] access, normall, 5 yr recall w/Dr Dionicio Adan    CHRISTIANE AND BSO Bilateral 2008    alwasy normal PAP's prior to hysterectomy age 48    TONSILLECTOMY      UPPER GASTROINTESTINAL ENDOSCOPY N/A 12/10/2020    Dr Adrian Nava normal exam; NEG h pylori    US GUIDED LIVER BIOPSY PERCUTANEOUS  12/14/2020    steatosis, + mild iron staining, grade 0, stage 0       Medications:  Current Outpatient Medications   Medication Sig Dispense Refill    amLODIPine (NORVASC) 2.5 MG tablet Take 1 tablet by mouth daily 30 tablet 5    venlafaxine (EFFEXOR XR) 150 MG extended release capsule TAKE 1 CAPSULE BY MOUTH DAILY 30 capsule 5    metoprolol tartrate (LOPRESSOR) 50 MG tablet TAKE 1 TABLET BY MOUTH EVERY 12 HOURS 60 tablet 5    irbesartan (AVAPRO) 300 MG tablet TAKE 1 TABLET BY MOUTH DAILY 30 tablet 5    rosuvastatin (CRESTOR) 20 MG tablet TAKE 1 TABLET BY MOUTH NIGHTLY 90 tablet 1    isosorbide mononitrate (IMDUR) 30 MG extended release tablet Take 1 tablet by mouth daily 90 tablet 3    hydroCHLOROthiazide (HYDRODIURIL) 25 MG tablet Take 1 tablet by mouth every morning 90 tablet 3    vitamin D (ERGOCALCIFEROL) 1.25 MG (69026 UT) CAPS capsule TAKE ONE CAPSULE BY MOUTH ONCE A WEEK (Patient taking differently: Take 50,000 Units by mouth once a week Takes on ) 12 capsule 1    ibandronate (BONIVA) 150 MG tablet Take 1 tablet by mouth every 30 days 3 tablet 1    albuterol sulfate HFA (VENTOLIN HFA) 108 (90 Base) MCG/ACT inhaler Inhale 2 puffs into the lungs 4 times daily as needed for Wheezing 1 Inhaler 0    cloNIDine (CATAPRES) 0.1 MG tablet Take 1 tablet by mouth 2 times daily as needed for High Blood Pressure (for BP < 180/100) 60 tablet 3    aspirin EC 81 MG EC tablet Take 81 mg by mouth daily      fluticasone (FLOVENT HFA) 110 MCG/ACT inhaler Inhale 2 puffs into the lungs 2 times daily (Patient not taking: Reported on 2021) 1 each 0    fluticasone (FLONASE) 50 MCG/ACT nasal spray 1 spray by Nasal route daily for 10 days (Patient not taking: Reported on 2021) 1 Bottle 0     No current facility-administered medications for this visit. Allergies:  Adhesive tape and Sulfa antibiotics    Past Social History:  Social History     Socioeconomic History    Marital status:      Spouse name: Not on file    Number of children: Not on file    Years of education: Not on file    Highest education level: Not on file   Occupational History    Not on file   Tobacco Use    Smoking status: Former Smoker     Packs/day: 0.75     Years: 35.00     Pack years: 26.25     Types: Cigarettes     Quit date:      Years since quittin.1    Smokeless tobacco: Never Used   Vaping Use    Vaping Use: Never used   Substance and Sexual Activity    Alcohol use:  Yes     Alcohol/week: 2.0 standard drinks     Types: 2 Glasses of wine per week     Comment: weekends    Drug use: No    Sexual activity: Not on file   Other Topics Concern    Not on file   Social History Narrative    Not on file     Social Determinants of Health     Financial Resource Strain: Low Risk     Difficulty of Paying Living Expenses: Not hard at all   Food Insecurity: No Food Insecurity    Worried About Running Out of Food in the Last Year: Never true    Ran Out of Food in the Last Year: Never true   Transportation Needs:     Lack of Transportation (Medical): Not on file    Lack of Transportation (Non-Medical): Not on file   Physical Activity:     Days of Exercise per Week: Not on file    Minutes of Exercise per Session: Not on file   Stress:     Feeling of Stress : Not on file   Social Connections:     Frequency of Communication with Friends and Family: Not on file    Frequency of Social Gatherings with Friends and Family: Not on file    Attends Hinduism Services: Not on file    Active Member of [address] [place] [address] [employer] or Organizations: Not on file    Attends Club or Organization Meetings: Not on file    Marital Status: Not on file   Intimate Partner Violence:     Fear of Current or Ex-Partner: Not on file    Emotionally Abused: Not on file    Physically Abused: Not on file    Sexually Abused: Not on file   Housing Stability:     Unable to Pay for Housing in the Last Year: Not on file    Number of [place] in the Last Year: Not on file    Unstable Housing in the Last Year: Not on file       Family History:       Problem Relation Age of Onset    Alzheimer's Disease Mother     Breast Cancer Mother 48    Heart Disease Father     Diabetes Father     Cancer Brother     Colon Cancer Neg Hx     Colon Polyps Neg Hx     Liver Cancer Neg Hx     Liver Disease Neg Hx     Esophageal Cancer Neg Hx     Rectal Cancer Neg Hx     Stomach Cancer Neg Hx          Physical Examination:  BP (!) 142/100 (Site: Right Upper Arm, Position: Sitting)   Pulse 100   Ht 5' 3\" (1.6 m)   Wt 146 lb (66.2 kg)   BMI 25.86 kg/m²   Physical Exam  Constitutional:       General: She is not in acute distress. Appearance: She is not diaphoretic. Comments: Normal build  Blood pressure right arm sitting 180/100 mmHg, pulse 84 bpm regular   HENT:      Mouth/Throat:      Pharynx: No oropharyngeal exudate.    Eyes:      General: No scleral icterus. Right eye: No discharge. Left eye: No discharge. Neck:      Thyroid: No thyromegaly. Vascular: No JVD. Cardiovascular:      Rate and Rhythm: Normal rate and regular rhythm. No extrasystoles are present. Heart sounds: Normal heart sounds, S1 normal and S2 normal. No murmur heard. No systolic murmur is present. No diastolic murmur is present. No friction rub. No gallop. No S3 or S4 sounds. Comments: No JVD  No edema    Pulmonary:      Effort: Pulmonary effort is normal. No respiratory distress. Breath sounds: Normal breath sounds. No wheezing or rales. Chest:      Chest wall: No tenderness. Abdominal:      General: Bowel sounds are normal. There is no distension. Palpations: Abdomen is soft. There is no mass. Tenderness: There is no abdominal tenderness. There is no guarding or rebound. Hernia: No hernia is present. Comments: No palpable organomegaly  No bruits noted   Musculoskeletal:         General: Normal range of motion. Skin:     General: Skin is warm. Coloration: Skin is not pale. Findings: No rash. Neurological:      Mental Status: She is alert and oriented to person, place, and time. Cranial Nerves: No cranial nerve deficit. Deep Tendon Reflexes: Reflexes normal.           Labs:   CBC: No results for input(s): WBC, HGB, HCT, PLT in the last 72 hours. BMP:No results for input(s): NA, K, CO2, BUN, CREATININE, LABGLOM, GLUCOSE in the last 72 hours. BNP: No results for input(s): BNP in the last 72 hours. PT/INR: No results for input(s): PROTIME, INR in the last 72 hours. APTT:No results for input(s): APTT in the last 72 hours. CARDIAC ENZYMES:No results for input(s): CKTOTAL, CKMB, CKMBINDEX, TROPONINI in the last 72 hours.   FASTING LIPID PANEL:  Lab Results   Component Value Date    HDL 55 11/15/2021    LDLCALC 91 11/15/2021    TRIG 125 11/15/2021     LIVER PROFILE:No results for input(s): AST, ALT, BETH in the last 72 hours. Imaging:  [facility] Nuclear Stress Test Report   Procedure date: 9/3/2021   Indications: chest pain   Procedure: Stress was performed with injection of 0.4 mg Lexiscan. Vital signs and EKG were monitored. Technetium-99 sestamibi was   injected in divided doses, 9.95 mCi and 32.50 mCi respectively for   rest and stress imaging. The patient was discharged back to room 314   in stable condition. Results: Patient had symptoms of midsternal chest pressure during   infusion that resolved in recovery. Baseline EKG showed normal sinus   rhythm with nonspecific ST/T changes. During stress there were no   significant EKG changes or rhythm changes. Baseline and peak blood   pressures were 97/56, and 110/69 respectively.  Baseline and peak   heart rates were 63 and  93 respectively. Lexiscan/Cardiolyte Nuclear Medicine Report   Date of Procedure: 9/3/2021   The patient was injected with 7.77 millicuries (mCi) of Technetium   (Tc99m).  After an appropriate level of stress the patient was   re-injected with 33.30 millicuries (mCi) of Technetium (Tc99m). Repeat gated images were then performed per standard protocol. Findings:   1.  Analysis of the the stress and rest images reveals no obvious   defects on stress and rest images. 2.  Analysis of the gated images reveals grossly normal left   ventricular function with a calculated ejection fraction of 75 %.         Impression   Impression:   There is no obvious infarct or ischemia, with a calculated ejection   fraction of 75 %. Suggest: Clinical correlation with consideration for medical   management. Signed by Dr Kojo Gonzalez         Procedure     Type of Study      TTE procedure:ECHO NO CONTRAST WITH DOP/COLR.      Study Location: Echo Lab  Technical Quality: Adequate visualization     Patient Status: Inpatient     BP: 96/54 mmHg     Indications:Chest pain and Dyspnea/SOB.      Conclusions      Summary   Normal left ventricular accuracy, there may be errors in the transcription that are not intended.

## 2022-03-04 DIAGNOSIS — Z11.59 SCREENING FOR VIRAL DISEASE: ICD-10-CM

## 2022-03-04 LAB — SARS-COV-2, PCR: NOT DETECTED

## 2022-03-07 ENCOUNTER — ANESTHESIA (OUTPATIENT)
Dept: OPERATING ROOM | Age: 62
End: 2022-03-07

## 2022-03-07 ENCOUNTER — HOSPITAL ENCOUNTER (OUTPATIENT)
Age: 62
Setting detail: SPECIMEN
Discharge: HOME | End: 2022-03-07
Payer: COMMERCIAL

## 2022-03-07 ENCOUNTER — HOSPITAL ENCOUNTER (OUTPATIENT)
Age: 62
Setting detail: OUTPATIENT SURGERY
Discharge: HOME | End: 2022-03-07
Attending: INTERNAL MEDICINE | Admitting: INTERNAL MEDICINE
Payer: COMMERCIAL

## 2022-03-07 ENCOUNTER — ANESTHESIA EVENT (OUTPATIENT)
Dept: OPERATING ROOM | Age: 62
End: 2022-03-07

## 2022-03-07 ENCOUNTER — APPOINTMENT (OUTPATIENT)
Dept: OPERATING ROOM | Age: 62
End: 2022-03-07

## 2022-03-07 VITALS
BODY MASS INDEX: 24.8 KG/M2 | HEART RATE: 60 BPM | WEIGHT: 140 LBS | RESPIRATION RATE: 20 BRPM | OXYGEN SATURATION: 96 % | SYSTOLIC BLOOD PRESSURE: 106 MMHG | DIASTOLIC BLOOD PRESSURE: 69 MMHG | TEMPERATURE: 98 F | HEIGHT: 63 IN

## 2022-03-07 VITALS
DIASTOLIC BLOOD PRESSURE: 67 MMHG | SYSTOLIC BLOOD PRESSURE: 104 MMHG | RESPIRATION RATE: 12 BRPM | OXYGEN SATURATION: 98 %

## 2022-03-07 PROCEDURE — 45380 COLONOSCOPY AND BIOPSY: CPT

## 2022-03-07 PROCEDURE — 88305 TISSUE EXAM BY PATHOLOGIST: CPT

## 2022-03-07 PROCEDURE — 45380 COLONOSCOPY AND BIOPSY: CPT | Performed by: INTERNAL MEDICINE

## 2022-03-07 RX ORDER — ONDANSETRON 2 MG/ML
4 INJECTION INTRAMUSCULAR; INTRAVENOUS
Status: DISCONTINUED | OUTPATIENT
Start: 2022-03-07 | End: 2022-03-07 | Stop reason: HOSPADM

## 2022-03-07 RX ORDER — SODIUM CHLORIDE 0.9 % (FLUSH) 0.9 %
5-40 SYRINGE (ML) INJECTION EVERY 12 HOURS SCHEDULED
Status: DISCONTINUED | OUTPATIENT
Start: 2022-03-07 | End: 2022-03-07 | Stop reason: HOSPADM

## 2022-03-07 RX ORDER — SODIUM CHLORIDE 0.9 % (FLUSH) 0.9 %
5-40 SYRINGE (ML) INJECTION PRN
Status: DISCONTINUED | OUTPATIENT
Start: 2022-03-07 | End: 2022-03-07 | Stop reason: HOSPADM

## 2022-03-07 RX ORDER — PROPOFOL 10 MG/ML
INJECTION, EMULSION INTRAVENOUS PRN
Status: DISCONTINUED | OUTPATIENT
Start: 2022-03-07 | End: 2022-03-07 | Stop reason: SDUPTHER

## 2022-03-07 RX ORDER — LIDOCAINE HYDROCHLORIDE 10 MG/ML
INJECTION, SOLUTION EPIDURAL; INFILTRATION; INTRACAUDAL; PERINEURAL PRN
Status: DISCONTINUED | OUTPATIENT
Start: 2022-03-07 | End: 2022-03-07 | Stop reason: SDUPTHER

## 2022-03-07 RX ORDER — DIPHENHYDRAMINE HYDROCHLORIDE 50 MG/ML
12.5 INJECTION INTRAMUSCULAR; INTRAVENOUS
Status: DISCONTINUED | OUTPATIENT
Start: 2022-03-07 | End: 2022-03-07 | Stop reason: HOSPADM

## 2022-03-07 RX ORDER — SODIUM CHLORIDE 9 MG/ML
25 INJECTION, SOLUTION INTRAVENOUS PRN
Status: DISCONTINUED | OUTPATIENT
Start: 2022-03-07 | End: 2022-03-07 | Stop reason: HOSPADM

## 2022-03-07 RX ORDER — SODIUM CHLORIDE 9 MG/ML
INJECTION, SOLUTION INTRAVENOUS CONTINUOUS
Status: DISCONTINUED | OUTPATIENT
Start: 2022-03-07 | End: 2022-03-07 | Stop reason: HOSPADM

## 2022-03-07 RX ADMIN — LIDOCAINE HYDROCHLORIDE 30 MG: 10 INJECTION, SOLUTION EPIDURAL; INFILTRATION; INTRACAUDAL; PERINEURAL at 07:39

## 2022-03-07 RX ADMIN — SODIUM CHLORIDE: 9 INJECTION, SOLUTION INTRAVENOUS at 06:57

## 2022-03-07 RX ADMIN — PROPOFOL 180 MG: 10 INJECTION, EMULSION INTRAVENOUS at 07:39

## 2022-03-07 NOTE — H&P
Patient Name: Laure Fields  : 1960  MRN: [id number]  DATE: 22    Allergies: Allergies   Allergen Reactions    Adhesive Tape Swelling    Sulfa Antibiotics Swelling and Rash     Other reaction(s): Unknown        ENDOSCOPY  History and Physical    Procedure:    [] Diagnostic Colonoscopy       [x] Screening Colonoscopy  [] EGD      [] ERCP      [] EUS       [] Other    [x] Previous office notes/History and Physical reviewed from the patients chart. Please see EMR for further details of HPI. I have examined the patient's status immediately prior to the procedure and:      Indications/HPI:       [x] Screening              [x] History of Polyps      []Fhx of colon CA/polyps []+Cologard/DNA/Stool Testing      Anesthesia:   [x] MAC [] Moderate Sedation   [] General   [] None     ROS: 12 pt review of systems was negative unless stated above    Medications:   Prior to Admission medications    Medication Sig Start Date End Date Taking?  Authorizing Provider   amLODIPine (NORVASC) 2.5 MG tablet Take 1 tablet by mouth daily 22  Yes Tressa Fu MD   venlafaxine (EFFEXOR XR) 150 MG extended release capsule TAKE 1 CAPSULE BY MOUTH DAILY 22  Yes Tracee Mccollum MD   metoprolol tartrate (LOPRESSOR) 50 MG tablet TAKE 1 TABLET BY MOUTH EVERY 12 HOURS 1/3/22  Yes VAZQUEZ Varghese   irbesartan (AVAPRO) 300 MG tablet TAKE 1 TABLET BY MOUTH DAILY 21  Yes VAZQUEZ Nash - CNP   rosuvastatin (CRESTOR) 20 MG tablet TAKE 1 TABLET BY MOUTH NIGHTLY 21  Yes Tracee Mccollum MD   isosorbide mononitrate (IMDUR) 30 MG extended release tablet Take 1 tablet by mouth daily 10/11/21  Yes VAZQUEZ Reyes   fluticasone [facility] HFA) 110 MCG/ACT inhaler Inhale 2 puffs into the lungs 2 times daily 9/3/21 3/7/22 Yes VAZQUEZ Saravia CNP   hydroCHLOROthiazide (HYDRODIURIL) 25 MG tablet Take 1 tablet by mouth every morning 21  Yes VAZQUEZ Varghese   vitamin D (ERGOCALCIFEROL) 1.25 MG (97602 UT) CAPS capsule TAKE ONE CAPSULE BY MOUTH ONCE A WEEK  Patient taking differently: Take 50,000 Units by mouth once a week Takes on sunday 8/12/21  Yes Thomas Le MD   ibandronate (BONIVA) 150 MG tablet Take 1 tablet by mouth every 30 days 3/8/21  Yes Thomas Le MD   albuterol sulfate HFA (VENTOLIN HFA) 108 (90 Base) MCG/ACT inhaler Inhale 2 puffs into the lungs 4 times daily as needed for Wheezing 3/5/21  Yes Thomas Le MD   cloNIDine (CATAPRES) 0.1 MG tablet Take 1 tablet by mouth 2 times daily as needed for High Blood Pressure (for BP < 180/100) 2/3/21  Yes VAZQUEZ Casey   aspirin EC 81 MG EC tablet Take 81 mg by mouth daily   Yes Historical Provider, MD   fluticasone (FLONASE) 50 MCG/ACT nasal spray 1 spray by Nasal route daily for 10 days  Patient not taking: Reported on 11/16/2021 8/31/18 10/11/21  VAZQUEZ Munguia       Past Medical History:  Past Medical History:   Diagnosis Date    Acid reflux     Anxiety     Breast cancer ([facility] [address] 2009    DCIS    Cancer ([facility] [address]     liposcaarcoma, peritoneal    Gastroesophageal reflux disease without esophagitis 10/20/2021    History of therapeutic radiation 2009    Hyperlipidemia     Hypertension     Mild coronary artery disease 7/18/2019       Past Surgical History:  Past Surgical History:   Procedure Laterality Date    BREAST BIOPSY Left 2009    DCIS    BREAST LUMPECTOMY Left     CHOLECYSTECTOMY  2008    COLONOSCOPY  12/20/2010    Dr Gary Barker  2008    MASTECTOMY, PARTIAL Left 2008    left breast cancer    [place] COLONOSCOPY FLX DX W/COLLJ SPEC WHEN PFRMD N/A 12/09/2016    Dr Abimael Garcia access, normall, 5 yr recall w/Dr Annette Hernandez    CHRISTIANE AND BSO Bilateral 2008    alwasy normal PAP's prior to hysterectomy age 48    TONSILLECTOMY     Mark Menendez N/A 12/10/2020    Dr Maia Mullen normal exam; NEG h pylori    US GUIDED LIVER BIOPSY PERCUTANEOUS  12/14/2020    steatosis, + mild iron staining, grade 0, stage 0       Social History:  Social History     Tobacco Use    Smoking status: Former Smoker     Packs/day: 0.75     Years: 35.00     Pack years: 26.25     Types: Cigarettes     Quit date:      Years since quittin.1    Smokeless tobacco: Never Used   Vaping Use    Vaping Use: Never used   Substance Use Topics    Alcohol use: Yes     Alcohol/week: 2.0 standard drinks     Types: 2 Glasses of wine per week     Comment: weekends    Drug use: No       Vital Signs:   Vitals:    22 0646   BP: 109/73   Pulse: 69   Resp: 18   Temp: 97.4 °F (36.3 °C)   SpO2: 95%        Physical Exam:  Cardiac:  [x]WNL []Comments:  Pulmonary:  [x]WNL   []Comments:  Neuro/Mental Status:  [x]WNL  []Comments:  Abdominal:  [x]WNL    []Comments:  Other:   []WNL  []Comments:    Informed Consent:  The risks and benefits of the procedure have been discussed with either the patient or if they cannot consent, their representative. Assessment:  Patient examined and appropriate for planned sedation and procedure. Plan:  Proceed with planned sedation and procedure as above. Melissa Espinoza am scribing for and in the presence of Dr. Sandrine Sousa MD.  Electronically signed by Jaun Gordillo RN on 3/7/2022 at 7:26 AM    I personally performed the services described in this documentation as scribed by Cher Dhillon, and it appears accurate and complete.      Sandrine Sousa MD  3/7/2022

## 2022-03-07 NOTE — OP NOTE
Patient: João Castelan : 1960  Med Rec#: [id number] Acc#: [id number]   Primary Care Provider Ozzy Saeed MD    Date of Procedure:  3/7/2022    Endoscopist: Zarina Noriega MD    Referring Provider: Ozzy Saeed MD    Operation Performed: Colonoscopy with biopsy    Indications: Screening- hx of polyps    Anesthesia:  Sedation was administered by anesthesia who monitored the patient during the procedure. I met with João Castelan prior to procedure. We discussed the procedure itself, and I have discussed the risks of endoscopy (including-- but not limited to-- pain, discomfort, bleeding potentially requiring second endoscopic procedure and/or blood transfusion, organ perforation requiring operative repair, damage to organs near the colon, infection, aspiration, cardiopulmonary/allergic reaction), benefits, indications to endoscopy. Additionally, we discussed options other than colonoscopy. The patient expressed understanding. All questions answered. The patient decided to proceed with the procedure. Signed informed consent was placed on the chart. Blood Loss: minimal    Withdrawal time: > 6 min  Bowel Prep: adequate     Complications: no immediate complications    DESCRIPTION OF PROCEDURE:     A time out was performed. After written informed consent was obtained, the patient was placed in the left lateral position. The perianal area was inspected, and a digital rectal exam was performed. A rectal exam was performed: normal tone, no palpable lesions. At this point, a forward viewing Olympus colonoscope was inserted into the anus and carefully advanced to the cecum. The cecum was identified by the ileocecal valve and the appendiceal orifice. The colonoscope was then slowly withdrawn with careful inspection of the mucosa in a linear and circumferential fashion. The scope was retroflexed in the rectum.  Suction was utilized during the procedure to remove as much air as possible from the bowel. The colonoscope was removed from the patient, and the procedure was terminated. Findings are listed below. Findings: The mucosa appeared normal throughout the entire examined colon. In the transverse colon, a 3 mm sessile polyp was removed completely with forceps polypectomy. There was evidence of diverticular disease throughout the left colon. Retroflexion in the rectum was normal and revealed no further abnormalities. Recommendations:  1. Repeat colonoscopy: pending pathology - 5 years  2. Await biopsy results-you will receive a letter with your results. Findings and recommendations were discussed w/ the patient. A copy of the images was provided. Ariana Degroot am scribing for and in the presence of Dr. Jillian Reyes MD.  Electronically signed by Conner Hearn RN on 3/7/2022 at 7:27 AM    I personally performed the services described in this documentation as scribed by Yu Moreau, and it appears accurate and complete.      Jillian Reyes MD  3/7/2022

## 2022-03-07 NOTE — ANESTHESIA PRE PROCEDURE
Department of Anesthesiology  Preprocedure Note       Name:  Jules Ag   Age:  64 y.o.  :  1960                                          MRN:  [id number]         Date:  3/7/2022      Surgeon: Pan Daniel):  Sommer Cerrato MD    Procedure: Procedure(s):  COLORECTAL CANCER SCREENING, NOT HIGH RISK    Medications prior to admission:   Prior to Admission medications    Medication Sig Start Date End Date Taking?  Authorizing Provider   amLODIPine (NORVASC) 2.5 MG tablet Take 1 tablet by mouth daily 22   Katharine Callejas MD   venlafaxine (EFFEXOR XR) 150 MG extended release capsule TAKE 1 CAPSULE BY MOUTH DAILY 22   Joi Middleton MD   metoprolol tartrate (LOPRESSOR) 50 MG tablet TAKE 1 TABLET BY MOUTH EVERY 12 HOURS 1/3/22   VAZQUEZ Bah   irbesartan (AVAPRO) 300 MG tablet TAKE 1 TABLET BY MOUTH DAILY 21   VAZQUEZ Stark - CNP   rosuvastatin (CRESTOR) 20 MG tablet TAKE 1 TABLET BY MOUTH NIGHTLY 21   Joi Middleton MD   isosorbide mononitrate (IMDUR) 30 MG extended release tablet Take 1 tablet by mouth daily 10/11/21   VAZQUEZ Garcia   fluticasone [facility] HFA) 110 MCG/ACT inhaler Inhale 2 puffs into the lungs 2 times daily 9/3/21 3/7/22  VAZQUEZ Segovia - PJ   hydroCHLOROthiazide (HYDRODIURIL) 25 MG tablet Take 1 tablet by mouth every morning 21   VAZQUEZ Bah   vitamin D (ERGOCALCIFEROL) 1.25 MG (78292 UT) CAPS capsule TAKE ONE CAPSULE BY MOUTH ONCE A WEEK  Patient taking differently: Take 50,000 Units by mouth once a week Takes on 21   Joi Middleton MD   ibandronate (BONIVA) 150 MG tablet Take 1 tablet by mouth every 30 days 3/8/21   Joi Middleton MD   albuterol sulfate HFA (VENTOLIN HFA) 108 (90 Base) MCG/ACT inhaler Inhale 2 puffs into the lungs 4 times daily as needed for Wheezing 3/5/21   Joi Middleton MD   cloNIDine (CATAPRES) 0.1 MG tablet Take 1 tablet by mouth 2 times daily as needed for High Blood Pressure (for BP < 180/100) 2/3/21   Justen Hunter VAZQUEZ Morton   aspirin EC 81 MG EC tablet Take 81 mg by mouth daily    Historical Provider, MD   fluticasone (FLONASE) 50 MCG/ACT nasal spray 1 spray by Nasal route daily for 10 days  Patient not taking: Reported on 11/16/2021 8/31/18 10/11/21  VAZQUEZ Holder       Current medications:    No current facility-administered medications for this visit. No current outpatient medications on file. Facility-Administered Medications Ordered in Other Visits   Medication Dose Route Frequency Provider Last Rate Last Admin    0.9 % sodium chloride infusion   IntraVENous Continuous Tyler Mann  mL/hr at 03/07/22 0657 New Bag at 03/07/22 0657       Allergies:     Allergies   Allergen Reactions    Adhesive Tape Swelling    Sulfa Antibiotics Swelling and Rash     Other reaction(s): Unknown       Problem List:    Patient Active Problem List   Diagnosis Code    Hx of colonic polyps Z86.010    Elevated transaminase level R74.01    Fatty liver disease, nonalcoholic H18.2    Vitamin D deficiency E55.9    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    Generalized anxiety disorder F41.1    Pure hypercholesterolemia E78.00    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    Osteopenia of multiple sites M85.89    History of breast cancer Z85.3    Other chest pain R07.89    Chest pressure R07.89    ACS (acute coronary syndrome) ([facility] [address] I24.9    Mild coronary artery disease I25.10    Transaminitis R74.01    Abnormal CT of the abdomen R93.5    Fatty liver K76.0    Alcohol consumption of one to four drinks per week Z78.9    Enlarged lymph nodes R59.9    Essential hypertension I10    Atypical chest pain R07.89    COPD (chronic obstructive pulmonary disease) ([facility]) J44.9    Panlobular emphysema ([facility]) J43.1    History of smoking Z87.891    Gastroesophageal reflux disease without esophagitis K21.9       Past Medical History:        Diagnosis Date    Acid reflux     Anxiety     Breast cancer ([facility] [address] 2009    DCIS    Cancer ([facility] [address]     liposcaarcoma, peritoneal    Gastroesophageal reflux disease without esophagitis 10/20/2021    History of therapeutic radiation     Hyperlipidemia     Hypertension     Mild coronary artery disease 2019       Past Surgical History:        Procedure Laterality Date    BREAST BIOPSY Left 2009    DCIS    BREAST LUMPECTOMY Left     CHOLECYSTECTOMY  2008    COLONOSCOPY  2010    Dr Fabricio Madrid  2008    MASTECTOMY, PARTIAL Left 2008    left breast cancer    [place] COLONOSCOPY FLX DX W/COLLJ SPEC WHEN PFRMD N/A 2016    Dr Cachorro House access, normall, 5 yr recall w/Dr Rahman Ratel    CHRISTIANE AND BSO Bilateral 2008    alwasy normal PAP's prior to hysterectomy age 48    TONSILLECTOMY      UPPER GASTROINTESTINAL ENDOSCOPY N/A 12/10/2020    Dr Angus Ormond normal exam; NEG h pylori    US GUIDED LIVER BIOPSY PERCUTANEOUS  2020    steatosis, + mild iron staining, grade 0, stage 0       Social History:    Social History     Tobacco Use    Smoking status: Former Smoker     Packs/day: 0.75     Years: 35.00     Pack years: 26.25     Types: Cigarettes     Quit date:      Years since quittin.1    Smokeless tobacco: Never Used   Substance Use Topics    Alcohol use: Yes     Alcohol/week: 2.0 standard drinks     Types: 2 Glasses of wine per week     Comment: weekends                                Counseling given: Not Answered      Vital Signs (Current): There were no vitals filed for this visit.                                            BP Readings from Last 3 Encounters:   22 109/73   22 (!) 142/100   21 120/74       NPO Status:                                                                                 BMI:   Wt Readings from Last 3 Encounters:   22 140 lb (63.5 kg)   22 146 lb (66.2 kg)   21 145 lb 6.4 oz (66 kg)     There is no height or weight on file to calculate BMI.    CBC:   Lab Results   Component Value Date    WBC 10.1 09/03/2021    RBC 4.46 09/03/2021    HGB 12.9 09/03/2021    HCT 41.1 09/03/2021    MCV 92.2 09/03/2021    RDW 13.2 09/03/2021     09/03/2021       CMP:   Lab Results   Component Value Date     11/15/2021    K 4.4 11/15/2021    K 5.4 09/03/2021     11/15/2021    CO2 24 11/15/2021    BUN 19 11/15/2021    CREATININE 0.7 11/15/2021    GFRAA >59 11/15/2021    LABGLOM >60 11/15/2021    GLUCOSE 106 11/15/2021    PROT 5.9 11/15/2021    CALCIUM 8.6 11/15/2021    BILITOT 0.3 11/15/2021    ALKPHOS 65 11/15/2021    AST 19 11/15/2021    ALT 19 11/15/2021       POC Tests: No results for input(s): POCGLU, POCNA, POCK, POCCL, POCBUN, POCHEMO, POCHCT in the last 72 hours.     Coags:   Lab Results   Component Value Date    PROTIME 13.3 09/02/2021    INR 0.99 09/02/2021       HCG (If Applicable): No results found for: PREGTESTUR, PREGSERUM, HCG, HCGQUANT     ABGs: No results found for: PHART, PO2ART, MMA8GCK, ZRD2UVG, BEART, X3KGREIU     Type & Screen (If Applicable):  No results found for: LABABO, LABRH    Drug/Infectious Status (If Applicable):  No results found for: HIV, HEPCAB    COVID-19 Screening (If Applicable):   Lab Results   Component Value Date    COVID19 Not Detected 03/04/2022         Anesthesia Evaluation  Patient summary reviewed and Nursing notes reviewed  Airway: Mallampati: II  TM distance: <3 FB   Neck ROM: full  Mouth opening: > = 3 FB Dental: normal exam         Pulmonary:Negative Pulmonary ROS and normal exam                              ROS comment: Quit smoking about 7-8 years ago (about 2014)   Cardiovascular:Negative CV ROS  Exercise tolerance: good (>4 METS),   (+) hypertension:, CAD:,          Beta Blocker:  Dose within 24 Hrs         Neuro/Psych:   Negative Neuro/Psych ROS  (+) psychiatric history:            GI/Hepatic/Renal: Neg GI/Hepatic/Renal ROS  (+) GERD:, liver disease:,           Endo/Other: Negative Endo/Other ROS                    Abdominal: Vascular: negative vascular ROS. Other Findings:               Anesthesia Plan      general and TIVA     ASA 3       Induction: intravenous. Anesthetic plan and risks discussed with patient.                       VAZQUEZ Rangel - CRNA   3/7/2022

## 2022-03-21 ENCOUNTER — APPOINTMENT (OUTPATIENT)
Dept: GENERAL RADIOLOGY | Age: 62
End: 2022-03-21
Payer: COMMERCIAL

## 2022-03-21 ENCOUNTER — HOSPITAL ENCOUNTER (EMERGENCY)
Age: 62
Discharge: HOME | End: 2022-03-21
Attending: EMERGENCY MEDICINE
Payer: COMMERCIAL

## 2022-03-21 VITALS
SYSTOLIC BLOOD PRESSURE: 148 MMHG | BODY MASS INDEX: 24.8 KG/M2 | WEIGHT: 140 LBS | DIASTOLIC BLOOD PRESSURE: 88 MMHG | RESPIRATION RATE: 17 BRPM | OXYGEN SATURATION: 94 % | HEIGHT: 63 IN | TEMPERATURE: 97.3 F | HEART RATE: 87 BPM

## 2022-03-21 DIAGNOSIS — I16.0 HYPERTENSIVE URGENCY: ICD-10-CM

## 2022-03-21 DIAGNOSIS — R06.00 ACUTE DYSPNEA: Primary | ICD-10-CM

## 2022-03-21 LAB
ADENOVIRUS BY PCR: NOT DETECTED
ALBUMIN SERPL-MCNC: 4.3 G/DL (ref 3.5–5.2)
ALP BLD-CCNC: 72 U/L (ref 35–104)
ALT SERPL-CCNC: 18 U/L (ref 5–33)
ANION GAP SERPL CALCULATED.3IONS-SCNC: 12 MMOL/L (ref 7–19)
AST SERPL-CCNC: 21 U/L (ref 5–32)
BASOPHILS ABSOLUTE: 0 K/UL (ref 0–0.2)
BASOPHILS RELATIVE PERCENT: 0.7 % (ref 0–1)
BILIRUB SERPL-MCNC: <0.2 MG/DL (ref 0.2–1.2)
BORDETELLA PARAPERTUSSIS BY PCR: NOT DETECTED
BORDETELLA PERTUSSIS BY PCR: NOT DETECTED
BUN BLDV-MCNC: 20 MG/DL (ref 8–23)
CALCIUM SERPL-MCNC: 9.1 MG/DL (ref 8.8–10.2)
CHLAMYDOPHILIA PNEUMONIAE BY PCR: NOT DETECTED
CHLORIDE BLD-SCNC: 106 MMOL/L (ref 98–111)
CO2: 23 MMOL/L (ref 22–29)
CORONAVIRUS 229E BY PCR: NOT DETECTED
CORONAVIRUS HKU1 BY PCR: NOT DETECTED
CORONAVIRUS NL63 BY PCR: NOT DETECTED
CORONAVIRUS OC43 BY PCR: NOT DETECTED
CREAT SERPL-MCNC: 0.7 MG/DL (ref 0.5–0.9)
EKG P AXIS: 76 DEGREES
EKG P-R INTERVAL: 152 MS
EKG Q-T INTERVAL: 402 MS
EKG QRS DURATION: 96 MS
EKG QTC CALCULATION (BAZETT): 431 MS
EKG T AXIS: 47 DEGREES
EOSINOPHILS ABSOLUTE: 0.5 K/UL (ref 0–0.6)
EOSINOPHILS RELATIVE PERCENT: 9.6 % (ref 0–5)
GFR AFRICAN AMERICAN: >59
GFR NON-AFRICAN AMERICAN: >60
GLUCOSE BLD-MCNC: 110 MG/DL (ref 74–109)
HCT VFR BLD CALC: 42.4 % (ref 37–47)
HEMOGLOBIN: 12.8 G/DL (ref 12–16)
HUMAN METAPNEUMOVIRUS BY PCR: NOT DETECTED
HUMAN RHINOVIRUS/ENTEROVIRUS BY PCR: DETECTED
IMMATURE GRANULOCYTES #: 0 K/UL
INFLUENZA A BY PCR: NOT DETECTED
INFLUENZA B BY PCR: NOT DETECTED
LYMPHOCYTES ABSOLUTE: 1.2 K/UL (ref 1.1–4.5)
LYMPHOCYTES RELATIVE PERCENT: 22.6 % (ref 20–40)
MCH RBC QN AUTO: 28.7 PG (ref 27–31)
MCHC RBC AUTO-ENTMCNC: 30.2 G/DL (ref 33–37)
MCV RBC AUTO: 95.1 FL (ref 81–99)
MONOCYTES ABSOLUTE: 0.5 K/UL (ref 0–0.9)
MONOCYTES RELATIVE PERCENT: 8.3 % (ref 0–10)
MYCOPLASMA PNEUMONIAE BY PCR: NOT DETECTED
NEUTROPHILS ABSOLUTE: 3.2 K/UL (ref 1.5–7.5)
NEUTROPHILS RELATIVE PERCENT: 58.6 % (ref 50–65)
PARAINFLUENZA VIRUS 1 BY PCR: NOT DETECTED
PARAINFLUENZA VIRUS 2 BY PCR: NOT DETECTED
PARAINFLUENZA VIRUS 3 BY PCR: NOT DETECTED
PARAINFLUENZA VIRUS 4 BY PCR: NOT DETECTED
PDW BLD-RTO: 13.2 % (ref 11.5–14.5)
PLATELET # BLD: 220 K/UL (ref 130–400)
PMV BLD AUTO: 9.9 FL (ref 9.4–12.3)
POTASSIUM REFLEX MAGNESIUM: 4.3 MMOL/L (ref 3.5–5)
PRO-BNP: 356 PG/ML (ref 0–900)
RBC # BLD: 4.46 M/UL (ref 4.2–5.4)
RESPIRATORY SYNCYTIAL VIRUS BY PCR: NOT DETECTED
SARS-COV-2, PCR: NOT DETECTED
SODIUM BLD-SCNC: 141 MMOL/L (ref 136–145)
TOTAL PROTEIN: 6 G/DL (ref 6.6–8.7)
TROPONIN: <0.01 NG/ML (ref 0–0.03)
WBC # BLD: 5.4 K/UL (ref 4.8–10.8)

## 2022-03-21 PROCEDURE — 80053 COMPREHEN METABOLIC PANEL: CPT

## 2022-03-21 PROCEDURE — 84484 ASSAY OF TROPONIN QUANT: CPT

## 2022-03-21 PROCEDURE — 85025 COMPLETE CBC W/AUTO DIFF WBC: CPT

## 2022-03-21 PROCEDURE — 99284 EMERGENCY DEPT VISIT MOD MDM: CPT

## 2022-03-21 PROCEDURE — 0202U NFCT DS 22 TRGT SARS-COV-2: CPT

## 2022-03-21 PROCEDURE — 71045 X-RAY EXAM CHEST 1 VIEW: CPT

## 2022-03-21 PROCEDURE — 83880 ASSAY OF NATRIURETIC PEPTIDE: CPT

## 2022-03-21 PROCEDURE — 93010 ELECTROCARDIOGRAM REPORT: CPT | Performed by: INTERNAL MEDICINE

## 2022-03-21 PROCEDURE — 36415 COLL VENOUS BLD VENIPUNCTURE: CPT

## 2022-03-21 PROCEDURE — 93005 ELECTROCARDIOGRAM TRACING: CPT | Performed by: EMERGENCY MEDICINE

## 2022-03-21 RX ORDER — IBANDRONATE SODIUM 150 MG/1
150 TABLET, FILM COATED ORAL
Qty: 3 TABLET | Refills: 1 | Status: SHIPPED | OUTPATIENT
Start: 2022-03-21 | End: 2022-03-22 | Stop reason: SDUPTHER

## 2022-03-21 RX ORDER — NITROGLYCERIN 0.4 MG/1
TABLET SUBLINGUAL
Qty: 15 TABLET | Refills: 0 | Status: SHIPPED | OUTPATIENT
Start: 2022-03-21

## 2022-03-21 RX ORDER — FLUTICASONE PROPIONATE 50 MCG
SPRAY, SUSPENSION (ML) NASAL
Qty: 16 G | Refills: 11 | OUTPATIENT
Start: 2022-03-21

## 2022-03-21 RX ORDER — AZELASTINE 1 MG/ML
SPRAY, METERED NASAL
Qty: 30 ML | Refills: 11 | OUTPATIENT
Start: 2022-03-21

## 2022-03-21 ASSESSMENT — ENCOUNTER SYMPTOMS
DIARRHEA: 0
CHEST TIGHTNESS: 1
EYE PAIN: 0
RHINORRHEA: 0
COUGH: 0
ABDOMINAL PAIN: 0
EYE REDNESS: 0
VOMITING: 0
VOICE CHANGE: 0
SHORTNESS OF BREATH: 1

## 2022-03-21 NOTE — ED NOTES
Patient placed on cardiac monitor, continuous pulse oximeter, and NIBP monitor. Monitor alarms on.          Kalpesh Echevarria RN  03/21/22 9933

## 2022-03-21 NOTE — ED PROVIDER NOTES
[facility]  EMERGENCY DEPARTMENT ENCOUNTER      Pt Name: Clementine Rose  MRN: [id number]  [place] 1960  Date of evaluation: 3/21/2022  Provider: Cherise Blood MD    CHIEF COMPLAINT       Chief Complaint   Patient presents with    Shortness of Breath     Patient woke up short of breath. Per EMS patient O2 sat was 90% on RA and patient was pale            HISTORY OF PRESENT ILLNESS   (Location/Symptom, Timing/Onset,Context/Setting, Quality, Duration, Modifying Factors, Severity)  Note limiting factors. Clementine Rose is a 64 y.o. female who presents to the emergency department 4 hours after waking up feeling short of breath tonight. States she had felt fine throughout the day before going to bed but that her blood pressure had been up and down all day. States she woke up and could not breathe. Tried to change positions several times without relief. States she checked her blood pressure at that time and it was greater than 098 systolic. States she had a similar episode for which she was admitted here in September. At that time she had an echo that was normal along with a negative nuclear stress test.  Patient takes clonidine as needed for elevated blood pressure and states she took 1 around 1:00. States she was feeling better by the time the ambulance arrived tonight and symptoms have completely resolved by time of arrival here. Initial oxygen saturation with EMS on room air was 90%. Patient was placed on nasal cannula oxygen throughout transportation here. HPI    NursingNotes were reviewed. REVIEW OF SYSTEMS    (2-9 systems for level 4, 10 or more for level 5)     Review of Systems   Constitutional: Negative for fatigue and fever. HENT: Negative for congestion, rhinorrhea and voice change. Eyes: Negative for pain and redness. Respiratory: Positive for chest tightness and shortness of breath. Negative for cough.     Cardiovascular: Negative for palpitations and leg swelling. Gastrointestinal: Negative for abdominal pain, diarrhea and vomiting. Endocrine: Negative. Genitourinary: Negative. Musculoskeletal: Negative for arthralgias and gait problem. Skin: Negative for rash and wound. Neurological: Negative for weakness and headaches. Hematological: Negative. Psychiatric/Behavioral: Negative. All other systems reviewed and are negative. A complete review of systems was performed and is negative except as noted above in the HPI.        PAST MEDICAL HISTORY     Past Medical History:   Diagnosis Date    Acid reflux     Anxiety     Breast cancer ([facility] [address] 2009    DCIS    Cancer ([facility] [address]     liposcaarcoma, peritoneal    Gastroesophageal reflux disease without esophagitis 10/20/2021    History of therapeutic radiation 2009    Hyperlipidemia     Hypertension     Mild coronary artery disease 7/18/2019         SURGICAL HISTORY       Past Surgical History:   Procedure Laterality Date    BREAST BIOPSY Left 2009    DCIS    BREAST LUMPECTOMY Left     CHOLECYSTECTOMY  2008    COLONOSCOPY  12/20/2010    Dr Sanchez-Questionable polyp @ appendiceal orifice, radiation changes, AP x 1    COLONOSCOPY N/A 03/07/2022    Dr Thelma Hotra disease-BCM, 5 yr recall    HYSTERECTOMY  2008    MASTECTOMY, PARTIAL Left 2008    breast cancer    NC COLONOSCOPY FLX DX W/COLLJ SPEC WHEN PFRMD N/A 12/09/2016    Dr Rhoda Lawson access, normall, 5 yr recall w/Dr Malou Milner    CHRISTIANE AND BSO Bilateral 2008    alwasy normal PAP's prior to hysterectomy age 48    Trollegade 12 12/10/2020    Dr Charlie Paris normal exam; NEG h pylori    US GUIDED LIVER BIOPSY PERCUTANEOUS  12/14/2020    steatosis, + mild iron staining, grade 0, stage 0         CURRENT MEDICATIONS       Discharge Medication List as of 3/21/2022  3:13 AM      CONTINUE these medications which have NOT CHANGED    Details   amLODIPine (NORVASC) 2.5 MG tablet Take 1 tablet by mouth daily, Disp-30 tablet, R-5Normal      venlafaxine (EFFEXOR XR) 150 MG extended release capsule TAKE 1 CAPSULE BY MOUTH DAILY, Disp-30 capsule, R-5Normal      metoprolol tartrate (LOPRESSOR) 50 MG tablet TAKE 1 TABLET BY MOUTH EVERY 12 HOURS, Disp-60 tablet, R-5Normal      irbesartan (AVAPRO) 300 MG tablet TAKE 1 TABLET BY MOUTH DAILY, Disp-30 tablet, R-5Normal      rosuvastatin (CRESTOR) 20 MG tablet TAKE 1 TABLET BY MOUTH NIGHTLY, Disp-90 tablet, R-1Normal      isosorbide mononitrate (IMDUR) 30 MG extended release tablet Take 1 tablet by mouth daily, Disp-90 tablet, R-3Normal      fluticasone (FLOVENT HFA) 110 MCG/ACT inhaler Inhale 2 puffs into the lungs 2 times daily, Disp-1 each, R-0Normal      hydroCHLOROthiazide (HYDRODIURIL) 25 MG tablet Take 1 tablet by mouth every morning, Disp-90 tablet, R-3Normal      vitamin D (ERGOCALCIFEROL) 1.25 MG (95770 UT) CAPS capsule TAKE ONE CAPSULE BY MOUTH ONCE A WEEK, Disp-12 capsule, R-1Normal      ibandronate (BONIVA) 150 MG tablet Take 1 tablet by mouth every 30 days, Disp-3 tablet, R-1Normal      albuterol sulfate HFA (VENTOLIN HFA) 108 (90 Base) MCG/ACT inhaler Inhale 2 puffs into the lungs 4 times daily as needed for Wheezing, Disp-1 Inhaler, R-0Normal      cloNIDine (CATAPRES) 0.1 MG tablet Take 1 tablet by mouth 2 times daily as needed for High Blood Pressure (for BP < 180/100), Disp-60 tablet, R-3Normal      aspirin EC 81 MG EC tablet Take 81 mg by mouth dailyHistorical Med             ALLERGIES     Adhesive tape and Sulfa antibiotics    FAMILY HISTORY       Family History   Problem Relation Age of Onset    Alzheimer's Disease Mother     Breast Cancer Mother 48    Heart Disease Father     Diabetes Father     Cancer Brother     Colon Cancer Neg Hx     Colon Polyps Neg Hx     Liver Cancer Neg Hx     Liver Disease Neg Hx     Esophageal Cancer Neg Hx     Rectal Cancer Neg Hx     Stomach Cancer Neg Hx           SOCIAL HISTORY       Social History     Socioeconomic History    Marital status:      Spouse name: None    Number of children: None    Years of education: None    Highest education level: None   Occupational History    None   Tobacco Use    Smoking status: Former Smoker     Packs/day: 0.75     Years: 35.00     Pack years: 26.25     Types: Cigarettes     Quit date:      Years since quittin.2    Smokeless tobacco: Never Used   Vaping Use    Vaping Use: Never used   Substance and Sexual Activity    Alcohol use: Yes     Alcohol/week: 2.0 standard drinks     Types: 2 Glasses of wine per week     Comment: weekends    Drug use: No    Sexual activity: None   Other Topics Concern    None   Social History Narrative    None     Social Determinants of Health     Financial Resource Strain: Low Risk     Difficulty of Paying Living Expenses: Not hard at all   Food Insecurity: No Food Insecurity    Worried About Running Out of Food in the Last Year: Never true    [place]odan of Food in the Last Year: Never true   Transportation Needs:     Lack of Transportation (Medical): Not on file    Lack of Transportation (Non-Medical):  Not on file   Physical Activity:     Days of Exercise per Week: Not on file    Minutes of Exercise per Session: Not on file   Stress:     Feeling of Stress : Not on file   Social Connections:     Frequency of Communication with Friends and Family: Not on file    Frequency of Social Gatherings with Friends and Family: Not on file    Attends Sikh Services: Not on file    Active Member of Clubs or Organizations: Not on file    Attends Club or Organization Meetings: Not on file    Marital Status: Not on file   Intimate Partner Violence:     Fear of Current or Ex-Partner: Not on file    Emotionally Abused: Not on file    Physically Abused: Not on file    Sexually Abused: Not on file   Housing Stability:     Unable to Pay for Housing in the Last Year: Not on file    Number of [facility] in the Last Year: Not on file    Unstable Housing in the Last Year: Not on file       SCREENINGS    Mani Coma Scale  Eye Opening: Spontaneous  Best Verbal Response: Oriented  Best Motor Response: Obeys commands  Mani Coma Scale Score: 15        PHYSICAL EXAM    (up to 7 for level 4, 8 or more for level 5)     ED Triage Vitals [03/21/22 0038]   BP Temp Temp Source Pulse Resp SpO2 Height Weight   (!) 156/94 97.1 °F (36.2 °C) Infrared 96 18 94 % 5' 3\" (1.6 m) 140 lb (63.5 kg)       Physical Exam  Vitals and nursing note reviewed. Constitutional:       General: She is not in acute distress. Appearance: She is well-developed. She is not diaphoretic. HENT:      Head: Normocephalic and atraumatic. Eyes:      General: No scleral icterus. Neck:      Vascular: No JVD. Cardiovascular:      Rate and Rhythm: Normal rate and regular rhythm. Pulses:           Radial pulses are 2+ on the right side and 2+ on the left side. Dorsalis pedis pulses are 2+ on the right side and 2+ on the left side. Heart sounds: Normal heart sounds. No murmur heard. No friction rub. No gallop. Pulmonary:      Effort: Pulmonary effort is normal. No accessory muscle usage or respiratory distress. Breath sounds: Normal breath sounds. No stridor. No decreased breath sounds, wheezing, rhonchi or rales. Chest:      Chest wall: No tenderness. Abdominal:      General: There is no distension. Palpations: Abdomen is soft. Tenderness: There is no abdominal tenderness. There is no guarding or rebound. Musculoskeletal:         General: No deformity. Normal range of motion. Right lower leg: No edema. Left lower leg: No edema. Skin:     General: Skin is warm and dry. Coloration: Skin is not pale. Findings: No erythema. Neurological:      Mental Status: She is alert and oriented to person, place, and time. GCS: GCS eye subscore is 4. GCS verbal subscore is 5. GCS motor subscore is 6. Cranial Nerves: No cranial nerve deficit. Motor: No abnormal muscle tone. Coordination: Coordination normal.   Psychiatric:         Behavior: Behavior normal.         Judgment: Judgment normal.         DIAGNOSTIC RESULTS     EKG: All EKG's are interpreted by the Emergency Department Physician who either signs or Co-signs this chart in the absence of a cardiologist.        RADIOLOGY:   Non-plain film images such as CT, Ultrasound and MRI are read by the radiologist. Di Flattery images are visualized and preliminarily interpreted by the emergency physician with the below findings:        Interpretation per the Radiologist below, if available at the time of this note:    XR CHEST PORTABLE    (Results Pending)         ED BEDSIDE ULTRASOUND:   Performed by ED Physician - none    LABS:  Labs Reviewed   RESPIRATORY PANEL, MOLECULAR, WITH COVID-19 - Abnormal; Notable for the following components:       Result Value    Human Rhinovirus/Enterovirus by PCR DETECTED (*)     All other components within normal limits   CBC WITH AUTO DIFFERENTIAL - Abnormal; Notable for the following components:    MCHC 30.2 (*)     Eosinophils % 9.6 (*)     All other components within normal limits   COMPREHENSIVE METABOLIC PANEL W/ REFLEX TO MG FOR LOW K - Abnormal; Notable for the following components:    Glucose 110 (*)     Total Protein 6.0 (*)     All other components within normal limits   TROPONIN   BRAIN NATRIURETIC PEPTIDE       All other labs were within normal range or not returned as of this dictation.     Medications - No data to display    EMERGENCY DEPARTMENT COURSE and DIFFERENTIALDIAGNOSIS/MDM:   Vitals:    Vitals:    03/21/22 0038 03/21/22 0103 03/21/22 0331   BP: (!) 156/94  (!) 148/88   Pulse: 96 96 87   Resp: 18  17   Temp: 97.1 °F (36.2 °C)  97.3 °F (36.3 °C)   TempSrc: Infrared     SpO2: 94%     Weight: 140 lb (63.5 kg)     Height: 5' 3\" (1.6 m)         MDM    ED Course as of 03/21/22 0512   Mon Mar 21, 2022 0137 EKG shows sinus rhythm with a rate of 78. There is a partial right bundle branch block. No findings of acute ischemia or infarction or other than isolated borderline T wave change in lead III. Normal intervals. Overall normal EKG. [JENNIFER]   0158 Vital signs within normal limits. Symptoms have resolved. Initial labs are unremarkable with no evidence of cardiac ischemia. BNP within normal limits. Chest x-ray shows no pulmonary edema, focal consolidation, pneumothorax, or other acute abnormalities [JENNIFER]   0300 Human Rhinovirus/Enterovirus by PCR(!): DETECTED [JENNIFER]      ED Course User Index  [JENNIFER] Dina Devlin MD     Patient with no wheezing or signs of reactive airway disease. Description of episode is concerning for an acute CHF type picture given hypertension along with shortness of breath, however patient has unremarkable recent echo and negative work-up here otherwise. Plan for discharge with as needed nitroglycerin tablets if patient experiences episodes like this again with shortness of breath and hypertension. Also advised her to get a home pulse ox machine to be able to monitor oxygen saturation during episodes like this. Rhinovirus positive. Unknown significance as patient does not have typical viral symptoms    Evaluation and work-up here revealed no signs of emergent or life-threatening pathology that would necessitate admission for further work-up or management at this time. Patient is felt to be stable for discharge home with return precautions for worsening of the condition or development of new concerning symptoms. Patient was encouraged to follow-up with their primary care doctor in the appropriate timeframe. Necessary prescriptions and information have been provided for treatment at home. Patient voices understanding and agreement with the plan. CONSULTS:  None    PROCEDURES:  Unless otherwise notedbelow, none     Procedures      FINAL IMPRESSION     1.  Acute dyspnea 2. Hypertensive urgency          DISPOSITION/PLAN   DISPOSITION Decision To Discharge 03/21/2022 03:00:40 AM      PATIENT REFERRED TO:  [facility]  [address] [address] [phone]    If symptoms worsen    MD Minal Bruno [phone]    Schedule an appointment as soon as possible for a visit         DISCHARGE MEDICATIONS:  Discharge Medication List as of 3/21/2022  3:13 AM      START taking these medications    Details   nitroGLYCERIN (NITROSTAT) 0.4 MG SL tablet For shortness of breath with hypertension. Up to max of 3 total doses.  If no relief after 1 dose, call 911., Disp-15 tablet, R-0Normal                (Please note that portions of this note were completed with a voice recognition program.  Efforts were made to edit the dictations butoccasionally words are mis-transcribed.)    Palmira Liang MD (electronically signed)  AttendingEmerJefferson Regional Medical Center Physician          Palmira Tirado MD  03/21/22 8013

## 2022-03-21 NOTE — TELEPHONE ENCOUNTER
Yumiko Garces called requesting a refill of the below medication which has been pended for you:     Requested Prescriptions     Pending Prescriptions Disp Refills    ibandronate (BONIVA) 150 MG tablet [Pharmacy Med Name: IBANDRONATE  MG  Tablet] 3 tablet 1     Sig: TAKE 1 TABLET BY MOUTH EVERY 30 DAYS       Last Appointment Date: 11/16/2021  Next Appointment Date: 5/16/2022    Allergies   Allergen Reactions    Adhesive Tape Swelling    Sulfa Antibiotics Swelling and Rash     Other reaction(s): Unknown

## 2022-03-22 ENCOUNTER — OFFICE VISIT (OUTPATIENT)
Dept: INTERNAL MEDICINE | Age: 62
End: 2022-03-22
Payer: COMMERCIAL

## 2022-03-22 VITALS
RESPIRATION RATE: 18 BRPM | WEIGHT: 146 LBS | HEIGHT: 63 IN | DIASTOLIC BLOOD PRESSURE: 94 MMHG | SYSTOLIC BLOOD PRESSURE: 154 MMHG | BODY MASS INDEX: 25.87 KG/M2 | OXYGEN SATURATION: 100 % | HEART RATE: 88 BPM

## 2022-03-22 DIAGNOSIS — Z56.6 STRESS AT WORK: ICD-10-CM

## 2022-03-22 DIAGNOSIS — I10 ESSENTIAL HYPERTENSION: Primary | ICD-10-CM

## 2022-03-22 DIAGNOSIS — R03.0 ELEVATED BLOOD PRESSURE READING: ICD-10-CM

## 2022-03-22 DIAGNOSIS — J30.2 SEASONAL ALLERGIC RHINITIS, UNSPECIFIED TRIGGER: ICD-10-CM

## 2022-03-22 PROCEDURE — 99213 OFFICE O/P EST LOW 20 MIN: CPT | Performed by: INTERNAL MEDICINE

## 2022-03-22 RX ORDER — AZELASTINE 1 MG/ML
1 SPRAY, METERED NASAL 2 TIMES DAILY
Qty: 3 EACH | Refills: 1 | Status: SHIPPED | OUTPATIENT
Start: 2022-03-22 | End: 2022-10-30 | Stop reason: SDUPTHER

## 2022-03-22 RX ORDER — CLONIDINE HYDROCHLORIDE 0.1 MG/1
TABLET ORAL
Qty: 60 TABLET | Refills: 3 | Status: SHIPPED | OUTPATIENT
Start: 2022-03-22

## 2022-03-22 RX ORDER — FLUTICASONE PROPIONATE 110 UG/1
2 AEROSOL, METERED RESPIRATORY (INHALATION) 2 TIMES DAILY
Qty: 3 EACH | Refills: 2 | Status: SHIPPED | OUTPATIENT
Start: 2022-03-22 | End: 2022-05-16

## 2022-03-22 RX ORDER — IBANDRONATE SODIUM 150 MG/1
150 TABLET, FILM COATED ORAL
Qty: 3 TABLET | Refills: 1 | Status: SHIPPED | OUTPATIENT
Start: 2022-03-22 | End: 2022-05-16 | Stop reason: SDUPTHER

## 2022-03-22 SDOH — HEALTH STABILITY - MENTAL HEALTH: OTHER PHYSICAL AND MENTAL STRAIN RELATED TO WORK: Z56.6

## 2022-03-22 ASSESSMENT — ENCOUNTER SYMPTOMS
COUGH: 0
WHEEZING: 0
CONSTIPATION: 0
SORE THROAT: 0
ABDOMINAL PAIN: 0
CHEST TIGHTNESS: 0

## 2022-03-22 ASSESSMENT — PATIENT HEALTH QUESTIONNAIRE - PHQ9
2. FEELING DOWN, DEPRESSED OR HOPELESS: 0
SUM OF ALL RESPONSES TO PHQ9 QUESTIONS 1 & 2: 0
SUM OF ALL RESPONSES TO PHQ QUESTIONS 1-9: 0
1. LITTLE INTEREST OR PLEASURE IN DOING THINGS: 0
SUM OF ALL RESPONSES TO PHQ QUESTIONS 1-9: 0

## 2022-03-22 NOTE — PROGRESS NOTES
Chief Complaint   Patient presents with    Follow-up     Pt was seen in [facility] ED for acute dyspnea on 03/21/2022 Pt states that in themiddle of the night on Sunday night she felt like her nose was stopped up and she could not get good air, States that she laid back down and felt like someone was sitting on her chest, so she got up and checked her BP and in left arm was 202/114 right was 206/114. Breathing became more labored, called ambulance, they said her in room O2 was at 90, BP was 184/100.  Hypertension     He metoprolol 50 BID was cut back to once daily a while back by Dr. David Álvarez office, but last night pt started taking the metoprolol BID      History of presenting illness:  Chao Ernst is a59 y.o. female who presents today for follow up on her chronic medical conditions as noted below.       Patient Active Problem List    Diagnosis Date Noted    ACS (acute coronary syndrome) ([facility] [address] 07/13/2019     Priority: High    Chest pressure 06/19/2018     Priority: High    Panlobular emphysema ([address] 10/20/2021    History of smoking 10/20/2021    Gastroesophageal reflux disease without esophagitis 10/20/2021    Atypical chest pain 09/02/2021    COPD (chronic obstructive pulmonary disease) ([address] 09/02/2021    Enlarged lymph nodes 07/19/2021    Essential hypertension 07/19/2021    Transaminitis 11/18/2020    Abnormal CT of the abdomen 11/18/2020    Fatty liver 11/18/2020    Alcohol consumption of one to four drinks per week 11/18/2020    Mild coronary artery disease 07/18/2019    Other chest pain 06/18/2018     Overview Note:     12/30/2016  SE negative for myocardial ischemia  6/19/18  SE negative for myocardial ischemia  7/13/19 ACS FERNANDO RISK Score 2 (angina, + troponin ), AUC indication 3, AUC score 7   7/14/19  Cath  Mild CAD, normal LVFX      Vitamin D deficiency 11/04/2017    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[address] 11/04/2017    Generalized anxiety disorder 11/04/2017    Pure hypercholesterolemia 11/04/2017    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/2017     Overview Note:     DX 2007; post extensive surgery/ hysterectomy      Osteopenia of multiple sites 11/04/2017     Overview Note:     2013 Lspine -2.4/hip neck -2.3; 5/17 Lspine -1.1; hip neck -2.2  10/2020 hip neck -2.0/ L spine -2.1  boniva started 2017      History of breast cancer 11/04/2017     Overview Note:     2008 LEFT/ post partial mastectomy      Elevated transaminase level 06/08/2017    Fatty liver disease, nonalcoholic 23/23/8464    Hx of colonic polyps      Past Medical History:   Diagnosis Date    Acid reflux     Anxiety     Breast cancer ([facility] [address] 2009    DCIS    Cancer ([facility] [address]     liposcaarcoma, peritoneal    Gastroesophageal reflux disease without esophagitis 10/20/2021    History of therapeutic radiation 2009    Hyperlipidemia     Hypertension     Mild coronary artery disease 7/18/2019      Past Surgical History:   Procedure Laterality Date    BREAST BIOPSY Left 2009    DCIS    BREAST LUMPECTOMY Left     CHOLECYSTECTOMY  2008    COLONOSCOPY  12/20/2010    Dr Sanchez-Questionable polyp @ appendiceal orifice, radiation changes, AP x 1    COLONOSCOPY N/A 03/07/2022    Dr KARSON Levy-Diverticular disease-BCM, 5 yr recall    HYSTERECTOMY  2008    MASTECTOMY, PARTIAL Left 2008    breast cancer    UT COLONOSCOPY FLX DX W/COLLJ SPEC WHEN PFRMD N/A 12/09/2016    Dr Lo Brice access, normall, 5 yr recall w/Dr Bryson Polanco    CHRISTIANE AND BSO Bilateral 2008    alwasy normal PAP's prior to hysterectomy age 48    TONSILLECTOMY      UPPER GASTROINTESTINAL ENDOSCOPY N/A 12/10/2020    Dr Simi Carvalho normal exam; NEG h pylori    US GUIDED LIVER BIOPSY PERCUTANEOUS  12/14/2020    steatosis, + mild iron staining, grade 0, stage 0     Current Outpatient Medications   Medication Sig Dispense Refill    nitroGLYCERIN (NITROSTAT) 0.4 MG SL tablet For shortness of breath with hypertension.  Up to max of 3 total doses. If no relief after 1 dose, call 911. 15 tablet 0    ibandronate (BONIVA) 150 MG tablet TAKE 1 TABLET BY MOUTH EVERY 30 DAYS 3 tablet 1    amLODIPine (NORVASC) 2.5 MG tablet Take 1 tablet by mouth daily 30 tablet 5    venlafaxine (EFFEXOR XR) 150 MG extended release capsule TAKE 1 CAPSULE BY MOUTH DAILY 30 capsule 5    metoprolol tartrate (LOPRESSOR) 50 MG tablet TAKE 1 TABLET BY MOUTH EVERY 12 HOURS (Patient taking differently: daily ) 60 tablet 5    irbesartan (AVAPRO) 300 MG tablet TAKE 1 TABLET BY MOUTH DAILY 30 tablet 5    rosuvastatin (CRESTOR) 20 MG tablet TAKE 1 TABLET BY MOUTH NIGHTLY 90 tablet 1    isosorbide mononitrate (IMDUR) 30 MG extended release tablet Take 1 tablet by mouth daily 90 tablet 3    fluticasone (FLOVENT HFA) 110 MCG/ACT inhaler Inhale 2 puffs into the lungs 2 times daily 1 each 0    hydroCHLOROthiazide (HYDRODIURIL) 25 MG tablet Take 1 tablet by mouth every morning 90 tablet 3    vitamin D (ERGOCALCIFEROL) 1.25 MG (24545 UT) CAPS capsule TAKE ONE CAPSULE BY MOUTH ONCE A WEEK (Patient taking differently: Take 50,000 Units by mouth once a week Takes on ) 12 capsule 1    albuterol sulfate HFA (VENTOLIN HFA) 108 (90 Base) MCG/ACT inhaler Inhale 2 puffs into the lungs 4 times daily as needed for Wheezing 1 Inhaler 0    cloNIDine (CATAPRES) 0.1 MG tablet Take 1 tablet by mouth 2 times daily as needed for High Blood Pressure (for BP < 180/100) 60 tablet 3    aspirin EC 81 MG EC tablet Take 81 mg by mouth daily       No current facility-administered medications for this visit.      Allergies   Allergen Reactions    Adhesive Tape Swelling    Sulfa Antibiotics Swelling and Rash     Other reaction(s): Unknown     Social History     Tobacco Use    Smoking status: Former Smoker     Packs/day: 0.75     Years: 35.00     Pack years: 26.25     Types: Cigarettes     Quit date:      Years since quittin.2    Smokeless tobacco: Never Used   Substance Use Topics    Alcohol use: Yes     Alcohol/week: 2.0 standard drinks     Types: 2 Glasses of wine per week     Comment: weekends      Family History   Problem Relation Age of Onset    Alzheimer's Disease Mother     Breast Cancer Mother 48    Heart Disease Father     Diabetes Father     Cancer Brother     Colon Cancer Neg Hx     Colon Polyps Neg Hx     Liver Cancer Neg Hx     Liver Disease Neg Hx     Esophageal Cancer Neg Hx     Rectal Cancer Neg Hx     Stomach Cancer Neg Hx        Review of Systems   Constitutional: Positive for fatigue. Negative for chills and fever. HENT: Negative for congestion, ear pain, nosebleeds, postnasal drip and sore throat. Respiratory: Negative for cough, chest tightness and wheezing. Cardiovascular: Negative for chest pain, palpitations and leg swelling. Gastrointestinal: Negative for abdominal pain and constipation. Genitourinary: Negative for dysuria and urgency. Musculoskeletal: Negative. Negative for arthralgias. Skin: Negative for rash. Neurological: Negative for dizziness and headaches. Psychiatric/Behavioral: Negative. Vitals:    03/22/22 1122 03/22/22 1128   BP: (!) 152/96 (!) 154/94   Site: Left Upper Arm Left Upper Arm   Position: Sitting Sitting   Cuff Size: Large Adult Large Adult   Pulse: 88    Resp: 18    SpO2: 100%    Weight: 146 lb (66.2 kg)    Height: 5' 3\" (1.6 m)      Body mass index is 25.86 kg/m². Physical Exam  Constitutional:       Appearance: She is well-developed. HENT:      Right Ear: External ear normal.      Left Ear: External ear normal.      Mouth/Throat:      Pharynx: No oropharyngeal exudate. Eyes:      Conjunctiva/sclera: Conjunctivae normal.      Pupils: Pupils are equal, round, and reactive to light. Neck:      Thyroid: No thyromegaly. Vascular: No JVD. Cardiovascular:      Rate and Rhythm: Normal rate. Heart sounds: Normal heart sounds. No murmur heard. Pulmonary:      Effort: No respiratory distress. Breath sounds: Normal breath sounds. No wheezing or rales. Chest:      Chest wall: No tenderness. Abdominal:      General: Bowel sounds are normal.      Palpations: Abdomen is soft. Musculoskeletal:      Cervical back: Neck supple. Lymphadenopathy:      Cervical: No cervical adenopathy. Skin:     General: Skin is warm. Findings: No rash. Neurological:      Mental Status: She is oriented to person, place, and time.          Lab Review   Admission on 03/21/2022, Discharged on 03/21/2022   Component Date Value    P-R Interval 03/21/2022 152     QRS Duration 03/21/2022 96     Q-T Interval 03/21/2022 402     QTc Calculation (Bazett) 03/21/2022 431     P Axis 03/21/2022 76     T Axis 03/21/2022 47     WBC 03/21/2022 5.4     RBC 03/21/2022 4.46     Hemoglobin 03/21/2022 12.8     Hematocrit 03/21/2022 42.4     MCV 03/21/2022 95.1     MCH 03/21/2022 28.7     MCHC 03/21/2022 30.2*    RDW 03/21/2022 13.2     Platelets 70/24/6366 220     MPV 03/21/2022 9.9     Neutrophils % 03/21/2022 58.6     Lymphocytes % 03/21/2022 22.6     Monocytes % 03/21/2022 8.3     Eosinophils % 03/21/2022 9.6*    Basophils % 03/21/2022 0.7     Neutrophils Absolute 03/21/2022 3.2     Immature Granulocytes # 03/21/2022 0.0     Lymphocytes Absolute 03/21/2022 1.2     Monocytes Absolute 03/21/2022 0.50     Eosinophils Absolute 03/21/2022 0.50     Basophils Absolute 03/21/2022 0.00     Sodium 03/21/2022 141     Potassium reflex Magnesi* 03/21/2022 4.3     Chloride 03/21/2022 106     CO2 03/21/2022 23     Anion Gap 03/21/2022 12     Glucose 03/21/2022 110*    BUN 03/21/2022 20     CREATININE 03/21/2022 0.7     GFR Non- 03/21/2022 >60     GFR  03/21/2022 >59     Calcium 03/21/2022 9.1     Total Protein 03/21/2022 6.0*    Albumin 03/21/2022 4.3     Total Bilirubin 03/21/2022 <0.2     Alkaline Phosphatase 03/21/2022 72     ALT 03/21/2022 18     AST 03/21/2022 21     Troponin 03/21/2022 <0.01     Pro-BNP 03/21/2022 356     Adenovirus by PCR 03/21/2022 Not Detected     Bordetella parapertussis* 03/21/2022 Not Detected     Bordetella pertussis by * 03/21/2022 Not Detected     Chlamydophilia pneumonia* 03/21/2022 Not Detected     Coronavirus 229E by PCR 03/21/2022 Not Detected     Coronavirus HKU1 by PCR 03/21/2022 Not Detected     Coronavirus NL63 by PCR 03/21/2022 Not Detected     Coronavirus OC43 by PCR 03/21/2022 Not Detected     SARS-CoV-2, PCR 03/21/2022 Not Detected     Human Metapneumovirus by* 03/21/2022 Not Detected     Human Rhinovirus/Enterov* 03/21/2022 DETECTED*    Influenza A by PCR 03/21/2022 Not Detected     Influenza B by PCR 03/21/2022 Not Detected     Mycoplasma pneumoniae by* 03/21/2022 Not Detected     Parainfluenza Virus 1 by* 03/21/2022 Not Detected     Parainfluenza Virus 2 by* 03/21/2022 Not Detected     Parainfluenza Virus 3 by* 03/21/2022 Not Detected     Parainfluenza Virus 4 by* 03/21/2022 Not Detected     Respiratory Syncytial Vi* 03/21/2022 Not Detected    Orders Only on 03/04/2022   Component Date Value    SARS-CoV-2, PCR 03/04/2022 Not Detected            ASSESSMENT/PLAN:    Hypertension with elevated blood pressure readings     Long dw pt  ER records reviewed-ER visit yst  I have extensively reviewed her chart and specialist notes/cardiology note from February 2022    Has been taking her meds incorrectly    Over last few days has been checking her blood pressures they have been significantly elevated  At office visit with Dr. Taylor Knee month ago amlodipine 2.5 mg was added  Patient for some reason decreased her metoprolol dose from 50 mg twice daily to 50 mg daily    Recommendations today  1. Restart metoprolol 50 BID ( has been taking 50 daily)  2. avapro 300 daily  3. Amlodipine 2.5 hs  4. hctz 25 in am  5. Clonidine 0.1 prn every 12 hr  6.  IMDUR 30 mg daily      BP readings to me 1 week          No orders of the defined types were placed in this encounter. New Prescriptions    No medications on file         No follow-ups on file. There are no Patient Instructions on file for this visit. EMR Dragon/transcription disclaimer:Significant part of this  encounter note is electronic transcription/translationof spoken language to printed text. The electronic translation of spoken language may be erroneous, or at times, nonsensical words or phrases may be inadvertently transcribed.  Although I have reviewed the note for sucherrors, some may still exist.

## 2022-03-25 RX ORDER — FLUTICASONE PROPIONATE 50 MCG
SPRAY, SUSPENSION (ML) NASAL
Qty: 16 G | Refills: 11 | Status: SHIPPED | OUTPATIENT
Start: 2022-03-25 | End: 2022-06-02

## 2022-03-28 RX ORDER — FLUTICASONE PROPIONATE 50 MCG
SPRAY, SUSPENSION (ML) NASAL
Qty: 16 G | Refills: 11 | OUTPATIENT
Start: 2022-03-28

## 2022-04-05 ENCOUNTER — HOSPITAL ENCOUNTER (OUTPATIENT)
Dept: GENERAL RADIOLOGY | Facility: HOSPITAL | Age: 62
Discharge: HOME | End: 2022-04-05

## 2022-04-05 PROCEDURE — 87086 URINE CULTURE/COLONY COUNT: CPT

## 2022-04-05 PROCEDURE — 87077 CULTURE AEROBIC IDENTIFY: CPT

## 2022-04-05 PROCEDURE — 87186 SC STD MICRODIL/AGAR DIL: CPT

## 2022-04-05 PROCEDURE — 74018 RADEX ABDOMEN 1 VIEW: CPT

## 2022-04-07 ENCOUNTER — TELEPHONE (OUTPATIENT)
Dept: URGENT CARE | Facility: CLINIC | Age: 62
End: 2022-04-07

## 2022-04-07 DIAGNOSIS — B96.20 E-COLI UTI: Primary | ICD-10-CM

## 2022-04-07 DIAGNOSIS — N39.0 E-COLI UTI: Primary | ICD-10-CM

## 2022-04-07 RX ORDER — CEPHALEXIN 500 MG/1
500 CAPSULE ORAL 2 TIMES DAILY
Qty: 14 CAPSULE | Refills: 0 | Status: SHIPPED | OUTPATIENT
Start: 2022-04-07 | End: 2022-04-14

## 2022-04-25 ENCOUNTER — TELEPHONE (OUTPATIENT)
Dept: CARDIOLOGY CLINIC | Age: 62
End: 2022-04-25

## 2022-04-26 ENCOUNTER — OFFICE VISIT (OUTPATIENT)
Dept: CARDIOLOGY CLINIC | Age: 62
End: 2022-04-26
Payer: COMMERCIAL

## 2022-04-26 VITALS
HEIGHT: 63 IN | OXYGEN SATURATION: 96 % | DIASTOLIC BLOOD PRESSURE: 74 MMHG | BODY MASS INDEX: 25.52 KG/M2 | HEART RATE: 70 BPM | WEIGHT: 144 LBS | SYSTOLIC BLOOD PRESSURE: 114 MMHG

## 2022-04-26 DIAGNOSIS — I25.10 MILD CORONARY ARTERY DISEASE: Primary | ICD-10-CM

## 2022-04-26 DIAGNOSIS — I10 ESSENTIAL HYPERTENSION: ICD-10-CM

## 2022-04-26 DIAGNOSIS — E78.2 MIXED HYPERLIPIDEMIA: ICD-10-CM

## 2022-04-26 PROCEDURE — 99214 OFFICE O/P EST MOD 30 MIN: CPT | Performed by: NURSE PRACTITIONER

## 2022-04-26 RX ORDER — ISOSORBIDE MONONITRATE 30 MG/1
30 TABLET, EXTENDED RELEASE ORAL DAILY
Qty: 90 TABLET | Refills: 3 | Status: SHIPPED | OUTPATIENT
Start: 2022-04-26

## 2022-04-26 RX ORDER — HYDROCHLOROTHIAZIDE 25 MG/1
25 TABLET ORAL EVERY MORNING
Qty: 90 TABLET | Refills: 3 | Status: SHIPPED | OUTPATIENT
Start: 2022-04-26 | End: 2022-10-25 | Stop reason: SDUPTHER

## 2022-04-26 NOTE — PATIENT INSTRUCTIONS
New instructions for today:    Patient Instructions:  Continue current medications as prescribed. Always keep a current medication list. Bring your medications to every office visit. Continue to follow up with primary care provider for non cardiac medical problems. Call the office with any problems, questions or concerns at [phone]. If you have been asked to keep a blood pressure log, do so for 2 weeks. Call the office to report readings to the triage nurse at [phone]. Follow up with cardiologist as scheduled. The following educational material has been included in this after visit summary for your review: Life simple 7. Heart health. Life simple 7  1) Manage blood pressure - high blood pressure is a major risk factor for heart disease and stroke. Keeping blood pressure in health range reduces strain on your heart, arteries and kidneys. Blood pressure goal is less than 130/80. 2) Control cholesterol - contributes to plaque, which can clog arteries and lead to heart disease and stroke. When you control your cholesterol you are giving your arteries their best chance to remain clear. It is recommended that you get cholesterol lab work done once a year. 3) Reduce blood sugar - most of the food we eat is turning into glucose or blood sugar that our body uses for energy. Over time, high levels of blood sugar can damage your heart, kidneys, eyes and nerves. 4) Get active - living an active life is one of the most rewarding gifts you can give yourself and those you love. Simply put, daily physical activity increases your length and quality of life. Strive to exercise 15 minutes most days of the week. 5)  Eat better - A healthy diet is one of your best weapons for fighting cardiovascular disease. When you eat a heart healthy diet, you improve your chances for feeling good and staying healthy for life.   6)  Lose weight - when you shed extra fat an unnecessary pounds, you reduce the burden on your hear, lungs, blood vessels and skeleton. You give yourself the gift of active living, you lower your blood pressure and help yourself feel better. 7) Stop smoking - cigarette smokers have a higher risk of developing cardiovascular disease. If  You smoke, quitting is the best thing you can do for your health. Check American Heart Association on line for more information on Life's Simple 7 and tips for healthy living. A Healthy Heart: Care Instructions  Your Care Instructions     Coronary artery disease, also called heart disease, occurs when a substance called plaque builds up in the vessels that supply oxygen-rich blood to your heart muscle. This can narrow the blood vessels and reduce blood flow. A heart attack happens when blood flow is completely blocked. A high-fat diet, smoking, and other factors increase the risk of heart disease. Your doctor has found that you have a chance of having heart disease. You can do lots of things to keep your heart healthy. It may not be easy, but you can change your diet, exercise more, and quit smoking. These steps really work to lower your chance of heart disease. Follow-up care is a key part of your treatment and safety. Be sure to make and go to all appointments, and call your doctor if you are having problems. It's also a good idea to know your test results and keep a list of the medicines you take. How can you care for yourself at home? Diet  · Use less salt when you cook and eat. This helps lower your blood pressure. Taste food before salting. Add only a little salt when you think you need it. With time, your taste buds will adjust to less salt. · Eat fewer snack items, fast foods, canned soups, and other high-salt, high-fat, processed foods. · Read food labels and try to avoid saturated and trans fats. They increase your risk of heart disease by raising cholesterol levels. · Limit the amount of solid fat-butter, margarine, and shortening-you eat.  Use olive, peanut, or canola oil when you cook. Bake, broil, and steam foods instead of frying them. · Eat a variety of fruit and vegetables every day. Dark green, deep orange, red, or yellow fruits and vegetables are especially good for you. Examples include spinach, carrots, peaches, and berries. · Foods high in fiber can reduce your cholesterol and provide important vitamins and minerals. High-fiber foods include whole-grain cereals and breads, oatmeal, beans, brown rice, citrus fruits, and apples. · Eat lean proteins. Heart-healthy proteins include seafood, lean meats and poultry, eggs, beans, peas, nuts, seeds, and soy products. · Limit drinks and foods with added sugar. These include candy, desserts, and soda pop. Lifestyle changes  · If your doctor recommends it, get more exercise. Walking is a good choice. Bit by bit, increase the amount you walk every day. Try for at least 30 minutes on most days of the week. You also may want to swim, bike, or do other activities. · Do not smoke. If you need help quitting, talk to your doctor about stop-smoking programs and medicines. These can increase your chances of quitting for good. Quitting smoking may be the most important step you can take to protect your heart. It is never too late to quit. · Limit alcohol to 2 drinks a day for men and 1 drink a day for women. Too much alcohol can cause health problems. · Manage other health problems such as diabetes, high blood pressure, and high cholesterol. If you think you may have a problem with alcohol or drug use, talk to your doctor. Medicines  · Take your medicines exactly as prescribed. Call your doctor if you think you are having a problem with your medicine. · If your doctor recommends aspirin, take the amount directed each day. Make sure you take aspirin and not another kind of pain reliever, such as acetaminophen (Tylenol). When should you call for help? [id number] if you have symptoms of a heart attack.  These may include:  · Chest pain or pressure, or a strange feeling in the chest.  · Sweating. · Shortness of breath. · Pain, pressure, or a strange feeling in the back, neck, jaw, or upper belly or in one or both shoulders or arms. · Lightheadedness or sudden weakness. · A fast or irregular heartbeat. After you call 911, the  may tell you to chew 1 adult-strength or 2 to 4 low-dose aspirin. Wait for an ambulance. Do not try to drive yourself. Watch closely for changes in your health, and be sure to contact your doctor if you have any problems. Where can you learn more? Go to https://[employer]. org and sign in to your [employer] account. Enter G433 in the [employer] box to learn more about \"A Healthy Heart: Care Instructions. \"     If you do not have an account, please click on the \"Sign Up Now\" link. Current as of: December 16, 2019               Content Version: 12.5  © 9224-2702 [employer]. Care instructions adapted under license by [facility] ([facility]). If you have questions about a medical condition or this instruction, always ask your healthcare professional. [address] any warranty or liability for your use of this information.

## 2022-04-26 NOTE — PROGRESS NOTES
[facility] [place], [place]. [address] [facility]  [phone] office  [phone] fax      OFFICE VISIT:  4/26/2022    [address] [place] [address] Road: 1960  Reason For Visit:  Valeriy Velazquez is a 64 y.o. female who is here for Follow-up and Hypertension    History:   Diagnosis Orders   1. Mild coronary artery disease     2. Essential hypertension  hydroCHLOROthiazide (HYDRODIURIL) 25 MG tablet   3. Mixed hyperlipidemia       The patient presents today for cardiology follow up. The patient presents today feeling great and doing well. She reports no chest pain and that BP has been great since Dr. Angelita Eubanks added amlodipine 2.5 mg to her regimen. She does not smoke and tries to remain active. She does lift light weights. The patient denies symptoms to suggest myocardial ischemia, heart failure or arrhythmia. BP is well controlled on current regimen. The patient's PCP monitors cholesterol. Subjective  Valeriy Velazquez denies exertional chest pain, shortness of breath, orthopnea, paroxysmal nocturnal dyspnea, syncope, presyncope, sensed arrhythmia, edema and fatigue. The patient denies numbness or weakness to suggest cerebrovascular accident or transient ischemic attack.      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    Osteopenia of multiple sites M85.89    History of breast cancer Z85.3    Other chest pain R07.89    Chest pressure R07.89    ACS (acute coronary syndrome) ([address] I24.9    Mild coronary artery disease I25.10    Transaminitis R74.01    Abnormal CT of the abdomen R93.5    Fatty liver K76.0    Alcohol consumption of one to four drinks per week Z78.9    Enlarged lymph nodes R59.9    Essential hypertension I10    Atypical chest pain R07.89    COPD (chronic obstructive pulmonary disease) (HCC) J44.9    Panlobular emphysema (HCC) J43.1    History of smoking Z87.891    Gastroesophageal reflux disease without esophagitis K21.9     Past Medical History:   Diagnosis Date    Acid reflux     Anxiety     [facility] cancer [facility] [address] 2009    DCIS    Cancer ([facility]     liposcaarcoma, peritoneal    Gastroesophageal reflux disease without esophagitis 10/20/2021    History of therapeutic radiation 2009    Hyperlipidemia     Hypertension     Mild coronary artery disease 7/18/2019     Past Surgical History:   Procedure Laterality Date    BREAST BIOPSY Left 2009    DCIS    BREAST LUMPECTOMY Left     CHOLECYSTECTOMY  2008    COLONOSCOPY  12/20/2010    Dr Sanchez-Questionable polyp @ appendiceal orifice, radiation changes, AP x 1    COLONOSCOPY N/A 03/07/2022    Dr KARSON Levy-Diverticular disease-BCM, 5 yr recall    HYSTERECTOMY  2008    MASTECTOMY, PARTIAL Left 2008    breast cancer    [place] COLONOSCOPY FLX DX W/COLLJ SPEC WHEN PFRMD N/A 12/09/2016    Dr Logan Welch access, normall, 5 yr recall w/Dr Lidia Coleman    CHRISTIANE AND BSO Bilateral 2008    alwasy normal PAP's prior to hysterectomy age 48    TONSILLECTOMY     Juanetta Filler N/A 12/10/2020    Dr Heather Alexander normal exam; NEG h pylori    US GUIDED LIVER BIOPSY PERCUTANEOUS  12/14/2020    steatosis, + mild iron staining, grade 0, stage 0     Family History   Problem Relation Age of Onset    Alzheimer's Disease Mother     Breast Cancer Mother 48    Heart Disease Father     Diabetes Father     Cancer Brother     Colon Cancer Neg Hx     Colon Polyps Neg Hx     Liver Cancer Neg Hx     Liver Disease Neg Hx     Esophageal Cancer Neg Hx     Rectal Cancer Neg Hx     Stomach Cancer Neg Hx WEEK (Patient taking differently: Take 50,000 Units by mouth once a week Takes on sunday) 12 capsule 1    albuterol sulfate HFA (VENTOLIN HFA) 108 (90 Base) MCG/ACT inhaler Inhale 2 puffs into the lungs 4 times daily as needed for Wheezing 1 Inhaler 0    aspirin EC 81 MG EC tablet Take 81 mg by mouth daily       No current facility-administered medications for this visit. Allergies: Adhesive tape and Sulfa antibiotics    Review of Systems  Constitutional - no appetite change, or unexpected weight change. No fever, chills or diaphoresis. No significant change in activity level or new onset of fatigue. HEENT - no significant rhinorrhea or epistaxis. No tinnitus or significant hearing loss. Eyes - no sudden vision change or amaurosis. No corneal arcus, xantholasma, subconjunctival hemorrhage or discharge. Respiratory - no significant wheezing, stridor, apnea or cough. No dyspnea on exertion or shortness of air. Cardiovascular - no exertional chest pain to suggest myocardial ischemia. No orthopnea or PND. No sensation of sustained arrythmia. No occurrence of slow heart rate. No palpitations. No claudication. Gastrointestinal - no abdominal swelling or pain. No blood in stool. No severe constipation, diarrhea, nausea, or vomiting. Genitourinary - no dysuria, frequency, or urgency. No flank pain or hematuria. Musculoskeletal - no back pain or myalgia. No problems with gait. Extremities - no clubbing, cyanosis or extremity edema. Skin - no color change or rash. No pallor. No new surgical incision. Neurologic - no speech difficulty, facial asymmetry or lateralizing weakness. No seizures, presyncope or syncope. No significant dizziness. Hematologic - no easy bruising or excessive bleeding. Psychiatric - no severe anxiety or insomnia. No confusion. All other review of systems are negative.     Objective  Vital Signs - /74   Pulse 70   Ht 5' 3\" (1.6 m)   Wt 144 lb (65.3 kg) SpO2 96%   BMI 25.51 kg/m²   General - Deandra Arriaga is alert, cooperative, and pleasant. Well groomed. No acute distress. Body habitus - Body mass index is 25.51 kg/m². HEENT - Head is normocephalic. No circumoral cyanosis. Dentition is normal.  EYES -   Lids normal without ptosis. No discharge, edema or subconjunctival hemorrhage. Neck - Symmetrical without apparent mass or lymphadenopathy. Respiratory - Normal respiratory effort without use of accessory muscles. Ausculatation reveals vesicular breath sounds without crackles, wheezes, rub or rhonchi. Cardiovascular - No jugular venous distention. Auscultation reveals regular rate and rhythm. No audible clicks, gallop or rub. No murmur. No lower extremity varicosities. No carotid bruits. Abdominal -  No visible distention, mass or pulsations. Extremities - No clubbing or cyanosis. No statis dermatitis or ulcers. No edema. Musculoskeletal -   No Osler's nodes. No kyphosis or scoliosis. Gait is even and regular without limp or shuffle. Ambulates without assistance. Skin -  Warm and dry; no rash or pallor. No new surgical wound. Neurological - No focal neurological deficits. Thought processes coherent. No apparent tremor. Oriented to person, place and time. Psychiatric -  Appropriate affect and mood. Data reviewed:  9/2/21 Lexiscan  Impression   Impression:   There is no obvious infarct or ischemia, with a calculated ejection   fraction of 75 %. Suggest: Clinical correlation with consideration for medical   management. Signed by Dr Becka Toney     9/2/21 echo  Normal left ventricular size with preserved LV function and a calculated ejection fraction of 72%. No regional wall motion  abnormalities. Normal left ventricular wall thickness. Normal diastolic filling pattern for age. Normal right ventricular size with preserved RV function. Mild tricuspid regurgitation with normal estimated RVSP.   Aortic root is within normal reviewed. Continue current medical management for cardiac related condition. Continue other current medications as directed. Continue to follow up with primary care provider for non cardiac medical problems. If your primary care provider is outside of the [facility], please request that your labs be faxed to this office at [phone]. BP goal 130/80 or less. Call the office with any problems, questions or concerns at [phone]. Cardiology follow up as scheduled in [address] appointments. Educational included in patient instructions. Heart health.      VAZQUEZ Trejo

## 2022-05-10 ENCOUNTER — OFFICE VISIT (OUTPATIENT)
Dept: INTERNAL MEDICINE | Age: 62
End: 2022-05-10
Payer: COMMERCIAL

## 2022-05-10 VITALS
DIASTOLIC BLOOD PRESSURE: 80 MMHG | SYSTOLIC BLOOD PRESSURE: 110 MMHG | WEIGHT: 142 LBS | BODY MASS INDEX: 25.16 KG/M2 | HEIGHT: 63 IN | HEART RATE: 104 BPM | OXYGEN SATURATION: 95 %

## 2022-05-10 DIAGNOSIS — J02.9 SORE THROAT: ICD-10-CM

## 2022-05-10 DIAGNOSIS — H66.92 LEFT ACUTE OTITIS MEDIA: Primary | ICD-10-CM

## 2022-05-10 DIAGNOSIS — J01.00 ACUTE NON-RECURRENT MAXILLARY SINUSITIS: ICD-10-CM

## 2022-05-10 PROCEDURE — 99213 OFFICE O/P EST LOW 20 MIN: CPT | Performed by: INTERNAL MEDICINE

## 2022-05-10 RX ORDER — CEFDINIR 300 MG/1
300 CAPSULE ORAL 2 TIMES DAILY
Qty: 14 CAPSULE | Refills: 0 | Status: SHIPPED | OUTPATIENT
Start: 2022-05-10 | End: 2022-05-17

## 2022-05-10 RX ORDER — PREDNISONE 10 MG/1
10 TABLET ORAL 2 TIMES DAILY
Qty: 6 TABLET | Refills: 0 | Status: SHIPPED | OUTPATIENT
Start: 2022-05-10 | End: 2022-05-13

## 2022-05-10 ASSESSMENT — ENCOUNTER SYMPTOMS
SINUS PRESSURE: 1
ABDOMINAL PAIN: 0
SINUS PAIN: 1
RHINORRHEA: 1
COUGH: 0
CONSTIPATION: 0
WHEEZING: 0
CHEST TIGHTNESS: 0
SORE THROAT: 0

## 2022-05-10 NOTE — PROGRESS NOTES
Chief Complaint   Patient presents with    Otalgia     History of presenting illness:  Kinza White is a59 y.o. female who presents today for follow up on her chronic medical conditions as noted below.       Sick for 2 days  Sore throat  Sinus pressure/ drainage  Earache- no drainage    No cough  covid neg at home    Patient Active Problem List    Diagnosis Date Noted    ACS (acute coronary syndrome) ([facility] 07/13/2019     Priority: High    Chest pressure 06/19/2018     Priority: High    Panlobular emphysema ([address] 10/20/2021    History of smoking 10/20/2021    Gastroesophageal reflux disease without esophagitis 10/20/2021    Atypical chest pain 09/02/2021    COPD (chronic obstructive pulmonary disease) ([address] 09/02/2021    Enlarged lymph nodes 07/19/2021    Essential hypertension 07/19/2021    Transaminitis 11/18/2020    Abnormal CT of the abdomen 11/18/2020    Fatty liver 11/18/2020    Alcohol consumption of one to four drinks per week 11/18/2020    Mild coronary artery disease 07/18/2019    Other chest pain 06/18/2018     Overview Note:     12/30/2016  SE negative for myocardial ischemia  6/19/18  SE negative for myocardial ischemia  7/13/19 ACS FERNANDO RISK Score 2 (angina, + troponin ), AUC indication 3, AUC score 7   7/14/19  Cath  Mild CAD, normal LVFX      Vitamin D deficiency 11/04/2017    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    Generalized anxiety disorder 11/04/2017    Pure hypercholesterolemia 11/04/2017    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[facility] 11/04/2017     Overview Note:     DX 2007; post extensive surgery/ hysterectomy      Osteopenia of multiple sites 11/04/2017     Overview Note:     2013 Lspine -2.4/hip neck -2.3; 5/17 Lspine -1.1; hip neck -2.2  10/2020 hip neck -2.0/ L spine -2.1  boniva started 2017      History of breast cancer 11/04/2017     Overview Note:     2008 LEFT/ post partial mastectomy      Elevated transaminase level 06/08/2017    Fatty liver disease, nonalcoholic 26/68/3865    Hx of colonic polyps      Past Medical History:   Diagnosis Date    Acid reflux     Anxiety     Breast cancer ([facility] [address] 2009    DCIS    Cancer ([facility] [address]     liposcaarcoma, peritoneal    Gastroesophageal reflux disease without esophagitis 10/20/2021    History of therapeutic radiation 2009    Hyperlipidemia     Hypertension     Mild coronary artery disease 7/18/2019      Past Surgical History:   Procedure Laterality Date    BREAST BIOPSY Left 2009    DCIS    BREAST LUMPECTOMY Left     CHOLECYSTECTOMY  2008    COLONOSCOPY  12/20/2010    Dr Sanchez-Questionable polyp @ appendiceal orifice, radiation changes, AP x 1    COLONOSCOPY N/A 03/07/2022    Dr Angelina Palomino disease-BCM, 5 yr recall    HYSTERECTOMY  2008    MASTECTOMY, PARTIAL Left 2008    breast cancer    [place] COLONOSCOPY FLX DX W/COLLJ SPEC WHEN PFRMD N/A 12/09/2016    Dr Shahnaz Real access, normall, 5 yr recall w/Dr Burgess Ruffinsin    CHRISTIANE AND BSO Bilateral 2008    alwasy normal PAP's prior to hysterectomy age 48    TONSILLECTOMY      UPPER GASTROINTESTINAL ENDOSCOPY N/A 12/10/2020    Dr Jory Vidal normal exam; NEG h pylori    US GUIDED LIVER BIOPSY PERCUTANEOUS  12/14/2020    steatosis, + mild iron staining, grade 0, stage 0     Current Outpatient Medications   Medication Sig Dispense Refill    neomycin-polymyxin-hydrocortisone (CORTISPORIN) 3.5-72046-3 otic solution Place 3 drops into the left ear 3 times daily for 10 days Instill into left Ear 10 mL 0    cefdinir (OMNICEF) 300 MG capsule Take 1 capsule by mouth 2 times daily for 7 days 14 capsule 0    predniSONE (DELTASONE) 10 MG tablet Take 1 tablet by mouth 2 times daily for 3 days 6 tablet 0    isosorbide mononitrate (IMDUR) 30 MG extended release tablet Take 1 tablet by mouth daily 90 tablet 3    hydroCHLOROthiazide (HYDRODIURIL) 25 MG tablet Take 1 tablet by mouth every morning 90 tablet 3    fluticasone (FLONASE) 50 MCG/ACT nasal spray USE 2 SPRAYS IN EACH NOSTRIL ONCE DAILY 16 g 11    ibandronate (BONIVA) 150 MG tablet Take 1 tablet by mouth every 30 days 3 tablet 1    fluticasone (FLOVENT HFA) 110 MCG/ACT inhaler Inhale 2 puffs into the lungs 2 times daily 3 each 2    azelastine (ASTELIN) 0.1 % nasal spray 1 spray by Nasal route 2 times daily Use in each nostril as directed 3 each 1    cloNIDine (CATAPRES) 0.1 MG tablet Take BP every 12 hours if BP over 227 systolic or 95 diastolic 60 tablet 3    nitroGLYCERIN (NITROSTAT) 0.4 MG SL tablet For shortness of breath with hypertension. Up to max of 3 total doses. If no relief after 1 dose, call 911. 15 tablet 0    amLODIPine (NORVASC) 2.5 MG tablet Take 1 tablet by mouth daily 30 tablet 5    venlafaxine (EFFEXOR XR) 150 MG extended release capsule TAKE 1 CAPSULE BY MOUTH DAILY 30 capsule 5    metoprolol tartrate (LOPRESSOR) 50 MG tablet TAKE 1 TABLET BY MOUTH EVERY 12 HOURS (Patient taking differently: Take 50 mg by mouth 2 times daily ) 60 tablet 5    irbesartan (AVAPRO) 300 MG tablet TAKE 1 TABLET BY MOUTH DAILY 30 tablet 5    rosuvastatin (CRESTOR) 20 MG tablet TAKE 1 TABLET BY MOUTH NIGHTLY 90 tablet 1    vitamin D (ERGOCALCIFEROL) 1.25 MG (01827 UT) CAPS capsule TAKE ONE CAPSULE BY MOUTH ONCE A WEEK (Patient taking differently: Take 50,000 Units by mouth once a week Takes on sunday) 12 capsule 1    albuterol sulfate HFA (VENTOLIN HFA) 108 (90 Base) MCG/ACT inhaler Inhale 2 puffs into the lungs 4 times daily as needed for Wheezing 1 Inhaler 0    aspirin EC 81 MG EC tablet Take 81 mg by mouth daily       No current facility-administered medications for this visit.      Allergies   Allergen Reactions    Adhesive Tape Swelling    Sulfa Antibiotics Swelling and Rash     Other reaction(s): Unknown     Social History     Tobacco Use    Smoking status: Former Smoker     Packs/day: 0.75     Years: 35.00     Pack years: 26.25     Types: Cigarettes     Quit date: 2016     Years since quitting: 6. 3    Smokeless tobacco: Never Used   Substance Use Topics    Alcohol use: Yes     Alcohol/week: 2.0 standard drinks     Types: 2 Glasses of wine per week     Comment: weekends      Family History   Problem Relation Age of Onset    Alzheimer's Disease Mother     Breast Cancer Mother 48    Heart Disease Father     Diabetes Father     Cancer Brother     Colon Cancer Neg Hx     Colon Polyps Neg Hx     Liver Cancer Neg Hx     Liver Disease Neg Hx     Esophageal Cancer Neg Hx     Rectal Cancer Neg Hx     Stomach Cancer Neg Hx        Review of Systems   Constitutional: Positive for fatigue. Negative for chills and fever. HENT: Positive for congestion, ear pain, postnasal drip, rhinorrhea, sinus pressure and sinus pain. Negative for nosebleeds and sore throat. Respiratory: Negative for cough, chest tightness and wheezing. Cardiovascular: Negative for chest pain, palpitations and leg swelling. Gastrointestinal: Negative for abdominal pain and constipation. Genitourinary: Negative for dysuria and urgency. Musculoskeletal: Negative. Negative for arthralgias. Skin: Negative for rash. Neurological: Negative for dizziness and headaches. Psychiatric/Behavioral: Negative. Vitals:    05/10/22 1319   BP: 110/80   Pulse: 104   SpO2: 95%   Weight: 142 lb (64.4 kg)   Height: 5' 3\" (1.6 m)     Body mass index is 25.15 kg/m². Physical Exam  Constitutional:       Appearance: She is well-developed. HENT:      Right Ear: External ear normal.      Left Ear: External ear normal.      Ears:      Comments: Left ear canal erythema present  Left TM erythematous  No ear discharge     Nose: Congestion and rhinorrhea present. Mouth/Throat:      Pharynx: Oropharyngeal exudate and posterior oropharyngeal erythema present. Eyes:      Conjunctiva/sclera: Conjunctivae normal.      Pupils: Pupils are equal, round, and reactive to light. Neck:      Thyroid: No thyromegaly. Vascular: No JVD. Cardiovascular:      Rate and Rhythm: Normal rate. Heart sounds: Normal heart sounds. No murmur heard. Pulmonary:      Effort: No respiratory distress. Breath sounds: Normal breath sounds. No wheezing or rales. Chest:      Chest wall: No tenderness. Abdominal:      General: Bowel sounds are normal.      Palpations: Abdomen is soft. Musculoskeletal:      Cervical back: Neck supple. Lymphadenopathy:      Cervical: Cervical adenopathy present. Skin:     Findings: No rash. Neurological:      Mental Status: She is disoriented.          Lab Review   Admission on 03/21/2022, Discharged on 03/21/2022   Component Date Value    P-R Interval 03/21/2022 152     QRS Duration 03/21/2022 96     Q-T Interval 03/21/2022 402     QTc Calculation (Bazett) 03/21/2022 431     P Axis 03/21/2022 76     T Axis 03/21/2022 47     WBC 03/21/2022 5.4     RBC 03/21/2022 4.46     Hemoglobin 03/21/2022 12.8     Hematocrit 03/21/2022 42.4     MCV 03/21/2022 95.1     MCH 03/21/2022 28.7     MCHC 03/21/2022 30.2*    RDW 03/21/2022 13.2     Platelets 92/42/9254 220     MPV 03/21/2022 9.9     Neutrophils % 03/21/2022 58.6     Lymphocytes % 03/21/2022 22.6     Monocytes % 03/21/2022 8.3     Eosinophils % 03/21/2022 9.6*    Basophils % 03/21/2022 0.7     Neutrophils Absolute 03/21/2022 3.2     Immature Granulocytes # 03/21/2022 0.0     Lymphocytes Absolute 03/21/2022 1.2     Monocytes Absolute 03/21/2022 0.50     Eosinophils Absolute 03/21/2022 0.50     Basophils Absolute 03/21/2022 0.00     Sodium 03/21/2022 141     Potassium reflex Magnesi* 03/21/2022 4.3     Chloride 03/21/2022 106     CO2 03/21/2022 23     Anion Gap 03/21/2022 12     Glucose 03/21/2022 110*    BUN 03/21/2022 20     CREATININE 03/21/2022 0.7     GFR Non- 03/21/2022 >60     GFR  03/21/2022 >59     Calcium 03/21/2022 9.1     Total Protein 03/21/2022 6.0*    Albumin 03/21/2022 4.3     Total Bilirubin 03/21/2022 <0.2     Alkaline Phosphatase 03/21/2022 72     ALT 03/21/2022 18     AST 03/21/2022 21     Troponin 03/21/2022 <0.01     Pro-BNP 03/21/2022 356     Adenovirus by PCR 03/21/2022 Not Detected     Bordetella parapertussis* 03/21/2022 Not Detected     Bordetella pertussis by * 03/21/2022 Not Detected     Chlamydophilia pneumonia* 03/21/2022 Not Detected     Coronavirus 229E by PCR 03/21/2022 Not Detected     Coronavirus HKU1 by PCR 03/21/2022 Not Detected     Coronavirus NL63 by PCR 03/21/2022 Not Detected     Coronavirus OC43 by PCR 03/21/2022 Not Detected     SARS-CoV-2, PCR 03/21/2022 Not Detected     Human Metapneumovirus by* 03/21/2022 Not Detected     Human Rhinovirus/Enterov* 03/21/2022 DETECTED*    Influenza A by PCR 03/21/2022 Not Detected     Influenza B by PCR 03/21/2022 Not Detected     Mycoplasma pneumoniae by* 03/21/2022 Not Detected     Parainfluenza Virus 1 by* 03/21/2022 Not Detected     Parainfluenza Virus 2 by* 03/21/2022 Not Detected     Parainfluenza Virus 3 by* 03/21/2022 Not Detected     Parainfluenza Virus 4 by* 03/21/2022 Not Detected     Respiratory Syncytial Vi* 03/21/2022 Not Detected            ASSESSMENT/PLAN:      Left acute otitis media    Sore throat    Acute non-recurrent maxillary sinusitis    RX  -     neomycin-polymyxin-hydrocortisone (CORTISPORIN) 3.5-23268-9 otic solution; Place 3 drops into the left ear 3 times daily for 10 days Instill into left Ear  -     cefdinir (OMNICEF) 300 MG capsule; Take 1 capsule by mouth 2 times daily for 7 days  -     predniSONE (DELTASONE) 10 MG tablet;  Take 1 tablet by mouth 2 times daily for 3 days    Recommendations  Use your Flonase no spray  Take your antihistamine-take daily  Use Astelin no spray    Excuse given to the patient for today and tomorrow, return to work on May 12    If sx not improving and resolving , if sx continue or re-occur pt has been instructed to call us and / or return here for follow- up evaluation            No orders of the defined types were placed in this encounter. New Prescriptions    CEFDINIR (OMNICEF) 300 MG CAPSULE    Take 1 capsule by mouth 2 times daily for 7 days    NEOMYCIN-POLYMYXIN-HYDROCORTISONE (CORTISPORIN) 3.5-98520-9 OTIC SOLUTION    Place 3 drops into the left ear 3 times daily for 10 days Instill into left Ear    PREDNISONE (DELTASONE) 10 MG TABLET    Take 1 tablet by mouth 2 times daily for 3 days         No follow-ups on file. There are no Patient Instructions on file for this visit. EMR Dragon/transcription disclaimer:Significant part of this  encounter note is electronic transcription/translationof spoken language to printed text. The electronic translation of spoken language may be erroneous, or at times, nonsensical words or phrases may be inadvertently transcribed.  Although I have reviewed the note for sucherrors, some may still exist.

## 2022-05-13 DIAGNOSIS — Z12.39 ENCOUNTER FOR BREAST CANCER SCREENING OTHER THAN MAMMOGRAM: ICD-10-CM

## 2022-05-13 DIAGNOSIS — R73.01 IFG (IMPAIRED FASTING GLUCOSE): ICD-10-CM

## 2022-05-13 DIAGNOSIS — I10 ESSENTIAL HYPERTENSION: ICD-10-CM

## 2022-05-13 DIAGNOSIS — Z12.11 SCREENING FOR COLORECTAL CANCER: ICD-10-CM

## 2022-05-13 DIAGNOSIS — E78.00 PURE HYPERCHOLESTEROLEMIA: ICD-10-CM

## 2022-05-13 DIAGNOSIS — M85.89 OSTEOPENIA OF MULTIPLE SITES: ICD-10-CM

## 2022-05-13 DIAGNOSIS — Z00.00 ANNUAL PHYSICAL EXAM: ICD-10-CM

## 2022-05-13 DIAGNOSIS — Z12.12 SCREENING FOR COLORECTAL CANCER: ICD-10-CM

## 2022-05-13 DIAGNOSIS — I25.10 MILD CORONARY ARTERY DISEASE: ICD-10-CM

## 2022-05-13 DIAGNOSIS — E55.9 VITAMIN D DEFICIENCY: ICD-10-CM

## 2022-05-13 LAB
ALBUMIN SERPL-MCNC: 4.1 G/DL (ref 3.5–5.2)
ALP BLD-CCNC: 76 U/L (ref 35–104)
ALT SERPL-CCNC: 16 U/L (ref 5–33)
ANION GAP SERPL CALCULATED.3IONS-SCNC: 16 MMOL/L (ref 7–19)
AST SERPL-CCNC: 14 U/L (ref 5–32)
BILIRUB SERPL-MCNC: <0.2 MG/DL (ref 0.2–1.2)
BUN BLDV-MCNC: 26 MG/DL (ref 8–23)
CALCIUM SERPL-MCNC: 9.2 MG/DL (ref 8.8–10.2)
CHLORIDE BLD-SCNC: 96 MMOL/L (ref 98–111)
CHOLESTEROL, TOTAL: 176 MG/DL (ref 160–199)
CO2: 25 MMOL/L (ref 22–29)
CREAT SERPL-MCNC: 0.6 MG/DL (ref 0.5–0.9)
GFR AFRICAN AMERICAN: >59
GFR NON-AFRICAN AMERICAN: >60
GLUCOSE BLD-MCNC: 104 MG/DL (ref 74–109)
HBA1C MFR BLD: 5.7 % (ref 4–6)
HDLC SERPL-MCNC: 66 MG/DL (ref 65–121)
LDL CHOLESTEROL CALCULATED: 83 MG/DL
POTASSIUM SERPL-SCNC: 4.5 MMOL/L (ref 3.5–5)
SODIUM BLD-SCNC: 137 MMOL/L (ref 136–145)
T4 FREE: 1.25 NG/DL (ref 0.93–1.7)
TOTAL PROTEIN: 6.3 G/DL (ref 6.6–8.7)
TRIGL SERPL-MCNC: 134 MG/DL (ref 0–149)
TSH SERPL DL<=0.05 MIU/L-ACNC: 2.96 UIU/ML (ref 0.27–4.2)
VITAMIN D 25-HYDROXY: 40.8 NG/ML

## 2022-05-16 ENCOUNTER — OFFICE VISIT (OUTPATIENT)
Dept: INTERNAL MEDICINE | Age: 62
End: 2022-05-16
Payer: COMMERCIAL

## 2022-05-16 VITALS
DIASTOLIC BLOOD PRESSURE: 88 MMHG | SYSTOLIC BLOOD PRESSURE: 132 MMHG | OXYGEN SATURATION: 98 % | BODY MASS INDEX: 25.34 KG/M2 | HEART RATE: 90 BPM | HEIGHT: 63 IN | RESPIRATION RATE: 18 BRPM | WEIGHT: 143 LBS

## 2022-05-16 DIAGNOSIS — I10 ESSENTIAL HYPERTENSION: ICD-10-CM

## 2022-05-16 DIAGNOSIS — H66.92 ACUTE LEFT OTITIS MEDIA: ICD-10-CM

## 2022-05-16 DIAGNOSIS — I25.10 MILD CORONARY ARTERY DISEASE: ICD-10-CM

## 2022-05-16 DIAGNOSIS — E55.9 VITAMIN D DEFICIENCY: ICD-10-CM

## 2022-05-16 DIAGNOSIS — R73.01 IFG (IMPAIRED FASTING GLUCOSE): ICD-10-CM

## 2022-05-16 DIAGNOSIS — H92.02 EARACHE SYMPTOMS IN LEFT EAR: ICD-10-CM

## 2022-05-16 DIAGNOSIS — E78.00 PURE HYPERCHOLESTEROLEMIA: Primary | ICD-10-CM

## 2022-05-16 PROCEDURE — 99214 OFFICE O/P EST MOD 30 MIN: CPT | Performed by: INTERNAL MEDICINE

## 2022-05-16 RX ORDER — IBANDRONATE SODIUM 150 MG/1
150 TABLET, FILM COATED ORAL
Qty: 3 TABLET | Refills: 1 | Status: SHIPPED | OUTPATIENT
Start: 2022-05-16 | End: 2022-10-30 | Stop reason: SDUPTHER

## 2022-05-16 RX ORDER — AMLODIPINE BESYLATE 2.5 MG/1
2.5 TABLET ORAL DAILY
Qty: 90 TABLET | Refills: 1 | Status: SHIPPED | OUTPATIENT
Start: 2022-05-16

## 2022-05-16 RX ORDER — VENLAFAXINE HYDROCHLORIDE 150 MG/1
CAPSULE, EXTENDED RELEASE ORAL
Qty: 90 CAPSULE | Refills: 1 | Status: SHIPPED | OUTPATIENT
Start: 2022-05-16

## 2022-05-16 ASSESSMENT — PATIENT HEALTH QUESTIONNAIRE - PHQ9
SUM OF ALL RESPONSES TO PHQ QUESTIONS 1-9: 0
SUM OF ALL RESPONSES TO PHQ9 QUESTIONS 1 & 2: 0
SUM OF ALL RESPONSES TO PHQ QUESTIONS 1-9: 0
4. FEELING TIRED OR HAVING LITTLE ENERGY: 0
6. FEELING BAD ABOUT YOURSELF - OR THAT YOU ARE A FAILURE OR HAVE LET YOURSELF OR YOUR FAMILY DOWN: 0
10. IF YOU CHECKED OFF ANY PROBLEMS, HOW DIFFICULT HAVE THESE PROBLEMS MADE IT FOR YOU TO DO YOUR WORK, TAKE CARE OF THINGS AT HOME, OR GET ALONG WITH OTHER PEOPLE: 0
9. THOUGHTS THAT YOU WOULD BE BETTER OFF DEAD, OR OF HURTING YOURSELF: 0
3. TROUBLE FALLING OR STAYING ASLEEP: 0
7. TROUBLE CONCENTRATING ON THINGS, SUCH AS READING THE NEWSPAPER OR WATCHING TELEVISION: 0
5. POOR APPETITE OR OVEREATING: 0
1. LITTLE INTEREST OR PLEASURE IN DOING THINGS: 0
2. FEELING DOWN, DEPRESSED OR HOPELESS: 0
SUM OF ALL RESPONSES TO PHQ QUESTIONS 1-9: 0
SUM OF ALL RESPONSES TO PHQ QUESTIONS 1-9: 0
8. MOVING OR SPEAKING SO SLOWLY THAT OTHER PEOPLE COULD HAVE NOTICED. OR THE OPPOSITE, BEING SO FIGETY OR RESTLESS THAT YOU HAVE BEEN MOVING AROUND A LOT MORE THAN USUAL: 0

## 2022-05-16 ASSESSMENT — ENCOUNTER SYMPTOMS
ABDOMINAL PAIN: 0
SORE THROAT: 0
WHEEZING: 0
COUGH: 0
CONSTIPATION: 0
CHEST TIGHTNESS: 0

## 2022-05-16 NOTE — PROGRESS NOTES
Chief Complaint   Patient presents with    6 Month Follow-Up    Otalgia     Left hear is still hurting and has ringing and cant hear     History of presenting illness:  Diane Montoya is a59 y.o. female who presents today for follow up on her chronic medical conditions as noted below.       Patient Active Problem List    Diagnosis Date Noted    ACS (acute coronary syndrome) ([facility] 07/13/2019     Priority: High    Chest pressure 06/19/2018     Priority: High    Panlobular emphysema ([address] 10/20/2021    History of smoking 10/20/2021    Gastroesophageal reflux disease without esophagitis 10/20/2021    Atypical chest pain 09/02/2021    COPD (chronic obstructive pulmonary disease) ([facility] 09/02/2021    Enlarged lymph nodes 07/19/2021    Essential hypertension 07/19/2021    Transaminitis 11/18/2020    Abnormal CT of the abdomen 11/18/2020    Fatty liver 11/18/2020    Alcohol consumption of one to four drinks per week 11/18/2020    Mild coronary artery disease 07/18/2019    Other chest pain 06/18/2018     Overview Note:     12/30/2016  SE negative for myocardial ischemia  6/19/18  SE negative for myocardial ischemia  7/13/19 ACS FERNANDO RISK Score 2 (angina, + troponin ), AUC indication 3, AUC score 7   7/14/19  Cath  Mild CAD, normal LVFX      Vitamin D deficiency 11/04/2017    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[facility] 11/04/2017    Generalized anxiety disorder 11/04/2017    Pure hypercholesterolemia 11/04/2017    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/2017     Overview Note:     DX 2007; post extensive surgery/ hysterectomy      Osteopenia of multiple sites 11/04/2017     Overview Note:     2013 Lspine -2.4/hip neck -2.3; 5/17 Lspine -1.1; hip neck -2.2  10/2020 hip neck -2.0/ L spine -2.1  boniva started 2017      History of breast cancer 11/04/2017     Overview Note:     2008 LEFT/ post partial mastectomy      Elevated transaminase level 06/08/2017    Fatty liver disease, nonalcoholic 04/60/1863    Hx of colonic polyps      Past Medical History:   Diagnosis Date    Acid reflux     Anxiety     Breast cancer ([facility] [address] 2009    DCIS    Cancer ([facility] [address]     liposcaarcoma, peritoneal    Gastroesophageal reflux disease without esophagitis 10/20/2021    History of therapeutic radiation 2009    Hyperlipidemia     Hypertension     Mild coronary artery disease 7/18/2019      Past Surgical History:   Procedure Laterality Date    BREAST BIOPSY Left 2009    DCIS    BREAST LUMPECTOMY Left     CHOLECYSTECTOMY  2008    COLONOSCOPY  12/20/2010    Dr Snachez-Questionable polyp @ appendiceal orifice, radiation changes, AP x 1    COLONOSCOPY N/A 03/07/2022    Dr Angelina Palomino disease-BCM, 5 yr recall    HYSTERECTOMY  2008    MASTECTOMY, PARTIAL Left 2008    breast cancer    CT COLONOSCOPY FLX DX W/COLLJ SPEC WHEN PFRMD N/A 12/09/2016    Dr Shahnaz Real access, normall, 5 yr recall w/Dr Marquez Cousin    CHRISTIANE AND BSO Bilateral 2008    alwasy normal PAP's prior to hysterectomy age 48    TONSILLECTOMY      UPPER GASTROINTESTINAL ENDOSCOPY N/A 12/10/2020    Dr Jory Vidal normal exam; NEG h pylori    US GUIDED LIVER BIOPSY PERCUTANEOUS  12/14/2020    steatosis, + mild iron staining, grade 0, stage 0     Current Outpatient Medications   Medication Sig Dispense Refill    venlafaxine (EFFEXOR XR) 150 MG extended release capsule TAKE 1 CAPSULE BY MOUTH DAILY 90 capsule 1    amLODIPine (NORVASC) 2.5 MG tablet Take 1 tablet by mouth daily 90 tablet 1    ibandronate (BONIVA) 150 MG tablet Take 1 tablet by mouth every 30 days 3 tablet 1    neomycin-polymyxin-hydrocortisone (CORTISPORIN) 3.5-67750-8 otic solution Place 3 drops into the left ear 3 times daily for 10 days Instill into left Ear 10 mL 0    cefdinir (OMNICEF) 300 MG capsule Take 1 capsule by mouth 2 times daily for 7 days 14 capsule 0    isosorbide mononitrate (IMDUR) 30 MG extended release tablet Take 1 tablet by mouth daily 90 tablet 3    hydroCHLOROthiazide (HYDRODIURIL) 25 MG tablet Take 1 tablet by mouth every morning 90 tablet 3    fluticasone (FLONASE) 50 MCG/ACT nasal spray USE 2 SPRAYS IN EACH NOSTRIL ONCE DAILY 16 g 11    fluticasone (FLOVENT HFA) 110 MCG/ACT inhaler Inhale 2 puffs into the lungs 2 times daily 3 each 2    azelastine (ASTELIN) 0.1 % nasal spray 1 spray by Nasal route 2 times daily Use in each nostril as directed 3 each 1    cloNIDine (CATAPRES) 0.1 MG tablet Take BP every 12 hours if BP over 267 systolic or 95 diastolic 60 tablet 3    nitroGLYCERIN (NITROSTAT) 0.4 MG SL tablet For shortness of breath with hypertension. Up to max of 3 total doses. If no relief after 1 dose, call 911. 15 tablet 0    metoprolol tartrate (LOPRESSOR) 50 MG tablet TAKE 1 TABLET BY MOUTH EVERY 12 HOURS (Patient taking differently: Take 50 mg by mouth 2 times daily ) 60 tablet 5    irbesartan (AVAPRO) 300 MG tablet TAKE 1 TABLET BY MOUTH DAILY 30 tablet 5    rosuvastatin (CRESTOR) 20 MG tablet TAKE 1 TABLET BY MOUTH NIGHTLY 90 tablet 1    vitamin D (ERGOCALCIFEROL) 1.25 MG (30445 UT) CAPS capsule TAKE ONE CAPSULE BY MOUTH ONCE A WEEK (Patient taking differently: Take 50,000 Units by mouth once a week Takes on ) 12 capsule 1    albuterol sulfate HFA (VENTOLIN HFA) 108 (90 Base) MCG/ACT inhaler Inhale 2 puffs into the lungs 4 times daily as needed for Wheezing 1 Inhaler 0    aspirin EC 81 MG EC tablet Take 81 mg by mouth daily       No current facility-administered medications for this visit. Allergies   Allergen Reactions    Adhesive Tape Swelling    Sulfa Antibiotics Swelling and Rash     Other reaction(s): Unknown     Social History     Tobacco Use    Smoking status: Former Smoker     Packs/day: 0.75     Years: 35.00     Pack years: 26.25     Types: Cigarettes     Quit date:      Years since quittin.3    Smokeless tobacco: Never Used   Substance Use Topics    Alcohol use:  Yes     Alcohol/week: 2.0 standard drinks Types: 2 Glasses of wine per week     Comment: weekends      Family History   Problem Relation Age of Onset    Alzheimer's Disease Mother     Breast Cancer Mother 48    Heart Disease Father     Diabetes Father     Cancer Brother     Colon Cancer Neg Hx     Colon Polyps Neg Hx     Liver Cancer Neg Hx     Liver Disease Neg Hx     Esophageal Cancer Neg Hx     Rectal Cancer Neg Hx     Stomach Cancer Neg Hx        Review of Systems   Constitutional: Positive for fatigue. Negative for chills and fever. HENT: Positive for ear pain and postnasal drip. Negative for congestion, nosebleeds and sore throat. Respiratory: Negative for cough, chest tightness and wheezing. Cardiovascular: Negative for chest pain, palpitations and leg swelling. Gastrointestinal: Negative for abdominal pain and constipation. Genitourinary: Negative for dysuria and urgency. Musculoskeletal: Negative. Negative for arthralgias. Skin: Negative for rash. Neurological: Negative for dizziness and headaches. Psychiatric/Behavioral: Negative. Vitals:    05/16/22 1025   BP: 132/88   Site: Left Upper Arm   Position: Sitting   Cuff Size: Large Adult   Pulse: 90   Resp: 18   SpO2: 98%   Weight: 143 lb (64.9 kg)   Height: 5' 3\" (1.6 m)     Body mass index is 25.33 kg/m². Physical Exam  Constitutional:       Appearance: She is well-developed. HENT:      Right Ear: External ear normal.      Ears:      Comments: Mild left canal erythma, tm dull  signif improved compared to week ago     Mouth/Throat:      Pharynx: No oropharyngeal exudate. Eyes:      Conjunctiva/sclera: Conjunctivae normal.      Pupils: Pupils are equal, round, and reactive to light. Neck:      Thyroid: No thyromegaly. Vascular: No JVD. Cardiovascular:      Rate and Rhythm: Normal rate. Heart sounds: Normal heart sounds. No murmur heard. Pulmonary:      Effort: No respiratory distress. Breath sounds: Normal breath sounds.  No wheezing or rales. Chest:      Chest wall: No tenderness. Abdominal:      General: Bowel sounds are normal.      Palpations: Abdomen is soft. Musculoskeletal:      Cervical back: Neck supple. Lymphadenopathy:      Cervical: No cervical adenopathy. Skin:     Findings: No rash.          Lab Review   Orders Only on 05/13/2022   Component Date Value    T4 Free 05/13/2022 1.25     TSH 05/13/2022 2.960     Vit D, 25-Hydroxy 05/13/2022 40.8     Cholesterol, Total 05/13/2022 176     Triglycerides 05/13/2022 134     HDL 05/13/2022 66     LDL Calculated 05/13/2022 83     Sodium 05/13/2022 137     Potassium 05/13/2022 4.5     Chloride 05/13/2022 96*    CO2 05/13/2022 25     Anion Gap 05/13/2022 16     Glucose 05/13/2022 104     BUN 05/13/2022 26*    CREATININE 05/13/2022 0.6     GFR Non- 05/13/2022 >60     GFR  05/13/2022 >59     Calcium 05/13/2022 9.2     Total Protein 05/13/2022 6.3*    Albumin 05/13/2022 4.1     Total Bilirubin 05/13/2022 <0.2     Alkaline Phosphatase 05/13/2022 76     ALT 05/13/2022 16     AST 05/13/2022 14     Hemoglobin A1C 05/13/2022 5.7    Admission on 03/21/2022, Discharged on 03/21/2022   Component Date Value    P-R Interval 03/21/2022 152     QRS Duration 03/21/2022 96     Q-T Interval 03/21/2022 402     QTc Calculation ([place]) 03/21/2022 431     P Axis 03/21/2022 76     T Axis 03/21/2022 47     WBC 03/21/2022 5.4     RBC 03/21/2022 4.46     Hemoglobin 03/21/2022 12.8     Hematocrit 03/21/2022 42.4     MCV 03/21/2022 95.1     MCH 03/21/2022 28.7     MCHC 03/21/2022 30.2*    RDW 03/21/2022 13.2     Platelets 84/52/5431 220     MPV 03/21/2022 9.9     Neutrophils % 03/21/2022 58.6     Lymphocytes % 03/21/2022 22.6     Monocytes % 03/21/2022 8.3     Eosinophils % 03/21/2022 9.6*    Basophils % 03/21/2022 0.7     Neutrophils Absolute 03/21/2022 3.2     Immature Granulocytes # 03/21/2022 0.0     Lymphocytes Absolute 03/21/2022 1.2     Monocytes Absolute 03/21/2022 0.50     Eosinophils Absolute 03/21/2022 0.50     Basophils Absolute 03/21/2022 0.00     Sodium 03/21/2022 141     Potassium reflex Magnesi* 03/21/2022 4.3     Chloride 03/21/2022 106     CO2 03/21/2022 23     Anion Gap 03/21/2022 12     Glucose 03/21/2022 110*    BUN 03/21/2022 20     CREATININE 03/21/2022 0.7     GFR Non- 03/21/2022 >60     GFR  03/21/2022 >59     Calcium 03/21/2022 9.1     Total Protein 03/21/2022 6.0*    Albumin 03/21/2022 4.3     Total Bilirubin 03/21/2022 <0.2     Alkaline Phosphatase 03/21/2022 72     ALT 03/21/2022 18     AST 03/21/2022 21     Troponin 03/21/2022 <0.01     Pro-BNP 03/21/2022 356     Adenovirus by PCR 03/21/2022 Not Detected     Bordetella parapertussis* 03/21/2022 Not Detected     Bordetella pertussis by * 03/21/2022 Not Detected     Chlamydophilia pneumonia* 03/21/2022 Not Detected     Coronavirus 229E by PCR 03/21/2022 Not Detected     Coronavirus HKU1 by PCR 03/21/2022 Not Detected     Coronavirus NL63 by PCR 03/21/2022 Not Detected     Coronavirus OC43 by PCR 03/21/2022 Not Detected     SARS-CoV-2, PCR 03/21/2022 Not Detected     Human Metapneumovirus by* 03/21/2022 Not Detected     Human Rhinovirus/Enterov* 03/21/2022 DETECTED*    Influenza A by PCR 03/21/2022 Not Detected     Influenza B by PCR 03/21/2022 Not Detected     Mycoplasma pneumoniae by* 03/21/2022 Not Detected     Parainfluenza Virus 1 by* 03/21/2022 Not Detected     Parainfluenza Virus 2 by* 03/21/2022 Not Detected     Parainfluenza Virus 3 by* 03/21/2022 Not Detected     Parainfluenza Virus 4 by* 03/21/2022 Not Detected     Respiratory Syncytial Vi* 03/21/2022 Not Detected            ASSESSMENT/PLAN:    Acute left otitis externa on her visit here week ago  Has been taking Omnicef and using Cortisporin drops  Pain is significantly better but not resolved  Recommend patient continue his Cortisporin drops for additional at least 7 to 10 days  We will schedule for ENT appointment after she returns from her vacation  She states that if she is doing better she will cancel appointment    Hypertension   Now blood pressure readings have been in good range  Kidney function is normal  Rx   Metoprolol 75 bid  Lisinopril 10 bid  Avapro 300 daily  IMDUR 30 mg daily        CAD  Imdur added  BP has been better  Abnormal left ventricular global longitudinal left ventricular strain of   -16.6% per echo 9/2021  Mild Coronary artery disease as per cardiac cath July 2019  Management plans as per cardiology        Pure hypercholesterolemia-  Lab results called to patient after the appointment since patient did not have lab done prior to appointment  LDL in 5/2022- 83  (LDL is significantly elevated 187 in 7/2021)  Patient has known mild coronary artery disease from 2019 cardiac cath  She obviously has not been taking her Crestor as prescribed  Importance of statin to prevent progressive coronary disease/MI is very highly recommended  Liver function tests now are normal  RX Crestor at lower dose 10 mg daily     Vitamin D deficiency-  Lab results reviewed with patient   Vitamin D level is 40 in 5/2022  Previously level low at 28  RXvitamin D 50,000 IU weekly        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HCC)  Generalized anxiety disorder-  she has been doing better,   RX  Effexor 75 mg daily     Bilateral hilar lymph node enlargement- borderline  Repeat CT scan 1/15/2021 shows chronic emphysema but no evidence of lymphadenopathy  Several tiny nodules right upper lobe  Repeat CT 1 year is recommended- intially for 1/2022  Now per pulmonology- should be able to wait until 9/2022 since had CTA in 9/2021  She will dw her pulmonology         Panlobular emphysema  Pulmonology notes reviewed independently by me and discussed with patient  Pulmonologist had also recommended patient to have modified barium swallow which was ordered but has not been performed  Recently lung function tests were ordered but have not been done yet  Barium swallow study has been scheduled for this week 11/19 and PFT has been scheduled for January 2022         Orders Placed This Encounter   Procedures    Comprehensive Metabolic Panel    CBC with Auto Differential    Hemoglobin A1C    Lipid Panel    Vitamin D 25 Hydroxy    Urinalysis    TSH   [facility], [place]     New Prescriptions    No medications on file         No follow-ups on file. There are no Patient Instructions on file for this visit. EMR Dragon/transcription disclaimer:Significant part of this  encounter note is electronic transcription/translationof spoken language to printed text. The electronic translation of spoken language may be erroneous, or at times, nonsensical words or phrases may be inadvertently transcribed.  Although I have reviewed the note for sucherrors, some may still exist.

## 2022-06-02 ENCOUNTER — OFFICE VISIT (OUTPATIENT)
Dept: ENT CLINIC | Age: 62
End: 2022-06-02
Payer: COMMERCIAL

## 2022-06-02 VITALS
OXYGEN SATURATION: 98 % | WEIGHT: 142.6 LBS | TEMPERATURE: 97.2 F | HEIGHT: 63 IN | SYSTOLIC BLOOD PRESSURE: 126 MMHG | DIASTOLIC BLOOD PRESSURE: 64 MMHG | BODY MASS INDEX: 25.27 KG/M2 | HEART RATE: 85 BPM

## 2022-06-02 DIAGNOSIS — H69.82 EUSTACHIAN TUBE DYSFUNCTION, LEFT: Primary | ICD-10-CM

## 2022-06-02 DIAGNOSIS — H65.92 MEE (MIDDLE EAR EFFUSION), LEFT: ICD-10-CM

## 2022-06-02 PROBLEM — H69.92 EUSTACHIAN TUBE DYSFUNCTION, LEFT: Status: ACTIVE | Noted: 2022-06-02

## 2022-06-02 PROCEDURE — 99203 OFFICE O/P NEW LOW 30 MIN: CPT | Performed by: PHYSICIAN ASSISTANT

## 2022-06-02 RX ORDER — FLUTICASONE PROPIONATE 50 MCG
2 SPRAY, SUSPENSION (ML) NASAL 2 TIMES DAILY
Qty: 16 G | Refills: 2 | Status: SHIPPED | OUTPATIENT
Start: 2022-06-02

## 2022-06-02 RX ORDER — OXYMETAZOLINE HYDROCHLORIDE 0.05 G/100ML
2 SPRAY NASAL 2 TIMES DAILY
Qty: 14 ML | Refills: 1 | Status: SHIPPED | OUTPATIENT
Start: 2022-06-02

## 2022-06-02 RX ORDER — METHYLPREDNISOLONE 4 MG/1
TABLET ORAL
Qty: 1 KIT | Refills: 0 | Status: SHIPPED | OUTPATIENT
Start: 2022-06-02 | End: 2022-06-08

## 2022-06-02 ASSESSMENT — ENCOUNTER SYMPTOMS
TROUBLE SWALLOWING: 0
RHINORRHEA: 0
EYE PAIN: 0
SORE THROAT: 0
SINUS PAIN: 0
VOICE CHANGE: 0
FACIAL SWELLING: 0
EYE DISCHARGE: 0
SINUS PRESSURE: 0

## 2022-06-02 NOTE — PROGRESS NOTES
[facility] OTOLARYNGOLOGY/ENT  Ms. Jody Rea is a pleasant 63-year-old  female that was referred by Dr. Renee Jean due to problems with muffled hearing to the left ear. Patient reported that she initially presented to Dr. Paola Pinon with a ear infection and was treated with antibiotics and steroids that resolved quite nicely. After waiting approximately 6 weeks from the time of the infection, she continued with muffled hearing that never resolved. She currently admits to sporadic dizziness that has been occurring with non-pulsatile tinnitus to the left ear. Patient reports of a cancer history of having a retroperitoneal sarcoma and a breast cancer that has been treated before in the past.  She is currently employed at the [employer] in [place].          Allergies: Adhesive tape and Sulfa antibiotics      Current Outpatient Medications   Medication Sig Dispense Refill    methylPREDNISolone (MEDROL, ELLA,) 4 MG tablet Take by mouth as directed 1 kit 0    oxymetazoline (AFRIN) 0.05 % nasal spray 2 sprays by Nasal route 2 times daily 14 mL 1    fluticasone (FLONASE) 50 MCG/ACT nasal spray 2 sprays by Each Nostril route 2 times daily 16 g 2    venlafaxine (EFFEXOR XR) 150 MG extended release capsule TAKE 1 CAPSULE BY MOUTH DAILY 90 capsule 1    amLODIPine (NORVASC) 2.5 MG tablet Take 1 tablet by mouth daily 90 tablet 1    ibandronate (BONIVA) 150 MG tablet Take 1 tablet by mouth every 30 days 3 tablet 1    isosorbide mononitrate (IMDUR) 30 MG extended release tablet Take 1 tablet by mouth daily 90 tablet 3    hydroCHLOROthiazide (HYDRODIURIL) 25 MG tablet Take 1 tablet by mouth every morning 90 tablet 3    azelastine (ASTELIN) 0.1 % nasal spray 1 spray by Nasal route 2 times daily Use in each nostril as directed 3 each 1    cloNIDine (CATAPRES) 0.1 MG tablet Take BP every 12 hours if BP over 009 systolic or 95 diastolic 60 tablet 3    nitroGLYCERIN (NITROSTAT) 0.4 MG SL tablet For shortness of breath with hypertension. Up to max of 3 total doses. If no relief after 1 dose, call 911. 15 tablet 0    metoprolol tartrate (LOPRESSOR) 50 MG tablet TAKE 1 TABLET BY MOUTH EVERY 12 HOURS (Patient taking differently: Take 50 mg by mouth 2 times daily ) 60 tablet 5    irbesartan (AVAPRO) 300 MG tablet TAKE 1 TABLET BY MOUTH DAILY 30 tablet 5    rosuvastatin (CRESTOR) 20 MG tablet TAKE 1 TABLET BY MOUTH NIGHTLY 90 tablet 1    vitamin D (ERGOCALCIFEROL) 1.25 MG (83019 UT) CAPS capsule TAKE ONE CAPSULE BY MOUTH ONCE A WEEK (Patient taking differently: Take 50,000 Units by mouth once a week Takes on sunday) 12 capsule 1    albuterol sulfate HFA (VENTOLIN HFA) 108 (90 Base) MCG/ACT inhaler Inhale 2 puffs into the lungs 4 times daily as needed for Wheezing 1 Inhaler 0    aspirin EC 81 MG EC tablet Take 81 mg by mouth daily      fluticasone (FLOVENT HFA) 110 MCG/ACT inhaler Inhale 2 puffs into the lungs 2 times daily 3 each 2     No current facility-administered medications for this visit.        Past Surgical History:   Procedure Laterality Date    BREAST BIOPSY Left 2009    DCIS    BREAST LUMPECTOMY Left     CHOLECYSTECTOMY  2008    COLONOSCOPY  12/20/2010    Dr Sanchez-Questionable polyp @ appendiceal orifice, radiation changes, AP x 1    COLONOSCOPY N/A 03/07/2022    Dr Raul Allan disease-BC, 5 yr recall    HYSTERECTOMY  2008    MASTECTOMY, PARTIAL Left 2008    breast cancer    [place] COLONOSCOPY FLX DX W/COLLJ SPEC WHEN PFRMD N/A 12/09/2016    Dr Freya Willett access, normall, 5 yr recall w/Dr Poly Armstrong    CHRISTIANE AND BSO Bilateral 2008    alwasy normal PAP's prior to hysterectomy age 48    [facility] N/A 12/10/2020    Dr Poppy Alarcon normal exam; NEG h pylori    US GUIDED LIVER BIOPSY PERCUTANEOUS  12/14/2020    steatosis, + mild iron staining, grade 0, stage 0       Past Medical History:   Diagnosis Date    Acid reflux     Anxiety     Breast cancer [facility]) 2009    DCIS    Cancer ([facility]     liposcaarcoma, peritoneal    Gastroesophageal reflux disease without esophagitis 10/20/2021    History of therapeutic radiation     Hyperlipidemia     Hypertension     Mild coronary artery disease 2019       Family History   Problem Relation Age of Onset    Alzheimer's Disease Mother     Breast Cancer Mother 48    Heart Disease Father     Diabetes Father     Cancer Brother     Colon Cancer Neg Hx     Colon Polyps Neg Hx     Liver Cancer Neg Hx     Liver Disease Neg Hx     Esophageal Cancer Neg Hx     Rectal Cancer Neg Hx     Stomach Cancer Neg Hx        Social History     Tobacco Use    Smoking status: Former Smoker     Packs/day: 0.75     Years: 35.00     Pack years: 26.25     Types: Cigarettes     Quit date:      Years since quittin.4    Smokeless tobacco: Never Used   Substance Use Topics    Alcohol use: Yes     Alcohol/week: 2.0 standard drinks     Types: 2 Glasses of wine per week     Comment: weekends           REVIEW OF SYSTEMS:  all other systems reviewed and are negative  Review of Systems   Constitutional: Negative for chills and fever. HENT: Positive for congestion. Negative for dental problem, ear discharge, ear pain, facial swelling, hearing loss, nosebleeds, postnasal drip, rhinorrhea, sinus pressure, sinus pain, sneezing, sore throat, tinnitus, trouble swallowing and voice change. Eyes: Negative for pain and discharge. Neurological: Negative for dizziness and headaches. Comments:     PHYSICAL EXAM:    /64   Pulse 85   Temp 97.2 °F (36.2 °C)   Ht 5' 3\" (1.6 m)   Wt 142 lb 9.6 oz (64.7 kg)   SpO2 98%   BMI 25.26 kg/m²   Body mass index is 25.26 kg/m².     General Appearance: well developed  and well nourished  Head/ Face: normocephalic and atraumatic  Vocal Quality: good/ normal  Ears: Right Ear: External: external ears normal Otoscopy Ear Canal: canal clear Otoscopy TM: TM's normal and TM's mobile Left Ear: External: external ears normal Otoscopy Ear Canal: canal clear Otoscopy TM: TM's dull, TM's sluggish, TM's buldging and tympanosclerosis: mild  Hearing: Rinne A>B: Right, Rinne A<B: Left and Matos R  Nose: septum midline and turbinates: engorged / congested  Neck: supple and adenopathy none palpable  Thyroid: normal and nodules No   Patient was noted with positive left maxillary sinus tenderness to percussion with the right side being negative. Assessment & Plan:    Problem List Items Addressed This Visit     STACY (middle ear effusion), left    Eustachian tube dysfunction, left - Primary     Eustachian tube dysfunction of the left ear with a middle ear effusion  Plan: I will place the patient on Medrol Dosepak as well as Afrin and Flonase. Patient is follow-up in 2 weeks for reevaluation. If she continues with distorted hearing, would consider an MRI of the left IAC due to her cancer history as well as current symptoms. No orders of the defined types were placed in this encounter. Orders Placed This Encounter   Medications    methylPREDNISolone (MEDROL, ELLA,) 4 MG tablet     Sig: Take by mouth as directed     Dispense:  1 kit     Refill:  0    oxymetazoline (AFRIN) 0.05 % nasal spray     Si sprays by Nasal route 2 times daily     Dispense:  14 mL     Refill:  1    fluticasone (FLONASE) 50 MCG/ACT nasal spray     Si sprays by Each Nostril route 2 times daily     Dispense:  16 g     Refill:  2       Electronically signed by Clarissa Giron PA-C on 22 at 4:15 PM CDT          Please note that this chart was generated using dragon dictation software. Although every effort was made to ensure the accuracy of this automated transcription, some errors in transcription may have occurred.

## 2022-06-02 NOTE — ASSESSMENT & PLAN NOTE
Eustachian tube dysfunction of the left ear with a middle ear effusion  Plan: I will place the patient on Medrol Dosepak as well as Afrin and Flonase. Patient is follow-up in 2 weeks for reevaluation. If she continues with distorted hearing, would consider an MRI of the left IAC due to her cancer history as well as current symptoms.

## 2022-06-03 RX ORDER — ERGOCALCIFEROL 1.25 MG/1
CAPSULE ORAL
Qty: 12 CAPSULE | Refills: 1 | Status: SHIPPED | OUTPATIENT
Start: 2022-06-03 | End: 2022-10-30 | Stop reason: SDUPTHER

## 2022-06-03 NOTE — TELEPHONE ENCOUNTER
Marciano Thomas called requesting a refill of the below medication which has been pended for you:     Requested Prescriptions     Pending Prescriptions Disp Refills    vitamin D (ERGOCALCIFEROL) 1.25 MG (72172 UT) CAPS capsule [Pharmacy Med Name: VIT D2 1.25 MG (50,000 UNIT 1.25 MG Capsule] 12 capsule 1     Sig: TAKE 1 CAPSULE BY MOUTH ONCE A WEEK       Last Appointment Date: 5/16/2022  Next Appointment Date: 11/15/2022    Allergies   Allergen Reactions    Adhesive Tape Swelling    Sulfa Antibiotics Swelling and Rash     Other reaction(s): Unknown

## 2022-07-12 RX ORDER — ROSUVASTATIN CALCIUM 20 MG/1
20 TABLET, COATED ORAL NIGHTLY
Qty: 90 TABLET | Refills: 1 | Status: SHIPPED | OUTPATIENT
Start: 2022-07-12 | End: 2022-10-30 | Stop reason: SDUPTHER

## 2022-07-12 NOTE — TELEPHONE ENCOUNTER
Byron Peraltaian called requesting a refill of the below medication which has been pended for you:     Requested Prescriptions     Pending Prescriptions Disp Refills    rosuvastatin (CRESTOR) 20 MG tablet [Pharmacy Med Name: ROSUVASTATIN CALCIUM 20 MG 20 Tablet] 90 tablet 1     Sig: TAKE 1 TABLET BY MOUTH NIGHTLY       Last Appointment Date: 5/16/2022  Next Appointment Date: 11/15/2022    Allergies   Allergen Reactions    Adhesive Tape Swelling    Sulfa Antibiotics Swelling and Rash     Other reaction(s): Unknown

## 2022-07-13 RX ORDER — IRBESARTAN 300 MG/1
300 TABLET ORAL DAILY
Qty: 30 TABLET | Refills: 5 | Status: SHIPPED | OUTPATIENT
Start: 2022-07-13 | End: 2022-10-25 | Stop reason: SDUPTHER

## 2022-09-20 PROCEDURE — 87635 SARS-COV-2 COVID-19 AMP PRB: CPT | Performed by: NURSE PRACTITIONER

## 2022-10-18 ENCOUNTER — TELEPHONE (OUTPATIENT)
Dept: CARDIOLOGY CLINIC | Age: 62
End: 2022-10-18

## 2022-10-19 ENCOUNTER — TELEPHONE (OUTPATIENT)
Dept: CARDIOLOGY CLINIC | Age: 62
End: 2022-10-19

## 2022-10-24 RX ORDER — METOPROLOL TARTRATE 50 MG/1
50 TABLET, FILM COATED ORAL 2 TIMES DAILY
Qty: 180 TABLET | Refills: 3 | Status: SHIPPED | OUTPATIENT
Start: 2022-10-24

## 2022-10-25 ENCOUNTER — OFFICE VISIT (OUTPATIENT)
Dept: CARDIOLOGY CLINIC | Age: 62
End: 2022-10-25
Payer: COMMERCIAL

## 2022-10-25 VITALS
SYSTOLIC BLOOD PRESSURE: 130 MMHG | HEIGHT: 63 IN | WEIGHT: 145 LBS | HEART RATE: 68 BPM | BODY MASS INDEX: 25.69 KG/M2 | DIASTOLIC BLOOD PRESSURE: 82 MMHG | OXYGEN SATURATION: 98 %

## 2022-10-25 DIAGNOSIS — E78.2 MIXED HYPERLIPIDEMIA: ICD-10-CM

## 2022-10-25 DIAGNOSIS — I10 ESSENTIAL HYPERTENSION: ICD-10-CM

## 2022-10-25 DIAGNOSIS — I25.10 MILD CORONARY ARTERY DISEASE: Primary | ICD-10-CM

## 2022-10-25 PROCEDURE — 99213 OFFICE O/P EST LOW 20 MIN: CPT | Performed by: NURSE PRACTITIONER

## 2022-10-25 RX ORDER — IRBESARTAN 300 MG/1
300 TABLET ORAL DAILY
Qty: 90 TABLET | Refills: 3 | Status: SHIPPED | OUTPATIENT
Start: 2022-10-25

## 2022-10-25 RX ORDER — HYDROCHLOROTHIAZIDE 25 MG/1
25 TABLET ORAL EVERY MORNING
Qty: 90 TABLET | Refills: 3 | Status: SHIPPED | OUTPATIENT
Start: 2022-10-25

## 2022-10-25 NOTE — PATIENT INSTRUCTIONS
New instructions for today:    Patient Instructions:  Continue current medications as prescribed. Always keep a current medication list. Bring your medications to every office visit. Continue to follow up with primary care provider for non cardiac medical problems. Call the office with any problems, questions or concerns at [phone]. If you have been asked to keep a blood pressure log, do so for 2 weeks. Call the office to report readings to the triage nurse at [phone]. Follow up with cardiologist as scheduled. The following educational material has been included in this after visit summary for your review: Life simple 7. Heart health. Life simple 7  1) Manage blood pressure - high blood pressure is a major risk factor for heart disease and stroke. Keeping blood pressure in health range reduces strain on your heart, arteries and kidneys. Blood pressure goal is less than 130/80. 2) Control cholesterol - contributes to plaque, which can clog arteries and lead to heart disease and stroke. When you control your cholesterol you are giving your arteries their best chance to remain clear. It is recommended that you get cholesterol lab work done once a year. 3) Reduce blood sugar - most of the food we eat is turning into glucose or blood sugar that our body uses for energy. Over time, high levels of blood sugar can damage your heart, kidneys, eyes and nerves. 4) Get active - living an active life is one of the most rewarding gifts you can give yourself and those you love. Simply put, daily physical activity increases your length and quality of life. Strive to exercise 15 minutes most days of the week. 5)  Eat better - A healthy diet is one of your best weapons for fighting cardiovascular disease. When you eat a heart healthy diet, you improve your chances for feeling good and staying healthy for life.   6)  Lose weight - when you shed extra fat an unnecessary pounds, you reduce the burden on your hear, lungs, blood vessels and skeleton. You give yourself the gift of active living, you lower your blood pressure and help yourself feel better. 7) Stop smoking - cigarette smokers have a higher risk of developing cardiovascular disease. If  You smoke, quitting is the best thing you can do for your health. Check American Heart Association on line for more information on Life's Simple 7 and tips for healthy living. A Healthy Heart: Care Instructions  Your Care Instructions     Coronary artery disease, also called heart disease, occurs when a substance called plaque builds up in the vessels that supply oxygen-rich blood to your heart muscle. This can narrow the blood vessels and reduce blood flow. A heart attack happens when blood flow is completely blocked. A high-fat diet, smoking, and other factors increase the risk of heart disease. Your doctor has found that you have a chance of having heart disease. You can do lots of things to keep your heart healthy. It may not be easy, but you can change your diet, exercise more, and quit smoking. These steps really work to lower your chance of heart disease. Follow-up care is a key part of your treatment and safety. Be sure to make and go to all appointments, and call your doctor if you are having problems. It's also a good idea to know your test results and keep a list of the medicines you take. How can you care for yourself at home? Diet  Use less salt when you cook and eat. This helps lower your blood pressure. Taste food before salting. Add only a little salt when you think you need it. With time, your taste buds will adjust to less salt. Eat fewer snack items, fast foods, canned soups, and other high-salt, high-fat, processed foods. Read food labels and try to avoid saturated and trans fats. They increase your risk of heart disease by raising cholesterol levels. Limit the amount of solid fat-butter, margarine, and shortening-you eat.  Use olive, peanut, or canola oil when you cook. Bake, broil, and steam foods instead of frying them. Eat a variety of fruit and vegetables every day. Dark green, deep orange, red, or yellow fruits and vegetables are especially good for you. Examples include spinach, carrots, peaches, and berries. Foods high in fiber can reduce your cholesterol and provide important vitamins and minerals. High-fiber foods include whole-grain cereals and breads, oatmeal, beans, brown rice, citrus fruits, and apples. Eat lean proteins. Heart-healthy proteins include seafood, lean meats and poultry, eggs, beans, peas, nuts, seeds, and soy products. Limit drinks and foods with added sugar. These include candy, desserts, and soda pop. Lifestyle changes  If your doctor recommends it, get more exercise. Walking is a good choice. Bit by bit, increase the amount you walk every day. Try for at least 30 minutes on most days of the week. You also may want to swim, bike, or do other activities. Do not smoke. If you need help quitting, talk to your doctor about stop-smoking programs and medicines. These can increase your chances of quitting for good. Quitting smoking may be the most important step you can take to protect your heart. It is never too late to quit. Limit alcohol to 2 drinks a day for men and 1 drink a day for women. Too much alcohol can cause health problems. Manage other health problems such as diabetes, high blood pressure, and high cholesterol. If you think you may have a problem with alcohol or drug use, talk to your doctor. Medicines  Take your medicines exactly as prescribed. Call your doctor if you think you are having a problem with your medicine. If your doctor recommends aspirin, take the amount directed each day. Make sure you take aspirin and not another kind of pain reliever, such as acetaminophen (Tylenol). When should you call for help? [id number] if you have symptoms of a heart attack.  These may include:  Chest pain or pressure, or a strange feeling in the chest.  Sweating. Shortness of breath. Pain, pressure, or a strange feeling in the back, neck, jaw, or upper belly or in one or both shoulders or arms. Lightheadedness or sudden weakness. A fast or irregular heartbeat. After you call 911, the  may tell you to chew 1 adult-strength or 2 to 4 low-dose aspirin. Wait for an ambulance. Do not try to drive yourself. Watch closely for changes in your health, and be sure to contact your doctor if you have any problems. Where can you learn more? Go to https://[email or web address].SeatID. org and sign in to your [employer] account. Enter F964 in the [employer] box to learn more about \"A Healthy Heart: Care Instructions. \"     If you do not have an account, please click on the \"Sign Up Now\" link. Current as of: December 16, 2019               Content Version: 12.5  © 8078-0792 Healthwise, Incorporated. Care instructions adapted under license by [facility] ([facility]). If you have questions about a medical condition or this instruction, always ask your healthcare professional. [place] [address] any warranty or liability for your use of this information.

## 2022-10-25 NOTE — PROGRESS NOTES
[facility] [address] [place] [address]. [address] [address] [facility]  [phone] office  [phone] fax      OFFICE VISIT:  10/25/2022    Yeny Gaytan - : 1960  Reason For Visit:  Chi Maki is a 58 y.o. female who is here for 6 Month Follow-Up (Patient is doing good with no cardiac symptoms ), Hypertension, and Coronary Artery Disease    History:   Diagnosis Orders   1. Mild coronary artery disease        2. Essential hypertension        3. Mixed hyperlipidemia          The patient presents today for cardiology follow up. The patient is doing very well and is feeling \"great. \" The patient denies symptoms to suggest myocardial ischemia, heart failure or arrhythmia. BP is well controlled on current regimen. The patient's PCP monitors cholesterol. Subjective  Chi Maki denies exertional chest pain, shortness of breath, orthopnea, paroxysmal nocturnal dyspnea, syncope, presyncope, sensed arrhythmia, edema and fatigue. The patient denies numbness or weakness to suggest cerebrovascular accident or transient ischemic attack.       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    Osteopenia of multiple sites M85.89    History of breast cancer Z85.3    Other chest pain R07.89    Chest pressure R07.89    ACS (acute coronary syndrome) ([address] I24.9    Mild coronary artery disease I25.10    Transaminitis R74.01    Abnormal CT of the abdomen R93.5    Fatty liver K76.0    Alcohol consumption of one to four drinks per week Z78.9    Enlarged lymph nodes R59.9    Essential hypertension I10 Atypical chest pain R07.89    COPD (chronic obstructive pulmonary disease) (HCC) J44.9    Panlobular emphysema (HCC) J43.1    History of smoking Z87.891    Gastroesophageal reflux disease without esophagitis K21.9    STACY (middle ear effusion), left H65.92    Eustachian tube dysfunction, left H69.82     Past Medical History:   Diagnosis Date    Acid reflux     Anxiety     Breast cancer ([facility] 2009    DCIS    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, peritoneal    Gastroesophageal reflux disease without esophagitis 10/20/2021    History of therapeutic radiation 2009    Hyperlipidemia     Hypertension     Mild coronary artery disease 7/18/2019     Past Surgical History:   Procedure Laterality Date    BREAST BIOPSY Left 2009    DCIS    BREAST LUMPECTOMY Left     CHOLECYSTECTOMY  2008    COLONOSCOPY  12/20/2010    Dr Sanchez-Questionable polyp @ appendiceal orifice, radiation changes, AP x 1    COLONOSCOPY N/A 03/07/2022    Dr Dahpney Grubbs disease-BCM, 5 yr recall    HYSTERECTOMY (CERVIX STATUS UNKNOWN)  2008    MASTECTOMY, PARTIAL Left 2008    breast cancer    FL COLONOSCOPY FLX DX W/COLLJ SPEC WHEN PFRMD N/A 12/09/2016    Dr Mir Antonio access, normall, 5 yr recall w/Dr Heide Barney    CHRISTIANE AND BSO (CERVIX REMOVED) Bilateral 2008    alwasy normal PAP's prior to hysterectomy age 48    TONSILLECTOMY      UPPER GASTROINTESTINAL ENDOSCOPY N/A 12/10/2020    Dr Dhaval Scales normal exam; NEG h pylori    US GUIDED LIVER BIOPSY PERCUTANEOUS  12/14/2020    steatosis, + mild iron staining, grade 0, stage 0     Family History   Problem Relation Age of Onset    Alzheimer's Disease Mother     Breast Cancer Mother 48    Heart Disease Father     Diabetes Father     Cancer Brother     Colon Cancer Neg Hx     Colon Polyps Neg Hx     Liver Cancer Neg Hx     Liver Disease Neg Hx     Esophageal Cancer Neg Hx     Rectal Cancer Neg Hx     Stomach Cancer Neg Hx      Social History     Tobacco Use    Smoking status: Former Packs/day: 0.75     Years: 35.00     Pack years: 26.25     Types: Cigarettes     Quit date:      Years since quittin.8    Smokeless tobacco: Never   Substance Use Topics    Alcohol use: Yes     Alcohol/week: 2.0 standard drinks     Types: 2 Glasses of wine per week     Comment: weekends      Current Outpatient Medications   Medication Sig Dispense Refill    metoprolol tartrate (LOPRESSOR) 50 MG tablet Take 1 tablet by mouth 2 times daily 180 tablet 3    irbesartan (AVAPRO) 300 MG tablet TAKE 1 TABLET BY MOUTH DAILY 30 tablet 5    rosuvastatin (CRESTOR) 20 MG tablet TAKE 1 TABLET BY MOUTH NIGHTLY 90 tablet 1    vitamin D (ERGOCALCIFEROL) 1.25 MG (83263 UT) CAPS capsule TAKE 1 CAPSULE BY MOUTH ONCE A WEEK 12 capsule 1    oxymetazoline (AFRIN) 0.05 % nasal spray 2 sprays by Nasal route 2 times daily 14 mL 1    fluticasone (FLONASE) 50 MCG/ACT nasal spray 2 sprays by Each Nostril route 2 times daily 16 g 2    venlafaxine (EFFEXOR XR) 150 MG extended release capsule TAKE 1 CAPSULE BY MOUTH DAILY 90 capsule 1    amLODIPine (NORVASC) 2.5 MG tablet Take 1 tablet by mouth daily 90 tablet 1    ibandronate (BONIVA) 150 MG tablet Take 1 tablet by mouth every 30 days 3 tablet 1    isosorbide mononitrate (IMDUR) 30 MG extended release tablet Take 1 tablet by mouth daily 90 tablet 3    hydroCHLOROthiazide (HYDRODIURIL) 25 MG tablet Take 1 tablet by mouth every morning 90 tablet 3    azelastine (ASTELIN) 0.1 % nasal spray 1 spray by Nasal route 2 times daily Use in each nostril as directed 3 each 1    cloNIDine (CATAPRES) 0.1 MG tablet Take BP every 12 hours if BP over 733 systolic or 95 diastolic 60 tablet 3    nitroGLYCERIN (NITROSTAT) 0.4 MG SL tablet For shortness of breath with hypertension. Up to max of 3 total doses.  If no relief after 1 dose, call 911. 15 tablet 0    albuterol sulfate HFA (VENTOLIN HFA) 108 (90 Base) MCG/ACT inhaler Inhale 2 puffs into the lungs 4 times daily as needed for Wheezing 1 Inhaler 0 aspirin EC 81 MG EC tablet Take 81 mg by mouth daily      fluticasone (FLOVENT HFA) 110 MCG/ACT inhaler Inhale 2 puffs into the lungs 2 times daily 3 each 2     No current facility-administered medications for this visit. Allergies: Adhesive tape and Sulfa antibiotics    Review of Systems  Constitutional - no appetite change, or unexpected weight change. No fever, chills or diaphoresis. No significant change in activity level or new onset of fatigue. HEENT - no significant rhinorrhea or epistaxis. No tinnitus or significant hearing loss. Eyes - no sudden vision change or amaurosis. No corneal arcus, xantholasma, subconjunctival hemorrhage or discharge. Respiratory - no significant wheezing, stridor, apnea or cough. No dyspnea on exertion or shortness of air. Cardiovascular - no exertional chest pain to suggest myocardial ischemia. No orthopnea or PND. No sensation of sustained arrythmia. No occurrence of slow heart rate. No palpitations. No claudication. Gastrointestinal - no abdominal swelling or pain. No blood in stool. No severe constipation, diarrhea, nausea, or vomiting. Genitourinary - no dysuria, frequency, or urgency. No flank pain or hematuria. Musculoskeletal - no back pain or myalgia. No problems with gait. Extremities - no clubbing, cyanosis or extremity edema. Skin - no color change or rash. No pallor. No new surgical incision. Neurologic - no speech difficulty, facial asymmetry or lateralizing weakness. No seizures, presyncope or syncope. No significant dizziness. Hematologic - no easy bruising or excessive bleeding. Psychiatric - no severe anxiety or insomnia. No confusion. All other review of systems are negative. Objective  Vital Signs - /82   Pulse 68   Ht 5' 3\" (1.6 m)   Wt 145 lb (65.8 kg)   SpO2 98%   BMI 25.69 kg/m²   General - Vianca Barclay is alert, cooperative, and pleasant. Well groomed. No acute distress.     Body habitus - Body mass index is 25.69 kg/m². HEENT - Head is normocephalic. No circumoral cyanosis. Dentition is normal.  EYES -   Lids normal without ptosis. No discharge, edema or subconjunctival hemorrhage. Neck - Symmetrical without apparent mass or lymphadenopathy. Respiratory - Normal respiratory effort without use of accessory muscles. Ausculatation reveals vesicular breath sounds without crackles, wheezes, rub or rhonchi. Cardiovascular - No jugular venous distention. Auscultation reveals regular rate and rhythm. No audible clicks, gallop or rub. No murmur. No lower extremity varicosities. No carotid bruits. Abdominal -  No visible distention, mass or pulsations. Extremities - No clubbing or cyanosis. No statis dermatitis or ulcers. No edema. Musculoskeletal -   No Osler's nodes. No kyphosis or scoliosis. Gait is even and regular without limp or shuffle. Ambulates without assistance. Skin -  Warm and dry; no rash or pallor. No new surgical wound. Neurological - No focal neurological deficits. Thought processes coherent. No apparent tremor. Oriented to person, place and time. Psychiatric -  Appropriate affect and mood. Data reviewed:  Prior Cardiac History -   12/30/2016  SE negative for myocardial ischemia  6/19/18  SE negative for myocardial ischemia  7/13/19 ACS FERNANDO RISK Score 2 (angina, + troponin ), AUC indication 3, AUC score 7   7/14/19  Cath  Mild CAD, normal LVFX    9/2/21 Lexiscan  Impression   Impression:   There is no obvious infarct or ischemia, with a calculated ejection   fraction of 75 %. Suggest: Clinical correlation with consideration for medical   management. Signed by Dr Jessica Melgar     9/2/21 echo  Normal left ventricular size with preserved LV function and a calculated   ejection fraction of 72%. No regional wall motion abnormalities. Normal   left ventricular wall thickness. Normal diastolic filling pattern for age.    Normal right ventricular size with preserved RV function. Mild tricuspid regurgitation with normal estimated RVSP. Aortic root is within normal limits. No evidence of significant pericardial effusion is noted. The rhythm is sinus. Electronically signed by Delfina Verde(Interpreting physician)   on 09/03/2021 11:46 AM    Lab Results   Component Value Date    WBC 5.4 03/21/2022    HGB 12.8 03/21/2022    HCT 42.4 03/21/2022    MCV 95.1 03/21/2022     03/21/2022     Lab Results   Component Value Date     05/13/2022    K 4.5 05/13/2022    CL 96 (L) 05/13/2022    CO2 25 05/13/2022    BUN 26 (H) 05/13/2022    CREATININE 0.6 05/13/2022    GLUCOSE 104 05/13/2022    CALCIUM 9.2 05/13/2022    PROT 6.3 (L) 05/13/2022    LABALBU 4.1 05/13/2022    BILITOT <0.2 05/13/2022    ALKPHOS 76 05/13/2022    AST 14 05/13/2022    ALT 16 05/13/2022    LABGLOM >60 05/13/2022    GFRAA >59 05/13/2022    GLOB 2.4 12/30/2016       Lab Results   Component Value Date    CHOL 176 05/13/2022    CHOL 171 11/15/2021    CHOL 180 09/03/2021     Lab Results   Component Value Date    TRIG 134 05/13/2022    TRIG 125 11/15/2021    TRIG 77 09/03/2021     Lab Results   Component Value Date    HDL 66 05/13/2022    HDL 55 (L) 11/15/2021    HDL 68 09/03/2021     Lab Results   Component Value Date    LDLCALC 83 05/13/2022    LDLCALC 91 11/15/2021    LDLCALC 97 09/03/2021     BP Readings from Last 3 Encounters:   10/25/22 130/82   06/02/22 126/64   05/16/22 132/88    Pulse Readings from Last 3 Encounters:   10/25/22 68   06/02/22 85   05/16/22 90        Wt Readings from Last 3 Encounters:   10/25/22 145 lb (65.8 kg)   06/02/22 142 lb 9.6 oz (64.7 kg)   05/16/22 143 lb (64.9 kg)     Assessment/Plan:   Diagnosis Orders   1. Mild coronary artery disease        2. Essential hypertension        3. Mixed hyperlipidemia          Stable CV status without overt heart failure, sensed arrhythmia or angina. Mild CAD - stable on current medical management. Continue same.     HTN - normotensive on current regimen. BP today 130/82. Continue same. Hyperlipidemia - monitored and managed by PCP. LDL 83. Continue Crestor 20 mg daily. Patient is compliant with medication regimen. Previous cardiac history and records reviewed. Continue current medical management for cardiac related condition. Continue other current medications as directed. Continue to follow up with primary care provider for non cardiac medical problems. If your primary care provider is outside of the [facility] [place] [facility], please request that your labs be faxed to this office at [phone]. BP goal 130/80 or less. Call the office with any problems, questions or concerns at [phone]. Cardiology follow up as scheduled in [address] appointments. Educational included in patient instructions. Heart health.       VAZQUEZ Brown

## 2022-10-31 RX ORDER — ROSUVASTATIN CALCIUM 20 MG/1
20 TABLET, COATED ORAL NIGHTLY
Qty: 90 TABLET | Refills: 0 | Status: SHIPPED | OUTPATIENT
Start: 2022-10-31 | End: 2022-11-15 | Stop reason: SDUPTHER

## 2022-10-31 RX ORDER — IBANDRONATE SODIUM 150 MG/1
150 TABLET, FILM COATED ORAL
Qty: 3 TABLET | Refills: 1 | Status: SHIPPED | OUTPATIENT
Start: 2022-10-31

## 2022-10-31 RX ORDER — AZELASTINE 1 MG/ML
1 SPRAY, METERED NASAL 2 TIMES DAILY
Qty: 3 EACH | Refills: 0 | Status: SHIPPED | OUTPATIENT
Start: 2022-10-31

## 2022-10-31 RX ORDER — ERGOCALCIFEROL 1.25 MG/1
50000 CAPSULE ORAL WEEKLY
Qty: 12 CAPSULE | Refills: 1 | Status: SHIPPED | OUTPATIENT
Start: 2022-10-31

## 2022-11-07 DIAGNOSIS — I10 ESSENTIAL HYPERTENSION: ICD-10-CM

## 2022-11-07 DIAGNOSIS — E78.00 PURE HYPERCHOLESTEROLEMIA: ICD-10-CM

## 2022-11-07 DIAGNOSIS — I25.10 MILD CORONARY ARTERY DISEASE: ICD-10-CM

## 2022-11-07 DIAGNOSIS — E55.9 VITAMIN D DEFICIENCY: ICD-10-CM

## 2022-11-07 DIAGNOSIS — R73.01 IFG (IMPAIRED FASTING GLUCOSE): ICD-10-CM

## 2022-11-07 LAB
ALBUMIN SERPL-MCNC: 4.2 G/DL (ref 3.5–5.2)
ALP BLD-CCNC: 78 U/L (ref 35–104)
ALT SERPL-CCNC: 18 U/L (ref 5–33)
ANION GAP SERPL CALCULATED.3IONS-SCNC: 8 MMOL/L (ref 7–19)
AST SERPL-CCNC: 21 U/L (ref 5–32)
BACTERIA: NEGATIVE /HPF
BASOPHILS ABSOLUTE: 0 K/UL (ref 0–0.2)
BASOPHILS RELATIVE PERCENT: 0.7 % (ref 0–1)
BILIRUB SERPL-MCNC: 0.3 MG/DL (ref 0.2–1.2)
BILIRUBIN URINE: NEGATIVE
BLOOD, URINE: NEGATIVE
BUN BLDV-MCNC: 21 MG/DL (ref 8–23)
CALCIUM SERPL-MCNC: 8.9 MG/DL (ref 8.8–10.2)
CHLORIDE BLD-SCNC: 103 MMOL/L (ref 98–111)
CHOLESTEROL, TOTAL: 205 MG/DL (ref 160–199)
CLARITY: CLEAR
CO2: 27 MMOL/L (ref 22–29)
COLOR: YELLOW
CREAT SERPL-MCNC: 0.7 MG/DL (ref 0.5–0.9)
CRYSTALS, UA: ABNORMAL /HPF
EOSINOPHILS ABSOLUTE: 0.7 K/UL (ref 0–0.6)
EOSINOPHILS RELATIVE PERCENT: 12.3 % (ref 0–5)
EPITHELIAL CELLS, UA: 1 /HPF (ref 0–5)
GFR SERPL CREATININE-BSD FRML MDRD: >60 ML/MIN/{1.73_M2}
GLUCOSE BLD-MCNC: 103 MG/DL (ref 74–109)
GLUCOSE URINE: NEGATIVE MG/DL
HBA1C MFR BLD: 5.5 % (ref 4–6)
HCT VFR BLD CALC: 41.2 % (ref 37–47)
HDLC SERPL-MCNC: 52 MG/DL (ref 65–121)
HEMOGLOBIN: 12.8 G/DL (ref 12–16)
HYALINE CASTS: 0 /HPF (ref 0–8)
IMMATURE GRANULOCYTES #: 0 K/UL
KETONES, URINE: NEGATIVE MG/DL
LDL CHOLESTEROL CALCULATED: 131 MG/DL
LEUKOCYTE ESTERASE, URINE: ABNORMAL
LYMPHOCYTES ABSOLUTE: 1.1 K/UL (ref 1.1–4.5)
LYMPHOCYTES RELATIVE PERCENT: 19.4 % (ref 20–40)
MCH RBC QN AUTO: 29 PG (ref 27–31)
MCHC RBC AUTO-ENTMCNC: 31.1 G/DL (ref 33–37)
MCV RBC AUTO: 93.2 FL (ref 81–99)
MONOCYTES ABSOLUTE: 0.5 K/UL (ref 0–0.9)
MONOCYTES RELATIVE PERCENT: 9.3 % (ref 0–10)
NEUTROPHILS ABSOLUTE: 3.3 K/UL (ref 1.5–7.5)
NEUTROPHILS RELATIVE PERCENT: 57.9 % (ref 50–65)
NITRITE, URINE: NEGATIVE
PDW BLD-RTO: 12.7 % (ref 11.5–14.5)
PH UA: 5.5 (ref 5–8)
PLATELET # BLD: 334 K/UL (ref 130–400)
PMV BLD AUTO: 9.7 FL (ref 9.4–12.3)
POTASSIUM SERPL-SCNC: 4.7 MMOL/L (ref 3.5–5)
PROTEIN UA: NEGATIVE MG/DL
RBC # BLD: 4.42 M/UL (ref 4.2–5.4)
RBC UA: 0 /HPF (ref 0–4)
SODIUM BLD-SCNC: 138 MMOL/L (ref 136–145)
SPECIFIC GRAVITY UA: 1.01 (ref 1–1.03)
TOTAL PROTEIN: 5.8 G/DL (ref 6.6–8.7)
TRIGL SERPL-MCNC: 109 MG/DL (ref 0–149)
TSH SERPL DL<=0.05 MIU/L-ACNC: 2.76 UIU/ML (ref 0.27–4.2)
UROBILINOGEN, URINE: 0.2 E.U./DL
VITAMIN D 25-HYDROXY: 40.2 NG/ML
WBC # BLD: 5.6 K/UL (ref 4.8–10.8)
WBC UA: 1 /HPF (ref 0–5)

## 2022-11-15 ENCOUNTER — OFFICE VISIT (OUTPATIENT)
Dept: INTERNAL MEDICINE | Age: 62
End: 2022-11-15
Payer: COMMERCIAL

## 2022-11-15 VITALS
SYSTOLIC BLOOD PRESSURE: 128 MMHG | OXYGEN SATURATION: 94 % | HEART RATE: 67 BPM | DIASTOLIC BLOOD PRESSURE: 78 MMHG | WEIGHT: 142 LBS | RESPIRATION RATE: 18 BRPM | BODY MASS INDEX: 25.16 KG/M2 | HEIGHT: 63 IN

## 2022-11-15 DIAGNOSIS — R73.01 IFG (IMPAIRED FASTING GLUCOSE): ICD-10-CM

## 2022-11-15 DIAGNOSIS — I25.10 MILD CORONARY ARTERY DISEASE: ICD-10-CM

## 2022-11-15 DIAGNOSIS — M85.89 OSTEOPENIA OF MULTIPLE SITES: ICD-10-CM

## 2022-11-15 DIAGNOSIS — Z12.31 ENCOUNTER FOR SCREENING MAMMOGRAM FOR BREAST CANCER: Primary | ICD-10-CM

## 2022-11-15 DIAGNOSIS — E55.9 VITAMIN D DEFICIENCY: ICD-10-CM

## 2022-11-15 DIAGNOSIS — J43.1 PANLOBULAR EMPHYSEMA (HCC): ICD-10-CM

## 2022-11-15 DIAGNOSIS — Z23 NEED FOR SHINGLES VACCINE: ICD-10-CM

## 2022-11-15 DIAGNOSIS — E78.00 PURE HYPERCHOLESTEROLEMIA: ICD-10-CM

## 2022-11-15 DIAGNOSIS — I10 ESSENTIAL HYPERTENSION: ICD-10-CM

## 2022-11-15 PROCEDURE — 3078F DIAST BP <80 MM HG: CPT | Performed by: INTERNAL MEDICINE

## 2022-11-15 PROCEDURE — 90677 PCV20 VACCINE IM: CPT | Performed by: INTERNAL MEDICINE

## 2022-11-15 PROCEDURE — 99396 PREV VISIT EST AGE 40-64: CPT | Performed by: INTERNAL MEDICINE

## 2022-11-15 PROCEDURE — 90471 IMMUNIZATION ADMIN: CPT | Performed by: INTERNAL MEDICINE

## 2022-11-15 PROCEDURE — 3074F SYST BP LT 130 MM HG: CPT | Performed by: INTERNAL MEDICINE

## 2022-11-15 RX ORDER — ZOSTER VACCINE RECOMBINANT, ADJUVANTED 50 MCG/0.5
0.5 KIT INTRAMUSCULAR SEE ADMIN INSTRUCTIONS
Qty: 0.5 ML | Refills: 0 | Status: SHIPPED | OUTPATIENT
Start: 2022-11-15 | End: 2023-05-14

## 2022-11-15 RX ORDER — ROSUVASTATIN CALCIUM 20 MG/1
30 TABLET, COATED ORAL NIGHTLY
Qty: 135 TABLET | Refills: 1 | Status: SHIPPED | OUTPATIENT
Start: 2022-11-15

## 2022-11-15 SDOH — ECONOMIC STABILITY: FOOD INSECURITY: WITHIN THE PAST 12 MONTHS, YOU WORRIED THAT YOUR FOOD WOULD RUN OUT BEFORE YOU GOT MONEY TO BUY MORE.: NEVER TRUE

## 2022-11-15 SDOH — ECONOMIC STABILITY: FOOD INSECURITY: WITHIN THE PAST 12 MONTHS, THE FOOD YOU BOUGHT JUST DIDN'T LAST AND YOU DIDN'T HAVE MONEY TO GET MORE.: NEVER TRUE

## 2022-11-15 ASSESSMENT — ENCOUNTER SYMPTOMS
ABDOMINAL PAIN: 0
COUGH: 0
CHEST TIGHTNESS: 0
CONSTIPATION: 0
WHEEZING: 0
SORE THROAT: 0

## 2022-11-15 ASSESSMENT — SOCIAL DETERMINANTS OF HEALTH (SDOH): HOW HARD IS IT FOR YOU TO PAY FOR THE VERY BASICS LIKE FOOD, HOUSING, MEDICAL CARE, AND HEATING?: NOT HARD AT ALL

## 2022-11-15 NOTE — PROGRESS NOTES
Chief Complaint:   Bessy Gunter is a 58 y.o. female who presents forcomplete physical exam.    History of Present Illness:      Bessy Gunter is a 58 y.o. female who presents todayfor wellness visit AND follow up on her chronic medical conditions as noted below.       Patient Active Problem List    Diagnosis Date Noted    ACS (acute coronary syndrome) ([facility] 07/13/2019     Priority: High    Chest pressure 06/19/2018     Priority: High    STACY (middle ear effusion), left 06/02/2022     Priority: Medium    Eustachian tube dysfunction, left 06/02/2022     Priority: Medium    Panlobular emphysema ([facility] 10/20/2021    History of smoking 10/20/2021    Gastroesophageal reflux disease without esophagitis 10/20/2021    Atypical chest pain 09/02/2021    COPD (chronic obstructive pulmonary disease) ([facility] 09/02/2021    Enlarged lymph nodes 07/19/2021    Essential hypertension 07/19/2021    Transaminitis 11/18/2020    Abnormal CT of the abdomen 11/18/2020    Fatty liver 11/18/2020    Alcohol consumption of one to four drinks per week 11/18/2020    Mild coronary artery disease 07/18/2019    Other chest pain 06/18/2018 12/30/2016  SE negative for myocardial ischemia  6/19/18  SE negative for myocardial ischemia  7/13/19 ACS FERNANDO RISK Score 2 (angina, + troponin ), AUC indication 3, AUC score 7   7/14/19  Cath  Mild CAD, normal LVFX      Vitamin D deficiency 11/04/2017    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    Generalized anxiety disorder 11/04/2017    Pure hypercholesterolemia 11/04/2017    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; post extensive surgery/ hysterectomy      Osteopenia of multiple sites 11/04/2017 2013 Lspine -2.4/hip neck -2.3; 5/17 Lspine -1.1; hip neck -2.2  10/2020 hip neck -2.0/ L spine -2.1  boniva started 2017      History of breast cancer 11/04/2017 2008 LEFT/ post partial mastectomy      Elevated transaminase level 06/08/2017    Fatty liver disease, nonalcoholic 86/31/1901 Hx of colonic polyps        Past Medical History:   Diagnosis Date    Acid reflux     Anxiety     Breast cancer ([facility] 2009    DCIS    Cancer ([facility]     liposcaarcoma, peritoneal    Gastroesophageal reflux disease without esophagitis 10/20/2021    History of therapeutic radiation 2009    Hyperlipidemia     Hypertension     Mild coronary artery disease 7/18/2019       Past Surgical History:   Procedure Laterality Date    BREAST BIOPSY Left 2009    DCIS    BREAST LUMPECTOMY Left     CHOLECYSTECTOMY  2008    COLONOSCOPY  12/20/2010    Dr Sanchez-Questionable polyp @ appendiceal orifice, radiation changes, AP x 1    COLONOSCOPY N/A 03/07/2022    Dr Michelle Maloney disease-BCM, 5 yr recall    HYSTERECTOMY (CERVIX STATUS UNKNOWN)  2008    MASTECTOMY, PARTIAL Left 2008    breast cancer    ID COLONOSCOPY FLX DX W/COLLJ SPEC WHEN PFRMD N/A 12/09/2016    Dr Staci Schilling access, normall, 5 yr recall w/Dr Heriberto Luna    CHRISTIANE AND BSO (CERVIX REMOVED) Bilateral 2008    alwasy normal PAP's prior to hysterectomy age 48    TONSILLECTOMY      UPPER GASTROINTESTINAL ENDOSCOPY N/A 12/10/2020    Dr Gabbie Arias normal exam; NEG h pylori    US GUIDED LIVER BIOPSY PERCUTANEOUS  12/14/2020    steatosis, + mild iron staining, grade 0, stage 0       Current Outpatient Medications   Medication Sig Dispense Refill    zoster recombinant adjuvanted vaccine (SHINGRIX) 50 MCG/0.5ML SUSR injection Inject 0.5 mLs into the muscle See Admin Instructions 1 dose now and repeat in 2-6 months 0.5 mL 0    rosuvastatin (CRESTOR) 20 MG tablet Take 1.5 tablets by mouth nightly 135 tablet 1    azelastine (ASTELIN) 0.1 % nasal spray 1 spray by Nasal route 2 times daily Use in each nostril as directed 3 each 0    ibandronate (BONIVA) 150 MG tablet Take 1 tablet by mouth every 30 days 3 tablet 1    vitamin D (ERGOCALCIFEROL) 1.25 MG (86763 UT) CAPS capsule Take 1 capsule by mouth once a week 12 capsule 1    hydroCHLOROthiazide (HYDRODIURIL) 25 MG tablet Take 1 tablet by mouth every morning 90 tablet 3    irbesartan (AVAPRO) 300 MG tablet Take 1 tablet by mouth daily 90 tablet 3    metoprolol tartrate (LOPRESSOR) 50 MG tablet Take 1 tablet by mouth 2 times daily 180 tablet 3    oxymetazoline (AFRIN) 0.05 % nasal spray 2 sprays by Nasal route 2 times daily 14 mL 1    fluticasone (FLONASE) 50 MCG/ACT nasal spray 2 sprays by Each Nostril route 2 times daily 16 g 2    venlafaxine (EFFEXOR XR) 150 MG extended release capsule TAKE 1 CAPSULE BY MOUTH DAILY 90 capsule 1    amLODIPine (NORVASC) 2.5 MG tablet Take 1 tablet by mouth daily 90 tablet 1    isosorbide mononitrate (IMDUR) 30 MG extended release tablet Take 1 tablet by mouth daily 90 tablet 3    fluticasone (FLOVENT HFA) 110 MCG/ACT inhaler Inhale 2 puffs into the lungs 2 times daily 3 each 2    cloNIDine (CATAPRES) 0.1 MG tablet Take BP every 12 hours if BP over 195 systolic or 95 diastolic 60 tablet 3    nitroGLYCERIN (NITROSTAT) 0.4 MG SL tablet For shortness of breath with hypertension. Up to max of 3 total doses. If no relief after 1 dose, call 911. 15 tablet 0    albuterol sulfate HFA (VENTOLIN HFA) 108 (90 Base) MCG/ACT inhaler Inhale 2 puffs into the lungs 4 times daily as needed for Wheezing 1 Inhaler 0    aspirin EC 81 MG EC tablet Take 81 mg by mouth daily       No current facility-administered medications for this visit.      Allergies   Allergen Reactions    Adhesive Tape Swelling    Sulfa Antibiotics Swelling and Rash     Other reaction(s): Unknown       Social History     Socioeconomic History    Marital status:      Spouse name: None    Number of children: None    Years of education: None    Highest education level: None   Tobacco Use    Smoking status: Former     Packs/day: 1.00     Years: 30.00     Pack years: 30.00     Types: Cigarettes     Start date:      Quit date:      Years since quittin.8    Smokeless tobacco: Never   Vaping Use    Vaping Use: Never used Substance and Sexual Activity    Alcohol use:  Yes     Alcohol/week: 2.0 standard drinks     Types: 2 Glasses of wine per week     Comment: weekends    Drug use: No     Social Determinants of Health     Financial Resource Strain: Low Risk     Difficulty of Paying Living Expenses: Not hard at all   Food Insecurity: No Food Insecurity    Worried About Running Out of Food in the Last Year: Never true    Ran Out of Food in the Last Year: Never true     Family History   Problem Relation Age of Onset    Alzheimer's Disease Mother     Breast Cancer Mother 48    Heart Disease Father     Diabetes Father     Cancer Brother     Colon Cancer Neg Hx     Colon Polyps Neg Hx     Liver Cancer Neg Hx     Liver Disease Neg Hx     Esophageal Cancer Neg Hx     Rectal Cancer Neg Hx     Stomach Cancer Neg Hx           Past Surgical History:   Procedure Laterality Date    BREAST BIOPSY Left 2009    DCIS    BREAST LUMPECTOMY Left     CHOLECYSTECTOMY  2008    COLONOSCOPY  12/20/2010    Dr Sanchez-Questionable polyp @ appendiceal orifice, radiation changes, AP x 1    COLONOSCOPY N/A 03/07/2022    Dr KARSON Levy-Diverticular disease-BCM, 5 yr recall    HYSTERECTOMY (CERVIX STATUS UNKNOWN)  2008    MASTECTOMY, PARTIAL Left 2008    breast cancer    [place] COLONOSCOPY FLX DX W/COLLJ SPEC WHEN PFRMD N/A 12/09/2016    Dr Janeth Mustafa access, normall, 5 yr recall w/Dr Dinora Crowley    CHRISTIANE AND BSO (CERVIX REMOVED) Bilateral 2008    alwasy normal PAP's prior to hysterectomy age 48    TONSILLECTOMY      UPPER GASTROINTESTINAL ENDOSCOPY N/A 12/10/2020    Dr Micheline Fabry normal exam; NEG h pylori    US GUIDED LIVER BIOPSY PERCUTANEOUS  12/14/2020    steatosis, + mild iron staining, grade 0, stage 0         Lab Review   Orders Only on 11/07/2022   Component Date Value    TSH 11/07/2022 2.760     Color, UA 11/07/2022 YELLOW     Clarity, UA 11/07/2022 Clear     Glucose, Ur 11/07/2022 Negative     Bilirubin Urine 11/07/2022 Negative     Ketones, Urine 11/07/2022 Negative     Specific Gravity, UA 11/07/2022 1.010     Blood, Urine 11/07/2022 Negative     pH, UA 11/07/2022 5.5     Protein, UA 11/07/2022 Negative     Urobilinogen, Urine 11/07/2022 0.2     Nitrite, Urine 11/07/2022 Negative     Leukocyte Esterase, Urine 11/07/2022 TRACE (A)     Vit D, 25-Hydroxy 11/07/2022 40.2     Cholesterol, Total 11/07/2022 205 (A)     Triglycerides 11/07/2022 109     HDL 11/07/2022 52 (A)     LDL Calculated 11/07/2022 131     Hemoglobin A1C 11/07/2022 5.5     WBC 11/07/2022 5.6     RBC 11/07/2022 4.42     Hemoglobin 11/07/2022 12.8     Hematocrit 11/07/2022 41.2     MCV 11/07/2022 93.2     MCH 11/07/2022 29.0     MCHC 11/07/2022 31.1 (A)     RDW 11/07/2022 12.7     Platelets 25/15/6512 334     MPV 11/07/2022 9.7     Neutrophils % 11/07/2022 57.9     Lymphocytes % 11/07/2022 19.4 (A)     Monocytes % 11/07/2022 9.3     Eosinophils % 11/07/2022 12.3 (A)     Basophils % 11/07/2022 0.7     Neutrophils Absolute 11/07/2022 3.3     Immature Granulocytes # 11/07/2022 0.0     Lymphocytes Absolute 11/07/2022 1.1     Monocytes Absolute 11/07/2022 0.50     Eosinophils Absolute 11/07/2022 0.70 (A)     Basophils Absolute 11/07/2022 0.00     Sodium 11/07/2022 138     Potassium 11/07/2022 4.7     Chloride 11/07/2022 103     CO2 11/07/2022 27     Anion Gap 11/07/2022 8     Glucose 11/07/2022 103     BUN 11/07/2022 21     Creatinine 11/07/2022 0.7     Est, Glom Filt Rate 11/07/2022 >60     Calcium 11/07/2022 8.9     Total Protein 11/07/2022 5.8 (A)     Albumin 11/07/2022 4.2     Total Bilirubin 11/07/2022 0.3     Alkaline Phosphatase 11/07/2022 78     ALT 11/07/2022 18     AST 11/07/2022 21     Bacteria, UA 11/07/2022 NEGATIVE (A)     Crystals, UA 11/07/2022 NEG (A)     Hyaline Casts, UA 11/07/2022 0     WBC, UA 11/07/2022 1     RBC, UA 11/07/2022 0     Epithelial Cells, UA 11/07/2022 1          Review of Systems   Constitutional:  Negative for chills, fatigue and fever.    HENT:  Negative for congestion, ear pain, nosebleeds, postnasal drip and sore throat. Respiratory:  Negative for cough, chest tightness and wheezing. Cardiovascular:  Negative for chest pain, palpitations and leg swelling. Gastrointestinal:  Negative for abdominal pain and constipation. Genitourinary:  Negative for dysuria and urgency. Musculoskeletal: Negative. Negative for arthralgias. Skin:  Negative for rash. Neurological:  Negative for dizziness and headaches. Psychiatric/Behavioral: Negative. Vitals:    11/15/22 1000   BP: 128/78   Site: Left Upper Arm   Position: Sitting   Cuff Size: Large Adult   Pulse: 67   Resp: 18   SpO2: 94%   Weight: 142 lb (64.4 kg)   Height: 5' 3\" (1.6 m)      Wt Readings from Last 3 Encounters:   11/15/22 142 lb (64.4 kg)   10/25/22 145 lb (65.8 kg)   06/02/22 142 lb 9.6 oz (64.7 kg)   Body mass index is 25.15 kg/m². BP Readings from Last 3 Encounters:   11/15/22 128/78   10/25/22 130/82   06/02/22 126/64       Physical Exam  Constitutional:       Appearance: She is well-developed. HENT:      Right Ear: External ear normal.      Left Ear: External ear normal.      Mouth/Throat:      Pharynx: No oropharyngeal exudate. Eyes:      Conjunctiva/sclera: Conjunctivae normal.      Pupils: Pupils are equal, round, and reactive to light. Neck:      Thyroid: No thyromegaly. Vascular: No JVD. Cardiovascular:      Rate and Rhythm: Normal rate. Heart sounds: Normal heart sounds. No murmur heard. Pulmonary:      Effort: No respiratory distress. Breath sounds: Normal breath sounds. No wheezing or rales. Chest:      Chest wall: No tenderness. Abdominal:      General: Bowel sounds are normal.      Palpations: Abdomen is soft. Musculoskeletal:      Cervical back: Neck supple. Lymphadenopathy:      Cervical: No cervical adenopathy. Skin:     Findings: No rash. Neurological:      Mental Status: She is oriented to person, place, and time. ASSESSMENT/PLAN  ANNUAL PHYSICAL  * mammogram- schedule  * PAP not needed  * cscope 2022- repeat 5 yrs  *  BD 11/2020- repeat 3 yrs    CT lung cancer screen  Pt curently scheduled for 6 mo repeat ct 2/2023     Osteopenia of multiple sites 11/04/2017 2013 Lspine -2.4/hip neck -2.3; 5/17 Lspine -1.1; hip neck -2.2  10/2020 hip neck -2.0/ L spine -2.1  boniva started 2017     Repeat 11/2022  Plan  dc boniva after BD done    Hypertension   Now blood pressure readings have been in good range  Kidney function is normal  Rx   Metoprolol 75 bid  Lisinopril 10 bid  Avapro 300 daily  IMDUR 30 mg daily        CAD  BP has been better  Abnormal left ventricular global longitudinal left ventricular strain of   -16.6% per echo 9/2021  Mild Coronary artery disease as per cardiac cath July 2019  Management plans as per cardiology        Pure hypercholesterolemia-  Lab results called to patient after the appointment since patient did not have lab done prior to appointment  LDL - 131 in 11/2022  LDL in 5/2022- 83  (LDL is significantly elevated 187 in 7/2021)  Patient has known mild coronary artery disease from 2019 cardiac cath  She obviously has not been taking her Crestor as prescribed  Importance of statin to prevent progressive coronary disease/MI is very highly recommended  Liver function tests now are normal  RX increase to Crestor 30 mg daily  Goal LDL below 70 with known mild cad  Repeat in 3 mo     Vitamin D deficiency-  Lab results reviewed with patient   Vitamin D level is 40 in 11/2022  RXvitamin D 50,000 IU weekly        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HCC)  Generalized anxiety disorder-  she has been doing better,   RX  Effexor 75 mg daily     Bilateral hilar lymph node enlargement- borderline  Impression   1. Persistent and stable tiny nodules in the right upper lobe. No   further follow-up of these nodules is recommended. 2. Chronic emphysematous minutes lung changes.    3. A nonspecific fatty infiltrated moderately prominent lymph node in   the left axilla is similar to the previous study. It was poorly   visualized in the previous study due to artifacts from the high   density contrast in the adjacent vein. Signed by Dr Noe Penn   Repeat 1 yr - 2/2023 ( needs yearly- ho smoking)         Panlobular emphysema  Pulmonology notes reviewed independently by me and discussed with patient  PPSV 20 today        Orders Placed This Encounter   Procedures    AMANDA DIGITAL SCREEN W OR WO CAD BILATERAL    DEXA BONE DENSITY 2 SITES    Pneumococcal, PCV20, PREVNAR 20, (age 25 yrs+), IM, PF    Comprehensive Metabolic Panel    Lipid Panel     New Prescriptions    ZOSTER RECOMBINANT ADJUVANTED VACCINE (SHINGRIX) 50 MCG/0.5ML SUSR INJECTION    Inject 0.5 mLs into the muscle See Admin Instructions 1 dose now and repeat in 2-6 months      There are no Patient Instructions on file for this visit. Return in about 3 months (around 3/1/2023) for Medication check. EMR Dragon/transcription disclaimer:Significant part of this  encounter note is electronic transcription/translation of spoken language to printed text. The electronic translation of spoken language may beerroneous, or at times, nonsensical words or phrases may be inadvertently transcribed.  Although I have reviewed the note for such errors, some may still exist.

## 2023-01-13 ENCOUNTER — OFFICE VISIT (OUTPATIENT)
Age: 63
End: 2023-01-13
Payer: COMMERCIAL

## 2023-01-13 ENCOUNTER — HOSPITAL ENCOUNTER (OUTPATIENT)
Dept: WOMENS IMAGING | Age: 63
Discharge: HOME | End: 2023-01-13
Payer: COMMERCIAL

## 2023-01-13 VITALS
OXYGEN SATURATION: 98 % | RESPIRATION RATE: 18 BRPM | HEART RATE: 61 BPM | WEIGHT: 140.4 LBS | BODY MASS INDEX: 24.88 KG/M2 | HEIGHT: 63 IN | SYSTOLIC BLOOD PRESSURE: 104 MMHG | DIASTOLIC BLOOD PRESSURE: 62 MMHG | TEMPERATURE: 97.6 F

## 2023-01-13 DIAGNOSIS — Z12.31 ENCOUNTER FOR SCREENING MAMMOGRAM FOR BREAST CANCER: ICD-10-CM

## 2023-01-13 DIAGNOSIS — J06.9 UPPER RESPIRATORY TRACT INFECTION, UNSPECIFIED TYPE: Primary | ICD-10-CM

## 2023-01-13 DIAGNOSIS — J98.8 CONGESTION OF RESPIRATORY TRACT: ICD-10-CM

## 2023-01-13 DIAGNOSIS — M85.89 OSTEOPENIA OF MULTIPLE SITES: ICD-10-CM

## 2023-01-13 LAB
INFLUENZA A ANTIBODY: NORMAL
INFLUENZA B ANTIBODY: NORMAL

## 2023-01-13 PROCEDURE — 87804 INFLUENZA ASSAY W/OPTIC: CPT | Performed by: NURSE PRACTITIONER

## 2023-01-13 PROCEDURE — 3074F SYST BP LT 130 MM HG: CPT | Performed by: NURSE PRACTITIONER

## 2023-01-13 PROCEDURE — 3078F DIAST BP <80 MM HG: CPT | Performed by: NURSE PRACTITIONER

## 2023-01-13 PROCEDURE — 99213 OFFICE O/P EST LOW 20 MIN: CPT | Performed by: NURSE PRACTITIONER

## 2023-01-13 PROCEDURE — 77067 SCR MAMMO BI INCL CAD: CPT

## 2023-01-13 PROCEDURE — 77080 DXA BONE DENSITY AXIAL: CPT

## 2023-01-13 RX ORDER — AMOXICILLIN AND CLAVULANATE POTASSIUM 875; 125 MG/1; MG/1
1 TABLET, FILM COATED ORAL 2 TIMES DAILY
Qty: 20 TABLET | Refills: 0 | Status: SHIPPED | OUTPATIENT
Start: 2023-01-13 | End: 2023-01-23

## 2023-01-13 ASSESSMENT — ENCOUNTER SYMPTOMS
SINUS PRESSURE: 0
TROUBLE SWALLOWING: 0
COLOR CHANGE: 0
SORE THROAT: 1
WHEEZING: 0
STRIDOR: 0
SHORTNESS OF BREATH: 0
CHEST TIGHTNESS: 0
COUGH: 1
EYE DISCHARGE: 0
RHINORRHEA: 1
ABDOMINAL DISTENTION: 0
ABDOMINAL PAIN: 0
EYE PAIN: 0

## 2023-01-13 NOTE — PROGRESS NOTES
[id number]  [address] [facility] [address] [id number]  Dept: [phone]  Dept Fax: [phone]  Loc: [phone]    Savanna Marlow is a 58 y.o. female who presents today for her medical conditions/complaints as noted below. Savanna Marlow is complaining of Ear Drainage, Otalgia (Started Tuesday ), Congestion, and Cough        HPI:   Ear Drainage   Associated symptoms include coughing, rhinorrhea and a sore throat. Pertinent negatives include no abdominal pain, neck pain or rash. Otalgia   Associated symptoms include coughing, rhinorrhea and a sore throat. Pertinent negatives include no abdominal pain, neck pain or rash. Cough  Associated symptoms include ear pain, rhinorrhea and a sore throat. Pertinent negatives include no chest pain, chills, fever, rash, shortness of breath or wheezing. Catarino Subramanian presents to the office complaining of cough, congestion, ear aches, and a sore throat. Symptoms started Tuesday. She denies fever or body aches. Patient reports having her mammogram earlier today and she passed out while holding her breath. She did not hit the floor. She is not wanting to be evaluated for this issue.      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, peritoneal    Gastroesophageal reflux disease without esophagitis 10/20/2021    History of therapeutic radiation 2009    Hyperlipidemia     Hypertension     Mild coronary artery disease 07/18/2019       Past Surgical History:   Procedure Laterality Date    BREAST BIOPSY Left 2009    DCIS    BREAST LUMPECTOMY Left     CHOLECYSTECTOMY  2008    COLONOSCOPY  12/20/2010    Dr Sanchez-Questionable polyp @ appendiceal orifice, radiation changes, AP x 1    COLONOSCOPY N/A 03/07/2022    Dr Josie Leblanc disease-BCM, 5 yr recall    HYSTERECTOMY (CERVIX STATUS UNKNOWN)  2008    MASTECTOMY, PARTIAL Left 2008    breast cancer    CT COLONOSCOPY FLX DX W/COLLJ SPEC WHEN PFRMD N/A 2016    Dr Maki--open access, normall, 5 yr recall w/Dr KARSON Levy    CHRISTIANE AND BSO (CERVIX REMOVED) Bilateral     alwasy normal PAP's prior to hysterectomy age 50    TONSILLECTOMY      UPPER GASTROINTESTINAL ENDOSCOPY N/A 12/10/2020    Dr KARSON Levy-mild gastritis,otherwise normal exam; NEG h pylori    US GUIDED LIVER BIOPSY PERCUTANEOUS  2020    steatosis, + mild iron staining, grade 0, stage 0       Family History   Problem Relation Age of Onset    Alzheimer's Disease Mother     Breast Cancer Mother 50    Heart Disease Father     Diabetes Father     Cancer Brother     Breast Cancer Paternal Grandmother 80    Colon Cancer Neg Hx     Colon Polyps Neg Hx     Liver Cancer Neg Hx     Liver Disease Neg Hx     Esophageal Cancer Neg Hx     Rectal Cancer Neg Hx     Stomach Cancer Neg Hx        Social History     Tobacco Use    Smoking status: Former     Packs/day: 1.00     Years: 30.00     Pack years: 30.00     Types: Cigarettes     Start date:      Quit date:      Years since quittin.0    Smokeless tobacco: Never   Substance Use Topics    Alcohol use: Yes     Alcohol/week: 2.0 standard drinks     Types: 2 Glasses of wine per week     Comment: weekends        Current Outpatient Medications   Medication Sig Dispense Refill    amoxicillin-clavulanate (AUGMENTIN) 875-125 MG per tablet Take 1 tablet by mouth 2 times daily for 10 days 20 tablet 0    rosuvastatin (CRESTOR) 20 MG tablet Take 1.5 tablets by mouth nightly 135 tablet 1    azelastine (ASTELIN) 0.1 % nasal spray 1 spray by Nasal route 2 times daily Use in each nostril as directed 3 each 0    ibandronate (BONIVA) 150 MG tablet Take 1 tablet by mouth every 30 days 3 tablet 1    vitamin D (ERGOCALCIFEROL) 1.25 MG (98578 UT) CAPS capsule Take 1 capsule by mouth once a week 12 capsule 1    hydroCHLOROthiazide (HYDRODIURIL) 25 MG tablet Take 1 tablet by mouth every  morning 90 tablet 3    irbesartan (AVAPRO) 300 MG tablet Take 1 tablet by mouth daily 90 tablet 3    metoprolol tartrate (LOPRESSOR) 50 MG tablet Take 1 tablet by mouth 2 times daily 180 tablet 3    oxymetazoline (AFRIN) 0.05 % nasal spray 2 sprays by Nasal route 2 times daily 14 mL 1    fluticasone (FLONASE) 50 MCG/ACT nasal spray 2 sprays by Each Nostril route 2 times daily 16 g 2    venlafaxine (EFFEXOR XR) 150 MG extended release capsule TAKE 1 CAPSULE BY MOUTH DAILY 90 capsule 1    amLODIPine (NORVASC) 2.5 MG tablet Take 1 tablet by mouth daily 90 tablet 1    isosorbide mononitrate (IMDUR) 30 MG extended release tablet Take 1 tablet by mouth daily 90 tablet 3    nitroGLYCERIN (NITROSTAT) 0.4 MG SL tablet For shortness of breath with hypertension. Up to max of 3 total doses. If no relief after 1 dose, call 911. 15 tablet 0    albuterol sulfate HFA (VENTOLIN HFA) 108 (90 Base) MCG/ACT inhaler Inhale 2 puffs into the lungs 4 times daily as needed for Wheezing 1 Inhaler 0    aspirin EC 81 MG EC tablet Take 81 mg by mouth daily      zoster recombinant adjuvanted vaccine [facility]) 50 MCG/0.5ML SUSR injection Inject 0.5 mLs into the muscle See Admin Instructions 1 dose now and repeat in 2-6 months (Patient not taking: Reported on 1/13/2023) 0.5 mL 0    fluticasone (FLOVENT HFA) 110 MCG/ACT inhaler Inhale 2 puffs into the lungs 2 times daily 3 each 2    cloNIDine (CATAPRES) 0.1 MG tablet Take BP every 12 hours if BP over 172 systolic or 95 diastolic (Patient not taking: Reported on 1/13/2023) 60 tablet 3     No current facility-administered medications for this visit.        Allergies   Allergen Reactions    Adhesive Tape Swelling    Sulfa Antibiotics Swelling and Rash     Other reaction(s): Unknown       Health Maintenance   Topic Date Due    DTaP/Tdap/Td vaccine (1 - Tdap) Never done    Shingles vaccine (1 of 2) Never done    Low dose CT lung screening  Never done    COVID-19 Vaccine (4 - Booster for Moderna series) 02/24/2022    Breast cancer screen  11/24/2022    Depression Monitoring  05/16/2023    Lipids  11/07/2023    Colorectal Cancer Screen  03/07/2027    Flu vaccine  Completed    Pneumococcal 0-64 years Vaccine  Completed    Hepatitis C screen  Completed    HIV screen  Completed    Hepatitis A vaccine  Aged Out    Hib vaccine  Aged Out    Meningococcal (ACWY) vaccine  Aged Out       Subjective:   Review of Systems   Constitutional:  Negative for chills, fatigue and fever. HENT:  Positive for ear pain, rhinorrhea and sore throat. Negative for congestion, sinus pressure and trouble swallowing. Eyes:  Negative for pain and discharge. Respiratory:  Positive for cough. Negative for chest tightness, shortness of breath, wheezing and stridor. Cardiovascular:  Negative for chest pain and palpitations. Gastrointestinal:  Negative for abdominal distention and abdominal pain. Genitourinary:  Negative for difficulty urinating, dysuria and hematuria. Musculoskeletal:  Negative for arthralgias, neck pain and neck stiffness. Skin:  Negative for color change and rash. Neurological:  Negative for dizziness, syncope, speech difficulty, weakness and numbness. Psychiatric/Behavioral:  Negative for confusion and suicidal ideas. Objective    Physical Exam  Vitals and nursing note reviewed. Constitutional:       General: She is not in acute distress. Appearance: Normal appearance. HENT:      Head: Normocephalic. Right Ear: External ear normal.      Left Ear: External ear normal.      Nose: Rhinorrhea present. No congestion. Mouth/Throat:      Mouth: Mucous membranes are moist.      Pharynx: Oropharynx is clear. Posterior oropharyngeal erythema (mild with copious thick post nasal drip) present. Eyes:      Conjunctiva/sclera: Conjunctivae normal.      Pupils: Pupils are equal, round, and reactive to light. Cardiovascular:      Rate and Rhythm: Normal rate and regular rhythm.       Pulses: Normal pulses. Heart sounds: Normal heart sounds. No murmur heard. Pulmonary:      Effort: Pulmonary effort is normal. No respiratory distress. Breath sounds: Normal breath sounds. No stridor. No wheezing. Abdominal:      General: Abdomen is flat. Bowel sounds are normal. There is no distension. Tenderness: There is no abdominal tenderness. Musculoskeletal:         General: No swelling or deformity. Normal range of motion. Cervical back: Normal range of motion. No rigidity or tenderness. Skin:     General: Skin is warm and dry. Findings: No rash. Neurological:      General: No focal deficit present. Mental Status: She is alert and oriented to person, place, and time. Sensory: No sensory deficit. /62 (Site: Left Upper Arm)   Pulse 61   Temp 97.6 °F (36.4 °C) (Temporal)   Resp 18   Ht 5' 3\" (1.6 m)   Wt 140 lb 6.4 oz (63.7 kg)   SpO2 98%   BMI 24.87 kg/m²     Assessment         Diagnosis Orders   1. Upper respiratory tract infection, unspecified type  amoxicillin-clavulanate (AUGMENTIN) 875-125 MG per tablet      2. Congestion of respiratory tract  POCT Influenza A/B          Plan   Encourage fluids, Tylenol/Ibuprofen, OTC decongestants   Steroid not giving due to syncopal episode earlier today  Antibiotic sent to pharmacy.   If symptoms worsen or fail to improve follow-up with office or PCP  If SOB, chest pain, or high persistent fevers occur, go to ER    Patient verbalized understanding and agrees to plan    Orders Placed This Encounter   Procedures    POCT Influenza A/B       Results for orders placed or performed in visit on 01/13/23   POCT Influenza A/B   Result Value Ref Range    Influenza A Ab neg     Influenza B Ab neg        Orders Placed This Encounter   Medications    amoxicillin-clavulanate (AUGMENTIN) 875-125 MG per tablet     Sig: Take 1 tablet by mouth 2 times daily for 10 days     Dispense:  20 tablet     Refill:  0      New Prescriptions AMOXICILLIN-CLAVULANATE (AUGMENTIN) 875-125 MG PER TABLET    Take 1 tablet by mouth 2 times daily for 10 days        No follow-ups on file. Discussed use, benefits, and side effects of any prescribed medications. All patient questions were answered. Patient voiced understanding of care plan. Patient was given educational materials - see patient instructions below. Patient Instructions   Encourage fluids, Tylenol/Ibuprofen, OTC decongestants   Steroid not giving due to syncopal episode earlier today  Antibiotic sent to pharmacy.   If symptoms worsen or fail to improve follow-up with office or PCP  If SOB, chest pain, or high persistent fevers occur, go to ER    Patient verbalized understanding and agrees to plan      Electronically signed by VAZQUEZ Walton CNP on 1/13/2023 at 12:15 PM

## 2023-01-13 NOTE — PATIENT INSTRUCTIONS
Encourage fluids, Tylenol/Ibuprofen, OTC decongestants   Steroid not giving due to syncopal episode earlier today  Antibiotic sent to pharmacy.   If symptoms worsen or fail to improve follow-up with office or PCP  If SOB, chest pain, or high persistent fevers occur, go to ER    Patient verbalized understanding and agrees to plan

## 2023-02-15 RX ORDER — VENLAFAXINE HYDROCHLORIDE 150 MG/1
CAPSULE, EXTENDED RELEASE ORAL
Qty: 90 CAPSULE | Refills: 1 | Status: SHIPPED | OUTPATIENT
Start: 2023-02-15

## 2023-02-15 RX ORDER — AMLODIPINE BESYLATE 2.5 MG/1
2.5 TABLET ORAL DAILY
Qty: 90 TABLET | Refills: 1 | Status: SHIPPED | OUTPATIENT
Start: 2023-02-15

## 2023-02-15 NOTE — TELEPHONE ENCOUNTER
Libertad Casillas called requesting a refill of the below medication which has been pended for you:     Requested Prescriptions     Pending Prescriptions Disp Refills    venlafaxine (EFFEXOR XR) 150 MG extended release capsule [Pharmacy Med Name: VENLAFAXINE HCL  MG C 150 Capsule] 90 capsule 1     Sig: TAKE 1 CAPUSLE BY MOUTH DAILY    amLODIPine (NORVASC) 2.5 MG tablet [Pharmacy Med Name: AMLODIPINE BESYLATE 2.5 MG 2.5 Tablet] 90 tablet 1     Sig: TAKE 1 TABLET BY MOUTH DAILY       Last Appointment Date: 11/15/2022  Next Appointment Date: 3/15/2023    Allergies   Allergen Reactions    Adhesive Tape Swelling    Sulfa Antibiotics Swelling and Rash     Other reaction(s): Unknown

## 2023-02-17 DIAGNOSIS — R59.9 ENLARGED LYMPH NODES: ICD-10-CM

## 2023-02-17 DIAGNOSIS — R93.89 ABNORMAL CT OF THE CHEST: ICD-10-CM

## 2023-02-17 DIAGNOSIS — J44.9 COPD MIXED TYPE (HCC): Primary | ICD-10-CM

## 2023-02-24 ENCOUNTER — HOSPITAL ENCOUNTER (OUTPATIENT)
Dept: CT IMAGING | Age: 63
Discharge: HOME | End: 2023-02-24
Payer: COMMERCIAL

## 2023-02-24 DIAGNOSIS — R59.9 ENLARGED LYMPH NODES: ICD-10-CM

## 2023-02-24 DIAGNOSIS — R93.89 ABNORMAL CT OF THE CHEST: ICD-10-CM

## 2023-02-24 DIAGNOSIS — J44.9 COPD MIXED TYPE (HCC): ICD-10-CM

## 2023-02-24 PROCEDURE — 6360000004 HC RX CONTRAST MEDICATION: Performed by: INTERNAL MEDICINE

## 2023-02-24 PROCEDURE — 71260 CT THORAX DX C+: CPT | Performed by: RADIOLOGY

## 2023-02-24 PROCEDURE — 71260 CT THORAX DX C+: CPT

## 2023-02-24 RX ADMIN — IOPAMIDOL 70 ML: 755 INJECTION, SOLUTION INTRAVENOUS at 10:17

## 2023-03-02 ENCOUNTER — APPOINTMENT (OUTPATIENT)
Dept: GENERAL RADIOLOGY | Age: 63
End: 2023-03-02
Payer: COMMERCIAL

## 2023-03-02 ENCOUNTER — HOSPITAL ENCOUNTER (EMERGENCY)
Age: 63
Discharge: HOME | End: 2023-03-02
Payer: COMMERCIAL

## 2023-03-02 VITALS
RESPIRATION RATE: 16 BRPM | HEART RATE: 80 BPM | DIASTOLIC BLOOD PRESSURE: 79 MMHG | TEMPERATURE: 98.4 F | OXYGEN SATURATION: 95 % | HEIGHT: 63 IN | WEIGHT: 140 LBS | SYSTOLIC BLOOD PRESSURE: 135 MMHG | BODY MASS INDEX: 24.8 KG/M2

## 2023-03-02 DIAGNOSIS — R00.2 PALPITATIONS: Primary | ICD-10-CM

## 2023-03-02 DIAGNOSIS — I10 ESSENTIAL HYPERTENSION: ICD-10-CM

## 2023-03-02 DIAGNOSIS — F41.1 ANXIETY STATE: ICD-10-CM

## 2023-03-02 LAB
ALBUMIN SERPL-MCNC: 4.2 G/DL (ref 3.5–5.2)
ALP BLD-CCNC: 81 U/L (ref 35–104)
ALT SERPL-CCNC: 20 U/L (ref 5–33)
ANION GAP SERPL CALCULATED.3IONS-SCNC: 14 MMOL/L (ref 7–19)
APTT: 32.1 SEC (ref 26–36.2)
AST SERPL-CCNC: 25 U/L (ref 5–32)
BACTERIA: NEGATIVE /HPF
BASOPHILS ABSOLUTE: 0.1 K/UL (ref 0–0.2)
BASOPHILS RELATIVE PERCENT: 0.6 % (ref 0–1)
BILIRUB SERPL-MCNC: <0.2 MG/DL (ref 0.2–1.2)
BILIRUBIN URINE: NEGATIVE
BLOOD, URINE: NEGATIVE
BUN BLDV-MCNC: 18 MG/DL (ref 8–23)
CALCIUM SERPL-MCNC: 9.7 MG/DL (ref 8.8–10.2)
CHLORIDE BLD-SCNC: 97 MMOL/L (ref 98–111)
CLARITY: CLEAR
CO2: 25 MMOL/L (ref 22–29)
COLOR: YELLOW
CREAT SERPL-MCNC: 0.7 MG/DL (ref 0.5–0.9)
CRYSTALS, UA: ABNORMAL /HPF
D DIMER: 0.27 UG/ML FEU (ref 0–0.48)
EOSINOPHILS ABSOLUTE: 0.6 K/UL (ref 0–0.6)
EOSINOPHILS RELATIVE PERCENT: 6.5 % (ref 0–5)
EPITHELIAL CELLS, UA: 1 /HPF (ref 0–5)
GFR SERPL CREATININE-BSD FRML MDRD: >60 ML/MIN/{1.73_M2}
GLUCOSE BLD-MCNC: 103 MG/DL (ref 74–109)
GLUCOSE URINE: NEGATIVE MG/DL
HCT VFR BLD CALC: 43.6 % (ref 37–47)
HEMOGLOBIN: 13.9 G/DL (ref 12–16)
HYALINE CASTS: 2 /HPF (ref 0–8)
IMMATURE GRANULOCYTES #: 0 K/UL
INR BLD: 1.04 (ref 0.88–1.18)
KETONES, URINE: 15 MG/DL
LEUKOCYTE ESTERASE, URINE: ABNORMAL
LYMPHOCYTES ABSOLUTE: 1.2 K/UL (ref 1.1–4.5)
LYMPHOCYTES RELATIVE PERCENT: 12 % (ref 20–40)
MCH RBC QN AUTO: 28.8 PG (ref 27–31)
MCHC RBC AUTO-ENTMCNC: 31.9 G/DL (ref 33–37)
MCV RBC AUTO: 90.3 FL (ref 81–99)
MONOCYTES ABSOLUTE: 0.8 K/UL (ref 0–0.9)
MONOCYTES RELATIVE PERCENT: 8.5 % (ref 0–10)
NEUTROPHILS ABSOLUTE: 7 K/UL (ref 1.5–7.5)
NEUTROPHILS RELATIVE PERCENT: 72.1 % (ref 50–65)
NITRITE, URINE: NEGATIVE
PDW BLD-RTO: 12.9 % (ref 11.5–14.5)
PH UA: 5 (ref 5–8)
PLATELET # BLD: 313 K/UL (ref 130–400)
PMV BLD AUTO: 8.9 FL (ref 9.4–12.3)
POTASSIUM REFLEX MAGNESIUM: 4.1 MMOL/L (ref 3.5–5)
PRO-BNP: 96 PG/ML (ref 0–900)
PROTEIN UA: NEGATIVE MG/DL
PROTHROMBIN TIME: 13.5 SEC (ref 12–14.6)
RBC # BLD: 4.83 M/UL (ref 4.2–5.4)
RBC UA: 1 /HPF (ref 0–4)
SODIUM BLD-SCNC: 136 MMOL/L (ref 136–145)
SPECIFIC GRAVITY UA: 1.02 (ref 1–1.03)
TOTAL PROTEIN: 7.1 G/DL (ref 6.6–8.7)
TROPONIN: <0.01 NG/ML (ref 0–0.03)
TROPONIN: <0.01 NG/ML (ref 0–0.03)
UROBILINOGEN, URINE: 0.2 E.U./DL
WBC # BLD: 9.7 K/UL (ref 4.8–10.8)
WBC UA: 22 /HPF (ref 0–5)

## 2023-03-02 PROCEDURE — 85730 THROMBOPLASTIN TIME PARTIAL: CPT

## 2023-03-02 PROCEDURE — 71045 X-RAY EXAM CHEST 1 VIEW: CPT

## 2023-03-02 PROCEDURE — 87086 URINE CULTURE/COLONY COUNT: CPT

## 2023-03-02 PROCEDURE — 99285 EMERGENCY DEPT VISIT HI MDM: CPT

## 2023-03-02 PROCEDURE — 85025 COMPLETE CBC W/AUTO DIFF WBC: CPT

## 2023-03-02 PROCEDURE — 80053 COMPREHEN METABOLIC PANEL: CPT

## 2023-03-02 PROCEDURE — 36415 COLL VENOUS BLD VENIPUNCTURE: CPT

## 2023-03-02 PROCEDURE — 85379 FIBRIN DEGRADATION QUANT: CPT

## 2023-03-02 PROCEDURE — 81001 URINALYSIS AUTO W/SCOPE: CPT

## 2023-03-02 PROCEDURE — 85610 PROTHROMBIN TIME: CPT

## 2023-03-02 PROCEDURE — 83880 ASSAY OF NATRIURETIC PEPTIDE: CPT

## 2023-03-02 PROCEDURE — 84484 ASSAY OF TROPONIN QUANT: CPT

## 2023-03-02 ASSESSMENT — ENCOUNTER SYMPTOMS
ABDOMINAL PAIN: 0
COLOR CHANGE: 0
COUGH: 0
BACK PAIN: 0
EYE PAIN: 0
ABDOMINAL DISTENTION: 0
RHINORRHEA: 0
SORE THROAT: 0
APNEA: 0
PHOTOPHOBIA: 0
EYE DISCHARGE: 0
SHORTNESS OF BREATH: 1
NAUSEA: 0

## 2023-03-02 ASSESSMENT — LIFESTYLE VARIABLES
HOW OFTEN DO YOU HAVE A DRINK CONTAINING ALCOHOL: 4 OR MORE TIMES A WEEK
HOW MANY STANDARD DRINKS CONTAINING ALCOHOL DO YOU HAVE ON A TYPICAL DAY: 1 OR 2

## 2023-03-02 ASSESSMENT — PAIN - FUNCTIONAL ASSESSMENT: PAIN_FUNCTIONAL_ASSESSMENT: NONE - DENIES PAIN

## 2023-03-03 NOTE — ED PROVIDER NOTES
[facility] EMERGENCY DEPT  eMERGENCYdEPARTMENT eNCOUnter      Pt Name: Michelle Stauffer  MRN: [id number]  [place] 1960  Date of evaluation: 3/2/2023  Provider:IRASEMA Fowler    CHIEF COMPLAINT       Chief Complaint   Patient presents with    Shortness of Breath     Pt was at work and states she had just came out of stressful meeting. Pt was sitting at her desk when she suddenly became short of breath. Pt states she had similar episode in February. HISTORY OF PRESENT ILLNESS  (Location/Symptom, Timing/Onset, Context/Setting, Quality, Duration, Modifying Factors, Severity.)   Michelle Stauffer is a 58 y.o. female who presents to the emergency department with complaints of chest pain heart pounding fast was sitting at her desk this has happened a few times before usually resolves last NM stress unremarkable 1.5 years ago. She is thorough historian was placed on oxygen on route not needing now asymptomatic normally not on oxygen. She has 76 HR during encounter on EKG asymptomatic now states happened around 1545. Based on onset will need repeats. HPI    Nursing Notes were reviewed and I agree. REVIEW OF SYSTEMS    (2-9 systems for level 4, 10 or more for level 5)     Review of Systems   Constitutional:  Negative for activity change, appetite change, chills and fever. HENT:  Negative for congestion, postnasal drip, rhinorrhea and sore throat. Eyes:  Negative for photophobia, pain, discharge and visual disturbance. Respiratory:  Positive for shortness of breath. Negative for apnea and cough. Cardiovascular:  Positive for chest pain. Negative for leg swelling. Gastrointestinal:  Negative for abdominal distention, abdominal pain and nausea. Genitourinary:  Negative for vaginal bleeding. Musculoskeletal:  Negative for arthralgias, back pain, joint swelling, neck pain and neck stiffness. Skin:  Negative for color change and rash.    Neurological:  Negative for dizziness, syncope, facial asymmetry and headaches. Hematological:  Negative for adenopathy. Does not bruise/bleed easily. Psychiatric/Behavioral:  Negative for agitation, behavioral problems and confusion. Except as noted above the remainder of the review of systems was reviewed and negative.        PAST MEDICAL HISTORY     Past Medical History:   Diagnosis Date    Acid reflux     Anxiety     Breast [address] ([facility] [address] 2009    DCIS left    Cancer ([facility] [address]     liposcaarcoma, peritoneal    Gastroesophageal reflux disease without esophagitis 10/20/2021    History of therapeutic radiation 2009    Hyperlipidemia     Hypertension     Mild coronary artery disease 07/18/2019         SURGICAL HISTORY       Past Surgical History:   Procedure Laterality Date    BREAST BIOPSY Left 2009    DCIS    BREAST LUMPECTOMY Left     CHOLECYSTECTOMY  2008    COLONOSCOPY  12/20/2010    Dr Sanchez-Questionable polyp @ appendiceal orifice, radiation changes, AP x 1    COLONOSCOPY N/A 03/07/2022    Dr Chi Drain disease-BCM, 5 yr recall    HYSTERECTOMY (CERVIX STATUS UNKNOWN)  2008    MASTECTOMY, PARTIAL Left 2008    breast cancer    CT COLONOSCOPY FLX DX W/COLLJ SPEC WHEN PFRMD N/A 12/09/2016    Dr Garza Blelaurenr access, normall, 5 yr recall w/Dr Darrel Marie    CHRISTIANE AND BSO (CERVIX REMOVED) Bilateral 2008    alwasy normal PAP's prior to hysterectomy age 48    TONSILLECTOMY      UPPER GASTROINTESTINAL ENDOSCOPY N/A 12/10/2020    Dr Tona Silva normal exam; NEG h pylori    US GUIDED LIVER BIOPSY PERCUTANEOUS  12/14/2020    steatosis, + mild iron staining, grade 0, stage 0         CURRENT MEDICATIONS       Previous Medications    ALBUTEROL SULFATE HFA (VENTOLIN HFA) 108 (90 BASE) MCG/ACT INHALER    Inhale 2 puffs into the lungs 4 times daily as needed for Wheezing    AMLODIPINE (NORVASC) 2.5 MG TABLET    TAKE 1 TABLET BY MOUTH DAILY    ASPIRIN EC 81 MG EC TABLET    Take 81 mg by mouth daily    AZELASTINE (ASTELIN) 0.1 % NASAL SPRAY    1 spray by Nasal route 2 times daily Use in each nostril as directed    CLONIDINE (CATAPRES) 0.1 MG TABLET    Take BP every 12 hours if BP over 537 systolic or 95 diastolic    FLUTICASONE (FLONASE) 50 MCG/ACT NASAL SPRAY    2 sprays by Each Nostril route 2 times daily    FLUTICASONE (FLOVENT HFA) 110 MCG/ACT INHALER    Inhale 2 puffs into the lungs 2 times daily    HYDROCHLOROTHIAZIDE (HYDRODIURIL) 25 MG TABLET    Take 1 tablet by mouth every morning    IBANDRONATE (BONIVA) 150 MG TABLET    Take 1 tablet by mouth every 30 days    IRBESARTAN (AVAPRO) 300 MG TABLET    Take 1 tablet by mouth daily    ISOSORBIDE MONONITRATE (IMDUR) 30 MG EXTENDED RELEASE TABLET    Take 1 tablet by mouth daily    METOPROLOL TARTRATE (LOPRESSOR) 50 MG TABLET    Take 1 tablet by mouth 2 times daily    NITROGLYCERIN (NITROSTAT) 0.4 MG SL TABLET    For shortness of breath with hypertension. Up to max of 3 total doses. If no relief after 1 dose, call 911.     OXYMETAZOLINE (AFRIN) 0.05 % NASAL SPRAY    2 sprays by Nasal route 2 times daily    ROSUVASTATIN (CRESTOR) 20 MG TABLET    Take 1.5 tablets by mouth nightly    VENLAFAXINE (EFFEXOR XR) 150 MG EXTENDED RELEASE CAPSULE    TAKE 1 CAPUSLE BY MOUTH DAILY    VITAMIN D (ERGOCALCIFEROL) 1.25 MG (11961 UT) CAPS CAPSULE    Take 1 capsule by mouth once a week    ZOSTER RECOMBINANT ADJUVANTED VACCINE (SHINGRIX) 50 MCG/0.5ML SUSR INJECTION    Inject 0.5 mLs into the muscle See Admin Instructions 1 dose now and repeat in 2-6 months       ALLERGIES     Adhesive tape and Sulfa antibiotics    FAMILY HISTORY       Family History   Problem Relation Age of Onset    Alzheimer's Disease Mother     Breast Cancer Mother 48    Heart Disease Father     Diabetes Father     Cancer Brother     Breast Cancer Paternal Grandmother [de-identified]    Colon Cancer Neg Hx     Colon Polyps Neg Hx     Liver Cancer Neg Hx     Liver Disease Neg Hx     Esophageal Cancer Neg Hx     Rectal Cancer Neg Hx     Stomach Cancer Neg Hx           SOCIAL HISTORY       Social History     Socioeconomic History    Marital status:      Spouse name: None    Number of children: None    Years of education: None    Highest education level: None   Tobacco Use    Smoking status: Former     Packs/day: 1.00     Years: 30.00     Pack years: 30.00     Types: Cigarettes     Start date: 12     Quit date: 2016     Years since quittin.1    Smokeless tobacco: Never   Vaping Use    Vaping Use: Never used   Substance and Sexual Activity    Alcohol use: Yes     Alcohol/week: 2.0 standard drinks     Types: 2 Glasses of wine per week     Comment: glass of wine each night    Drug use: No     Social Determinants of Health     Financial Resource Strain: Low Risk     Difficulty of Paying Living Expenses: Not hard at all   Food Insecurity: No Food Insecurity    Worried About [address] [employer] in the Last Year: Never true    [address] [employer]  [address] in the Last Year: Never true       SCREENINGS    Mani Coma Scale  Eye Opening: Spontaneous  Best Verbal Response: Oriented  Best Motor Response: Obeys commands  Mani Coma Scale Score: 15      PHYSICAL EXAM    (up to 7 forlevel 4, 8 or more for level 5)     ED Triage Vitals [23 1800]   BP Temp Temp src Pulse Resp SpO2 Height Weight   (!) 147/96 98.4 °F (36.9 °C) -- -- 16 -- 5' 3\" (1.6 m) 140 lb (63.5 kg)       Physical Exam  Vitals and nursing note reviewed. Constitutional:       General: She is not in acute distress. Appearance: She is well-developed. She is not diaphoretic. HENT:      Head: Normocephalic and atraumatic. Right Ear: External ear normal.      Left Ear: External ear normal.      Mouth/Throat:      Pharynx: No oropharyngeal exudate. Eyes:      General:         Right eye: No discharge. Left eye: No discharge. Pupils: Pupils are equal, round, and reactive to light. Neck:      Thyroid: No thyromegaly. Cardiovascular:      Rate and Rhythm: Normal rate and regular rhythm.       Heart sounds: Normal heart sounds. No murmur heard.    No friction rub.   Pulmonary:      Effort: Pulmonary effort is normal. No respiratory distress.      Breath sounds: Normal breath sounds. No stridor. No wheezing.   Abdominal:      General: Bowel sounds are normal. There is no distension.      Palpations: Abdomen is soft.      Tenderness: There is no abdominal tenderness.   Musculoskeletal:         General: Normal range of motion.      Cervical back: Normal range of motion and neck supple.   Skin:     General: Skin is warm and dry.      Capillary Refill: Capillary refill takes less than 2 seconds.      Findings: No rash.   Neurological:      Mental Status: She is alert and oriented to person, place, and time.      Cranial Nerves: No cranial nerve deficit.      Sensory: No sensory deficit.      Coordination: Coordination normal.   Psychiatric:         Behavior: Behavior normal.         Thought Content: Thought content normal.         DIAGNOSTIC RESULTS     RADIOLOGY:   Non-plain film images such as CT, Ultrasound and MRI are read by the radiologist. Plain radiographic images are visualized and preliminarilyinterpreted by No att. providers found with the below findings:        Interpretation per the Radiologist below, if available at the time of this note:    XR CHEST PORTABLE   Final Result   Findings can be from reactive airway disease.    Recommendation: Follow up as clinically indicated.        Dictated and Electronically Signed by CADEN BAEZA MD, [facility] CERTIFIED at 02-Mar-2023 09:03:58 PM EST                   LABS:  Labs Reviewed   URINALYSIS WITH REFLEX TO CULTURE - Abnormal; Notable for the following components:       Result Value    Ketones, Urine 15 (*)     Leukocyte Esterase, Urine MODERATE (*)     All other components within normal limits   CBC WITH AUTO DIFFERENTIAL - Abnormal; Notable for the following components:    MCHC 31.9 (*)     MPV 8.9 (*)     Neutrophils % 72.1 (*)     Lymphocytes % 12.0 (*)      Eosinophils % 6.5 (*)     All other components within normal limits   COMPREHENSIVE METABOLIC PANEL W/ REFLEX TO MG FOR LOW K - Abnormal; Notable for the following components:    Chloride 97 (*)     All other components within normal limits   MICROSCOPIC URINALYSIS - Abnormal; Notable for the following components:    Bacteria, UA NEGATIVE (*)     Crystals, UA NEG (*)     WBC, UA 22 (*)     All other components within normal limits   CULTURE, URINE   TROPONIN   BRAIN NATRIURETIC PEPTIDE   PROTIME-INR   APTT   D-DIMER, QUANTITATIVE   TROPONIN       All other labs were within normal range or notreturned as of this dictation. RE-ASSESSMENT          EMERGENCY DEPARTMENT COURSE and DIFFERENTIAL DIAGNOSIS/MDM:   Vitals:    Vitals:    03/02/23 1800 03/02/23 2100   BP: (!) 147/96 135/79   Pulse: 85 80   Resp: 16 16   Temp: 98.4 °F (36.9 °C)    SpO2:  95%   Weight: 140 lb (63.5 kg)    Height: 5' 3\" (1.6 m)      EKG - sinus rate 92 no st changes or irregularities similar to prior. MDM  Number of Diagnoses or Management Options  Anxiety state: new, needed workup  Essential hypertension: new, needed workup  Palpitations: new, needed workup     Amount and/or Complexity of Data Reviewed  Clinical lab tests: reviewed  Tests in the radiology section of CPT®: reviewed  Tests in the medicine section of CPT®: reviewed  Obtain history from someone other than the patient: yes  Discuss the patient with other providers: yes    Plan for DC she has negative repeats remains asymptomatic no HTN here. She is offered admission/observation heart score of 3 she declines wants to follow with her cardiologist and PCP outpatient. PROCEDURES:    Procedures      FINAL IMPRESSION      1. Palpitations    2. Essential hypertension    3.  Anxiety state          DISPOSITION/PLAN   DISPOSITION Decision To Discharge 03/02/2023 09:14:12 PM      PATIENT REFERRED TO:  [facility] EMERGENCY DEPT  Marky Brock  [phone]    If symptoms worsen    MD Minal Mccurdy [phone]      follow up as planned    DISCHARGE MEDICATIONS:  New Prescriptions    No medications on file       (Please note that portions of this note were completed with a voice recognition program.  Efforts were made to edit the dictations but occasionallywords are mis-transcribed.)    Kelby Downing, [address] [place] [address]  03/02/23 2275

## 2023-03-04 LAB — URINE CULTURE, ROUTINE: NORMAL

## 2023-03-06 ENCOUNTER — OFFICE VISIT (OUTPATIENT)
Dept: INTERNAL MEDICINE | Age: 63
End: 2023-03-06
Payer: COMMERCIAL

## 2023-03-06 VITALS
RESPIRATION RATE: 18 BRPM | HEIGHT: 63 IN | HEART RATE: 65 BPM | WEIGHT: 142 LBS | SYSTOLIC BLOOD PRESSURE: 110 MMHG | BODY MASS INDEX: 25.16 KG/M2 | DIASTOLIC BLOOD PRESSURE: 72 MMHG | OXYGEN SATURATION: 95 %

## 2023-03-06 DIAGNOSIS — I25.10 MILD CORONARY ARTERY DISEASE: ICD-10-CM

## 2023-03-06 DIAGNOSIS — E55.9 VITAMIN D DEFICIENCY: ICD-10-CM

## 2023-03-06 DIAGNOSIS — J43.1 PANLOBULAR EMPHYSEMA (HCC): ICD-10-CM

## 2023-03-06 DIAGNOSIS — R73.01 IFG (IMPAIRED FASTING GLUCOSE): ICD-10-CM

## 2023-03-06 DIAGNOSIS — F41.8 SITUATIONAL ANXIETY: Primary | ICD-10-CM

## 2023-03-06 DIAGNOSIS — I10 ESSENTIAL HYPERTENSION: ICD-10-CM

## 2023-03-06 DIAGNOSIS — E78.00 PURE HYPERCHOLESTEROLEMIA: ICD-10-CM

## 2023-03-06 PROCEDURE — 99214 OFFICE O/P EST MOD 30 MIN: CPT | Performed by: INTERNAL MEDICINE

## 2023-03-06 PROCEDURE — 3074F SYST BP LT 130 MM HG: CPT | Performed by: INTERNAL MEDICINE

## 2023-03-06 PROCEDURE — 3078F DIAST BP <80 MM HG: CPT | Performed by: INTERNAL MEDICINE

## 2023-03-06 RX ORDER — ROSUVASTATIN CALCIUM 40 MG/1
40 TABLET, COATED ORAL DAILY
Qty: 90 TABLET | Refills: 1 | Status: SHIPPED | OUTPATIENT
Start: 2023-03-06

## 2023-03-06 RX ORDER — BUSPIRONE HYDROCHLORIDE 10 MG/1
10 TABLET ORAL 2 TIMES DAILY PRN
Qty: 30 TABLET | Refills: 1 | Status: SHIPPED | OUTPATIENT
Start: 2023-03-06 | End: 2023-04-05

## 2023-03-06 ASSESSMENT — PATIENT HEALTH QUESTIONNAIRE - PHQ9
9. THOUGHTS THAT YOU WOULD BE BETTER OFF DEAD, OR OF HURTING YOURSELF: 0
8. MOVING OR SPEAKING SO SLOWLY THAT OTHER PEOPLE COULD HAVE NOTICED. OR THE OPPOSITE, BEING SO FIGETY OR RESTLESS THAT YOU HAVE BEEN MOVING AROUND A LOT MORE THAN USUAL: 0
SUM OF ALL RESPONSES TO PHQ QUESTIONS 1-9: 0
1. LITTLE INTEREST OR PLEASURE IN DOING THINGS: 0
4. FEELING TIRED OR HAVING LITTLE ENERGY: 0
10. IF YOU CHECKED OFF ANY PROBLEMS, HOW DIFFICULT HAVE THESE PROBLEMS MADE IT FOR YOU TO DO YOUR WORK, TAKE CARE OF THINGS AT HOME, OR GET ALONG WITH OTHER PEOPLE: 0
SUM OF ALL RESPONSES TO PHQ9 QUESTIONS 1 & 2: 0
SUM OF ALL RESPONSES TO PHQ QUESTIONS 1-9: 0
2. FEELING DOWN, DEPRESSED OR HOPELESS: 0
3. TROUBLE FALLING OR STAYING ASLEEP: 0
SUM OF ALL RESPONSES TO PHQ QUESTIONS 1-9: 0
7. TROUBLE CONCENTRATING ON THINGS, SUCH AS READING THE NEWSPAPER OR WATCHING TELEVISION: 0
5. POOR APPETITE OR OVEREATING: 0
6. FEELING BAD ABOUT YOURSELF - OR THAT YOU ARE A FAILURE OR HAVE LET YOURSELF OR YOUR FAMILY DOWN: 0
SUM OF ALL RESPONSES TO PHQ QUESTIONS 1-9: 0

## 2023-03-06 ASSESSMENT — ENCOUNTER SYMPTOMS
CONSTIPATION: 0
WHEEZING: 0
CHEST TIGHTNESS: 0
COUGH: 0
SORE THROAT: 0
ABDOMINAL PAIN: 0

## 2023-03-06 NOTE — PROGRESS NOTES
Chief Complaint   Patient presents with    ED Follow-up     Was seen at [facility] ED on 03/02/2023 for Palpitations, possibly being caused by anxiety     History of presenting illness:  Amol Solis is a58 y.o. female who presents today for follow up on her chronic medical conditions as noted below.     Patient Active Problem List    Diagnosis Date Noted    ACS (acute coronary syndrome) ([address] 07/13/2019     Priority: High    Chest pressure 06/19/2018     Priority: High    STACY (middle ear effusion), left 06/02/2022     Priority: Medium    Eustachian tube dysfunction, left 06/02/2022     Priority: Medium    Panlobular emphysema ([address] 10/20/2021    History of smoking 10/20/2021    Gastroesophageal reflux disease without esophagitis 10/20/2021    Atypical chest pain 09/02/2021    COPD (chronic obstructive pulmonary disease) ([address] 09/02/2021    Enlarged lymph nodes 07/19/2021    Essential hypertension 07/19/2021    Transaminitis 11/18/2020    Abnormal CT of the abdomen 11/18/2020    Fatty liver 11/18/2020    Alcohol consumption of one to four drinks per week 11/18/2020    Mild coronary artery disease 07/18/2019    Other chest pain 06/18/2018     Overview Note:     12/30/2016  SE negative for myocardial ischemia  6/19/18  SE negative for myocardial ischemia  7/13/19 ACS FERNANDO RISK Score 2 (angina, + troponin ), AUC indication 3, AUC score 7   7/14/19  Cath  Mild CAD, normal LVFX        Vitamin D deficiency 11/04/2017    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    Generalized anxiety disorder 11/04/2017    Pure hypercholesterolemia 11/04/2017    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     Overview Note:     DX 2007; post extensive surgery/ hysterectomy      Osteopenia of multiple sites 11/04/2017     Overview Note:     2013 Lspine -2.4/hip neck -2.3; 5/17 Lspine -1.1; hip neck -2.2  10/2020 hip neck -2.0/ L spine -2.1  boniva started 2017 1/2023 lumbar spine -1.8 and hip neck -2.3      History of breast cancer 11/04/2017     Overview Note:     2008 LEFT/ post partial mastectomy      Elevated transaminase level 06/08/2017    Fatty liver disease, nonalcoholic 81/87/8774    Hx of colonic polyps      Past Medical History:   Diagnosis Date    Acid reflux     Anxiety     Breast cancer ([facility] 2009    DCIS left    Cancer ([facility]     liposcaarcoma, peritoneal    Gastroesophageal reflux disease without esophagitis 10/20/2021    History of therapeutic radiation 2009    Hyperlipidemia     Hypertension     Mild coronary artery disease 07/18/2019      Past Surgical History:   Procedure Laterality Date    BREAST BIOPSY Left 2009    DCIS    BREAST LUMPECTOMY Left     CHOLECYSTECTOMY  2008    COLONOSCOPY  12/20/2010    Dr Sanchez-Questionable polyp @ appendiceal orifice, radiation changes, AP x 1    COLONOSCOPY N/A 03/07/2022    Dr Sara Henderson disease-BCM, 5 yr recall    HYSTERECTOMY (CERVIX STATUS UNKNOWN)  2008    MASTECTOMY, PARTIAL Left 2008    breast cancer    MD COLONOSCOPY FLX DX W/COLLJ SPEC WHEN PFRMD N/A 12/09/2016    Dr Allen Nolan access, [place], 5 yr recall w/Dr Valentina Amos    CHRISTIANE AND BSO (CERVIX REMOVED) Bilateral 2008    alwasy normal PAP's prior to hysterectomy age 48    TONSILLECTOMY      UPPER GASTROINTESTINAL ENDOSCOPY N/A 12/10/2020    Dr Jaun Hemphill normal exam; NEG h pylori    US GUIDED LIVER BIOPSY PERCUTANEOUS  12/14/2020    steatosis, + mild iron staining, grade 0, stage 0     Current Outpatient Medications   Medication Sig Dispense Refill    rosuvastatin (CRESTOR) 40 MG tablet Take 1 tablet by mouth daily 90 tablet 1    busPIRone (BUSPAR) 10 MG tablet Take 1 tablet by mouth 2 times daily as needed (anxiety) 30 tablet 1    venlafaxine (EFFEXOR XR) 150 MG extended release capsule TAKE 1 CAPUSLE BY MOUTH DAILY 90 capsule 1    amLODIPine (NORVASC) 2.5 MG tablet TAKE 1 TABLET BY MOUTH DAILY 90 tablet 1    azelastine (ASTELIN) 0.1 % nasal spray 1 spray by Nasal route 2 times daily Use in each nostril as directed 3 each 0    ibandronate (BONIVA) 150 MG tablet Take 1 tablet by mouth every 30 days 3 tablet 1    vitamin D (ERGOCALCIFEROL) 1.25 MG (53552 UT) CAPS capsule Take 1 capsule by mouth once a week 12 capsule 1    hydroCHLOROthiazide (HYDRODIURIL) 25 MG tablet Take 1 tablet by mouth every morning 90 tablet 3    irbesartan (AVAPRO) 300 MG tablet Take 1 tablet by mouth daily 90 tablet 3    metoprolol tartrate (LOPRESSOR) 50 MG tablet Take 1 tablet by mouth 2 times daily 180 tablet 3    oxymetazoline (AFRIN) 0.05 % nasal spray 2 sprays by Nasal route 2 times daily 14 mL 1    fluticasone (FLONASE) 50 MCG/ACT nasal spray 2 sprays by Each Nostril route 2 times daily 16 g 2    isosorbide mononitrate (IMDUR) 30 MG extended release tablet Take 1 tablet by mouth daily 90 tablet 3    fluticasone (FLOVENT HFA) 110 MCG/ACT inhaler Inhale 2 puffs into the lungs 2 times daily 3 each 2    nitroGLYCERIN (NITROSTAT) 0.4 MG SL tablet For shortness of breath with hypertension. Up to max of 3 total doses. If no relief after 1 dose, call 911. 15 tablet 0    albuterol sulfate HFA (VENTOLIN HFA) 108 (90 Base) MCG/ACT inhaler Inhale 2 puffs into the lungs 4 times daily as needed for Wheezing 1 Inhaler 0    aspirin EC 81 MG EC tablet Take 81 mg by mouth daily      cloNIDine (CATAPRES) 0.1 MG tablet Take BP every 12 hours if BP over 707 systolic or 95 diastolic (Patient not taking: Reported on 3/6/2023) 60 tablet 3     No current facility-administered medications for this visit. Allergies   Allergen Reactions    Adhesive Tape Swelling    Sulfa Antibiotics Swelling and Rash     Other reaction(s): Unknown     Social History     Tobacco Use    Smoking status: Former     Packs/day: 1.00     Years: 30.00     Pack years: 30.00     Types: Cigarettes     Start date: 12     Quit date:      Years since quittin.1    Smokeless tobacco: Never   Substance Use Topics    Alcohol use:  Yes     Alcohol/week: 2.0 standard drinks Types: 2 Glasses of wine per week     Comment: glass of wine each night      Family History   Problem Relation Age of Onset    Alzheimer's Disease Mother     Breast Cancer Mother 48    Heart Disease Father     Diabetes Father     Cancer Brother     Breast Cancer Paternal Grandmother [de-identified]    Colon Cancer Neg Hx     Colon Polyps Neg Hx     Liver Cancer Neg Hx     Liver Disease Neg Hx     Esophageal Cancer Neg Hx     Rectal Cancer Neg Hx     Stomach Cancer Neg Hx        Review of Systems   Constitutional:  Positive for fatigue. Negative for chills and fever. HENT:  Negative for congestion, ear pain, nosebleeds, postnasal drip and sore throat. Respiratory:  Negative for cough, chest tightness and wheezing. Cardiovascular:  Negative for chest pain, palpitations and leg swelling. Gastrointestinal:  Negative for abdominal pain and constipation. Genitourinary:  Negative for dysuria and urgency. Musculoskeletal: Negative. Negative for arthralgias. Skin:  Negative for rash. Neurological:  Negative for dizziness and headaches. Psychiatric/Behavioral:  The patient is nervous/anxious. Vitals:    03/06/23 0820   BP: 110/72   Pulse: 65   Resp: 18   SpO2: 95%   Weight: 142 lb (64.4 kg)   Height: 5' 3\" (1.6 m)     Body mass index is 25.15 kg/m². Physical Exam  Constitutional:       Appearance: She is well-developed. HENT:      Right Ear: External ear normal.      Left Ear: External ear normal.      Mouth/Throat:      Pharynx: No oropharyngeal exudate. Eyes:      Conjunctiva/sclera: Conjunctivae normal.      Pupils: Pupils are equal, round, and reactive to light. Neck:      Thyroid: No thyromegaly. Vascular: No JVD. Cardiovascular:      Rate and Rhythm: Normal rate. Heart sounds: Normal heart sounds. No murmur heard. Pulmonary:      Effort: No respiratory distress. Breath sounds: Normal breath sounds. No wheezing or rales. Chest:      Chest wall: No tenderness.    Abdominal: General: Bowel sounds are normal.      Palpations: Abdomen is soft. Musculoskeletal:      Cervical back: Neck supple. Lymphadenopathy:      Cervical: No cervical adenopathy. Skin:     Findings: No rash. Neurological:      Mental Status: She is oriented to person, place, and time.        Lab Review   Admission on 03/02/2023, Discharged on 03/02/2023   Component Date Value    Color, UA 03/02/2023 YELLOW     Clarity, UA 03/02/2023 Clear     Glucose, Ur 03/02/2023 Negative     Bilirubin Urine 03/02/2023 Negative     Ketones, Urine 03/02/2023 15 (A)     Specific [place], UA 03/02/2023 1.016     Blood, Urine 03/02/2023 Negative     pH, UA 03/02/2023 5.0     Protein, UA 03/02/2023 Negative     Urobilinogen, Urine 03/02/2023 0.2     Nitrite, Urine 03/02/2023 Negative     Leukocyte Esterase, Urine 03/02/2023 MODERATE (A)     WBC 03/02/2023 9.7     RBC 03/02/2023 4.83     Hemoglobin 03/02/2023 13.9     Hematocrit 03/02/2023 43.6     MCV 03/02/2023 90.3     MCH 03/02/2023 28.8     MCHC 03/02/2023 31.9 (A)     RDW 03/02/2023 12.9     Platelets 18/17/5820 313     MPV 03/02/2023 8.9 (A)     Neutrophils % 03/02/2023 72.1 (A)     Lymphocytes % 03/02/2023 12.0 (A)     Monocytes % 03/02/2023 8.5     Eosinophils % 03/02/2023 6.5 (A)     Basophils % 03/02/2023 0.6     Neutrophils Absolute 03/02/2023 7.0     Immature Granulocytes # 03/02/2023 0.0     Lymphocytes Absolute 03/02/2023 1.2     Monocytes Absolute 03/02/2023 0.80     Eosinophils Absolute 03/02/2023 0.60     Basophils Absolute 03/02/2023 0.10     Sodium 03/02/2023 136     Potassium reflex Magnesi* 03/02/2023 4.1     Chloride 03/02/2023 97 (A)     CO2 03/02/2023 25     Anion Gap 03/02/2023 14     Glucose 03/02/2023 103     BUN 03/02/2023 18     Creatinine 03/02/2023 0.7     Est, Glom Filt Rate 03/02/2023 >60     Calcium 03/02/2023 9.7     Total Protein 03/02/2023 7.1     Albumin 03/02/2023 4.2     Total Bilirubin 03/02/2023 <0.2     Alkaline Phosphatase 03/02/2023 81 ALT 03/02/2023 20     AST 03/02/2023 25     Troponin 03/02/2023 <0.01     Pro-BNP 03/02/2023 96     Protime 03/02/2023 13.5     INR 03/02/2023 1.04     aPTT 03/02/2023 32.1     D-Dimer, Quant 03/02/2023 0.27     Troponin 03/02/2023 <0.01     Bacteria, UA 03/02/2023 NEGATIVE (A)     Crystals, UA 03/02/2023 NEG (A)     Hyaline Casts, UA 03/02/2023 2     WBC, UA 03/02/2023 22 (A)     RBC, UA 03/02/2023 1     Epithelial Cells, UA 03/02/2023 1     Urine Culture, Routine 03/02/2023 >50,000 CFU/ml mixed skin/urogenital karen. No further workup    Orders Only on 02/10/2023   Component Date Value    Cholesterol, Total 02/10/2023 192     Triglycerides 02/10/2023 87     HDL 02/10/2023 72     LDL Calculated 02/10/2023 103     Sodium 02/10/2023 141     Potassium 02/10/2023 4.4     Chloride 02/10/2023 104     CO2 02/10/2023 26     Anion Gap 02/10/2023 11     Glucose 02/10/2023 91     BUN 02/10/2023 18     Creatinine 02/10/2023 0.6     Est, Glom Filt Rate 02/10/2023 >60     Calcium 02/10/2023 9.5     Total Protein 02/10/2023 6.4 (A)     Albumin 02/10/2023 4.3     Total Bilirubin 02/10/2023 0.4     Alkaline Phosphatase 02/10/2023 83     ALT 02/10/2023 22     AST 02/10/2023 21    Office Visit on 01/13/2023   Component Date Value    Influenza A Ab 01/13/2023 neg     Influenza B Ab 01/13/2023 neg            ASSESSMENT/PLAN:    Episodes of acute shortness of breath that have been happening weekly  Seen at Er last week  All eval neg ( d dimer,troponin, d dimer neg)  Ct chest 2/2023     No adenopathy in the chest.   Moderate emphysema. Aortic atherosclerotic disease.    Needs to make appt to [facility]- pt will call dr Lisa Arias office    Known generalized anxiety disorder-  Situational anxiety associated to work  RX  Effexor 75 mg daily  Add buspar prn  ( Issues with boss at work, sx always happen at work)      CT lung cancer screen  Pt curently scheduled for 6 mo repeat ct 2/2023          Osteopenia of multiple sites 11/04/2017 2013 Lspine -2.4/hip neck -2.3; 5/17 Lspine -1.1; hip neck -2.2  10/2020 hip neck -2.0/ L spine -2.1  boniva started 2017      Repeat 11/2022  Plan  dc boniva after BD done     Hypertension   Now blood pressure readings have been in good range  Kidney function is normal  Rx   Metoprolol 75 bid  Lisinopril 10 bid  Avapro 300 daily  IMDUR 30 mg daily        CAD  BP has been better  Abnormal left ventricular global longitudinal left ventricular strain of   -16.6% per echo 9/2021  Mild Coronary artery disease as per cardiac cath July 2019  Management plans as per cardiology        Pure hypercholesterolemia-  Lab results called to patient after the appointment since patient did not have lab done prior to appointment  - 3/2023  LDL - 131 in 11/2022  LDL in 5/2022- 83    Patient has known mild coronary artery disease from 2019 cardiac cath  She obviously has not been taking her Crestor as prescribed  Importance of statin to prevent progressive coronary disease/MI is very highly recommended  Liver function tests now are normal  RX increase to Crestor 40 mg daily  Goal LDL below 70 with known mild cad  Repeat in 3 mo     Vitamin D deficiency-  Lab results reviewed with patient   Vitamin D level is 40 in 11/2022  RXvitamin D 50,000 IU weekly           Bilateral hilar lymph node enlargement- borderline  Impression   1. Persistent and stable tiny nodules in the right upper lobe. No   further follow-up of these nodules is recommended. 2. Chronic emphysematous minutes lung changes. 3. A nonspecific fatty infiltrated moderately prominent lymph node in   the left axilla is similar to the previous study. It was poorly   visualized in the previous study due to artifacts from the high   density contrast in the adjacent vein.    Signed by Dr Caden Currie   Repeat 1 yr - 2/2023 ( needs yearly- ho smoking)         Panlobular emphysema  Pulmonology notes reviewed independently by me and discussed with patient  PPSV 20 today Orders Placed This Encounter   Procedures    Hemoglobin A1C    Comprehensive Metabolic Panel    Lipid Panel    Vitamin D 25 Hydroxy     New Prescriptions    BUSPIRONE (BUSPAR) 10 MG TABLET    Take 1 tablet by mouth 2 times daily as needed (anxiety)    ROSUVASTATIN (CRESTOR) 40 MG TABLET    Take 1 tablet by mouth daily         Return in about 4 months (around 7/6/2023) for Annual Physical.   There are no Patient Instructions on file for this visit. EMR Dragon/transcription disclaimer:Significant part of this  encounter note is electronic transcription/translationof spoken language to printed text. The electronic translation of spoken language may be erroneous, or at times, nonsensical words or phrases may be inadvertently transcribed.  Although I have reviewed the note for sucherrors, some may still exist.

## 2023-03-08 ENCOUNTER — OFFICE VISIT (OUTPATIENT)
Dept: CARDIOLOGY CLINIC | Age: 63
End: 2023-03-08
Payer: COMMERCIAL

## 2023-03-08 VITALS
BODY MASS INDEX: 25.52 KG/M2 | DIASTOLIC BLOOD PRESSURE: 80 MMHG | OXYGEN SATURATION: 99 % | HEART RATE: 66 BPM | WEIGHT: 144 LBS | HEIGHT: 63 IN | SYSTOLIC BLOOD PRESSURE: 132 MMHG

## 2023-03-08 DIAGNOSIS — I10 ESSENTIAL HYPERTENSION: ICD-10-CM

## 2023-03-08 DIAGNOSIS — J44.9 CHRONIC OBSTRUCTIVE PULMONARY DISEASE, UNSPECIFIED COPD TYPE (HCC): ICD-10-CM

## 2023-03-08 DIAGNOSIS — R06.02 SOB (SHORTNESS OF BREATH): Primary | ICD-10-CM

## 2023-03-08 DIAGNOSIS — R00.0 TACHYCARDIA, UNSPECIFIED: ICD-10-CM

## 2023-03-08 DIAGNOSIS — E78.2 MIXED HYPERLIPIDEMIA: ICD-10-CM

## 2023-03-08 PROCEDURE — 99214 OFFICE O/P EST MOD 30 MIN: CPT | Performed by: NURSE PRACTITIONER

## 2023-03-08 PROCEDURE — 3078F DIAST BP <80 MM HG: CPT | Performed by: NURSE PRACTITIONER

## 2023-03-08 PROCEDURE — 3075F SYST BP GE 130 - 139MM HG: CPT | Performed by: NURSE PRACTITIONER

## 2023-03-08 NOTE — PATIENT INSTRUCTIONS
New instructions for today:  Use the Clonidine as needed for BP greater than 160/90. Use your inhaler during times of acute shortness of breath. The adhesive cardiac monitor will need to stay on for 14 days. Use the symptom marker as instructed. Mail back the monitor in the postage paid box provided. Echocardiogram -  No prep. [place] [facility] and [address] [facility] located on the first floor of [place] [address] through hospital main entrance and turn immediately to your left. Date/Time:     Takes approximately 30 min. An echocardiogram uses sound waves to produce images of your heart. This commonly used test allows your doctor to see how your heart is beating and pumping blood. Your doctor can use the images from an echocardiogram to identify various abnormalities in the heart muscle and valves. This test has 2 parts: You will be asked to disrobe from the waist up and given a gown to wear. The technologist will then hook up an EKG monitor to you for the entire exam.   You will then have an ultrasound of your heart (echocardiogram) to assess the heart muscle, heart valves and heart function. You may eat and take any medicines before the exam.     If you need to change your appointment, please call outpatient scheduling at [phone]. Patient Instructions:  Continue current medications as prescribed. Always keep a current medication list. Bring your medications to every office visit. Continue to follow up with primary care provider for non cardiac medical problems. Call the office with any problems, questions or concerns at [phone]. If you have been asked to keep a blood pressure log, do so for 2 weeks. Call the office to report readings to the triage nurse at [phone]. Follow up with cardiologist as scheduled. The following educational material has been included in this after visit summary for your review: [employer] 7. Heart health. Life simple 7  1) Manage blood pressure - high blood pressure is a major risk factor for heart disease and stroke. Keeping blood pressure in health range reduces strain on your heart, arteries and kidneys. Blood pressure goal is less than 130/80. 2) Control cholesterol - contributes to plaque, which can clog arteries and lead to heart disease and stroke. When you control your cholesterol you are giving your arteries their best chance to remain clear. It is recommended that you get cholesterol lab work done once a year. 3) Reduce blood sugar - most of the food we eat is turning into glucose or blood sugar that our body uses for energy. Over time, high levels of blood sugar can damage your heart, kidneys, eyes and nerves. 4) Get active - living an active life is one of the most rewarding gifts you can give yourself and those you love. Simply put, daily physical activity increases your length and quality of life. Strive to exercise 15 minutes most days of the week. 5)  Eat better - A healthy diet is one of your best weapons for fighting cardiovascular disease. When you eat a heart healthy diet, you improve your chances for feeling good and staying healthy for life. 6)  Lose weight - when you shed extra fat an unnecessary pounds, you reduce the burden on your hear, lungs, blood vessels and skeleton. You give yourself the gift of active living, you lower your blood pressure and help yourself feel better. 7) Stop smoking - cigarette smokers have a higher risk of developing cardiovascular disease. If  You smoke, quitting is the best thing you can do for your health. Check American Heart Association on line for more information on Life[employer] Simple 7 and tips for healthy living. A Healthy Heart: Care Instructions  Your Care Instructions     Coronary artery disease, also called heart disease, occurs when a substance called plaque builds up in the vessels that supply oxygen-rich blood to your heart muscle. This can narrow the blood vessels and reduce blood flow. A heart attack happens when blood flow is completely blocked. A high-fat diet, smoking, and other factors increase the risk of heart disease. Your doctor has found that you have a chance of having heart disease. You can do lots of things to keep your heart healthy. It may not be easy, but you can change your diet, exercise more, and quit smoking. These steps really work to lower your chance of heart disease. Follow-up care is a key part of your treatment and safety. Be sure to make and go to all appointments, and call your doctor if you are having problems. It's also a good idea to know your test results and keep a list of the medicines you take. How can you care for yourself at home? Diet  Use less salt when you cook and eat. This helps lower your blood pressure. Taste food before salting. Add only a little salt when you think you need it. With time, your taste buds will adjust to less salt. Eat fewer snack items, fast foods, canned soups, and other high-salt, high-fat, processed foods. Read food labels and try to avoid saturated and trans fats. They increase your risk of heart disease by raising cholesterol levels. Limit the amount of solid fat-butter, margarine, and shortening-you eat. Use olive, peanut, or canola oil when you cook. Bake, broil, and steam foods instead of frying them. Eat a variety of fruit and vegetables every day. Dark green, deep orange, red, or yellow fruits and vegetables are especially good for you. Examples include spinach, carrots, peaches, and berries. Foods high in fiber can reduce your cholesterol and provide important vitamins and minerals. High-fiber foods include whole-grain cereals and breads, oatmeal, beans, brown rice, citrus fruits, and apples. Eat lean proteins. Heart-healthy proteins include seafood, lean meats and poultry, eggs, beans, peas, nuts, seeds, and soy products.   Limit drinks and foods with added sugar. These include candy, desserts, and soda pop. Lifestyle changes  If your doctor recommends it, get more exercise. Walking is a good choice. Bit by bit, increase the amount you walk every day. Try for at least 30 minutes on most days of the week. You also may want to swim, bike, or do other activities. Do not smoke. If you need help quitting, talk to your doctor about stop-smoking programs and medicines. These can increase your chances of quitting for good. Quitting smoking may be the most important step you can take to protect your heart. It is never too late to quit. Limit alcohol to 2 drinks a day for men and 1 drink a day for women. Too much alcohol can cause health problems. Manage other health problems such as diabetes, high blood pressure, and high cholesterol. If you think you may have a problem with alcohol or drug use, talk to your doctor. Medicines  Take your medicines exactly as prescribed. Call your doctor if you think you are having a problem with your medicine. If your doctor recommends aspirin, take the amount directed each day. Make sure you take aspirin and not another kind of pain reliever, such as acetaminophen (Tylenol). When should you call for help? [id number] if you have symptoms of a heart attack. These may include:  Chest pain or pressure, or a strange feeling in the chest.  Sweating. Shortness of breath. Pain, pressure, or a strange feeling in the back, neck, jaw, or upper belly or in one or both shoulders or arms. Lightheadedness or sudden weakness. A fast or irregular heartbeat. After you call 911, the  may tell you to chew 1 adult-strength or 2 to 4 low-dose aspirin. Wait for an ambulance. Do not try to drive yourself. Watch closely for changes in your health, and be sure to contact your doctor if you have any problems. Where can you learn more? Go to [email or web address] org and sign in to your [employer] account.  Enter F598 in the [address] Information box to learn more about \"A Healthy Heart: Care Instructions. \"     If you do not have an account, please click on the \"Sign Up Now\" link. Current as of: December 16, 2019               Content Version: 12.5  © 0110-0528 Healthwise, Incorporated. Care instructions adapted under license by [facility] [place] ([facility]). If you have questions about a medical condition or this instruction, always ask your healthcare professional. [address] any warranty or liability for your use of this information.

## 2023-03-08 NOTE — PROGRESS NOTES
[facility], [place]. [address] Mohit Montes [address], [address] [facility]  [phone] office  [phone] fax      OFFICE VISIT:  3/8/2023    [address] [place] [address] Road: 1960  Reason For Visit:  Kike Veronica is a 58 y.o. female who is here for Follow-Up from Hospital (1 week hospital follow up, has episodes of SOA and lasting 2 hours. Recent EKG and labs)    History:   Diagnosis Orders   1. SOB (shortness of breath)  LONGTERM CONTINUOUS CARDIAC EVENT MONITOR (ZIO)    ECHO Complete 2D W Doppler W Color      2. Mixed hyperlipidemia        3. Essential hypertension  ECHO Complete 2D W Doppler W Color      4. Tachycardia, unspecified  LONGTERM CONTINUOUS CARDIAC EVENT MONITOR (ZIO)    ECHO Complete 2D W Doppler W Color      5. Chronic obstructive pulmonary disease, unspecified COPD type ([address]          The patient presents today for follow up after ED visit. The patient reports \"I have experienced 4 episodes now where all of a sudden I get so short of breath I cannot speak. I went to the ER in 9/21 and was told it was an anxiety attack. It may last a few hours and I really feel better if I lean forward. \"  In 9/21, a CTA was negative for PE. The patient does follow with Dr. Veronica Schulz for COPD. She no longer smokes. The patient has inhalers but has not tried using them with these episodes. The patient had a cath by Dr. Hayley Cintron in 2019 which showed no concerning disease. Ne Angulo was negative for ischemia in 2021. A 2D echo at that time showed EF at 72% with normal diastolic function. The patient does have HTN which is normally fairly well controlled. She did have a log of readings during the last episode which was very elevated. The patient reports some associated fast heart rate with these episodes. Subjective  Kike Veronica denies exertional chest pain, orthopnea, paroxysmal nocturnal dyspnea, syncope, presyncope, sensed arrhythmia, edema and fatigue. The patient denies numbness or weakness to suggest cerebrovascular accident or transient ischemic attack. + 4 acute episodes of SOA over past few years. Associated fast heart rate noted.      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    Osteopenia of multiple sites M85.89    History of breast cancer Z85.3    Other chest pain R07.89    Chest pressure R07.89    ACS (acute coronary syndrome) ([facility] I24.9    Mild coronary artery disease I25.10    Transaminitis R74.01    Abnormal CT of the abdomen R93.5    Fatty liver K76.0    Alcohol consumption of one to four drinks per week Z78.9    Enlarged lymph nodes R59.9    Essential hypertension I10    Atypical chest pain R07.89    COPD (chronic obstructive pulmonary disease) (HCC) J44.9    Panlobular emphysema (HCC) J43.1    History of smoking Z87.891    Gastroesophageal reflux disease without esophagitis K21.9    STACY (middle ear effusion), left H65.92    Eustachian tube dysfunction, left H69.82     Past Medical History:   Diagnosis Date    Acid reflux     Anxiety     Breast cancer ([facility] 2009    DCIS left    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, peritoneal    Gastroesophageal reflux disease without esophagitis 10/20/2021    History of therapeutic radiation 2009    Hyperlipidemia     Hypertension     Mild coronary artery disease 07/18/2019     Past Surgical History:   Procedure Laterality Date    BREAST BIOPSY Left 2009    DCIS    BREAST LUMPECTOMY Left     CHOLECYSTECTOMY  2008    COLONOSCOPY  12/20/2010    Dr Sanchez-Questionable polyp @ appendiceal orifice, radiation changes, AP x 1    COLONOSCOPY N/A 03/07/2022    Dr Xena Veronica Cam-Diverticular disease-BCM, 5 yr recall    HYSTERECTOMY (CERVIX STATUS UNKNOWN)  2008    MASTECTOMY, PARTIAL Left 2008    breast cancer    MO COLONOSCOPY FLX DX W/COLLJ SPEC WHEN PFRMD N/A 2016    Dr Milady Victoria access, normall, 5 yr recall w/Dr Morgan Jimenez    CHRISTIANE AND BSO (CERVIX REMOVED) Bilateral 2008    alwasy normal PAP's prior to hysterectomy age 48    TONSILLECTOMY      UPPER GASTROINTESTINAL ENDOSCOPY N/A 12/10/2020    Dr Juan Herrmann normal exam; NEG h pylori    [address]  2020    steatosis, + mild iron staining, grade 0, stage 0     Family History   Problem Relation Age of Onset    Alzheimer's Disease Mother     Breast Cancer Mother 48    Heart Disease Father     Diabetes Father     Cancer Brother     Breast Cancer Paternal Grandmother [de-identified]    Colon Cancer Neg Hx     Colon Polyps Neg Hx     Liver Cancer Neg Hx     Liver Disease Neg Hx     Esophageal Cancer Neg Hx     Rectal Cancer Neg Hx     Stomach Cancer Neg Hx      Social History     Tobacco Use    Smoking status: Former     Packs/day: 1.00     Years: 30.00     Pack years: 30.00     Types: Cigarettes     Start date:      Quit date:      Years since quittin.1    Smokeless tobacco: Never   Substance Use Topics    Alcohol use:  Yes     Alcohol/week: 2.0 standard drinks     Types: 2 Glasses of wine per week     Comment: glass of wine each night      Current Outpatient Medications   Medication Sig Dispense Refill    rosuvastatin (CRESTOR) 40 MG tablet Take 1 tablet by mouth daily 90 tablet 1    busPIRone (BUSPAR) 10 MG tablet Take 1 tablet by mouth 2 times daily as needed (anxiety) 30 tablet 1    venlafaxine (EFFEXOR XR) 150 MG extended release capsule TAKE 1 CAPUSLE BY MOUTH DAILY 90 capsule 1    amLODIPine (NORVASC) 2.5 MG tablet TAKE 1 TABLET BY MOUTH DAILY 90 tablet 1    azelastine (ASTELIN) 0.1 % nasal spray 1 spray by Nasal route 2 times daily Use in each nostril as directed 3 each 0 ibandronate (BONIVA) 150 MG tablet Take 1 tablet by mouth every 30 days 3 tablet 1    vitamin D (ERGOCALCIFEROL) 1.25 MG (51648 UT) CAPS capsule Take 1 capsule by mouth once a week 12 capsule 1    hydroCHLOROthiazide (HYDRODIURIL) 25 MG tablet Take 1 tablet by mouth every morning 90 tablet 3    irbesartan (AVAPRO) 300 MG tablet Take 1 tablet by mouth daily 90 tablet 3    metoprolol tartrate (LOPRESSOR) 50 MG tablet Take 1 tablet by mouth 2 times daily 180 tablet 3    isosorbide mononitrate (IMDUR) 30 MG extended release tablet Take 1 tablet by mouth daily 90 tablet 3    cloNIDine (CATAPRES) 0.1 MG tablet Take BP every 12 hours if BP over 942 systolic or 95 diastolic 60 tablet 3    nitroGLYCERIN (NITROSTAT) 0.4 MG SL tablet For shortness of breath with hypertension. Up to max of 3 total doses. If no relief after 1 dose, call 911. 15 tablet 0    albuterol sulfate HFA (VENTOLIN HFA) 108 (90 Base) MCG/ACT inhaler Inhale 2 puffs into the lungs 4 times daily as needed for Wheezing 1 Inhaler 0    aspirin EC 81 MG EC tablet Take 81 mg by mouth daily      fluticasone (FLOVENT HFA) 110 MCG/ACT inhaler Inhale 2 puffs into the lungs 2 times daily 3 each 2     No current facility-administered medications for this visit. Allergies: Adhesive tape and Sulfa antibiotics    Review of Systems  Constitutional - no appetite change, or unexpected weight change. No fever, chills or diaphoresis. No significant change in activity level or new onset of fatigue. HEENT - no significant rhinorrhea or epistaxis. No tinnitus or significant hearing loss. Eyes - no sudden vision change or amaurosis. No corneal arcus, xantholasma, subconjunctival hemorrhage or discharge. Respiratory - no significant wheezing, stridor, apnea or cough. No dyspnea on exertion or shortness of air.+ 4 acute episodes of SOA over past few years. Associated fast heart rate noted. Cardiovascular - no exertional chest pain to suggest myocardial ischemia.   No orthopnea or PND.    No occurrence of slow heart rate.  No palpitations.  No claudication.  + sensed faster heart rate with spells of shortness of breath.  Gastrointestinal - no abdominal swelling or pain. No blood in stool. No severe constipation, diarrhea, nausea, or vomiting.   Genitourinary - no dysuria, frequency, or urgency. No flank pain or hematuria.   Musculoskeletal - no back pain or myalgia.  No problems with gait.  Extremities - no clubbing, cyanosis or extremity edema.  Skin - no color change or rash.  No pallor.  No new surgical incision.   Neurologic - no speech difficulty, facial asymmetry or lateralizing weakness.  No seizures, presyncope or syncope.  No significant dizziness.  Hematologic - no easy bruising or excessive bleeding.   Psychiatric - no severe anxiety or insomnia.  No confusion.   All other review of systems are negative.    Objective  Vital Signs - BP (!) 150/80   Pulse 66   Ht 5' 3\" (1.6 m)   Wt 144 lb (65.3 kg)   SpO2 99%   BMI 25.51 kg/m²   General - Michelle is alert, cooperative, and pleasant.  Well groomed.  No acute distress.    Body habitus - Body mass index is 25.51 kg/m².  HEENT - Head is normocephalic. No circumoral cyanosis.  Dentition is normal.  EYES -   Lids normal without ptosis.  No discharge, edema or subconjunctival hemorrhage.   Neck - Symmetrical without apparent mass or lymphadenopathy.   Respiratory - Normal respiratory effort without use of accessory muscles.  Ausculatation reveals vesicular breath sounds without crackles, wheezes, rub or rhonchi.    Cardiovascular - No jugular venous distention.  Auscultation reveals regular rate and rhythm.  No audible clicks, gallop or rub.  No murmur.  No lower extremity varicosities.  No carotid bruits.    Abdominal -  No visible distention, mass or pulsations.  Extremities - No clubbing or cyanosis.  No statis dermatitis or ulcers. No edema.    Musculoskeletal -   No Osler's nodes.  No kyphosis or scoliosis.  Gait is  even and regular without limp or shuffle. Ambulates without assistance. Skin -  Warm and dry; no rash or pallor. No new surgical wound. Neurological - No focal neurological deficits. Thought processes coherent. No apparent tremor. Oriented to person, place and time. Psychiatric -  Appropriate affect and mood. Data reviewed:  Prior Cardiac History -   12/30/2016  SE negative for myocardial ischemia  6/19/18  SE negative for myocardial ischemia  7/13/19 ACS FERNANDO RISK Score 2 (angina, + troponin ), AUC indication 3, AUC score 7   7/14/19  Cath  Mild CAD, normal LVFX    3/2/23 CXR  Impression   Findings can be from reactive airway disease. Recommendation: Follow up as clinically indicated. Dictated and Electronically Signed by JESUS Bates MD CERTIFIED at [id number] 09:03:58 PM EST         9/2/21 Lexiscan  Impression:   There is no obvious infarct or ischemia, with a calculated ejection   fraction of 75 %. Suggest: Clinical correlation with consideration for medical   management. Signed by Dr Ranulfo Schuler     9/2/21 echo   Normal left ventricular size with preserved LV function and a calculated  ejection fraction of 72%. No regional wall motion abnormalities. Normal  left ventricular wall thickness. Normal diastolic filling pattern for age. Normal right ventricular size with preserved RV function. Mild tricuspid regurgitation with normal estimated RVSP. Aortic root is within normal limits. No evidence of significant pericardial effusion is noted. The rhythm is sinus. Electronically signed by Wily Verde(Interpreting physician)   on 09/03/2021 11:46 AM    9/2/21 CTA pulmonary  Impression   Findings can be from reactive airway disease. Recommendation: Follow up as clinically indicated.         Dictated and Electronically Signed by JESUS Bates MD CERTIFIED at [id number] 09:03:58 PM EST         Lab Results   Component Value Date    WBC 9.7 03/02/2023    HGB 13.9 03/02/2023    HCT 43.6 03/02/2023    MCV 90.3 03/02/2023     03/02/2023     Lab Results   Component Value Date     03/02/2023    K 4.1 03/02/2023    CL 97 (L) 03/02/2023    CO2 25 03/02/2023    BUN 18 03/02/2023    CREATININE 0.7 03/02/2023    GLUCOSE 103 03/02/2023    CALCIUM 9.7 03/02/2023    PROT 7.1 03/02/2023    LABALBU 4.2 03/02/2023    BILITOT <0.2 03/02/2023    ALKPHOS 81 03/02/2023    AST 25 03/02/2023    ALT 20 03/02/2023    LABGLOM >60 03/02/2023    GFRAA >59 05/13/2022    GLOB 2.4 12/30/2016       Lab Results   Component Value Date    CHOL 192 02/10/2023    CHOL 205 (H) 11/07/2022    CHOL 176 05/13/2022     Lab Results   Component Value Date    TRIG 87 02/10/2023    TRIG 109 11/07/2022    TRIG 134 05/13/2022     Lab Results   Component Value Date    HDL 72 02/10/2023    HDL 52 (L) 11/07/2022    HDL 66 05/13/2022     Lab Results   Component Value Date    LDLCALC 103 02/10/2023    LDLCALC 131 11/07/2022    LDLCALC 83 05/13/2022     Lab Results   Component Value Date    CHOL 192 02/10/2023    CHOL 205 (H) 11/07/2022    CHOL 176 05/13/2022     Lab Results   Component Value Date    TRIG 87 02/10/2023    TRIG 109 11/07/2022    TRIG 134 05/13/2022     Lab Results   Component Value Date    HDL 72 02/10/2023    HDL 52 (L) 11/07/2022    HDL 66 05/13/2022     Lab Results   Component Value Date    LDLCALC 103 02/10/2023    LDLCALC 131 11/07/2022    LDLCALC 83 05/13/2022     Lab Results   Component Value Date    DDIMER 0.27 03/02/2023      BP Readings from Last 3 Encounters:   03/08/23 (!) 150/80   03/06/23 110/72   03/02/23 135/79    Pulse Readings from Last 3 Encounters:   03/08/23 66   03/06/23 65   03/02/23 80        Wt Readings from Last 3 Encounters:   03/08/23 144 lb (65.3 kg)   03/06/23 142 lb (64.4 kg)   03/02/23 140 lb (63.5 kg)     Assessment/Plan:   Diagnosis Orders   1. SOB (shortness of breath)  LONGTERM CONTINUOUS CARDIAC EVENT MONITOR (ZIO)    ECHO Complete 2D W Doppler W Color      2. Mixed hyperlipidemia        3. Essential hypertension  ECHO Complete 2D W Doppler W Color      4. Tachycardia, unspecified  LONGTERM CONTINUOUS CARDIAC EVENT MONITOR (ZIO)    ECHO Complete 2D W Doppler W Color      5. Chronic obstructive pulmonary disease, unspecified COPD type ([address]          4 episodes of acute shortness breath over past few years -   PCP recently added Buspar in the event some anxiety component. Patient reports being in high stress job. She follows with Dr. Jeanette Galvan for COPD - has inhalers and has not used with episodes. Advised to use inhalers with episode in the event pulmonary origin. Check 2D echocardiogram.  2 week Zio cardiac monitor placed to assess for arrhythmia. HTN - normotensive to at 132/80. Log shows some intermittent elevations. Marked elevation with last episode of acute shortness of breath. Advised to take Clonidine 0.1 mg TID prn BP greater than 160/90. Hyperlipidemia - monitored and managed by PCP. . Continues on Crestor 40 mg daily. Patient is compliant with medication regimen. Previous cardiac history and records reviewed. Continue current medical management for cardiac related condition. Continue other current medications as directed. Continue to follow up with primary care provider for non cardiac medical problems. If your primary care provider is outside of the [facility], please request that your labs be faxed to this office at [phone]. BP goal 130/80 or less. Call the office with any problems, questions or concerns at [phone]. Cardiology follow up as scheduled in [address] appointments. Educational included in patient instructions. Heart health.       VAZQUEZ [place]

## 2023-03-09 RX ORDER — ALBUTEROL SULFATE 90 UG/1
2 AEROSOL, METERED RESPIRATORY (INHALATION) 4 TIMES DAILY PRN
Qty: 1 EACH | Refills: 0 | Status: SHIPPED | OUTPATIENT
Start: 2023-03-09

## 2023-03-09 NOTE — TELEPHONE ENCOUNTER
Last Appointment Date: 3/6/2023  Next Appointment Date: 7/7/2023    Allergies   Allergen Reactions    Adhesive Tape Swelling    Sulfa Antibiotics Swelling and Rash     Other reaction(s): Unknown     Patient needs refill on   Requested Prescriptions     Pending Prescriptions Disp Refills    albuterol sulfate HFA (VENTOLIN HFA) 108 (90 Base) MCG/ACT inhaler 1 each 0     Sig: Inhale 2 puffs into the lungs 4 times daily as needed for Wheezing

## 2023-03-23 ENCOUNTER — HOSPITAL ENCOUNTER (OUTPATIENT)
Dept: NON INVASIVE DIAGNOSTICS | Age: 63
Discharge: HOME | End: 2023-03-23
Payer: COMMERCIAL

## 2023-03-23 DIAGNOSIS — R06.02 SOB (SHORTNESS OF BREATH): ICD-10-CM

## 2023-03-23 DIAGNOSIS — I10 ESSENTIAL HYPERTENSION: ICD-10-CM

## 2023-03-23 DIAGNOSIS — R00.0 TACHYCARDIA, UNSPECIFIED: ICD-10-CM

## 2023-03-23 LAB
LV EF: 60 %
LVEF MODALITY: NORMAL

## 2023-03-23 PROCEDURE — 6360000004 HC RX CONTRAST MEDICATION: Performed by: INTERNAL MEDICINE

## 2023-03-23 PROCEDURE — C8929 TTE W OR WO FOL WCON,DOPPLER: HCPCS

## 2023-03-23 RX ADMIN — PERFLUTREN 1.5 ML: 6.52 INJECTION, SUSPENSION INTRAVENOUS at 13:36

## 2023-03-30 ENCOUNTER — TELEPHONE (OUTPATIENT)
Dept: CARDIOLOGY CLINIC | Age: 63
End: 2023-03-30

## 2023-03-30 DIAGNOSIS — R00.0 TACHYCARDIA, UNSPECIFIED: ICD-10-CM

## 2023-03-30 DIAGNOSIS — R06.02 SOB (SHORTNESS OF BREATH): ICD-10-CM

## 2023-03-30 DIAGNOSIS — R06.02 SOB (SHORTNESS OF BREATH): Primary | ICD-10-CM

## 2023-03-30 NOTE — TELEPHONE ENCOUNTER
Jose Go report reviewed. Analysis time 13 days and 5 hours. Underlying rhythm - NSR  Average heart rate 72  Minimum heart rate 48 bpm at 1:58 am.  Max heart rate  148 with 6 beat run PSVT. 3 PSVT runs longest 11.4 seconds. No VT, AF, pauses or heart block. Rare PAC and PVC.   Maryellen Ruelas, APRN

## 2023-04-04 RX ORDER — AZELASTINE 1 MG/ML
SPRAY, METERED NASAL
Qty: 90 ML | Refills: 0 | Status: SHIPPED | OUTPATIENT
Start: 2023-04-04

## 2023-04-04 NOTE — TELEPHONE ENCOUNTER
Leisa Soto called requesting a refill of the below medication which has been pended for you:     Requested Prescriptions     Pending Prescriptions Disp Refills    azelastine (ASTELIN) 0.1 % nasal spray [Pharmacy Med Name: AZELASTINE 0.1% (137 MCG) S 0.1 Solution] 90 mL 0     Sig: USE 1 SPRAY BY NASAL ROUTE 2 TIMES DAILY USE IN EACH NOSTRIL AS DIRECTED       Last Appointment Date: 3/6/2023  Next Appointment Date: 7/7/2023    Allergies   Allergen Reactions    Adhesive Tape Swelling    Sulfa Antibiotics Swelling and Rash     Other reaction(s): Unknown

## 2023-04-25 RX ORDER — ERGOCALCIFEROL 1.25 MG/1
50000 CAPSULE ORAL WEEKLY
Qty: 12 CAPSULE | Refills: 1 | Status: SHIPPED | OUTPATIENT
Start: 2023-04-25

## 2023-04-25 NOTE — TELEPHONE ENCOUNTER
Leisa Soto called requesting a refill of the below medication which has been pended for you:     Requested Prescriptions     Pending Prescriptions Disp Refills    vitamin D (ERGOCALCIFEROL) 1.25 MG (80875 UT) CAPS capsule [Pharmacy Med Name: VIT D2 1.25 MG (50,000 UNIT 1.25 MG Capsule] 12 capsule 1     Sig: TAKE 1 CAPSULE BY MOUTH ONCE A WEEK       Last Appointment Date: 3/6/2023  Next Appointment Date: 7/7/2023    Allergies   Allergen Reactions    Adhesive Tape Swelling    Sulfa Antibiotics Swelling and Rash     Other reaction(s): Unknown

## 2023-05-16 RX ORDER — IBANDRONATE SODIUM 150 MG/1
150 TABLET, FILM COATED ORAL
Qty: 3 TABLET | Refills: 1 | Status: SHIPPED | OUTPATIENT
Start: 2023-05-16

## 2023-05-16 NOTE — TELEPHONE ENCOUNTER
Cristy Barroso called requesting a refill of the below medication which has been pended for you:     Requested Prescriptions     Pending Prescriptions Disp Refills    ibandronate (BONIVA) 150 MG tablet [Pharmacy Med Name: IBANDRONATE SODIUM 150 MG T 150 Tablet] 3 tablet 1     Sig: TAKE 1 TABLET BY MOUTH EVERY 30 DAYS       Last Appointment Date: 3/6/2023  Next Appointment Date: 7/7/2023    Allergies   Allergen Reactions    Adhesive Tape Swelling    Sulfa Antibiotics Swelling and Rash     Other reaction(s): Unknown

## 2023-05-17 RX ORDER — ISOSORBIDE MONONITRATE 30 MG/1
30 TABLET, EXTENDED RELEASE ORAL DAILY
Qty: 90 TABLET | Refills: 3 | Status: SHIPPED | OUTPATIENT
Start: 2023-05-17

## 2023-07-06 ENCOUNTER — PATIENT MESSAGE (OUTPATIENT)
Dept: INTERNAL MEDICINE | Age: 63
End: 2023-07-06

## 2023-07-06 RX ORDER — BUSPIRONE HYDROCHLORIDE 10 MG/1
10 TABLET ORAL 2 TIMES DAILY PRN
Qty: 30 TABLET | Refills: 1 | Status: SHIPPED | OUTPATIENT
Start: 2023-07-06 | End: 2023-08-05

## 2023-07-06 NOTE — TELEPHONE ENCOUNTER
Last Appointment Date: 3/6/2023  Next Appointment Date: 7/14/2023    Allergies   Allergen Reactions    Adhesive Tape Swelling    Sulfa Antibiotics Swelling and Rash     Other reaction(s): Unknown     Patient needs refill on   Requested Prescriptions     Pending Prescriptions Disp Refills    busPIRone (BUSPAR) 10 MG tablet 30 tablet 1     Sig: Take 1 tablet by mouth 2 times daily as needed (anxiety)

## 2023-07-06 NOTE — TELEPHONE ENCOUNTER
From: Osei Blas  To: Dr. Perrin Livin2023 11:42 AM CDT  Subject: Prescription refill    I am needing Buspirone HCL 10 MG refilled please.     Thank you,  Rosie Shi

## 2023-07-07 DIAGNOSIS — E78.00 PURE HYPERCHOLESTEROLEMIA: ICD-10-CM

## 2023-07-07 DIAGNOSIS — I25.10 MILD CORONARY ARTERY DISEASE: ICD-10-CM

## 2023-07-07 DIAGNOSIS — R73.01 IFG (IMPAIRED FASTING GLUCOSE): ICD-10-CM

## 2023-07-07 DIAGNOSIS — E55.9 VITAMIN D DEFICIENCY: ICD-10-CM

## 2023-07-07 DIAGNOSIS — I10 ESSENTIAL HYPERTENSION: ICD-10-CM

## 2023-07-07 LAB
25(OH)D3 SERPL-MCNC: 49.4 NG/ML
ALBUMIN SERPL-MCNC: 4.1 G/DL (ref 3.5–5.2)
ALP SERPL-CCNC: 78 U/L (ref 35–104)
ALT SERPL-CCNC: 19 U/L (ref 5–33)
ANION GAP SERPL CALCULATED.3IONS-SCNC: 11 MMOL/L (ref 7–19)
AST SERPL-CCNC: 21 U/L (ref 5–32)
BILIRUB SERPL-MCNC: 0.3 MG/DL (ref 0.2–1.2)
BUN SERPL-MCNC: 20 MG/DL (ref 8–23)
CALCIUM SERPL-MCNC: 9.4 MG/DL (ref 8.8–10.2)
CHLORIDE SERPL-SCNC: 104 MMOL/L (ref 98–111)
CHOLEST SERPL-MCNC: 225 MG/DL (ref 160–199)
CO2 SERPL-SCNC: 25 MMOL/L (ref 22–29)
CREAT SERPL-MCNC: 0.6 MG/DL (ref 0.5–0.9)
GLUCOSE SERPL-MCNC: 110 MG/DL (ref 74–109)
HBA1C MFR BLD: 5.6 % (ref 4–6)
HDLC SERPL-MCNC: 62 MG/DL (ref 65–121)
LDLC SERPL CALC-MCNC: 137 MG/DL
POTASSIUM SERPL-SCNC: 4.9 MMOL/L (ref 3.5–5)
PROT SERPL-MCNC: 6.5 G/DL (ref 6.6–8.7)
SODIUM SERPL-SCNC: 140 MMOL/L (ref 136–145)
TRIGL SERPL-MCNC: 130 MG/DL (ref 0–149)

## 2023-07-19 ENCOUNTER — OFFICE VISIT (OUTPATIENT)
Dept: INTERNAL MEDICINE | Age: 63
End: 2023-07-19
Payer: COMMERCIAL

## 2023-07-19 VITALS
OXYGEN SATURATION: 98 % | BODY MASS INDEX: 26.22 KG/M2 | DIASTOLIC BLOOD PRESSURE: 86 MMHG | HEIGHT: 63 IN | HEART RATE: 68 BPM | SYSTOLIC BLOOD PRESSURE: 130 MMHG | WEIGHT: 148 LBS

## 2023-07-19 DIAGNOSIS — I10 ESSENTIAL HYPERTENSION: ICD-10-CM

## 2023-07-19 DIAGNOSIS — I25.10 MILD CORONARY ARTERY DISEASE: Primary | ICD-10-CM

## 2023-07-19 DIAGNOSIS — R73.01 IFG (IMPAIRED FASTING GLUCOSE): ICD-10-CM

## 2023-07-19 DIAGNOSIS — E78.00 PURE HYPERCHOLESTEROLEMIA: ICD-10-CM

## 2023-07-19 DIAGNOSIS — L98.9 SKIN LESION: ICD-10-CM

## 2023-07-19 DIAGNOSIS — E55.9 VITAMIN D DEFICIENCY: ICD-10-CM

## 2023-07-19 PROCEDURE — 3075F SYST BP GE 130 - 139MM HG: CPT | Performed by: INTERNAL MEDICINE

## 2023-07-19 PROCEDURE — 3079F DIAST BP 80-89 MM HG: CPT | Performed by: INTERNAL MEDICINE

## 2023-07-19 PROCEDURE — 99214 OFFICE O/P EST MOD 30 MIN: CPT | Performed by: INTERNAL MEDICINE

## 2023-07-19 RX ORDER — BUSPIRONE HYDROCHLORIDE 10 MG/1
10 TABLET ORAL 2 TIMES DAILY PRN
Qty: 180 TABLET | Refills: 1 | Status: SHIPPED | OUTPATIENT
Start: 2023-07-19 | End: 2024-01-15

## 2023-07-19 RX ORDER — EZETIMIBE 10 MG/1
10 TABLET ORAL DAILY
Qty: 90 TABLET | Refills: 1 | Status: SHIPPED | OUTPATIENT
Start: 2023-07-19

## 2023-07-19 ASSESSMENT — ENCOUNTER SYMPTOMS
CONSTIPATION: 0
CHEST TIGHTNESS: 0
ABDOMINAL PAIN: 0
SORE THROAT: 0
WHEEZING: 0
COUGH: 0

## 2023-07-19 NOTE — PROGRESS NOTES
Chief Complaint   Patient presents with    Follow-up     History of presenting illness:  Sherrie Saldana is a57 y.o. female who presents today for follow up on her chronic medical conditions as noted below.     Patient Active Problem List    Diagnosis Date Noted    ACS (acute coronary syndrome) ([address]) 07/13/2019     Priority: High    Chest pressure 06/19/2018     Priority: High    STACY (middle ear effusion), left 06/02/2022     Priority: Medium    Eustachian tube dysfunction, left 06/02/2022     Priority: Medium    Panlobular emphysema ([address]) 10/20/2021    History of smoking 10/20/2021    Gastroesophageal reflux disease without esophagitis 10/20/2021    Atypical chest pain 09/02/2021    COPD (chronic obstructive pulmonary disease) ([address]) 09/02/2021    Enlarged lymph nodes 07/19/2021    Essential hypertension 07/19/2021    Transaminitis 11/18/2020    Abnormal CT of the abdomen 11/18/2020    Fatty liver 11/18/2020    Alcohol consumption of one to four drinks per week 11/18/2020    Mild coronary artery disease 07/18/2019    Other chest pain 06/18/2018     Overview Note:     12/30/2016  SE negative for myocardial ischemia  6/19/18  SE negative for myocardial ischemia  7/13/19 ACS FERNANDO RISK Score 2 (angina, + troponin ), AUC indication 3, AUC score 7   7/14/19  Cath  Mild CAD, normal LVFX          Vitamin D deficiency 11/04/2017    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

## 2023-08-18 RX ORDER — VENLAFAXINE HYDROCHLORIDE 150 MG/1
CAPSULE, EXTENDED RELEASE ORAL
Qty: 90 CAPSULE | Refills: 1 | Status: SHIPPED | OUTPATIENT
Start: 2023-08-18

## 2023-08-18 RX ORDER — AMLODIPINE BESYLATE 2.5 MG/1
2.5 TABLET ORAL DAILY
Qty: 90 TABLET | Refills: 1 | Status: SHIPPED | OUTPATIENT
Start: 2023-08-18

## 2023-09-23 ENCOUNTER — NURSE ONLY (OUTPATIENT)
Dept: INTERNAL MEDICINE | Age: 63
End: 2023-09-23
Payer: COMMERCIAL

## 2023-09-23 DIAGNOSIS — Z23 NEED FOR IMMUNIZATION AGAINST INFLUENZA: Primary | ICD-10-CM

## 2023-09-23 PROCEDURE — 90674 CCIIV4 VAC NO PRSV 0.5 ML IM: CPT | Performed by: INTERNAL MEDICINE

## 2023-09-23 PROCEDURE — 99999 PR OFFICE/OUTPT VISIT,PROCEDURE ONLY: CPT | Performed by: INTERNAL MEDICINE

## 2023-09-23 PROCEDURE — 90471 IMMUNIZATION ADMIN: CPT | Performed by: INTERNAL MEDICINE

## 2023-10-03 RX ORDER — ERGOCALCIFEROL 1.25 MG/1
50000 CAPSULE ORAL WEEKLY
Qty: 12 CAPSULE | Refills: 1 | Status: SHIPPED | OUTPATIENT
Start: 2023-10-03

## 2023-10-03 RX ORDER — IBANDRONATE SODIUM 150 MG/1
150 TABLET, FILM COATED ORAL
Qty: 3 TABLET | Refills: 1 | Status: SHIPPED | OUTPATIENT
Start: 2023-10-03

## 2023-10-03 RX ORDER — AZELASTINE 1 MG/ML
1 SPRAY, METERED NASAL 2 TIMES DAILY
Qty: 90 ML | Refills: 1 | Status: SHIPPED | OUTPATIENT
Start: 2023-10-03

## 2023-10-03 RX ORDER — ALBUTEROL SULFATE 90 UG/1
2 AEROSOL, METERED RESPIRATORY (INHALATION) 4 TIMES DAILY PRN
Qty: 1 EACH | Refills: 0 | Status: SHIPPED | OUTPATIENT
Start: 2023-10-03

## 2023-10-03 RX ORDER — FLUTICASONE PROPIONATE 110 UG/1
2 AEROSOL, METERED RESPIRATORY (INHALATION) 2 TIMES DAILY
Qty: 3 EACH | Refills: 2 | Status: SHIPPED | OUTPATIENT
Start: 2023-10-03 | End: 2023-11-02

## 2023-10-16 DIAGNOSIS — I10 ESSENTIAL HYPERTENSION: ICD-10-CM

## 2023-10-17 RX ORDER — HYDROCHLOROTHIAZIDE 25 MG/1
25 TABLET ORAL EVERY MORNING
Qty: 90 TABLET | Refills: 3 | Status: SHIPPED | OUTPATIENT
Start: 2023-10-17

## 2023-10-17 RX ORDER — METOPROLOL TARTRATE 50 MG/1
50 TABLET, FILM COATED ORAL 2 TIMES DAILY
Qty: 180 TABLET | Refills: 3 | Status: SHIPPED | OUTPATIENT
Start: 2023-10-17

## 2023-10-30 RX ORDER — ALBUTEROL SULFATE 90 UG/1
2 AEROSOL, METERED RESPIRATORY (INHALATION) 4 TIMES DAILY PRN
Qty: 18 G | Refills: 0 | Status: SHIPPED | OUTPATIENT
Start: 2023-10-30

## 2023-10-30 NOTE — TELEPHONE ENCOUNTER
Margaret Zuluaga called to request a refill on her medication.       Last office visit : 7/19/2023   Next office visit : 11/7/2023     Requested Prescriptions     Pending Prescriptions Disp Refills    albuterol sulfate HFA (PROVENTIL;VENTOLIN;PROAIR) 108 (90 Base) MCG/ACT inhaler [Pharmacy Med Name: ALBUTEROL SULFATE  ( 108 (90 BAS Aerosol] 18 g 0     Sig: INHALE 2 PUFFS INTO THE LUNGS 4 TIMES DAILY AS NEEDED FOR WHEEZING            Inna Hopper MA

## 2023-11-02 DIAGNOSIS — I25.10 MILD CORONARY ARTERY DISEASE: ICD-10-CM

## 2023-11-02 DIAGNOSIS — I10 ESSENTIAL HYPERTENSION: ICD-10-CM

## 2023-11-02 DIAGNOSIS — E55.9 VITAMIN D DEFICIENCY: ICD-10-CM

## 2023-11-02 DIAGNOSIS — E78.00 PURE HYPERCHOLESTEROLEMIA: ICD-10-CM

## 2023-11-02 DIAGNOSIS — R73.01 IFG (IMPAIRED FASTING GLUCOSE): ICD-10-CM

## 2023-11-02 LAB
25(OH)D3 SERPL-MCNC: 48.4 NG/ML
ALBUMIN SERPL-MCNC: 4.1 G/DL (ref 3.5–5.2)
ALP SERPL-CCNC: 68 U/L (ref 35–104)
ALT SERPL-CCNC: 42 U/L (ref 5–33)
ANION GAP SERPL CALCULATED.3IONS-SCNC: 13 MMOL/L (ref 7–19)
AST SERPL-CCNC: 39 U/L (ref 5–32)
BASOPHILS # BLD: 0.1 K/UL (ref 0–0.2)
BASOPHILS NFR BLD: 0.9 % (ref 0–1)
BILIRUB SERPL-MCNC: 0.3 MG/DL (ref 0.2–1.2)
BILIRUB UR QL STRIP: NEGATIVE
BUN SERPL-MCNC: 19 MG/DL (ref 8–23)
CALCIUM SERPL-MCNC: 9.1 MG/DL (ref 8.8–10.2)
CHLORIDE SERPL-SCNC: 104 MMOL/L (ref 98–111)
CHOLEST SERPL-MCNC: 122 MG/DL (ref 160–199)
CLARITY UR: CLEAR
CO2 SERPL-SCNC: 25 MMOL/L (ref 22–29)
COLOR UR: YELLOW
CREAT SERPL-MCNC: 0.6 MG/DL (ref 0.5–0.9)
EOSINOPHIL # BLD: 0.8 K/UL (ref 0–0.6)
EOSINOPHIL NFR BLD: 11.9 % (ref 0–5)
ERYTHROCYTE [DISTWIDTH] IN BLOOD BY AUTOMATED COUNT: 13.1 % (ref 11.5–14.5)
GLUCOSE SERPL-MCNC: 101 MG/DL (ref 74–109)
GLUCOSE UR STRIP.AUTO-MCNC: NEGATIVE MG/DL
HBA1C MFR BLD: 5.9 % (ref 4–6)
HCT VFR BLD AUTO: 41.8 % (ref 37–47)
HDLC SERPL-MCNC: 53 MG/DL (ref 65–121)
HGB BLD-MCNC: 13 G/DL (ref 12–16)
HGB UR STRIP.AUTO-MCNC: NEGATIVE MG/L
IMM GRANULOCYTES # BLD: 0 K/UL
KETONES UR STRIP.AUTO-MCNC: ABNORMAL MG/DL
LDLC SERPL CALC-MCNC: 53 MG/DL
LEUKOCYTE ESTERASE UR QL STRIP.AUTO: NEGATIVE
LYMPHOCYTES # BLD: 1.5 K/UL (ref 1.1–4.5)
LYMPHOCYTES NFR BLD: 21.8 % (ref 20–40)
MCH RBC QN AUTO: 29 PG (ref 27–31)
MCHC RBC AUTO-ENTMCNC: 31.1 G/DL (ref 33–37)
MCV RBC AUTO: 93.3 FL (ref 81–99)
MONOCYTES # BLD: 0.8 K/UL (ref 0–0.9)
MONOCYTES NFR BLD: 11.3 % (ref 0–10)
NEUTROPHILS # BLD: 3.6 K/UL (ref 1.5–7.5)
NEUTS SEG NFR BLD: 53.8 % (ref 50–65)
NITRITE UR QL STRIP.AUTO: NEGATIVE
PH UR STRIP.AUTO: 6 [PH] (ref 5–8)
PLATELET # BLD AUTO: 306 K/UL (ref 130–400)
PMV BLD AUTO: 9.7 FL (ref 9.4–12.3)
POTASSIUM SERPL-SCNC: 4.4 MMOL/L (ref 3.5–5)
PROT SERPL-MCNC: 6.1 G/DL (ref 6.6–8.7)
PROT UR STRIP.AUTO-MCNC: NEGATIVE MG/DL
RBC # BLD AUTO: 4.48 M/UL (ref 4.2–5.4)
SODIUM SERPL-SCNC: 142 MMOL/L (ref 136–145)
SP GR UR STRIP.AUTO: 1.03 (ref 1–1.03)
TRIGL SERPL-MCNC: 82 MG/DL (ref 0–149)
TSH SERPL DL<=0.005 MIU/L-ACNC: 2.71 UIU/ML (ref 0.27–4.2)
UROBILINOGEN UR STRIP.AUTO-MCNC: 0.2 E.U./DL
WBC # BLD AUTO: 6.7 K/UL (ref 4.8–10.8)

## 2023-11-07 ENCOUNTER — OFFICE VISIT (OUTPATIENT)
Dept: INTERNAL MEDICINE | Age: 63
End: 2023-11-07
Payer: COMMERCIAL

## 2023-11-07 VITALS
DIASTOLIC BLOOD PRESSURE: 80 MMHG | SYSTOLIC BLOOD PRESSURE: 120 MMHG | WEIGHT: 147.2 LBS | BODY MASS INDEX: 26.08 KG/M2 | HEIGHT: 63 IN

## 2023-11-07 DIAGNOSIS — R73.01 IFG (IMPAIRED FASTING GLUCOSE): ICD-10-CM

## 2023-11-07 DIAGNOSIS — I10 ESSENTIAL HYPERTENSION: ICD-10-CM

## 2023-11-07 DIAGNOSIS — I25.10 MILD CORONARY ARTERY DISEASE: ICD-10-CM

## 2023-11-07 DIAGNOSIS — E78.00 PURE HYPERCHOLESTEROLEMIA: ICD-10-CM

## 2023-11-07 DIAGNOSIS — E55.9 VITAMIN D DEFICIENCY: ICD-10-CM

## 2023-11-07 DIAGNOSIS — Z87.891 PERSONAL HISTORY OF TOBACCO USE: ICD-10-CM

## 2023-11-07 DIAGNOSIS — Z12.31 ENCOUNTER FOR SCREENING MAMMOGRAM FOR MALIGNANT NEOPLASM OF BREAST: Primary | ICD-10-CM

## 2023-11-07 PROCEDURE — 3079F DIAST BP 80-89 MM HG: CPT | Performed by: INTERNAL MEDICINE

## 2023-11-07 PROCEDURE — G0296 VISIT TO DETERM LDCT ELIG: HCPCS | Performed by: INTERNAL MEDICINE

## 2023-11-07 PROCEDURE — 99396 PREV VISIT EST AGE 40-64: CPT | Performed by: INTERNAL MEDICINE

## 2023-11-07 PROCEDURE — 3074F SYST BP LT 130 MM HG: CPT | Performed by: INTERNAL MEDICINE

## 2023-11-07 ASSESSMENT — ENCOUNTER SYMPTOMS
CONSTIPATION: 0
CHEST TIGHTNESS: 0
ABDOMINAL PAIN: 0
COUGH: 0
SORE THROAT: 0
WHEEZING: 0

## 2023-11-17 RX ORDER — ROSUVASTATIN CALCIUM 40 MG/1
40 TABLET, COATED ORAL DAILY
Qty: 90 TABLET | Refills: 1 | Status: SHIPPED | OUTPATIENT
Start: 2023-11-17

## 2023-11-17 RX ORDER — IRBESARTAN 300 MG/1
300 TABLET ORAL DAILY
Qty: 90 TABLET | Refills: 0 | Status: SHIPPED | OUTPATIENT
Start: 2023-11-17

## 2023-11-17 NOTE — TELEPHONE ENCOUNTER
Last OV 11/7/2023  Next OV 3/8/2024      Requested Prescriptions     Pending Prescriptions Disp Refills    rosuvastatin (CRESTOR) 40 MG tablet [Pharmacy Med Name: ROSUVASTATIN 40 MG 40 Tablet] 90 tablet 1     Sig: TAKE 1 TABLET BY MOUTH DAILY

## 2024-01-08 ENCOUNTER — OFFICE VISIT (OUTPATIENT)
Dept: INTERNAL MEDICINE | Age: 64
End: 2024-01-08
Payer: COMMERCIAL

## 2024-01-08 VITALS
OXYGEN SATURATION: 96 % | DIASTOLIC BLOOD PRESSURE: 88 MMHG | SYSTOLIC BLOOD PRESSURE: 130 MMHG | HEART RATE: 73 BPM | WEIGHT: 145 LBS | HEIGHT: 63 IN | BODY MASS INDEX: 25.69 KG/M2

## 2024-01-08 DIAGNOSIS — H57.89 EYE SWELLING, RIGHT: ICD-10-CM

## 2024-01-08 DIAGNOSIS — H00.011 HORDEOLUM EXTERNUM OF RIGHT UPPER EYELID: Primary | ICD-10-CM

## 2024-01-08 DIAGNOSIS — R21 MACULOPAPULAR RASH: ICD-10-CM

## 2024-01-08 DIAGNOSIS — R21 SKIN RASH: ICD-10-CM

## 2024-01-08 PROCEDURE — 99213 OFFICE O/P EST LOW 20 MIN: CPT | Performed by: INTERNAL MEDICINE

## 2024-01-08 PROCEDURE — 3079F DIAST BP 80-89 MM HG: CPT | Performed by: INTERNAL MEDICINE

## 2024-01-08 PROCEDURE — 96372 THER/PROPH/DIAG INJ SC/IM: CPT | Performed by: INTERNAL MEDICINE

## 2024-01-08 PROCEDURE — 3075F SYST BP GE 130 - 139MM HG: CPT | Performed by: INTERNAL MEDICINE

## 2024-01-08 RX ORDER — IBANDRONATE SODIUM 150 MG/1
150 TABLET, FILM COATED ORAL
Qty: 3 TABLET | Refills: 1 | Status: SHIPPED | OUTPATIENT
Start: 2024-01-08

## 2024-01-08 RX ORDER — METHYLPREDNISOLONE ACETATE 80 MG/ML
80 INJECTION, SUSPENSION INTRA-ARTICULAR; INTRALESIONAL; INTRAMUSCULAR; SOFT TISSUE ONCE
Status: COMPLETED | OUTPATIENT
Start: 2024-01-08 | End: 2024-01-08

## 2024-01-08 RX ORDER — TOBRAMYCIN 3 MG/ML
2 SOLUTION/ DROPS OPHTHALMIC EVERY 4 HOURS
Qty: 1 EACH | Refills: 0 | Status: SHIPPED | OUTPATIENT
Start: 2024-01-08 | End: 2024-01-18

## 2024-01-08 RX ORDER — PREDNISONE 10 MG/1
10 TABLET ORAL 2 TIMES DAILY
Qty: 6 TABLET | Refills: 0 | Status: SHIPPED | OUTPATIENT
Start: 2024-01-10 | End: 2024-01-13

## 2024-01-08 RX ORDER — ERYTHROMYCIN 5 MG/G
OINTMENT OPHTHALMIC
Qty: 1 EACH | Refills: 0 | Status: SHIPPED | OUTPATIENT
Start: 2024-01-08 | End: 2024-01-18

## 2024-01-08 RX ADMIN — METHYLPREDNISOLONE ACETATE 80 MG: 80 INJECTION, SUSPENSION INTRA-ARTICULAR; INTRALESIONAL; INTRAMUSCULAR; SOFT TISSUE at 14:43

## 2024-01-08 ASSESSMENT — PATIENT HEALTH QUESTIONNAIRE - PHQ9
2. FEELING DOWN, DEPRESSED OR HOPELESS: 0
9. THOUGHTS THAT YOU WOULD BE BETTER OFF DEAD, OR OF HURTING YOURSELF: 0
SUM OF ALL RESPONSES TO PHQ QUESTIONS 1-9: 0
6. FEELING BAD ABOUT YOURSELF - OR THAT YOU ARE A FAILURE OR HAVE LET YOURSELF OR YOUR FAMILY DOWN: 0
4. FEELING TIRED OR HAVING LITTLE ENERGY: 0
SUM OF ALL RESPONSES TO PHQ QUESTIONS 1-9: 0
SUM OF ALL RESPONSES TO PHQ QUESTIONS 1-9: 0
1. LITTLE INTEREST OR PLEASURE IN DOING THINGS: 0
7. TROUBLE CONCENTRATING ON THINGS, SUCH AS READING THE NEWSPAPER OR WATCHING TELEVISION: 0
3. TROUBLE FALLING OR STAYING ASLEEP: 0
SUM OF ALL RESPONSES TO PHQ9 QUESTIONS 1 & 2: 0
10. IF YOU CHECKED OFF ANY PROBLEMS, HOW DIFFICULT HAVE THESE PROBLEMS MADE IT FOR YOU TO DO YOUR WORK, TAKE CARE OF THINGS AT HOME, OR GET ALONG WITH OTHER PEOPLE: 0
8. MOVING OR SPEAKING SO SLOWLY THAT OTHER PEOPLE COULD HAVE NOTICED. OR THE OPPOSITE, BEING SO FIGETY OR RESTLESS THAT YOU HAVE BEEN MOVING AROUND A LOT MORE THAN USUAL: 0
5. POOR APPETITE OR OVEREATING: 0
SUM OF ALL RESPONSES TO PHQ QUESTIONS 1-9: 0

## 2024-01-08 ASSESSMENT — ENCOUNTER SYMPTOMS
EYE PAIN: 1
SORE THROAT: 0
EYE REDNESS: 1
CHEST TIGHTNESS: 0
COUGH: 0
WHEEZING: 0
CONSTIPATION: 0
ABDOMINAL PAIN: 0

## 2024-01-08 NOTE — PROGRESS NOTES
Chief Complaint   Patient presents with    Other     Stye in right eye and break out around her neck/ back with itching times one week      History of presenting illness:  Michelle Long is a63 y.o. female who presents today for follow up on her chronic medical conditions as noted below.      Patient Active Problem List    Diagnosis Date Noted    ACS (acute coronary syndrome) (HCC) 07/13/2019     Priority: High    Chest pressure 06/19/2018     Priority: High    STACY (middle ear effusion), left 06/02/2022     Priority: Medium    Eustachian tube dysfunction, left 06/02/2022     Priority: Medium    Panlobular emphysema (HCC) 10/20/2021    History of smoking 10/20/2021    Gastroesophageal reflux disease without esophagitis 10/20/2021    Atypical chest pain 09/02/2021    COPD (chronic obstructive pulmonary disease) ([facility]) 09/02/2021    Enlarged lymph nodes 07/19/2021    Essential hypertension 07/19/2021    Transaminitis 11/18/2020    Abnormal CT of the abdomen 11/18/2020    Fatty liver 11/18/2020    Alcohol consumption of one to four drinks per week 11/18/2020    Mild coronary artery disease 07/18/2019    Other chest pain 06/18/2018     Overview Note:     12/30/2016  SE negative for myocardial ischemia  6/19/18  SE negative for myocardial ischemia  7/13/19 ACS FERNANDO RISK Score 2 (angina, + troponin ), AUC indication 3, AUC score 7   7/14/19  Cath  Mild CAD, normal LVFX        Vitamin D deficiency 11/04/2017    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HCC) 11/04/2017    Generalized anxiety disorder 11/04/2017    Pure hypercholesterolemia 11/04/2017    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

## 2024-01-15 ENCOUNTER — HOSPITAL ENCOUNTER (OUTPATIENT)
Dept: CT IMAGING | Age: 64
Discharge: HOME | End: 2024-01-15
Payer: COMMERCIAL

## 2024-01-15 ENCOUNTER — HOSPITAL ENCOUNTER (OUTPATIENT)
Dept: WOMENS IMAGING | Age: 64
Discharge: HOME | End: 2024-01-15
Attending: INTERNAL MEDICINE
Payer: COMMERCIAL

## 2024-01-15 DIAGNOSIS — Z12.31 ENCOUNTER FOR SCREENING MAMMOGRAM FOR MALIGNANT NEOPLASM OF BREAST: ICD-10-CM

## 2024-01-15 DIAGNOSIS — Z87.891 PERSONAL HISTORY OF TOBACCO USE: ICD-10-CM

## 2024-01-15 PROCEDURE — 71271 CT THORAX LUNG CANCER SCR C-: CPT

## 2024-01-15 PROCEDURE — 77063 BREAST TOMOSYNTHESIS BI: CPT

## 2024-02-07 NOTE — TELEPHONE ENCOUNTER
Michelle Long called to request a refill on her medication.      Last office visit : 1/8/2024   Next office visit : 3/8/2024     Requested Prescriptions     Pending Prescriptions Disp Refills    busPIRone (BUSPAR) 10 MG tablet [Pharmacy Med Name: BUSPIRONE HCL 10 MG TABS 10 Tablet] 30 tablet 1     Sig: TAKE 1 TABLET BY MOUTH TWO TIMES A DAY AS NEEDED FOR ANXIETY            Alexa Watts MA

## 2024-02-08 RX ORDER — BUSPIRONE HYDROCHLORIDE 10 MG/1
TABLET ORAL
Qty: 30 TABLET | Refills: 1 | Status: SHIPPED | OUTPATIENT
Start: 2024-02-08

## 2024-03-04 DIAGNOSIS — I25.10 MILD CORONARY ARTERY DISEASE: ICD-10-CM

## 2024-03-04 DIAGNOSIS — I10 ESSENTIAL HYPERTENSION: ICD-10-CM

## 2024-03-04 DIAGNOSIS — Z12.31 ENCOUNTER FOR SCREENING MAMMOGRAM FOR MALIGNANT NEOPLASM OF BREAST: ICD-10-CM

## 2024-03-04 DIAGNOSIS — E78.00 PURE HYPERCHOLESTEROLEMIA: ICD-10-CM

## 2024-03-04 DIAGNOSIS — Z87.891 PERSONAL HISTORY OF TOBACCO USE: ICD-10-CM

## 2024-03-04 DIAGNOSIS — E55.9 VITAMIN D DEFICIENCY: ICD-10-CM

## 2024-03-04 DIAGNOSIS — R73.01 IFG (IMPAIRED FASTING GLUCOSE): ICD-10-CM

## 2024-03-04 LAB
25(OH)D3 SERPL-MCNC: 41.2 NG/ML
ALBUMIN SERPL-MCNC: 4.3 G/DL (ref 3.5–5.2)
ALP SERPL-CCNC: 74 U/L (ref 35–104)
ALT SERPL-CCNC: 54 U/L (ref 5–33)
ANION GAP SERPL CALCULATED.3IONS-SCNC: 9 MMOL/L (ref 7–19)
AST SERPL-CCNC: 42 U/L (ref 5–32)
BILIRUB SERPL-MCNC: <0.2 MG/DL (ref 0.2–1.2)
BUN SERPL-MCNC: 19 MG/DL (ref 8–23)
CALCIUM SERPL-MCNC: 8.9 MG/DL (ref 8.8–10.2)
CHLORIDE SERPL-SCNC: 104 MMOL/L (ref 98–111)
CHOLEST SERPL-MCNC: 165 MG/DL (ref 160–199)
CO2 SERPL-SCNC: 26 MMOL/L (ref 22–29)
CREAT SERPL-MCNC: 0.6 MG/DL (ref 0.5–0.9)
GLUCOSE SERPL-MCNC: 107 MG/DL (ref 74–109)
HBA1C MFR BLD: 5.5 % (ref 4–6)
HDLC SERPL-MCNC: 77 MG/DL (ref 65–121)
LDLC SERPL CALC-MCNC: 74 MG/DL
POTASSIUM SERPL-SCNC: 5.2 MMOL/L (ref 3.5–5)
PROT SERPL-MCNC: 6.1 G/DL (ref 6.6–8.7)
SODIUM SERPL-SCNC: 139 MMOL/L (ref 136–145)
TRIGL SERPL-MCNC: 69 MG/DL (ref 0–149)
TSH SERPL DL<=0.005 MIU/L-ACNC: 4.6 UIU/ML (ref 0.27–4.2)

## 2024-03-08 ENCOUNTER — OFFICE VISIT (OUTPATIENT)
Dept: INTERNAL MEDICINE | Age: 64
End: 2024-03-08
Payer: COMMERCIAL

## 2024-03-08 VITALS
RESPIRATION RATE: 18 BRPM | SYSTOLIC BLOOD PRESSURE: 110 MMHG | HEART RATE: 74 BPM | WEIGHT: 144.6 LBS | OXYGEN SATURATION: 96 % | BODY MASS INDEX: 25.62 KG/M2 | DIASTOLIC BLOOD PRESSURE: 78 MMHG | HEIGHT: 63 IN

## 2024-03-08 DIAGNOSIS — E55.9 VITAMIN D DEFICIENCY: ICD-10-CM

## 2024-03-08 DIAGNOSIS — Z23 NEED FOR VACCINATION: ICD-10-CM

## 2024-03-08 DIAGNOSIS — R74.01 ELEVATED TRANSAMINASE LEVEL: ICD-10-CM

## 2024-03-08 DIAGNOSIS — E03.8 SUBCLINICAL HYPOTHYROIDISM: ICD-10-CM

## 2024-03-08 DIAGNOSIS — I25.10 MILD CORONARY ARTERY DISEASE: Primary | ICD-10-CM

## 2024-03-08 DIAGNOSIS — I10 PRIMARY HYPERTENSION: ICD-10-CM

## 2024-03-08 DIAGNOSIS — I10 ESSENTIAL HYPERTENSION: ICD-10-CM

## 2024-03-08 DIAGNOSIS — E78.00 PURE HYPERCHOLESTEROLEMIA: ICD-10-CM

## 2024-03-08 DIAGNOSIS — R73.01 IFG (IMPAIRED FASTING GLUCOSE): ICD-10-CM

## 2024-03-08 PROCEDURE — 99214 OFFICE O/P EST MOD 30 MIN: CPT | Performed by: INTERNAL MEDICINE

## 2024-03-08 PROCEDURE — 3078F DIAST BP <80 MM HG: CPT | Performed by: INTERNAL MEDICINE

## 2024-03-08 PROCEDURE — 3074F SYST BP LT 130 MM HG: CPT | Performed by: INTERNAL MEDICINE

## 2024-03-08 RX ORDER — AMLODIPINE BESYLATE 2.5 MG/1
2.5 TABLET ORAL DAILY
Qty: 90 TABLET | Refills: 1 | Status: SHIPPED | OUTPATIENT
Start: 2024-03-08

## 2024-03-08 RX ORDER — ZOSTER VACCINE RECOMBINANT, ADJUVANTED 50 MCG/0.5
0.5 KIT INTRAMUSCULAR SEE ADMIN INSTRUCTIONS
Qty: 0.5 ML | Refills: 0 | Status: SHIPPED | OUTPATIENT
Start: 2024-03-08 | End: 2024-09-04

## 2024-03-08 RX ORDER — EZETIMIBE 10 MG/1
10 TABLET ORAL DAILY
Qty: 90 TABLET | Refills: 1 | Status: SHIPPED | OUTPATIENT
Start: 2024-03-08

## 2024-03-08 RX ORDER — IRBESARTAN 300 MG/1
300 TABLET ORAL DAILY
Qty: 90 TABLET | Refills: 0 | Status: SHIPPED | OUTPATIENT
Start: 2024-03-08

## 2024-03-08 RX ORDER — VENLAFAXINE HYDROCHLORIDE 150 MG/1
150 CAPSULE, EXTENDED RELEASE ORAL DAILY
Qty: 90 CAPSULE | Refills: 1 | Status: SHIPPED | OUTPATIENT
Start: 2024-03-08 | End: 2024-06-06

## 2024-03-08 RX ORDER — ISOSORBIDE MONONITRATE 30 MG/1
30 TABLET, EXTENDED RELEASE ORAL DAILY
Qty: 90 TABLET | Refills: 3 | Status: SHIPPED | OUTPATIENT
Start: 2024-03-08

## 2024-03-08 RX ORDER — IRBESARTAN 300 MG/1
300 TABLET ORAL DAILY
Qty: 90 TABLET | Refills: 0 | OUTPATIENT
Start: 2024-03-08

## 2024-03-08 ASSESSMENT — ENCOUNTER SYMPTOMS
COUGH: 0
WHEEZING: 0
CONSTIPATION: 0
CHEST TIGHTNESS: 0
SORE THROAT: 0
ABDOMINAL PAIN: 0

## 2024-03-08 NOTE — PROGRESS NOTES
tenderness.   Abdominal:      General: Bowel sounds are normal.      Palpations: Abdomen is soft.   Musculoskeletal:      Cervical back: Neck supple.   Lymphadenopathy:      Cervical: No cervical adenopathy.   Skin:     Findings: No rash.   Neurological:      Mental Status: She is oriented to person, place, and time.         Lab Review   Orders Only on 03/04/2024   Component Date Value    Vit D, 25-Hydroxy 03/04/2024 41.2     TSH 03/04/2024 4.600 (H)     Sodium 03/04/2024 139     Potassium 03/04/2024 5.2 (H)     Chloride 03/04/2024 104     CO2 03/04/2024 26     Anion Gap 03/04/2024 9     Glucose 03/04/2024 107     BUN 03/04/2024 19     Creatinine 03/04/2024 0.6     Est, Glom Filt Rate 03/04/2024 >60     Calcium 03/04/2024 8.9     Total Protein 03/04/2024 6.1 (L)     Albumin 03/04/2024 4.3     Total Bilirubin 03/04/2024 <0.2     Alkaline Phosphatase 03/04/2024 74     ALT 03/04/2024 54 (H)     AST 03/04/2024 42 (H)     Hemoglobin A1C 03/04/2024 5.5     Cholesterol, Total 03/04/2024 165     Triglycerides 03/04/2024 69     HDL 03/04/2024 77     LDL Calculated 03/04/2024 74            ASSESSMENT/PLAN:      IFG  A1c 5.5  Healthy, mostly fiber rich nonstarchy plant-based diet recommended  Recommend to decrease intake of processed foods, simple carbohydrates and animal-based products that high in saturated fats          Osteopenia of multiple sites 11/04/2017 2013 Lspine -2.4/hip neck -2.3; 5/17 Lspine -1.1; hip neck -2.2  10/2020 hip neck -2.0/ L spine -2.1  boniva started 2017 1/2023 lumbar spine -1.8 and hip neck -2.3      Rx Boniva     Hypertension   Now blood pressure readings have been in good range  Kidney function is normal  Rx   Metoprolol 75 bid  Lisinopril 10 bid  Avapro 300 daily  IMDUR 30 mg daily    Transaminase elevated  ALT 54/ ast 42  Dc etoh  Change diet- no added sugar        CAD  BP has been better  Abnormal left ventricular global longitudinal left ventricular strain of   -16.6% per echo

## 2024-06-03 RX ORDER — ROSUVASTATIN CALCIUM 40 MG/1
40 TABLET, COATED ORAL DAILY
Qty: 90 TABLET | Refills: 1 | Status: SHIPPED | OUTPATIENT
Start: 2024-06-03

## 2024-06-03 RX ORDER — IRBESARTAN 300 MG/1
300 TABLET ORAL DAILY
Qty: 90 TABLET | Refills: 1 | Status: SHIPPED | OUTPATIENT
Start: 2024-06-03

## 2024-06-03 NOTE — TELEPHONE ENCOUNTER
Michelle Long called to request a refill on her medication.      Last office visit : 3/8/2024   Next office visit : 7/11/2024     Requested Prescriptions     Pending Prescriptions Disp Refills    irbesartan (AVAPRO) 300 MG tablet [Pharmacy Med Name: IRBESARTAN 300 MG TABS 300 Tablet] 90 tablet 1     Sig: TAKE 1 TABLET BY MOUTH EVERY DAY    rosuvastatin (CRESTOR) 40 MG tablet [Pharmacy Med Name: ROSUVASTATIN 40 MG 40 Tablet] 90 tablet 1     Sig: TAKE 1 TABLET BY MOUTH EVERY DAY            Alexa Watts MA

## 2024-06-10 RX ORDER — ERGOCALCIFEROL 1.25 MG/1
50000 CAPSULE ORAL WEEKLY
Qty: 12 CAPSULE | Refills: 1 | Status: SHIPPED | OUTPATIENT
Start: 2024-06-10

## 2024-06-10 RX ORDER — ALBUTEROL SULFATE 90 UG/1
2 AEROSOL, METERED RESPIRATORY (INHALATION) 4 TIMES DAILY PRN
Qty: 18 G | Refills: 0 | Status: SHIPPED | OUTPATIENT
Start: 2024-06-10

## 2024-06-10 RX ORDER — EZETIMIBE 10 MG/1
10 TABLET ORAL DAILY
Qty: 90 TABLET | Refills: 1 | Status: SHIPPED | OUTPATIENT
Start: 2024-06-10

## 2024-08-15 ENCOUNTER — OFFICE VISIT (OUTPATIENT)
Dept: INTERNAL MEDICINE | Age: 64
End: 2024-08-15
Payer: COMMERCIAL

## 2024-08-15 VITALS
SYSTOLIC BLOOD PRESSURE: 120 MMHG | HEIGHT: 63 IN | OXYGEN SATURATION: 94 % | TEMPERATURE: 98 F | BODY MASS INDEX: 25.8 KG/M2 | HEART RATE: 90 BPM | DIASTOLIC BLOOD PRESSURE: 80 MMHG | WEIGHT: 145.6 LBS

## 2024-08-15 DIAGNOSIS — J21.9 ACUTE BRONCHITIS AND BRONCHIOLITIS: ICD-10-CM

## 2024-08-15 DIAGNOSIS — R09.81 SINUS CONGESTION: ICD-10-CM

## 2024-08-15 DIAGNOSIS — J20.9 ACUTE BRONCHITIS AND BRONCHIOLITIS: ICD-10-CM

## 2024-08-15 DIAGNOSIS — J44.1 COPD WITH ACUTE EXACERBATION (HCC): Primary | ICD-10-CM

## 2024-08-15 DIAGNOSIS — R09.89 CHEST CONGESTION: ICD-10-CM

## 2024-08-15 LAB
INFLUENZA A ANTIGEN, POC: NEGATIVE
INFLUENZA B ANTIGEN, POC: NEGATIVE
LOT EXPIRE DATE: NORMAL
LOT KIT NUMBER: NORMAL
SARS-COV-2, POC: NORMAL
VALID INTERNAL CONTROL: NORMAL
VENDOR AND KIT NAME POC: NORMAL

## 2024-08-15 PROCEDURE — 87428 SARSCOV & INF VIR A&B AG IA: CPT | Performed by: INTERNAL MEDICINE

## 2024-08-15 PROCEDURE — 3079F DIAST BP 80-89 MM HG: CPT | Performed by: INTERNAL MEDICINE

## 2024-08-15 PROCEDURE — 3074F SYST BP LT 130 MM HG: CPT | Performed by: INTERNAL MEDICINE

## 2024-08-15 PROCEDURE — 99213 OFFICE O/P EST LOW 20 MIN: CPT | Performed by: INTERNAL MEDICINE

## 2024-08-15 PROCEDURE — 96372 THER/PROPH/DIAG INJ SC/IM: CPT | Performed by: INTERNAL MEDICINE

## 2024-08-15 RX ORDER — METHYLPREDNISOLONE ACETATE 80 MG/ML
80 INJECTION, SUSPENSION INTRA-ARTICULAR; INTRALESIONAL; INTRAMUSCULAR; SOFT TISSUE ONCE
Status: COMPLETED | OUTPATIENT
Start: 2024-08-15 | End: 2024-08-15

## 2024-08-15 RX ORDER — CEFDINIR 300 MG/1
300 CAPSULE ORAL 2 TIMES DAILY
Qty: 14 CAPSULE | Refills: 0 | Status: SHIPPED | OUTPATIENT
Start: 2024-08-15 | End: 2024-08-22

## 2024-08-15 RX ORDER — PREDNISONE 10 MG/1
10 TABLET ORAL 2 TIMES DAILY
Qty: 8 TABLET | Refills: 0 | Status: SHIPPED | OUTPATIENT
Start: 2024-08-18 | End: 2024-08-22

## 2024-08-15 RX ORDER — FLUTICASONE PROPIONATE 110 UG/1
2 AEROSOL, METERED RESPIRATORY (INHALATION) 2 TIMES DAILY
Qty: 3 EACH | Refills: 2 | Status: SHIPPED | OUTPATIENT
Start: 2024-08-15 | End: 2024-09-14

## 2024-08-15 RX ORDER — IBANDRONATE SODIUM 150 MG/1
150 TABLET, FILM COATED ORAL
Qty: 3 TABLET | Refills: 1 | Status: SHIPPED | OUTPATIENT
Start: 2024-08-15

## 2024-08-15 RX ADMIN — METHYLPREDNISOLONE ACETATE 80 MG: 80 INJECTION, SUSPENSION INTRA-ARTICULAR; INTRALESIONAL; INTRAMUSCULAR; SOFT TISSUE at 10:51

## 2024-08-15 SDOH — ECONOMIC STABILITY: FOOD INSECURITY: WITHIN THE PAST 12 MONTHS, YOU WORRIED THAT YOUR FOOD WOULD RUN OUT BEFORE YOU GOT MONEY TO BUY MORE.: NEVER TRUE

## 2024-08-15 SDOH — ECONOMIC STABILITY: FOOD INSECURITY: WITHIN THE PAST 12 MONTHS, THE FOOD YOU BOUGHT JUST DIDN'T LAST AND YOU DIDN'T HAVE MONEY TO GET MORE.: NEVER TRUE

## 2024-08-15 SDOH — ECONOMIC STABILITY: INCOME INSECURITY: HOW HARD IS IT FOR YOU TO PAY FOR THE VERY BASICS LIKE FOOD, HOUSING, MEDICAL CARE, AND HEATING?: NOT HARD AT ALL

## 2024-08-15 ASSESSMENT — ENCOUNTER SYMPTOMS
SORE THROAT: 0
RHINORRHEA: 1
CHEST TIGHTNESS: 0
WHEEZING: 1
CONSTIPATION: 0
ABDOMINAL PAIN: 0
SINUS PAIN: 1
COUGH: 0

## 2024-08-15 NOTE — PROGRESS NOTES
Pt received an injection of Depo Medrol 80 in the right Ventrogluteal in the office today.  Pt has signed an Injection consent form for the injection. Pt does not show any signs of reaction to the injection. Pt tolerated the injection well, and is advised to call the office if he/she notices any kind of reaction to happen once the Pt leaves the office.     
  Eyes:      Conjunctiva/sclera: Conjunctivae normal.      Pupils: Pupils are equal, round, and reactive to light.   Neck:      Thyroid: No thyromegaly.      Vascular: No JVD.   Cardiovascular:      Rate and Rhythm: Normal rate.      Heart sounds: Normal heart sounds. No murmur heard.  Pulmonary:      Effort: No respiratory distress.      Breath sounds: Wheezing, rhonchi and rales present.   Chest:      Chest wall: No tenderness.   Abdominal:      General: Bowel sounds are normal.      Palpations: Abdomen is soft.   Musculoskeletal:      Cervical back: Neck supple.   Lymphadenopathy:      Cervical: No cervical adenopathy.   Skin:     Findings: No rash.   Neurological:      Mental Status: She is oriented to person, place, and time.         Lab Review   No visits with results within 2 Month(s) from this visit.   Latest known visit with results is:   Orders Only on 03/04/2024   Component Date Value    Vit D, 25-Hydroxy 03/04/2024 41.2     TSH 03/04/2024 4.600 (H)     Sodium 03/04/2024 139     Potassium 03/04/2024 5.2 (H)     Chloride 03/04/2024 104     CO2 03/04/2024 26     Anion Gap 03/04/2024 9     Glucose 03/04/2024 107     BUN 03/04/2024 19     Creatinine 03/04/2024 0.6     Est, Glom Filt Rate 03/04/2024 >60     Calcium 03/04/2024 8.9     Total Protein 03/04/2024 6.1 (L)     Albumin 03/04/2024 4.3     Total Bilirubin 03/04/2024 <0.2     Alkaline Phosphatase 03/04/2024 74     ALT 03/04/2024 54 (H)     AST 03/04/2024 42 (H)     Hemoglobin A1C 03/04/2024 5.5     Cholesterol, Total 03/04/2024 165     Triglycerides 03/04/2024 69     HDL 03/04/2024 77     LDL Calculated 03/04/2024 74            ASSESSMENT/PLAN:    COPD with acute exacerbation (HCC)    Chest congestion  -     POCT COVID-19 & Influenza A/B- neg  Sinus congestion  Acute bronchitis and bronchiolitis    RX   -     methylPREDNISolone acetate (DEPO-MEDROL) injection 80 mg      -     fluticasone (FLOVENT HFA) 110 MCG/ACT inhaler; Inhale 2 puffs into the lungs 2

## 2024-08-28 RX ORDER — VENLAFAXINE HYDROCHLORIDE 150 MG/1
150 CAPSULE, EXTENDED RELEASE ORAL DAILY
Qty: 30 CAPSULE | Refills: 0 | Status: SHIPPED | OUTPATIENT
Start: 2024-08-28

## 2024-08-28 RX ORDER — AMLODIPINE BESYLATE 2.5 MG/1
2.5 TABLET ORAL DAILY
Qty: 30 TABLET | Refills: 0 | Status: SHIPPED | OUTPATIENT
Start: 2024-08-28

## 2024-08-28 NOTE — TELEPHONE ENCOUNTER
Michelle Long called to request a refill on her medication.      Last office visit : 8/15/2024   Next office visit : Visit date not found     Requested Prescriptions     Pending Prescriptions Disp Refills    venlafaxine (EFFEXOR XR) 150 MG extended release capsule [Pharmacy Med Name: VENLAFAXINE HCL  MG C 150 Capsule] 90 capsule 1     Sig: TAKE ONE CAPSULE BY MOUTH EVERY DAY    amLODIPine (NORVASC) 2.5 MG tablet [Pharmacy Med Name: AMLODIPINE 2.5 MG 2.5 Tablet] 90 tablet 1     Sig: TAKE 1 TABLET BY MOUTH EVERY DAY            Alexa Watts MA

## 2024-09-05 DIAGNOSIS — E55.9 VITAMIN D DEFICIENCY: ICD-10-CM

## 2024-09-05 DIAGNOSIS — I10 PRIMARY HYPERTENSION: ICD-10-CM

## 2024-09-05 DIAGNOSIS — I10 ESSENTIAL HYPERTENSION: ICD-10-CM

## 2024-09-05 DIAGNOSIS — R73.01 IFG (IMPAIRED FASTING GLUCOSE): ICD-10-CM

## 2024-09-05 DIAGNOSIS — E78.00 PURE HYPERCHOLESTEROLEMIA: ICD-10-CM

## 2024-09-05 DIAGNOSIS — Z23 NEED FOR VACCINATION: ICD-10-CM

## 2024-09-05 DIAGNOSIS — I25.10 MILD CORONARY ARTERY DISEASE: ICD-10-CM

## 2024-09-05 DIAGNOSIS — R74.01 ELEVATED TRANSAMINASE LEVEL: ICD-10-CM

## 2024-09-05 LAB
25(OH)D3 SERPL-MCNC: 56.2 NG/ML
ALBUMIN SERPL-MCNC: 4.1 G/DL (ref 3.5–5.2)
ALP SERPL-CCNC: 100 U/L (ref 35–104)
ALT SERPL-CCNC: 37 U/L (ref 5–33)
ANION GAP SERPL CALCULATED.3IONS-SCNC: 10 MMOL/L (ref 7–19)
AST SERPL-CCNC: 32 U/L (ref 5–32)
BILIRUB SERPL-MCNC: 0.3 MG/DL (ref 0.2–1.2)
BUN SERPL-MCNC: 19 MG/DL (ref 8–23)
CALCIUM SERPL-MCNC: 9 MG/DL (ref 8.8–10.2)
CHLORIDE SERPL-SCNC: 104 MMOL/L (ref 98–111)
CHOLEST SERPL-MCNC: 148 MG/DL (ref 0–199)
CO2 SERPL-SCNC: 25 MMOL/L (ref 22–29)
CREAT SERPL-MCNC: 0.5 MG/DL (ref 0.5–0.9)
GLUCOSE SERPL-MCNC: 92 MG/DL (ref 70–99)
HBA1C MFR BLD: 5.8 % (ref 4–5.6)
HDLC SERPL-MCNC: 71 MG/DL (ref 40–60)
LDLC SERPL CALC-MCNC: 59 MG/DL
POTASSIUM SERPL-SCNC: 4.6 MMOL/L (ref 3.5–5)
PROT SERPL-MCNC: 6.4 G/DL (ref 6.4–8.3)
SODIUM SERPL-SCNC: 139 MMOL/L (ref 136–145)
T4 FREE SERPL-MCNC: 1.26 NG/DL (ref 0.93–1.7)
TRIGL SERPL-MCNC: 91 MG/DL (ref 0–149)
TSH SERPL DL<=0.005 MIU/L-ACNC: 4.4 UIU/ML (ref 0.27–4.2)

## 2024-09-11 ENCOUNTER — OFFICE VISIT (OUTPATIENT)
Dept: INTERNAL MEDICINE | Age: 64
End: 2024-09-11

## 2024-09-11 VITALS
HEART RATE: 70 BPM | BODY MASS INDEX: 26.26 KG/M2 | SYSTOLIC BLOOD PRESSURE: 120 MMHG | WEIGHT: 148.2 LBS | DIASTOLIC BLOOD PRESSURE: 78 MMHG | OXYGEN SATURATION: 97 % | HEIGHT: 63 IN

## 2024-09-11 DIAGNOSIS — I25.10 MILD CORONARY ARTERY DISEASE: ICD-10-CM

## 2024-09-11 DIAGNOSIS — E03.8 SUBCLINICAL HYPOTHYROIDISM: ICD-10-CM

## 2024-09-11 DIAGNOSIS — I10 PRIMARY HYPERTENSION: ICD-10-CM

## 2024-09-11 DIAGNOSIS — R74.01 ELEVATED TRANSAMINASE LEVEL: ICD-10-CM

## 2024-09-11 DIAGNOSIS — R73.01 IFG (IMPAIRED FASTING GLUCOSE): ICD-10-CM

## 2024-09-11 DIAGNOSIS — E55.9 VITAMIN D DEFICIENCY: ICD-10-CM

## 2024-09-11 DIAGNOSIS — Z23 NEED FOR VACCINATION: Primary | ICD-10-CM

## 2024-09-11 DIAGNOSIS — E78.00 PURE HYPERCHOLESTEROLEMIA: ICD-10-CM

## 2024-09-11 DIAGNOSIS — I10 ESSENTIAL HYPERTENSION: ICD-10-CM

## 2024-09-11 RX ORDER — ZOSTER VACCINE RECOMBINANT, ADJUVANTED 50 MCG/0.5
0.5 KIT INTRAMUSCULAR SEE ADMIN INSTRUCTIONS
Qty: 0.5 ML | Refills: 0 | Status: SHIPPED | OUTPATIENT
Start: 2024-09-11 | End: 2025-03-10

## 2024-09-11 RX ORDER — IBANDRONATE SODIUM 150 MG/1
150 TABLET, FILM COATED ORAL
Qty: 3 TABLET | Refills: 1 | Status: SHIPPED | OUTPATIENT
Start: 2024-09-11

## 2024-09-11 ASSESSMENT — ENCOUNTER SYMPTOMS
WHEEZING: 0
SORE THROAT: 0
ABDOMINAL PAIN: 0
CONSTIPATION: 0
CHEST TIGHTNESS: 0
COUGH: 0

## 2024-09-16 RX ORDER — VENLAFAXINE HYDROCHLORIDE 150 MG/1
150 CAPSULE, EXTENDED RELEASE ORAL DAILY
Qty: 30 CAPSULE | Refills: 0 | Status: SHIPPED | OUTPATIENT
Start: 2024-09-16

## 2024-09-23 RX ORDER — AMLODIPINE BESYLATE 2.5 MG/1
2.5 TABLET ORAL DAILY
Qty: 30 TABLET | Refills: 5 | Status: SHIPPED | OUTPATIENT
Start: 2024-09-23

## 2024-10-22 ENCOUNTER — TELEPHONE (OUTPATIENT)
Dept: INTERNAL MEDICINE | Age: 64
End: 2024-10-22

## 2024-10-22 NOTE — TELEPHONE ENCOUNTER
Pt called in stating that she is having another bought of Bronchitis like she did back in August. Wants to know if Dr. Gonzalez will send in the same medication that she sent in the last time.     08/15/2024  COPD with acute exacerbation (HCC)     Chest congestion  -     POCT COVID-19 & Influenza A/B- neg  Sinus congestion  Acute bronchitis and bronchiolitis     RX   -     methylPREDNISolone acetate (DEPO-MEDROL) injection 80 mg        -     fluticasone (FLOVENT HFA) 110 MCG/ACT inhaler; Inhale 2 puffs into the lungs 2 times daily  -     predniSONE (DELTASONE) 10 MG tablet; Take 1 tablet by mouth 2 times daily for 4 days- start on 8/18 am  -     cefdinir (OMNICEF) 300 MG capsule; Take 1 capsule by mouth 2 times daily for 7 days  Take claritin daily  Use albuterol 3-4 x daily     If sx not improving and resolving , if sx continue or re-occur pt has been instructed to call us and / or return here for follow- up evaluation

## 2024-10-23 ENCOUNTER — OFFICE VISIT (OUTPATIENT)
Dept: INTERNAL MEDICINE | Age: 64
End: 2024-10-23

## 2024-10-23 VITALS
TEMPERATURE: 98.5 F | SYSTOLIC BLOOD PRESSURE: 120 MMHG | WEIGHT: 147 LBS | HEIGHT: 63 IN | BODY MASS INDEX: 26.05 KG/M2 | OXYGEN SATURATION: 91 % | HEART RATE: 64 BPM | DIASTOLIC BLOOD PRESSURE: 76 MMHG

## 2024-10-23 DIAGNOSIS — R50.9 FEVER AND CHILLS: ICD-10-CM

## 2024-10-23 DIAGNOSIS — R09.89 CHEST CONGESTION: ICD-10-CM

## 2024-10-23 DIAGNOSIS — R09.81 SINUS CONGESTION: ICD-10-CM

## 2024-10-23 DIAGNOSIS — J44.1 COPD WITH ACUTE EXACERBATION (HCC): Primary | ICD-10-CM

## 2024-10-23 DIAGNOSIS — R11.0 NAUSEA: ICD-10-CM

## 2024-10-23 RX ORDER — PREDNISONE 10 MG/1
10 TABLET ORAL 2 TIMES DAILY
Qty: 10 TABLET | Refills: 0 | Status: SHIPPED | OUTPATIENT
Start: 2024-10-26 | End: 2024-10-31

## 2024-10-23 RX ORDER — CEFUROXIME AXETIL 250 MG/1
250 TABLET ORAL 2 TIMES DAILY
Qty: 14 TABLET | Refills: 0 | Status: SHIPPED | OUTPATIENT
Start: 2024-10-23 | End: 2024-10-30

## 2024-10-23 RX ORDER — METHYLPREDNISOLONE ACETATE 80 MG/ML
80 INJECTION, SUSPENSION INTRA-ARTICULAR; INTRALESIONAL; INTRAMUSCULAR; SOFT TISSUE ONCE
Status: COMPLETED | OUTPATIENT
Start: 2024-10-23 | End: 2024-10-23

## 2024-10-23 RX ORDER — PROMETHAZINE HYDROCHLORIDE 12.5 MG/1
12.5 TABLET ORAL 3 TIMES DAILY PRN
Qty: 12 TABLET | Refills: 0 | Status: SHIPPED | OUTPATIENT
Start: 2024-10-23 | End: 2024-10-30

## 2024-10-23 RX ADMIN — METHYLPREDNISOLONE ACETATE 80 MG: 80 INJECTION, SUSPENSION INTRA-ARTICULAR; INTRALESIONAL; INTRAMUSCULAR; SOFT TISSUE at 12:49

## 2024-10-23 ASSESSMENT — ENCOUNTER SYMPTOMS
COUGH: 1
CONSTIPATION: 0
ABDOMINAL PAIN: 0
WHEEZING: 1
SORE THROAT: 1
NAUSEA: 1
CHEST TIGHTNESS: 0
SINUS PRESSURE: 1

## 2024-10-23 NOTE — PROGRESS NOTES
Pt received an injection of Depo-Medrol 80mg in the left Ventrogluteal in the office today.  Pt has signed an Injection consent form for the injection. Pt does not show any signs of reaction to the injection. Pt tolerated the injection well, and is advised to call the office if he/she notices any kind of reaction to happen once the Pt leaves the office.    
        Allergies   Allergen Reactions    Adhesive Tape Swelling    Sulfa Antibiotics Swelling and Rash     Other reaction(s): Unknown     Social History     Tobacco Use    Smoking status: Former     Current packs/day: 0.00     Average packs/day: 1 pack/day for 30.0 years (30.0 ttl pk-yrs)     Types: Cigarettes     Start date:      Quit date:      Years since quittin.8    Smokeless tobacco: Never   Substance Use Topics    Alcohol use: Yes     Alcohol/week: 2.0 standard drinks of alcohol     Types: 2 Glasses of wine per week     Comment: glass of wine each night      Family History   Problem Relation Age of Onset    Alzheimer's Disease Mother     Breast Cancer Mother 50    Heart Disease Father     Diabetes Father     Cancer Brother     Breast Cancer Paternal Grandmother 80    Colon Cancer Neg Hx     Colon Polyps Neg Hx     Liver Cancer Neg Hx     Liver Disease Neg Hx     Esophageal Cancer Neg Hx     Rectal Cancer Neg Hx     Stomach Cancer Neg Hx        Review of Systems   Constitutional:  Positive for chills, fatigue and fever.   HENT:  Positive for congestion, sinus pressure and sore throat. Negative for ear pain, nosebleeds and postnasal drip.    Respiratory:  Positive for cough and wheezing. Negative for chest tightness.    Cardiovascular:  Negative for chest pain, palpitations and leg swelling.   Gastrointestinal:  Positive for nausea. Negative for abdominal pain and constipation.   Genitourinary:  Negative for dysuria and urgency.   Musculoskeletal: Negative.  Negative for arthralgias.   Skin:  Negative for rash.   Neurological:  Negative for dizziness and headaches.   Psychiatric/Behavioral: Negative.       Vitals:    10/23/24 1223   BP: 120/76   Pulse: 64   Temp: 98.5 °F (36.9 °C)   SpO2: 91%   Weight: 66.7 kg (147 lb)   Height: 1.6 m (5' 3\")     Body mass index is 26.04 kg/m².    Physical Exam  Constitutional:       Appearance: She is well-developed.   HENT:      Mouth/Throat:      Pharynx: No

## 2024-11-08 RX ORDER — VENLAFAXINE HYDROCHLORIDE 150 MG/1
150 CAPSULE, EXTENDED RELEASE ORAL DAILY
Qty: 90 CAPSULE | Refills: 1 | Status: SHIPPED | OUTPATIENT
Start: 2024-11-08

## 2024-11-08 RX ORDER — BUSPIRONE HYDROCHLORIDE 10 MG/1
10 TABLET ORAL 2 TIMES DAILY PRN
Qty: 30 TABLET | Refills: 1 | Status: SHIPPED | OUTPATIENT
Start: 2024-11-08

## 2024-11-16 ENCOUNTER — APPOINTMENT (OUTPATIENT)
Dept: GENERAL RADIOLOGY | Facility: HOSPITAL | Age: 64
End: 2024-11-16
Payer: COMMERCIAL

## 2024-11-16 PROCEDURE — 87086 URINE CULTURE/COLONY COUNT: CPT | Performed by: NURSE PRACTITIONER

## 2024-11-16 PROCEDURE — 87186 SC STD MICRODIL/AGAR DIL: CPT | Performed by: NURSE PRACTITIONER

## 2024-11-16 PROCEDURE — 87088 URINE BACTERIA CULTURE: CPT | Performed by: NURSE PRACTITIONER

## 2024-11-16 PROCEDURE — 74018 RADEX ABDOMEN 1 VIEW: CPT

## 2024-11-30 RX ORDER — METOPROLOL TARTRATE 50 MG
50 TABLET ORAL 2 TIMES DAILY
Qty: 180 TABLET | Refills: 3 | OUTPATIENT
Start: 2024-11-30

## 2024-12-02 RX ORDER — IRBESARTAN 300 MG/1
300 TABLET ORAL DAILY
Qty: 90 TABLET | Refills: 1 | Status: SHIPPED | OUTPATIENT
Start: 2024-12-02

## 2024-12-02 RX ORDER — ERGOCALCIFEROL 1.25 MG/1
50000 CAPSULE, LIQUID FILLED ORAL WEEKLY
Qty: 12 CAPSULE | Refills: 1 | Status: SHIPPED | OUTPATIENT
Start: 2024-12-02

## 2024-12-02 RX ORDER — ROSUVASTATIN CALCIUM 40 MG/1
40 TABLET, COATED ORAL DAILY
Qty: 90 TABLET | Refills: 1 | Status: SHIPPED | OUTPATIENT
Start: 2024-12-02

## 2024-12-02 NOTE — TELEPHONE ENCOUNTER
Michelle Long called to request a refill on her medication.      Last office visit : 10/23/2024   Next office visit : 12/24/2024     Requested Prescriptions     Pending Prescriptions Disp Refills    irbesartan (AVAPRO) 300 MG tablet [Pharmacy Med Name: IRBESARTAN 300 MG TABS 300 Tablet] 90 tablet 1     Sig: TAKE 1 TABLET BY MOUTH EVERY DAY    rosuvastatin (CRESTOR) 40 MG tablet [Pharmacy Med Name: ROSUVASTATIN 40 MG 40 Tablet] 90 tablet 1     Sig: TAKE 1 TABLET BY MOUTH EVERY DAY    vitamin D (ERGOCALCIFEROL) 1.25 MG (67334 UT) CAPS capsule [Pharmacy Med Name: VIT D2 1.25 MG (50,000 UNIT 1.25 MG Capsule] 12 capsule 1     Sig: TAKE 1 CAPSULE BY MOUTH ONCE A WEEK            Alexa Watts MA

## 2024-12-10 DIAGNOSIS — I25.10 MILD CORONARY ARTERY DISEASE: ICD-10-CM

## 2024-12-10 DIAGNOSIS — R74.01 ELEVATED TRANSAMINASE LEVEL: ICD-10-CM

## 2024-12-10 DIAGNOSIS — E03.8 SUBCLINICAL HYPOTHYROIDISM: ICD-10-CM

## 2024-12-10 DIAGNOSIS — I10 PRIMARY HYPERTENSION: ICD-10-CM

## 2024-12-10 DIAGNOSIS — E55.9 VITAMIN D DEFICIENCY: ICD-10-CM

## 2024-12-10 DIAGNOSIS — I10 ESSENTIAL HYPERTENSION: ICD-10-CM

## 2024-12-10 DIAGNOSIS — Z23 NEED FOR VACCINATION: ICD-10-CM

## 2024-12-10 DIAGNOSIS — R73.01 IFG (IMPAIRED FASTING GLUCOSE): ICD-10-CM

## 2024-12-10 DIAGNOSIS — E78.00 PURE HYPERCHOLESTEROLEMIA: ICD-10-CM

## 2024-12-10 LAB
25(OH)D3 SERPL-MCNC: 53.6 NG/ML
ALBUMIN SERPL-MCNC: 4.2 G/DL (ref 3.5–5.2)
ALP SERPL-CCNC: 78 U/L (ref 35–104)
ALT SERPL-CCNC: 52 U/L (ref 5–33)
ANION GAP SERPL CALCULATED.3IONS-SCNC: 13 MMOL/L (ref 7–19)
AST SERPL-CCNC: 47 U/L (ref 5–32)
BACTERIA URNS QL MICRO: NEGATIVE /HPF
BASOPHILS # BLD: 0 K/UL (ref 0–0.2)
BASOPHILS NFR BLD: 0.7 % (ref 0–1)
BILIRUB SERPL-MCNC: 0.4 MG/DL (ref 0.2–1.2)
BILIRUB UR QL STRIP: NEGATIVE
BUN SERPL-MCNC: 13 MG/DL (ref 8–23)
CALCIUM SERPL-MCNC: 8.7 MG/DL (ref 8.8–10.2)
CHLORIDE SERPL-SCNC: 101 MMOL/L (ref 98–111)
CHOLEST SERPL-MCNC: 143 MG/DL (ref 0–199)
CLARITY UR: CLEAR
CO2 SERPL-SCNC: 27 MMOL/L (ref 22–29)
COLOR UR: YELLOW
CREAT SERPL-MCNC: 0.5 MG/DL (ref 0.5–0.9)
CRYSTALS URNS MICRO: NORMAL /HPF
EOSINOPHIL # BLD: 0.4 K/UL (ref 0–0.6)
EOSINOPHIL NFR BLD: 6 % (ref 0–5)
EPI CELLS #/AREA URNS AUTO: 0 /HPF (ref 0–5)
ERYTHROCYTE [DISTWIDTH] IN BLOOD BY AUTOMATED COUNT: 13.7 % (ref 11.5–14.5)
GLUCOSE SERPL-MCNC: 98 MG/DL (ref 70–99)
GLUCOSE UR STRIP.AUTO-MCNC: NEGATIVE MG/DL
HBA1C MFR BLD: 5.8 % (ref 4–5.6)
HCT VFR BLD AUTO: 42.6 % (ref 37–47)
HDLC SERPL-MCNC: 78 MG/DL (ref 40–60)
HGB BLD-MCNC: 13 G/DL (ref 12–16)
HGB UR STRIP.AUTO-MCNC: NEGATIVE MG/L
HYALINE CASTS #/AREA URNS AUTO: 0 /HPF (ref 0–8)
IMM GRANULOCYTES # BLD: 0 K/UL
KETONES UR STRIP.AUTO-MCNC: NEGATIVE MG/DL
LDLC SERPL CALC-MCNC: 50 MG/DL
LEUKOCYTE ESTERASE UR QL STRIP.AUTO: ABNORMAL
LYMPHOCYTES # BLD: 1 K/UL (ref 1.1–4.5)
LYMPHOCYTES NFR BLD: 17.8 % (ref 20–40)
MCH RBC QN AUTO: 29.3 PG (ref 27–31)
MCHC RBC AUTO-ENTMCNC: 30.5 G/DL (ref 33–37)
MCV RBC AUTO: 96.2 FL (ref 81–99)
MONOCYTES # BLD: 0.6 K/UL (ref 0–0.9)
MONOCYTES NFR BLD: 10.3 % (ref 0–10)
NEUTROPHILS # BLD: 3.8 K/UL (ref 1.5–7.5)
NEUTS SEG NFR BLD: 64.9 % (ref 50–65)
NITRITE UR QL STRIP.AUTO: NEGATIVE
PH UR STRIP.AUTO: 6.5 [PH] (ref 5–8)
PLATELET # BLD AUTO: 293 K/UL (ref 130–400)
PMV BLD AUTO: 9.3 FL (ref 9.4–12.3)
POTASSIUM SERPL-SCNC: 4.6 MMOL/L (ref 3.5–5)
PROT SERPL-MCNC: 6.5 G/DL (ref 6.4–8.3)
PROT UR STRIP.AUTO-MCNC: NEGATIVE MG/DL
RBC # BLD AUTO: 4.43 M/UL (ref 4.2–5.4)
RBC #/AREA URNS AUTO: 0 /HPF (ref 0–4)
SODIUM SERPL-SCNC: 141 MMOL/L (ref 136–145)
SP GR UR STRIP.AUTO: 1.01 (ref 1–1.03)
T4 FREE SERPL-MCNC: 1.18 NG/DL (ref 0.93–1.7)
TRIGL SERPL-MCNC: 77 MG/DL (ref 0–149)
TSH SERPL DL<=0.005 MIU/L-ACNC: 4.51 UIU/ML (ref 0.27–4.2)
UROBILINOGEN UR STRIP.AUTO-MCNC: 0.2 E.U./DL
WBC # BLD AUTO: 5.9 K/UL (ref 4.8–10.8)
WBC #/AREA URNS AUTO: 1 /HPF (ref 0–5)

## 2024-12-30 RX ORDER — FLUTICASONE PROPIONATE 110 UG/1
2 AEROSOL, METERED RESPIRATORY (INHALATION) 2 TIMES DAILY
Qty: 3 EACH | Refills: 2 | Status: SHIPPED | OUTPATIENT
Start: 2024-12-30 | End: 2025-01-29

## 2024-12-30 RX ORDER — IBANDRONATE SODIUM 150 MG/1
150 TABLET, FILM COATED ORAL
Qty: 3 TABLET | Refills: 1 | Status: SHIPPED | OUTPATIENT
Start: 2024-12-30

## 2024-12-30 NOTE — TELEPHONE ENCOUNTER
Michelle Long called to request a refill on her medication.      Last office visit : 10/23/2024   Next office visit : 1/21/2025     Requested Prescriptions     Pending Prescriptions Disp Refills    fluticasone (FLOVENT HFA) 110 MCG/ACT inhaler [Pharmacy Med Name: FLUTICASONE HFA 110mcg 110 Aerosol] 12 g 2     Sig: INHALE 2 PUFFS INTO THE LUNGS 2 TIMES DAILY            April Radha Whitfield MA

## 2025-01-01 PROCEDURE — 87086 URINE CULTURE/COLONY COUNT: CPT | Performed by: FAMILY MEDICINE

## 2025-01-01 RX ORDER — FLUTICASONE PROPIONATE 110 UG/1
2 AEROSOL, METERED RESPIRATORY (INHALATION) 2 TIMES DAILY
Qty: 12 G | Refills: 2 | Status: SHIPPED | OUTPATIENT
Start: 2025-01-01 | End: 2025-01-31

## 2025-01-19 ASSESSMENT — PATIENT HEALTH QUESTIONNAIRE - PHQ9
9. THOUGHTS THAT YOU WOULD BE BETTER OFF DEAD, OR OF HURTING YOURSELF: NOT AT ALL
SUM OF ALL RESPONSES TO PHQ QUESTIONS 1-9: 0
SUM OF ALL RESPONSES TO PHQ QUESTIONS 1-9: 0
4. FEELING TIRED OR HAVING LITTLE ENERGY: NOT AT ALL
2. FEELING DOWN, DEPRESSED OR HOPELESS: NOT AT ALL
2. FEELING DOWN, DEPRESSED OR HOPELESS: NOT AT ALL
SUM OF ALL RESPONSES TO PHQ QUESTIONS 1-9: 0
3. TROUBLE FALLING OR STAYING ASLEEP: NOT AT ALL
SUM OF ALL RESPONSES TO PHQ QUESTIONS 1-9: 0
7. TROUBLE CONCENTRATING ON THINGS, SUCH AS READING THE NEWSPAPER OR WATCHING TELEVISION: NOT AT ALL
7. TROUBLE CONCENTRATING ON THINGS, SUCH AS READING THE NEWSPAPER OR WATCHING TELEVISION: NOT AT ALL
5. POOR APPETITE OR OVEREATING: NOT AT ALL
9. THOUGHTS THAT YOU WOULD BE BETTER OFF DEAD, OR OF HURTING YOURSELF: NOT AT ALL
4. FEELING TIRED OR HAVING LITTLE ENERGY: NOT AT ALL
8. MOVING OR SPEAKING SO SLOWLY THAT OTHER PEOPLE COULD HAVE NOTICED. OR THE OPPOSITE - BEING SO FIDGETY OR RESTLESS THAT YOU HAVE BEEN MOVING AROUND A LOT MORE THAN USUAL: NOT AT ALL
3. TROUBLE FALLING OR STAYING ASLEEP: NOT AT ALL
10. IF YOU CHECKED OFF ANY PROBLEMS, HOW DIFFICULT HAVE THESE PROBLEMS MADE IT FOR YOU TO DO YOUR WORK, TAKE CARE OF THINGS AT HOME, OR GET ALONG WITH OTHER PEOPLE: NOT DIFFICULT AT ALL
6. FEELING BAD ABOUT YOURSELF - OR THAT YOU ARE A FAILURE OR HAVE LET YOURSELF OR YOUR FAMILY DOWN: NOT AT ALL
8. MOVING OR SPEAKING SO SLOWLY THAT OTHER PEOPLE COULD HAVE NOTICED. OR THE OPPOSITE, BEING SO FIGETY OR RESTLESS THAT YOU HAVE BEEN MOVING AROUND A LOT MORE THAN USUAL: NOT AT ALL
10. IF YOU CHECKED OFF ANY PROBLEMS, HOW DIFFICULT HAVE THESE PROBLEMS MADE IT FOR YOU TO DO YOUR WORK, TAKE CARE OF THINGS AT HOME, OR GET ALONG WITH OTHER PEOPLE: NOT DIFFICULT AT ALL
6. FEELING BAD ABOUT YOURSELF - OR THAT YOU ARE A FAILURE OR HAVE LET YOURSELF OR YOUR FAMILY DOWN: NOT AT ALL
SUM OF ALL RESPONSES TO PHQ9 QUESTIONS 1 & 2: 0
1. LITTLE INTEREST OR PLEASURE IN DOING THINGS: NOT AT ALL
SUM OF ALL RESPONSES TO PHQ QUESTIONS 1-9: 0
5. POOR APPETITE OR OVEREATING: NOT AT ALL
1. LITTLE INTEREST OR PLEASURE IN DOING THINGS: NOT AT ALL

## 2025-01-21 ENCOUNTER — OFFICE VISIT (OUTPATIENT)
Dept: INTERNAL MEDICINE | Age: 65
End: 2025-01-21
Payer: COMMERCIAL

## 2025-01-21 VITALS
SYSTOLIC BLOOD PRESSURE: 122 MMHG | HEART RATE: 76 BPM | BODY MASS INDEX: 26.22 KG/M2 | HEIGHT: 63 IN | DIASTOLIC BLOOD PRESSURE: 80 MMHG | OXYGEN SATURATION: 98 % | WEIGHT: 148 LBS

## 2025-01-21 DIAGNOSIS — E78.00 PURE HYPERCHOLESTEROLEMIA: ICD-10-CM

## 2025-01-21 DIAGNOSIS — R31.29 MICROHEMATURIA: ICD-10-CM

## 2025-01-21 DIAGNOSIS — E55.9 VITAMIN D DEFICIENCY: ICD-10-CM

## 2025-01-21 DIAGNOSIS — I10 PRIMARY HYPERTENSION: ICD-10-CM

## 2025-01-21 DIAGNOSIS — Z00.00 ANNUAL PHYSICAL EXAM: Primary | ICD-10-CM

## 2025-01-21 DIAGNOSIS — K76.0 FATTY LIVER DISEASE, NONALCOHOLIC: ICD-10-CM

## 2025-01-21 DIAGNOSIS — E03.8 SUBCLINICAL HYPOTHYROIDISM: ICD-10-CM

## 2025-01-21 DIAGNOSIS — Z87.891 PERSONAL HISTORY OF TOBACCO USE: ICD-10-CM

## 2025-01-21 DIAGNOSIS — I25.10 MILD CORONARY ARTERY DISEASE: ICD-10-CM

## 2025-01-21 DIAGNOSIS — J43.1 PANLOBULAR EMPHYSEMA (HCC): ICD-10-CM

## 2025-01-21 DIAGNOSIS — M85.89 OSTEOPENIA OF MULTIPLE SITES: ICD-10-CM

## 2025-01-21 DIAGNOSIS — Z23 NEED FOR VACCINATION: ICD-10-CM

## 2025-01-21 DIAGNOSIS — Z12.31 ENCOUNTER FOR SCREENING MAMMOGRAM FOR MALIGNANT NEOPLASM OF BREAST: ICD-10-CM

## 2025-01-21 DIAGNOSIS — R73.01 IFG (IMPAIRED FASTING GLUCOSE): ICD-10-CM

## 2025-01-21 DIAGNOSIS — R74.01 ELEVATED TRANSAMINASE LEVEL: ICD-10-CM

## 2025-01-21 PROCEDURE — 3074F SYST BP LT 130 MM HG: CPT | Performed by: INTERNAL MEDICINE

## 2025-01-21 PROCEDURE — 3079F DIAST BP 80-89 MM HG: CPT | Performed by: INTERNAL MEDICINE

## 2025-01-21 PROCEDURE — G0296 VISIT TO DETERM LDCT ELIG: HCPCS | Performed by: INTERNAL MEDICINE

## 2025-01-21 PROCEDURE — 99396 PREV VISIT EST AGE 40-64: CPT | Performed by: INTERNAL MEDICINE

## 2025-01-21 SDOH — ECONOMIC STABILITY: FOOD INSECURITY: WITHIN THE PAST 12 MONTHS, THE FOOD YOU BOUGHT JUST DIDN'T LAST AND YOU DIDN'T HAVE MONEY TO GET MORE.: NEVER TRUE

## 2025-01-21 SDOH — ECONOMIC STABILITY: FOOD INSECURITY: WITHIN THE PAST 12 MONTHS, YOU WORRIED THAT YOUR FOOD WOULD RUN OUT BEFORE YOU GOT MONEY TO BUY MORE.: NEVER TRUE

## 2025-01-21 ASSESSMENT — ENCOUNTER SYMPTOMS
SORE THROAT: 0
CONSTIPATION: 0
BACK PAIN: 1
WHEEZING: 0
COUGH: 0
ABDOMINAL PAIN: 0
CHEST TIGHTNESS: 0

## 2025-01-21 NOTE — PROGRESS NOTES
Screen (Initial/Annual/Baseline)    DEXA BONE DENSITY 2 SITES    Urinalysis with Microscopic    Lipid Panel    Hemoglobin A1C    Comprehensive Metabolic Panel    TSH    Vitamin D 25 Hydroxy    HI VISIT TO DISCUSS LUNG CA SCREEN W LDCT     New Prescriptions    RESPIRATORY SYNCYTIAL VACCINE, ADJUVANTED (AREXVY) 120 MCG/0.5ML INJECTION    Inject 0.5 mLs into the muscle once for 1 dose      Patient Instructions        Learning About Lung Cancer Screening  What is screening for lung cancer?     Lung cancer screening is a way to find some lung cancers early, before a person has any symptoms of the cancer.  Lung cancer screening may help those who have the highest risk for lung cancer--people age 50 and older who are or were heavy smokers. For most people, who aren't at increased risk, screening for lung cancer probably isn't helpful.  Screening won't prevent cancer. And it may not find all lung cancers. Lung cancer screening may lower the risk of dying from lung cancer in a small number of people.  How is it done?  Lung cancer screening is done with a low-dose CT (computed tomography) scan. A CT scan uses X-rays, or radiation, to make detailed pictures of your body. Experts recommend that screening be done in medical centers that focus on finding and treating lung cancer.  Who is screening recommended for?  Lung cancer screening is recommended for people age 50 and older who are or were heavy smokers. That means people with a smoking history of at least 20 pack years. A pack year is a way to measure how heavy a smoker you are or were.  To figure out your pack years, multiply how many packs a day on average (assuming 20 cigarettes per pack) you have smoked by how many years you have smoked. For example:  If you smoked 1 pack a day for 20 years, that's 1 times 20. So you have a smoking history of 20 pack years.  If you smoked 2 packs a day for 10 years, that's 2 times 10. So you have a smoking history of 20 pack

## 2025-01-27 RX ORDER — METOPROLOL TARTRATE 50 MG
50 TABLET ORAL 2 TIMES DAILY
Qty: 180 TABLET | Refills: 3 | OUTPATIENT
Start: 2025-01-27

## 2025-01-29 ENCOUNTER — TELEPHONE (OUTPATIENT)
Dept: INTERNAL MEDICINE | Age: 65
End: 2025-01-29

## 2025-01-29 RX ORDER — METOPROLOL TARTRATE 50 MG
50 TABLET ORAL 2 TIMES DAILY
Qty: 180 TABLET | Refills: 3 | Status: SHIPPED | OUTPATIENT
Start: 2025-01-29

## 2025-01-29 RX ORDER — METOPROLOL TARTRATE 50 MG
50 TABLET ORAL 2 TIMES DAILY
Qty: 180 TABLET | Refills: 3 | OUTPATIENT
Start: 2025-01-29

## 2025-01-29 NOTE — TELEPHONE ENCOUNTER
Patient calling 1/29/25 requesting a refill of their metoprolol tartrate (LOPRESSOR) 50 MG tablet  medication. Patient would like it sent to [facility] - [place], [place] - [address] -  [phone] - F [phone] .

## 2025-02-14 ENCOUNTER — HOSPITAL ENCOUNTER (OUTPATIENT)
Dept: ULTRASOUND IMAGING | Age: 65
Discharge: HOME | End: 2025-02-14
Payer: COMMERCIAL

## 2025-02-14 ENCOUNTER — HOSPITAL ENCOUNTER (OUTPATIENT)
Dept: CT IMAGING | Age: 65
Discharge: HOME | End: 2025-02-14
Payer: COMMERCIAL

## 2025-02-14 ENCOUNTER — HOSPITAL ENCOUNTER (OUTPATIENT)
Dept: WOMENS IMAGING | Age: 65
Discharge: HOME | End: 2025-02-14
Attending: INTERNAL MEDICINE
Payer: COMMERCIAL

## 2025-02-14 VITALS — BODY MASS INDEX: 25.69 KG/M2 | WEIGHT: 145 LBS

## 2025-02-14 DIAGNOSIS — Z87.891 PERSONAL HISTORY OF TOBACCO USE: ICD-10-CM

## 2025-02-14 DIAGNOSIS — Z12.31 ENCOUNTER FOR SCREENING MAMMOGRAM FOR MALIGNANT NEOPLASM OF BREAST: ICD-10-CM

## 2025-02-14 DIAGNOSIS — M85.89 OSTEOPENIA OF MULTIPLE SITES: ICD-10-CM

## 2025-02-14 PROCEDURE — 71271 CT THORAX LUNG CANCER SCR C-: CPT

## 2025-02-14 PROCEDURE — 77067 SCR MAMMO BI INCL CAD: CPT

## 2025-02-14 PROCEDURE — 77080 DXA BONE DENSITY AXIAL: CPT

## 2025-02-17 ENCOUNTER — OFFICE VISIT (OUTPATIENT)
Dept: INTERNAL MEDICINE | Age: 65
End: 2025-02-17
Payer: COMMERCIAL

## 2025-02-17 VITALS
BODY MASS INDEX: 26.58 KG/M2 | OXYGEN SATURATION: 95 % | DIASTOLIC BLOOD PRESSURE: 82 MMHG | WEIGHT: 150 LBS | HEART RATE: 81 BPM | HEIGHT: 63 IN | SYSTOLIC BLOOD PRESSURE: 124 MMHG

## 2025-02-17 DIAGNOSIS — J01.00 ACUTE NON-RECURRENT MAXILLARY SINUSITIS: ICD-10-CM

## 2025-02-17 DIAGNOSIS — R09.81 SINUS CONGESTION: ICD-10-CM

## 2025-02-17 DIAGNOSIS — H92.01 EARACHE ON RIGHT: Primary | ICD-10-CM

## 2025-02-17 PROCEDURE — 3074F SYST BP LT 130 MM HG: CPT | Performed by: INTERNAL MEDICINE

## 2025-02-17 PROCEDURE — 99213 OFFICE O/P EST LOW 20 MIN: CPT | Performed by: INTERNAL MEDICINE

## 2025-02-17 PROCEDURE — 3079F DIAST BP 80-89 MM HG: CPT | Performed by: INTERNAL MEDICINE

## 2025-02-17 RX ORDER — PREDNISONE 10 MG/1
10 TABLET ORAL 2 TIMES DAILY
Qty: 10 TABLET | Refills: 0 | Status: SHIPPED | OUTPATIENT
Start: 2025-02-17 | End: 2025-02-22

## 2025-02-17 RX ORDER — LEVOFLOXACIN 500 MG/1
500 TABLET, FILM COATED ORAL DAILY
Qty: 8 TABLET | Refills: 0 | Status: SHIPPED | OUTPATIENT
Start: 2025-02-17 | End: 2025-02-25

## 2025-02-17 ASSESSMENT — ENCOUNTER SYMPTOMS
ABDOMINAL PAIN: 0
CHEST TIGHTNESS: 0
CONSTIPATION: 0
COUGH: 0
SORE THROAT: 0
WHEEZING: 0

## 2025-02-17 NOTE — PROGRESS NOTES
Start date:      Quit date:      Years since quittin.1    Smokeless tobacco: Never   Substance Use Topics    Alcohol use: Yes     Alcohol/week: 2.0 standard drinks of alcohol     Types: 2 Glasses of wine per week     Comment: glass of wine each night      Family History   Problem Relation Age of Onset    Alzheimer's Disease Mother     Breast Cancer Mother 50    Heart Disease Father     Diabetes Father     Cancer Brother     Breast Cancer Paternal Grandmother 80    Colon Cancer Neg Hx     Colon Polyps Neg Hx     Liver Cancer Neg Hx     Liver Disease Neg Hx     Esophageal Cancer Neg Hx     Rectal Cancer Neg Hx     Stomach Cancer Neg Hx        Review of Systems   Constitutional:  Positive for fatigue. Negative for chills and fever.   HENT:  Positive for congestion and ear pain. Negative for nosebleeds, postnasal drip and sore throat.    Respiratory:  Negative for cough, chest tightness and wheezing.    Cardiovascular:  Negative for chest pain, palpitations and leg swelling.   Gastrointestinal:  Negative for abdominal pain and constipation.   Genitourinary:  Negative for dysuria and urgency.   Musculoskeletal: Negative.  Negative for arthralgias.   Skin:  Negative for rash.   Neurological:  Negative for dizziness and headaches.   Psychiatric/Behavioral: Negative.       Vitals:    25 1524   BP: 124/82   Pulse: 81   SpO2: 95%   Weight: 68 kg (150 lb)   Height: 1.6 m (5' 3\")     Body mass index is 26.57 kg/m².    Physical Exam  Constitutional:       Appearance: She is well-developed.   HENT:      Mouth/Throat:      Pharynx: No oropharyngeal exudate.   Neck:      Thyroid: No thyromegaly.      Vascular: No JVD.   Cardiovascular:      Rate and Rhythm: Normal rate.      Heart sounds: Normal heart sounds. No murmur heard.  Pulmonary:      Effort: No respiratory distress.      Breath sounds: Normal breath sounds. No wheezing or rales.   Chest:      Chest wall: No tenderness.   Abdominal:      General: Bowel

## 2025-02-26 ENCOUNTER — OFFICE VISIT (OUTPATIENT)
Dept: INTERNAL MEDICINE | Age: 65
End: 2025-02-26
Payer: COMMERCIAL

## 2025-02-26 VITALS
HEART RATE: 76 BPM | SYSTOLIC BLOOD PRESSURE: 118 MMHG | HEIGHT: 63 IN | DIASTOLIC BLOOD PRESSURE: 80 MMHG | WEIGHT: 148.4 LBS | BODY MASS INDEX: 26.29 KG/M2 | OXYGEN SATURATION: 96 %

## 2025-02-26 DIAGNOSIS — H92.01 EARACHE ON RIGHT: Primary | ICD-10-CM

## 2025-02-26 DIAGNOSIS — M54.6 ACUTE RIGHT-SIDED THORACIC BACK PAIN: ICD-10-CM

## 2025-02-26 PROCEDURE — 3074F SYST BP LT 130 MM HG: CPT | Performed by: INTERNAL MEDICINE

## 2025-02-26 PROCEDURE — 99213 OFFICE O/P EST LOW 20 MIN: CPT | Performed by: INTERNAL MEDICINE

## 2025-02-26 PROCEDURE — 3079F DIAST BP 80-89 MM HG: CPT | Performed by: INTERNAL MEDICINE

## 2025-02-26 RX ORDER — METHOCARBAMOL 500 MG/1
500 TABLET, FILM COATED ORAL NIGHTLY PRN
Qty: 10 TABLET | Refills: 0 | Status: SHIPPED | OUTPATIENT
Start: 2025-02-26 | End: 2025-03-08

## 2025-02-26 RX ORDER — MELOXICAM 15 MG/1
15 TABLET ORAL DAILY
Qty: 10 TABLET | Refills: 0 | Status: SHIPPED | OUTPATIENT
Start: 2025-02-26

## 2025-02-26 ASSESSMENT — ENCOUNTER SYMPTOMS
CHEST TIGHTNESS: 0
ABDOMINAL PAIN: 0
BACK PAIN: 1
SORE THROAT: 0
CONSTIPATION: 0
COUGH: 0
WHEEZING: 0

## 2025-02-26 NOTE — PROGRESS NOTES
Chief Complaint   Patient presents with    Shoulder Pain     Right shoulder pain -no ijury times one week     Ear Pain     Right ear pain ongoing   Dull pain constantly      History of presenting illness:  Michelle Long is a64 y.o. female who presents today for follow up on her chronic medical conditions as noted below.  Patient Active Problem List    Diagnosis Date Noted    ACS (acute coronary syndrome) ([facility]) 07/13/2019     Priority: High    Chest pressure 06/19/2018     Priority: High    STACY (middle ear effusion), left 06/02/2022     Priority: Medium    Eustachian tube dysfunction, left 06/02/2022     Priority: Medium    Panlobular emphysema ([facility]) 10/20/2021    History of smoking 10/20/2021    Gastroesophageal reflux disease without esophagitis 10/20/2021    Atypical chest pain 09/02/2021    COPD (chronic obstructive pulmonary disease) ([facility]) 09/02/2021    Enlarged lymph nodes 07/19/2021    Essential hypertension 07/19/2021    Transaminitis 11/18/2020    Abnormal CT of the abdomen 11/18/2020    Fatty liver 11/18/2020    Alcohol consumption of one to four drinks per week 11/18/2020    Mild coronary artery disease 07/18/2019    Other chest pain 06/18/2018     Overview Note:     12/30/2016  SE negative for myocardial ischemia  6/19/18  SE negative for myocardial ischemia  7/13/19 ACS FERNANDO RISK Score 2 (angina, + troponin ), AUC indication 3, AUC score 7   7/14/19  Cath  Mild CAD, normal LVFX        Vitamin D deficiency 11/04/2017    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

## 2025-02-28 DIAGNOSIS — R91.8 LUNG NODULES: Primary | ICD-10-CM

## 2025-03-03 ENCOUNTER — OFFICE VISIT (OUTPATIENT)
Dept: ENT CLINIC | Age: 65
End: 2025-03-03
Payer: COMMERCIAL

## 2025-03-03 VITALS
BODY MASS INDEX: 26.58 KG/M2 | DIASTOLIC BLOOD PRESSURE: 80 MMHG | WEIGHT: 150 LBS | SYSTOLIC BLOOD PRESSURE: 120 MMHG | HEIGHT: 63 IN

## 2025-03-03 DIAGNOSIS — J32.0 CHRONIC RIGHT MAXILLARY SINUSITIS: Primary | ICD-10-CM

## 2025-03-03 PROBLEM — H65.92 MEE (MIDDLE EAR EFFUSION), LEFT: Status: RESOLVED | Noted: 2022-06-02 | Resolved: 2025-03-03

## 2025-03-03 PROBLEM — H69.92 EUSTACHIAN TUBE DYSFUNCTION, LEFT: Status: RESOLVED | Noted: 2022-06-02 | Resolved: 2025-03-03

## 2025-03-03 PROCEDURE — 3074F SYST BP LT 130 MM HG: CPT | Performed by: PHYSICIAN ASSISTANT

## 2025-03-03 PROCEDURE — 99213 OFFICE O/P EST LOW 20 MIN: CPT | Performed by: PHYSICIAN ASSISTANT

## 2025-03-03 PROCEDURE — 3079F DIAST BP 80-89 MM HG: CPT | Performed by: PHYSICIAN ASSISTANT

## 2025-03-03 RX ORDER — PREDNISONE 20 MG/1
TABLET ORAL
Qty: 20 TABLET | Refills: 0 | Status: SHIPPED | OUTPATIENT
Start: 2025-03-03

## 2025-03-03 RX ORDER — CLINDAMYCIN HYDROCHLORIDE 300 MG/1
300 CAPSULE ORAL 3 TIMES DAILY
Qty: 42 CAPSULE | Refills: 0 | Status: SHIPPED | OUTPATIENT
Start: 2025-03-03 | End: 2025-03-17

## 2025-03-03 RX ORDER — ECHINACEA PURPUREA EXTRACT 125 MG
TABLET ORAL
Qty: 1 EACH | Refills: 3 | Status: SHIPPED | OUTPATIENT
Start: 2025-03-03

## 2025-03-03 ASSESSMENT — ENCOUNTER SYMPTOMS
EYE DISCHARGE: 0
SINUS PRESSURE: 1
EYE PAIN: 0
SINUS PAIN: 0
FACIAL SWELLING: 0
SORE THROAT: 0
VOICE CHANGE: 0
RHINORRHEA: 0
TROUBLE SWALLOWING: 0

## 2025-03-03 NOTE — ASSESSMENT & PLAN NOTE
Chronic right maxillary sinusitis with failed medical management  Plan: I recommended a CT of the sinuses for further evaluation due to the recurring history.  I will go ahead and start her on antibiotics, prednisone, and Ocean nasal spray.  I will call her the results with further recommendations to follow.

## 2025-03-04 ENCOUNTER — HOSPITAL ENCOUNTER (OUTPATIENT)
Dept: CT IMAGING | Age: 65
Discharge: HOME | End: 2025-03-04
Payer: COMMERCIAL

## 2025-03-04 DIAGNOSIS — J32.0 CHRONIC RIGHT MAXILLARY SINUSITIS: ICD-10-CM

## 2025-03-04 PROCEDURE — 70486 CT MAXILLOFACIAL W/O DYE: CPT

## 2025-03-07 RX ORDER — EZETIMIBE 10 MG/1
10 TABLET ORAL DAILY
Qty: 90 TABLET | Refills: 2 | Status: SHIPPED | OUTPATIENT
Start: 2025-03-07

## 2025-03-07 RX ORDER — BUSPIRONE HYDROCHLORIDE 10 MG/1
10 TABLET ORAL 2 TIMES DAILY PRN
Qty: 30 TABLET | Refills: 2 | Status: SHIPPED | OUTPATIENT
Start: 2025-03-07

## 2025-03-07 NOTE — TELEPHONE ENCOUNTER
Michelle Long called to request a refill on her medication.      Last office visit : 2/26/2025   Next office visit : 5/30/2025     Requested Prescriptions     Pending Prescriptions Disp Refills    ezetimibe (ZETIA) 10 MG tablet [Pharmacy Med Name: EZETIMIBE 10 MG TABS 10 Tablet] 90 tablet 2     Sig: TAKE 1 TABLET BY MOUTH DAILY    busPIRone (BUSPAR) 10 MG tablet [Pharmacy Med Name: BUSPIRONE HCL 10 MG TABS 10 Tablet] 30 tablet 2     Sig: TAKE 1 TABLET BY MOUTH 2 TIMES DAILY AS NEEDED (ANXIETY)            Alexa Watts MA

## 2025-03-10 ENCOUNTER — OFFICE VISIT (OUTPATIENT)
Dept: PULMONOLOGY | Age: 65
End: 2025-03-10
Payer: COMMERCIAL

## 2025-03-10 ENCOUNTER — PREP FOR PROCEDURE (OUTPATIENT)
Dept: PULMONOLOGY | Age: 65
End: 2025-03-10

## 2025-03-10 VITALS
HEART RATE: 98 BPM | BODY MASS INDEX: 26.31 KG/M2 | WEIGHT: 143 LBS | DIASTOLIC BLOOD PRESSURE: 82 MMHG | HEIGHT: 62 IN | RESPIRATION RATE: 20 BRPM | OXYGEN SATURATION: 94 % | SYSTOLIC BLOOD PRESSURE: 138 MMHG | TEMPERATURE: 96.9 F

## 2025-03-10 DIAGNOSIS — R59.0 MEDIASTINAL ADENOPATHY: ICD-10-CM

## 2025-03-10 DIAGNOSIS — R91.1 LUNG NODULE: Primary | ICD-10-CM

## 2025-03-10 DIAGNOSIS — C79.31 NON-SMALL CELL LUNG CANCER METASTATIC TO BRAIN (HCC): ICD-10-CM

## 2025-03-10 DIAGNOSIS — C34.90 NON-SMALL CELL LUNG CANCER METASTATIC TO BRAIN (HCC): ICD-10-CM

## 2025-03-10 DIAGNOSIS — Z87.891 HISTORY OF SMOKING: ICD-10-CM

## 2025-03-10 DIAGNOSIS — Z45.2 ENCOUNTER FOR VENOUS ACCESS DEVICE CARE: ICD-10-CM

## 2025-03-10 PROCEDURE — 3075F SYST BP GE 130 - 139MM HG: CPT | Performed by: INTERNAL MEDICINE

## 2025-03-10 PROCEDURE — 99204 OFFICE O/P NEW MOD 45 MIN: CPT | Performed by: INTERNAL MEDICINE

## 2025-03-10 PROCEDURE — 3079F DIAST BP 80-89 MM HG: CPT | Performed by: INTERNAL MEDICINE

## 2025-03-10 RX ORDER — ALBUTEROL SULFATE 90 UG/1
2 INHALANT RESPIRATORY (INHALATION) 4 TIMES DAILY PRN
Qty: 18 G | Refills: 3 | Status: SHIPPED | OUTPATIENT
Start: 2025-03-10

## 2025-03-10 ASSESSMENT — ENCOUNTER SYMPTOMS
BACK PAIN: 0
WHEEZING: 0
ABDOMINAL DISTENTION: 0
APNEA: 0
ABDOMINAL PAIN: 0
SHORTNESS OF BREATH: 1
COUGH: 0
RHINORRHEA: 0
CHEST TIGHTNESS: 0
ANAL BLEEDING: 0

## 2025-03-10 NOTE — PROGRESS NOTES
Pulmonary and Sleep Medicine    Michelle Long (:  1960) is a 64 y.o. female,New patient, here for evaluation of the following chief complaint(s):  New Patient (Lung nodule- right upper/soa)      Referring physician:  Brandy Gonzalez Md  [address] [place] [address] [place] [address]     ASSESSMENT/PLAN:  1. Lung nodule  -     Case Request  -     CT CHEST W CONTRAST; Future  2. Mediastinal adenopathy  3. History of smoking. Quit smoking in . Smoked a pack a day for 25 years.        Lung nodule seen on the CT is definitely suspicious for possible malignancy.  We will proceed with bronchoscopy and biopsy.  The risk benefit was explained to the patient.  She is willing to undergo the procedure.       Mel Allison MD, [facility], [facility]    No follow-ups on file.    SUBJECTIVE/OBJECTIVE:        Patient was evaluated today regarding abnormal CT of the chest.  She had a CT for lung cancer screening that showed right upper lobe nodule at 1 cm in size.  The nodule \"my interpretation has grown compared to prior exam from 2024.  The patient quit smoking in .  She smoked for about 20 years.  Denies any hemoptysis denies any weight loss.  Her CT also reported to show some mediastinal adenopathy.  The patient denies any anticoagulants.           Continue the following medications as reported by the patient:    Prior to Visit Medications    Medication Sig Taking? Authorizing Provider   ezetimibe (ZETIA) 10 MG tablet TAKE 1 TABLET BY MOUTH DAILY Yes Brandy Gonzalez MD   busPIRone (BUSPAR) 10 MG tablet TAKE 1 TABLET BY MOUTH 2 TIMES DAILY AS NEEDED (ANXIETY) Yes Brandy Gonzalez MD   predniSONE (DELTASONE) 20 MG tablet Take 2 tabs po q d x 6 days, decrease to 1 tab po q d x 4 days, then decrease to half tablet po q d x 2 days. Yes Charli Jaime PA-C   sodium chloride (OCEAN NASAL SPRAY) 0.65 % nasal spray 2 squirts to each nostril q AM and nightly Yes Charli Jaime PA-C   metoprolol

## 2025-03-10 NOTE — TELEPHONE ENCOUNTER
Michelle Long called to request a refill on her medication.      Last office visit : 2/26/2025   Next office visit : 5/30/2025     Requested Prescriptions     Pending Prescriptions Disp Refills    albuterol sulfate HFA (PROVENTIL;VENTOLIN;PROAIR) 108 (90 Base) MCG/ACT inhaler [Pharmacy Med Name: ALBUTEROL SULFATE  ( 108 (90 BAS Aerosol] 18 g 1     Sig: INHALE 2 PUFFS INTO THE LUNGS 4 TIMES DAILY AS NEEDED FOR WHEEZING            Alexa Watts MA

## 2025-03-11 ENCOUNTER — HOSPITAL ENCOUNTER (OUTPATIENT)
Dept: CT IMAGING | Age: 65
Discharge: HOME | End: 2025-03-11
Payer: COMMERCIAL

## 2025-03-11 DIAGNOSIS — R91.1 LUNG NODULE: ICD-10-CM

## 2025-03-11 LAB
CREAT SERPL-MCNC: 0.5 MG/DL (ref 0.3–1.3)
PERFORMED ON: NORMAL

## 2025-03-11 PROCEDURE — 6360000004 HC RX CONTRAST MEDICATION: Performed by: INTERNAL MEDICINE

## 2025-03-11 PROCEDURE — 82565 ASSAY OF CREATININE: CPT

## 2025-03-11 PROCEDURE — 71260 CT THORAX DX C+: CPT

## 2025-03-11 RX ORDER — IOPAMIDOL 755 MG/ML
75 INJECTION, SOLUTION INTRAVASCULAR
Status: COMPLETED | OUTPATIENT
Start: 2025-03-11 | End: 2025-03-11

## 2025-03-11 RX ADMIN — IOPAMIDOL 75 ML: 755 INJECTION, SOLUTION INTRAVENOUS at 11:51

## 2025-03-12 NOTE — CASE COMMUNICATION
Called patient to confirm bronchoscopy with Dr. Faust on 3/13/25, arrival time 0600. Patient confirmed.

## 2025-03-13 ENCOUNTER — ANESTHESIA EVENT (OUTPATIENT)
Dept: ENDOSCOPY | Age: 65
End: 2025-03-13
Payer: COMMERCIAL

## 2025-03-13 ENCOUNTER — ANCILLARY PROCEDURE (OUTPATIENT)
Dept: ENDOSCOPY | Age: 65
End: 2025-03-13
Attending: INTERNAL MEDICINE
Payer: COMMERCIAL

## 2025-03-13 ENCOUNTER — ANESTHESIA (OUTPATIENT)
Dept: ENDOSCOPY | Age: 65
End: 2025-03-13
Payer: COMMERCIAL

## 2025-03-13 ENCOUNTER — HOSPITAL ENCOUNTER (OUTPATIENT)
Age: 65
Setting detail: OUTPATIENT SURGERY
Discharge: HOME | End: 2025-03-13
Attending: INTERNAL MEDICINE | Admitting: INTERNAL MEDICINE
Payer: COMMERCIAL

## 2025-03-13 VITALS
WEIGHT: 148 LBS | HEART RATE: 81 BPM | DIASTOLIC BLOOD PRESSURE: 78 MMHG | BODY MASS INDEX: 27.23 KG/M2 | SYSTOLIC BLOOD PRESSURE: 113 MMHG | HEIGHT: 62 IN | TEMPERATURE: 98.4 F | OXYGEN SATURATION: 100 % | RESPIRATION RATE: 16 BRPM

## 2025-03-13 DIAGNOSIS — R91.1 LUNG NODULE: ICD-10-CM

## 2025-03-13 PROCEDURE — 3609027000 HC BRONCHOSCOPY: Performed by: INTERNAL MEDICINE

## 2025-03-13 PROCEDURE — 88112 CYTOPATH CELL ENHANCE TECH: CPT

## 2025-03-13 PROCEDURE — 31627 NAVIGATIONAL BRONCHOSCOPY: CPT | Performed by: INTERNAL MEDICINE

## 2025-03-13 PROCEDURE — 88305 TISSUE EXAM BY PATHOLOGIST: CPT

## 2025-03-13 PROCEDURE — 31628 BRONCHOSCOPY/LUNG BX EACH: CPT | Performed by: INTERNAL MEDICINE

## 2025-03-13 PROCEDURE — 2720000010 HC SURG SUPPLY STERILE: Performed by: INTERNAL MEDICINE

## 2025-03-13 PROCEDURE — 31652 BRONCH EBUS SAMPLNG 1/2 NODE: CPT | Performed by: INTERNAL MEDICINE

## 2025-03-13 PROCEDURE — 31654 BRONCH EBUS IVNTJ PERPH LES: CPT | Performed by: INTERNAL MEDICINE

## 2025-03-13 PROCEDURE — 2500000003 HC RX 250 WO HCPCS

## 2025-03-13 PROCEDURE — 6370000000 HC RX 637 (ALT 250 FOR IP): Performed by: INTERNAL MEDICINE

## 2025-03-13 PROCEDURE — 3609020000 HC BRONCHOSCOPY W/EBUS FNA 3/> NODE: Performed by: INTERNAL MEDICINE

## 2025-03-13 PROCEDURE — 7100000011 HC PHASE II RECOVERY - ADDTL 15 MIN: Performed by: INTERNAL MEDICINE

## 2025-03-13 PROCEDURE — 2580000003 HC RX 258: Performed by: INTERNAL MEDICINE

## 2025-03-13 PROCEDURE — 7100000000 HC PACU RECOVERY - FIRST 15 MIN: Performed by: INTERNAL MEDICINE

## 2025-03-13 PROCEDURE — 3700000000 HC ANESTHESIA ATTENDED CARE: Performed by: INTERNAL MEDICINE

## 2025-03-13 PROCEDURE — 94640 AIRWAY INHALATION TREATMENT: CPT

## 2025-03-13 PROCEDURE — 31629 BRONCHOSCOPY/NEEDLE BX EACH: CPT | Performed by: INTERNAL MEDICINE

## 2025-03-13 PROCEDURE — 88341 IMHCHEM/IMCYTCHM EA ADD ANTB: CPT

## 2025-03-13 PROCEDURE — 3609010800 HC BRONCHOSCOPY ALVEOLAR LAVAGE: Performed by: INTERNAL MEDICINE

## 2025-03-13 PROCEDURE — 2709999900 HC NON-CHARGEABLE SUPPLY: Performed by: INTERNAL MEDICINE

## 2025-03-13 PROCEDURE — 88342 IMHCHEM/IMCYTCHM 1ST ANTB: CPT

## 2025-03-13 PROCEDURE — 6360000002 HC RX W HCPCS

## 2025-03-13 PROCEDURE — 88173 CYTOPATH EVAL FNA REPORT: CPT

## 2025-03-13 PROCEDURE — 7100000010 HC PHASE II RECOVERY - FIRST 15 MIN: Performed by: INTERNAL MEDICINE

## 2025-03-13 PROCEDURE — 3609155400 HC ADD ON COMPUTER ASSISTED NAVIGATION: Performed by: INTERNAL MEDICINE

## 2025-03-13 PROCEDURE — 31624 DX BRONCHOSCOPE/LAVAGE: CPT | Performed by: INTERNAL MEDICINE

## 2025-03-13 PROCEDURE — 3700000001 HC ADD 15 MINUTES (ANESTHESIA): Performed by: INTERNAL MEDICINE

## 2025-03-13 PROCEDURE — 7100000001 HC PACU RECOVERY - ADDTL 15 MIN: Performed by: INTERNAL MEDICINE

## 2025-03-13 RX ORDER — LIDOCAINE HYDROCHLORIDE 10 MG/ML
INJECTION, SOLUTION EPIDURAL; INFILTRATION; INTRACAUDAL; PERINEURAL
Status: DISCONTINUED | OUTPATIENT
Start: 2025-03-13 | End: 2025-03-13 | Stop reason: SDUPTHER

## 2025-03-13 RX ORDER — PROPOFOL 10 MG/ML
INJECTION, EMULSION INTRAVENOUS
Status: DISCONTINUED | OUTPATIENT
Start: 2025-03-13 | End: 2025-03-13 | Stop reason: SDUPTHER

## 2025-03-13 RX ORDER — ONDANSETRON 2 MG/ML
INJECTION INTRAMUSCULAR; INTRAVENOUS
Status: DISCONTINUED | OUTPATIENT
Start: 2025-03-13 | End: 2025-03-13 | Stop reason: SDUPTHER

## 2025-03-13 RX ORDER — ROCURONIUM BROMIDE 10 MG/ML
INJECTION, SOLUTION INTRAVENOUS
Status: DISCONTINUED | OUTPATIENT
Start: 2025-03-13 | End: 2025-03-13 | Stop reason: SDUPTHER

## 2025-03-13 RX ORDER — SODIUM CHLORIDE, SODIUM LACTATE, POTASSIUM CHLORIDE, CALCIUM CHLORIDE 600; 310; 30; 20 MG/100ML; MG/100ML; MG/100ML; MG/100ML
INJECTION, SOLUTION INTRAVENOUS CONTINUOUS
Status: DISCONTINUED | OUTPATIENT
Start: 2025-03-13 | End: 2025-03-13 | Stop reason: HOSPADM

## 2025-03-13 RX ORDER — FENTANYL CITRATE 50 UG/ML
INJECTION, SOLUTION INTRAMUSCULAR; INTRAVENOUS
Status: DISCONTINUED | OUTPATIENT
Start: 2025-03-13 | End: 2025-03-13 | Stop reason: SDUPTHER

## 2025-03-13 RX ORDER — EPHEDRINE SULFATE/0.9% NACL/PF 25 MG/5 ML
SYRINGE (ML) INTRAVENOUS
Status: DISCONTINUED | OUTPATIENT
Start: 2025-03-13 | End: 2025-03-13 | Stop reason: SDUPTHER

## 2025-03-13 RX ORDER — IPRATROPIUM BROMIDE AND ALBUTEROL SULFATE 2.5; .5 MG/3ML; MG/3ML
1 SOLUTION RESPIRATORY (INHALATION) ONCE
Status: COMPLETED | OUTPATIENT
Start: 2025-03-13 | End: 2025-03-13

## 2025-03-13 RX ORDER — DEXAMETHASONE SODIUM PHOSPHATE 4 MG/ML
INJECTION, SOLUTION INTRA-ARTICULAR; INTRALESIONAL; INTRAMUSCULAR; INTRAVENOUS; SOFT TISSUE
Status: DISCONTINUED | OUTPATIENT
Start: 2025-03-13 | End: 2025-03-13 | Stop reason: SDUPTHER

## 2025-03-13 RX ADMIN — PROPOFOL 150 MG: 10 INJECTION, EMULSION INTRAVENOUS at 07:50

## 2025-03-13 RX ADMIN — SODIUM CHLORIDE, SODIUM LACTATE, POTASSIUM CHLORIDE, AND CALCIUM CHLORIDE: 600; 310; 30; 20 INJECTION, SOLUTION INTRAVENOUS at 07:53

## 2025-03-13 RX ADMIN — LIDOCAINE HYDROCHLORIDE 70 MG: 10 INJECTION, SOLUTION EPIDURAL; INFILTRATION; INTRACAUDAL; PERINEURAL at 07:06

## 2025-03-13 RX ADMIN — ONDANSETRON 4 MG: 2 INJECTION INTRAMUSCULAR; INTRAVENOUS at 07:40

## 2025-03-13 RX ADMIN — SUGAMMADEX 200 MG: 100 INJECTION, SOLUTION INTRAVENOUS at 08:15

## 2025-03-13 RX ADMIN — EPHEDRINE SULFATE 5 MG: 5 INJECTION INTRAVENOUS at 07:29

## 2025-03-13 RX ADMIN — DEXAMETHASONE SODIUM PHOSPHATE 10 MG: 4 INJECTION, SOLUTION INTRAMUSCULAR; INTRAVENOUS at 07:16

## 2025-03-13 RX ADMIN — ROCURONIUM BROMIDE 40 MG: 10 INJECTION, SOLUTION INTRAVENOUS at 07:06

## 2025-03-13 RX ADMIN — IPRATROPIUM BROMIDE AND ALBUTEROL SULFATE 1 DOSE: 2.5; .5 SOLUTION RESPIRATORY (INHALATION) at 06:39

## 2025-03-13 RX ADMIN — FENTANYL CITRATE 100 MCG: 50 INJECTION INTRAMUSCULAR; INTRAVENOUS at 07:04

## 2025-03-13 RX ADMIN — PROPOFOL 150 MG: 10 INJECTION, EMULSION INTRAVENOUS at 07:06

## 2025-03-13 RX ADMIN — SODIUM CHLORIDE, SODIUM LACTATE, POTASSIUM CHLORIDE, AND CALCIUM CHLORIDE: 600; 310; 30; 20 INJECTION, SOLUTION INTRAVENOUS at 06:35

## 2025-03-13 ASSESSMENT — PAIN - FUNCTIONAL ASSESSMENT
PAIN_FUNCTIONAL_ASSESSMENT: 0-10
PAIN_FUNCTIONAL_ASSESSMENT: 0-10
PAIN_FUNCTIONAL_ASSESSMENT: NONE - DENIES PAIN

## 2025-03-13 NOTE — PROCEDURES
After consent and under general anesthesia patient underwent bronchoscopy through the endotracheal tube.  The distal trachea was normal.  Bebe was sharp and midline.  Right and left bronchial trees were explored.  There was no endobronchial disease.  However the patient did have somewhat compressed takeoff of the right upper lobe bronchus.  The Ion navigational bronchoscopy platform was then used to navigate to the right upper lobe nodule.  Successful navigation was confirmed using the radial EBUS which yielded a concentric view of the lesion.  The right upper lobe nodule was then biopsied using transbronchial biopsies and submitted for pathology.  Transbronchial needle aspirations were also done from the right upper lobe nodule.  This was submitted for cytology.  Bronchoalveolar lavage from the right upper lobe was also obtained and submitted for cytology.  The linear EBUS scope was then used to survey the mediastinum.  There was a large mass and lymph node at station 10R.  Station 10R lymphadenopathy was then biopsied using the transbronchial needle aspirates and submitted for cytology.  Adequate specimen was confirmed using rapid onsite evaluation.  Patient tolerated the procedure well.    Estimated blood loss 2 cc.  Complications: None.  Specimen:  1.  Right upper lobe nodule transbronchial biopsy for pathology.  2.  Right upper lobe transbronchial needle aspirate for cytology.  3.  Right upper lobe bronchoalveolar lavage for cytology.  4.  Station 10R transbronchial needle aspirate for cytology.  Disposition: Postanesthesia recovery per anesthesia service

## 2025-03-13 NOTE — ANESTHESIA POSTPROCEDURE EVALUATION
Department of Anesthesiology  Postprocedure Note    Patient: Michelle Long  MRN: [id number]  YOB: 1960  Date of evaluation: 3/13/2025    Procedure Summary       Date: 03/13/25 Room / Location: [place] [address] / [facility]    Anesthesia Start: 0704 Anesthesia Stop:     Procedures:       BRONCHOSCOPY      BRONCHOSCOPY COMPUTER ASSISTED ROBOTIC      BRONCHOSCOPY ALVEOLAR LAVAGE ROBOTIC      BRONCHOSCOPY ENDOBRONCHIAL ULTRASOUND FINE NEEDLE ASPIRATION (Chest)      BRONCHOSCOPY ENDOBRONCHIAL ULTRASOUND FINE NEEDLE ASPIRATION ROBOTIC Diagnosis:       Lung nodule      (Lung nodule [R91.1])    Surgeons: Mel Allison MD Responsible Provider: Mg Lancaster APRN - CRNA    Anesthesia Type: General ASA Status: 3            Anesthesia Type: General    Mahin Phase I: Mahin Score: 10    Mahin Phase II:      Anesthesia Post Evaluation    Patient location during evaluation: PACU  Patient participation: complete - patient participated  Level of consciousness: awake and alert  Pain score: 0  Airway patency: patent  Nausea & Vomiting: no vomiting and no nausea  Cardiovascular status: blood pressure returned to baseline and hemodynamically stable  Respiratory status: spontaneous ventilation, room air, nonlabored ventilation and acceptable  Hydration status: euvolemic  Comments: BP    Pulse 77   Temp 98.2 °F (36.8 °C) (Temporal)   Resp 16   Ht 1.575 m (5' 2\")   Wt 67.1 kg (148 lb)   SpO2 95%   BMI 27.07 kg/m²     Pain management: adequate    No notable events documented.

## 2025-03-13 NOTE — H&P (VIEW-ONLY)
Pulmonary and Sleep Medicine    Michelle Long (:  1960) is a 64 y.o. female,New patient, here for evaluation of the following chief complaint(s):  New Patient (Lung nodule- right upper/soa)      Referring physician:  Brandy Gonzalez Md  [address] [place] [id number] [place] [address]     ASSESSMENT/PLAN:  1. Lung nodule  -     Case Request  -     CT CHEST W CONTRAST; Future  2. Mediastinal adenopathy  3. History of smoking. Quit smoking in . Smoked a pack a day for 25 years.        Lung nodule seen on the CT is definitely suspicious for possible malignancy.  We will proceed with bronchoscopy and biopsy.  The risk benefit was explained to the patient.  She is willing to undergo the procedure.       Mel Allison MD, [facility], [facility]    No follow-ups on file.    SUBJECTIVE/OBJECTIVE:        Patient was evaluated today regarding abnormal CT of the chest.  She had a CT for lung cancer screening that showed right upper lobe nodule at 1 cm in size.  The nodule \"my interpretation has grown compared to prior exam from 2024.  The patient quit smoking in .  She smoked for about 20 years.  Denies any hemoptysis denies any weight loss.  Her CT also reported to show some mediastinal adenopathy.  The patient denies any anticoagulants.           Continue the following medications as reported by the patient:    Prior to Visit Medications    Medication Sig Taking? Authorizing Provider   ezetimibe (ZETIA) 10 MG tablet TAKE 1 TABLET BY MOUTH DAILY Yes Brandy Gonzalez MD   busPIRone (BUSPAR) 10 MG tablet TAKE 1 TABLET BY MOUTH 2 TIMES DAILY AS NEEDED (ANXIETY) Yes Brandy Gonzalez MD   predniSONE (DELTASONE) 20 MG tablet Take 2 tabs po q d x 6 days, decrease to 1 tab po q d x 4 days, then decrease to half tablet po q d x 2 days. Yes Charli Jaime PA-C   sodium chloride (OCEAN NASAL SPRAY) 0.65 % nasal spray 2 squirts to each nostril q AM and nightly Yes Charli Jaime PA-C   metoprolol  shortness of breath with hypertension. Up to max of 3 total doses. If no relief after 1 dose, call 911.  Patient not taking: Reported on 3/10/2025  Pan Wyman Jr., MD        Review of Systems   Constitutional:  Negative for activity change, appetite change, chills, diaphoresis and fatigue.   HENT:  Negative for congestion, dental problem, drooling, ear discharge, postnasal drip and rhinorrhea.    Eyes:  Negative for visual disturbance.   Respiratory:  Positive for shortness of breath. Negative for apnea, cough, chest tightness and wheezing.    Gastrointestinal:  Negative for abdominal distention, abdominal pain and anal bleeding.   Endocrine: Negative for cold intolerance, heat intolerance and polydipsia.   Genitourinary:  Negative for difficulty urinating, dysuria, enuresis and flank pain.   Musculoskeletal:  Negative for arthralgias, back pain and gait problem.   Allergic/Immunologic: Negative for environmental allergies.   Neurological:  Negative for dizziness, facial asymmetry, light-headedness and headaches.       Vitals:    03/10/25 1339   BP: 138/82   Pulse: 98   Resp: 20   Temp: 96.9 °F (36.1 °C)   SpO2: 94%   Weight: 64.9 kg (143 lb)   Height: 1.575 m (5' 2\")      BMI Readings from Last 1 Encounters:   03/10/25 26.16 kg/m²         Physical Exam  Vitals reviewed.   Constitutional:       Appearance: Normal appearance.   HENT:      Head: Normocephalic and atraumatic.      Nose: Nose normal.   Eyes:      Extraocular Movements: Extraocular movements intact.      Conjunctiva/sclera: Conjunctivae normal.   Cardiovascular:      Rate and Rhythm: Normal rate and regular rhythm.      Heart sounds: No murmur heard.     No friction rub.   Pulmonary:      Effort: Pulmonary effort is normal. No respiratory distress.      Breath sounds: Normal breath sounds. No stridor. No wheezing, rhonchi or rales.   Abdominal:      General: There is no distension.      Palpations: There is no mass.      Tenderness: There is no

## 2025-03-13 NOTE — ANESTHESIA PRE PROCEDURE
Department of Anesthesiology  Preprocedure Note       Name:  Michelle Long   Age:  64 y.o.  :  1960                                          MRN:  [id number]         Date:  3/13/2025      Surgeon: Surgeon(s):  Mel Allison MD    Procedure: Procedure(s):  BRONCHOSCOPY  BRONCHOSCOPY ENDOBRONCHIAL ULTRASOUND FINE NEEDLE ASPIRATION ROBOTIC  BRONCHOSCOPY ALVEOLAR LAVAGE ROBOTIC    Medications prior to admission:   Prior to Admission medications    Medication Sig Start Date End Date Taking? Authorizing Provider   albuterol sulfate HFA (PROVENTIL;VENTOLIN;PROAIR) 108 (90 Base) MCG/ACT inhaler INHALE 2 PUFFS INTO THE LUNGS 4 TIMES DAILY AS NEEDED FOR WHEEZING 3/10/25  Yes Brandy Gonzalez MD   ezetimibe (ZETIA) 10 MG tablet TAKE 1 TABLET BY MOUTH DAILY 3/7/25  Yes Brandy Gonzalez MD   busPIRone (BUSPAR) 10 MG tablet TAKE 1 TABLET BY MOUTH 2 TIMES DAILY AS NEEDED (ANXIETY) 3/7/25  Yes Brandy Gonzalez MD   clindamycin (CLEOCIN) 300 MG capsule Take 1 capsule by mouth 3 times daily for 14 days 3/3/25 3/17/25 Yes Charli Jaime PA-C   sodium chloride (OCEAN NASAL SPRAY) 0.65 % nasal spray 2 squirts to each nostril q AM and nightly 3/3/25  Yes Charli Jaime PA-C   metoprolol tartrate (LOPRESSOR) 50 MG tablet Take 1 tablet by mouth 2 times daily 25  Yes Brandy Gonzalez MD   fluticasone (FLOVENT HFA) 110 MCG/ACT inhaler Inhale 2 puffs into the lungs 2 times daily 12/30/24 3/13/25 Yes Brandy Gonzalez MD   irbesartan (AVAPRO) 300 MG tablet TAKE 1 TABLET BY MOUTH EVERY DAY 24  Yes Brandy Gonzalez MD   rosuvastatin (CRESTOR) 40 MG tablet TAKE 1 TABLET BY MOUTH EVERY DAY 24  Yes Brandy Gonzalez MD   venlafaxine (EFFEXOR XR) 150 MG extended release capsule Take 1 capsule by mouth daily 24  Yes Brandy Gonzalez MD   amLODIPine (NORVASC) 2.5 MG tablet TAKE 1 TABLET BY MOUTH EVERY DAY 24  Yes Brandy Gonzalez MD   isosorbide mononitrate (IMDUR) 30 MG extended release tablet Take 1 tablet by mouth daily

## 2025-03-13 NOTE — INTERVAL H&P NOTE
Update History & Physical    The patient's History and Physical of March 10, 2025 was reviewed with the patient and I examined the patient. There was no change. The surgical site was confirmed by the patient and me.     Plan: The risks, benefits, expected outcome, and alternative to the recommended procedure have been discussed with the patient. Patient understands and wants to proceed with the procedure.     Electronically signed by Mel Allison MD on 3/13/2025 at 7:07 AM

## 2025-03-13 NOTE — H&P
Pulmonary and Sleep Medicine    Michelle Long (:  1960) is a 64 y.o. female,New patient, here for evaluation of the following chief complaint(s):  New Patient (Lung nodule- right upper/soa)      Referring physician:  Brandy Gonzalez Md  [address] [place] [address] [place] [address]     ASSESSMENT/PLAN:  1. Lung nodule  -     Case Request  -     CT CHEST W CONTRAST; Future  2. Mediastinal adenopathy  3. History of smoking. Quit smoking in . Smoked a pack a day for 25 years.        Lung nodule seen on the CT is definitely suspicious for possible malignancy.  We will proceed with bronchoscopy and biopsy.  The risk benefit was explained to the patient.  She is willing to undergo the procedure.       Mel Allison MD, [facility], [facility]    No follow-ups on file.    SUBJECTIVE/OBJECTIVE:        Patient was evaluated today regarding abnormal CT of the chest.  She had a CT for lung cancer screening that showed right upper lobe nodule at 1 cm in size.  The nodule \"my interpretation has grown compared to prior exam from 2024.  The patient quit smoking in .  She smoked for about 20 years.  Denies any hemoptysis denies any weight loss.  Her CT also reported to show some mediastinal adenopathy.  The patient denies any anticoagulants.           Continue the following medications as reported by the patient:    Prior to Visit Medications    Medication Sig Taking? Authorizing Provider   ezetimibe (ZETIA) 10 MG tablet TAKE 1 TABLET BY MOUTH DAILY Yes Brandy Gonzalez MD   busPIRone (BUSPAR) 10 MG tablet TAKE 1 TABLET BY MOUTH 2 TIMES DAILY AS NEEDED (ANXIETY) Yes Brandy Gonzalez MD   predniSONE (DELTASONE) 20 MG tablet Take 2 tabs po q d x 6 days, decrease to 1 tab po q d x 4 days, then decrease to half tablet po q d x 2 days. Yes Charli Jaime PA-C   sodium chloride (OCEAN NASAL SPRAY) 0.65 % nasal spray 2 squirts to each nostril q AM and nightly Yes Charli Jaime PA-C   metoprolol

## 2025-03-15 DIAGNOSIS — C34.91 SQUAMOUS CELL CARCINOMA OF RIGHT LUNG: Primary | ICD-10-CM

## 2025-03-15 DIAGNOSIS — Z87.891 HISTORY OF SMOKING: ICD-10-CM

## 2025-03-20 ENCOUNTER — TELEPHONE (OUTPATIENT)
Dept: HEMATOLOGY | Age: 65
End: 2025-03-20

## 2025-03-20 NOTE — TELEPHONE ENCOUNTER

## 2025-03-21 ENCOUNTER — TELEPHONE (OUTPATIENT)
Dept: OTHER | Age: 65
End: 2025-03-21

## 2025-03-21 DIAGNOSIS — C34.91 NON-SMALL CELL CANCER OF RIGHT LUNG (HCC): ICD-10-CM

## 2025-03-21 DIAGNOSIS — Z85.3 HISTORY OF BREAST CANCER: Primary | ICD-10-CM

## 2025-03-21 DIAGNOSIS — Z80.8 FAMILY HISTORY OF CANCER OF TONGUE: ICD-10-CM

## 2025-03-21 DIAGNOSIS — C48.0 RETROPERITONEAL SARCOMA (HCC): ICD-10-CM

## 2025-03-21 DIAGNOSIS — Z80.3 FAMILY HISTORY OF BREAST CANCER IN MOTHER: ICD-10-CM

## 2025-03-21 NOTE — TELEPHONE ENCOUNTER
One son and one grandson, no daughters or sisters.    Patient recently was identified as having a significant personal and/or family history of cancer. Pt is being presented at [facility].  We discussed in detail their personal/family history of cancer. Patient will be tested for 81 gene mutations. Once sample is submitted and the lab receives it, it will take 3-4 weeks to get results.  Pt will have blood collected at outpatient lab on Monday prior to her MRI.  All questions answered.

## 2025-03-21 NOTE — PROGRESS NOTES
MEDICAL ONCOLOGY CONSULTATION    Patient Name: Michelle Long  MRN: [id number]  YOB: 1960  Date of evaluation: 3/25/2025    REASON FOR CONSULTATION:  Lung cancer  REQUESTING PHYSICIAN: Dr Mel Allison/[facility]    History Obtained From:  patient and old medical records    HISTORY OF PRESENT ILLNESS:      Diagnosis  NSCLC-squamous cell carcinoma, right upper lobe lung, March 2025  Mediastinal lymphadenopathy  Left frontal brain lesion  Clinical T1N2M1(subcentimeter brain metastasis).  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   DCIS left breast Lumpectomy 2009->WBRT Dr Arita    Treatment Summary  Anticipate Carbo/Taxol/Keytruda every 21 days x 4 cycles then maintenance Keytruda-pending insurance  Anticipate radiation therapy with Dr. Arita to brain and lung feilds    Cancer History  Mrs. Michelle Long 64 year old female presents to clinic today for initial consultation of lung cancer. She has been referred by Dr. Faust.  She has a history of retroperitoneal sarcoma diagnosed in 2007 status post total hysterectomy and salpingo-oophorectomy bilaterally followed by pelvic RT at [facility].  In addition, history of left breast DCIS she is status post left breast lumpectomy in 2009 followed by WBRT by Dr. Armando Arita at [facility].  Unfortunately, now she presents with non-small cell lung cancer, squamous cell subtype  1/15/24 CT lung screening ([facility]): No evidence of pulmonary or thoracic malignancy. Emphysema. Chronic bronchiolitis. Coronary disease.   2/14/25 CT lung screening ([facility]): Multiple right upper lobe pulmonary nodules There is right posterior pleural thickening. There is a new irregular pulmonary nodule in the right upper lobe on image 64 measuring 0.7 cm on image 64. There is a more inferior pulmonary nodule in the right upper lobe centrally measuring 1.1 cm. There is an additional more central pulmonary nodule measuring 0.6 cm on image 85.

## 2025-03-24 ENCOUNTER — HOSPITAL ENCOUNTER (OUTPATIENT)
Dept: NUCLEAR MEDICINE | Age: 65
Discharge: HOME | End: 2025-03-26
Attending: INTERNAL MEDICINE
Payer: COMMERCIAL

## 2025-03-24 ENCOUNTER — HOSPITAL ENCOUNTER (OUTPATIENT)
Dept: MRI IMAGING | Age: 65
Discharge: HOME | End: 2025-03-24
Attending: INTERNAL MEDICINE
Payer: COMMERCIAL

## 2025-03-24 DIAGNOSIS — C34.91 SQUAMOUS CELL CARCINOMA OF RIGHT LUNG: ICD-10-CM

## 2025-03-24 DIAGNOSIS — R91.8 LUNG NODULES: ICD-10-CM

## 2025-03-24 LAB
GLUCOSE BLD-MCNC: 95 MG/DL (ref 70–99)
PERFORMED ON: NORMAL

## 2025-03-24 PROCEDURE — A9609 HC RX DIAGNOSTIC RADIOPHARMACEUTICAL: HCPCS | Performed by: INTERNAL MEDICINE

## 2025-03-24 PROCEDURE — 78815 PET IMAGE W/CT SKULL-THIGH: CPT

## 2025-03-24 PROCEDURE — 70553 MRI BRAIN STEM W/O & W/DYE: CPT

## 2025-03-24 PROCEDURE — 82962 GLUCOSE BLOOD TEST: CPT

## 2025-03-24 PROCEDURE — 6360000004 HC RX CONTRAST MEDICATION: Performed by: INTERNAL MEDICINE

## 2025-03-24 PROCEDURE — A9577 INJ MULTIHANCE: HCPCS | Performed by: INTERNAL MEDICINE

## 2025-03-24 PROCEDURE — 3430000000 HC RX DIAGNOSTIC RADIOPHARMACEUTICAL: Performed by: INTERNAL MEDICINE

## 2025-03-24 RX ORDER — FLUDEOXYGLUCOSE F 18 200 MCI/ML
15 INJECTION, SOLUTION INTRAVENOUS ONCE
Status: COMPLETED | OUTPATIENT
Start: 2025-03-24 | End: 2025-03-24

## 2025-03-24 RX ADMIN — GADOBENATE DIMEGLUMINE 13 ML: 529 INJECTION, SOLUTION INTRAVENOUS at 09:43

## 2025-03-24 RX ADMIN — FLUDEOXYGLUCOSE F 18 15 MILLICURIE: 200 INJECTION, SOLUTION INTRAVENOUS at 11:28

## 2025-03-25 ENCOUNTER — HOSPITAL ENCOUNTER (OUTPATIENT)
Dept: INFUSION THERAPY | Age: 65
Discharge: HOME | End: 2025-03-25
Payer: COMMERCIAL

## 2025-03-25 ENCOUNTER — OFFICE VISIT (OUTPATIENT)
Dept: HEMATOLOGY | Age: 65
End: 2025-03-25
Payer: COMMERCIAL

## 2025-03-25 VITALS
SYSTOLIC BLOOD PRESSURE: 142 MMHG | OXYGEN SATURATION: 91 % | WEIGHT: 148.5 LBS | HEIGHT: 62 IN | TEMPERATURE: 97.8 F | HEART RATE: 77 BPM | BODY MASS INDEX: 27.33 KG/M2 | DIASTOLIC BLOOD PRESSURE: 84 MMHG

## 2025-03-25 DIAGNOSIS — C79.31 NSCLC METASTATIC TO BRAIN (HCC): ICD-10-CM

## 2025-03-25 DIAGNOSIS — C79.31 NSCLC METASTATIC TO BRAIN (HCC): Primary | ICD-10-CM

## 2025-03-25 DIAGNOSIS — J43.1 PANLOBULAR EMPHYSEMA (HCC): ICD-10-CM

## 2025-03-25 DIAGNOSIS — C34.90 NSCLC METASTATIC TO BRAIN (HCC): Primary | ICD-10-CM

## 2025-03-25 DIAGNOSIS — C34.90 NSCLC METASTATIC TO BRAIN (HCC): ICD-10-CM

## 2025-03-25 DIAGNOSIS — Z51.11 CHEMOTHERAPY MANAGEMENT, ENCOUNTER FOR: ICD-10-CM

## 2025-03-25 DIAGNOSIS — Z71.89 CARE PLAN DISCUSSED WITH PATIENT: ICD-10-CM

## 2025-03-25 DIAGNOSIS — Z71.89 COORDINATION OF COMPLEX CARE: ICD-10-CM

## 2025-03-25 DIAGNOSIS — Z78.9 NEED FOR FOLLOW-UP BY SOCIAL WORKER: Primary | ICD-10-CM

## 2025-03-25 DIAGNOSIS — C34.91 NON-SMALL CELL CANCER OF RIGHT LUNG (HCC): ICD-10-CM

## 2025-03-25 LAB
ALBUMIN SERPL-MCNC: 4.1 G/DL (ref 3.5–5.2)
ALP SERPL-CCNC: 103 U/L (ref 35–104)
ALT SERPL-CCNC: 54 U/L (ref 10–35)
ANION GAP SERPL CALCULATED.3IONS-SCNC: 16 MMOL/L (ref 8–16)
AST SERPL-CCNC: 32 U/L (ref 10–35)
BASOPHILS # BLD: 0.02 K/UL (ref 0–0.2)
BASOPHILS NFR BLD: 0.2 % (ref 0–1)
BILIRUB SERPL-MCNC: 0.3 MG/DL (ref 0.2–1.2)
BUN SERPL-MCNC: 19 MG/DL (ref 8–23)
CALCIUM SERPL-MCNC: 9.4 MG/DL (ref 8.8–10.2)
CHLORIDE SERPL-SCNC: 101 MMOL/L (ref 98–107)
CO2 SERPL-SCNC: 20 MMOL/L (ref 22–29)
CREAT SERPL-MCNC: 0.4 MG/DL (ref 0.5–0.9)
EOSINOPHIL # BLD: 0.01 K/UL (ref 0–0.6)
EOSINOPHIL NFR BLD: 0.1 % (ref 0–5)
ERYTHROCYTE [DISTWIDTH] IN BLOOD BY AUTOMATED COUNT: 13.1 % (ref 11.5–14.5)
GLUCOSE SERPL-MCNC: 129 MG/DL (ref 70–99)
HCT VFR BLD AUTO: 39.3 % (ref 37–47)
HGB BLD-MCNC: 12.7 G/DL (ref 12–16)
LYMPHOCYTES # BLD: 0.67 K/UL (ref 1.1–4.5)
LYMPHOCYTES NFR BLD: 6.4 % (ref 20–40)
MCH RBC QN AUTO: 28.9 PG (ref 27–31)
MCHC RBC AUTO-ENTMCNC: 32.3 G/DL (ref 33–37)
MCV RBC AUTO: 89.3 FL (ref 81–99)
MONOCYTES # BLD: 0.28 K/UL (ref 0–0.9)
MONOCYTES NFR BLD: 2.7 % (ref 1–10)
NEUTROPHILS # BLD: 9.39 K/UL (ref 1.5–7.5)
NEUTS SEG NFR BLD: 90.1 % (ref 50–65)
PLATELET # BLD AUTO: 368 K/UL (ref 130–400)
PMV BLD AUTO: 8.5 FL (ref 9.4–12.3)
POTASSIUM SERPL-SCNC: 4.3 MMOL/L (ref 3.5–5.1)
PROT SERPL-MCNC: 7.1 G/DL (ref 6.4–8.3)
RBC # BLD AUTO: 4.4 M/UL (ref 4.2–5.4)
SODIUM SERPL-SCNC: 137 MMOL/L (ref 136–145)
WBC # BLD AUTO: 10.42 K/UL (ref 4.8–10.8)

## 2025-03-25 PROCEDURE — 80053 COMPREHEN METABOLIC PANEL: CPT

## 2025-03-25 PROCEDURE — 3079F DIAST BP 80-89 MM HG: CPT | Performed by: INTERNAL MEDICINE

## 2025-03-25 PROCEDURE — 99215 OFFICE O/P EST HI 40 MIN: CPT | Performed by: INTERNAL MEDICINE

## 2025-03-25 PROCEDURE — 99214 OFFICE O/P EST MOD 30 MIN: CPT

## 2025-03-25 PROCEDURE — 85025 COMPLETE CBC W/AUTO DIFF WBC: CPT

## 2025-03-25 PROCEDURE — G2211 COMPLEX E/M VISIT ADD ON: HCPCS | Performed by: INTERNAL MEDICINE

## 2025-03-25 PROCEDURE — 3077F SYST BP >= 140 MM HG: CPT | Performed by: INTERNAL MEDICINE

## 2025-03-25 PROCEDURE — 36415 COLL VENOUS BLD VENIPUNCTURE: CPT

## 2025-03-25 RX ORDER — PROMETHAZINE HYDROCHLORIDE 25 MG/1
12.5 TABLET ORAL EVERY 8 HOURS PRN
Qty: 20 TABLET | Refills: 3 | Status: SHIPPED | OUTPATIENT
Start: 2025-03-25 | End: 2025-05-17

## 2025-03-25 RX ORDER — ONDANSETRON 4 MG/1
4 TABLET, FILM COATED ORAL EVERY 8 HOURS PRN
Qty: 30 TABLET | Refills: 0 | Status: SHIPPED | OUTPATIENT
Start: 2025-03-25

## 2025-03-25 RX ORDER — DEXAMETHASONE 4 MG/1
TABLET ORAL
Qty: 40 TABLET | Refills: 0 | Status: SHIPPED | OUTPATIENT
Start: 2025-03-25

## 2025-03-25 NOTE — PROGRESS NOTES
Treatment built at this time. Treatment consent signed. Pre meds, and nausea medications sent to pharmacy. Electronically signed by Heide Sanchez RN on 3/25/2025 at 5:28 PM

## 2025-03-26 ENCOUNTER — CLINICAL DOCUMENTATION (OUTPATIENT)
Facility: HOSPITAL | Age: 65
End: 2025-03-26

## 2025-03-26 ENCOUNTER — OFFICE VISIT (OUTPATIENT)
Dept: PULMONOLOGY | Age: 65
End: 2025-03-26
Payer: COMMERCIAL

## 2025-03-26 VITALS
DIASTOLIC BLOOD PRESSURE: 93 MMHG | WEIGHT: 148.6 LBS | SYSTOLIC BLOOD PRESSURE: 162 MMHG | TEMPERATURE: 98 F | OXYGEN SATURATION: 94 % | HEART RATE: 76 BPM | BODY MASS INDEX: 26.33 KG/M2 | HEIGHT: 63 IN

## 2025-03-26 DIAGNOSIS — C34.91 SQUAMOUS CELL CARCINOMA OF RIGHT LUNG: Primary | ICD-10-CM

## 2025-03-26 DIAGNOSIS — I10 PRIMARY HYPERTENSION: ICD-10-CM

## 2025-03-26 DIAGNOSIS — Z87.891 HISTORY OF SMOKING: ICD-10-CM

## 2025-03-26 DIAGNOSIS — R74.01 ELEVATED TRANSAMINASE LEVEL: ICD-10-CM

## 2025-03-26 DIAGNOSIS — J43.1 PANLOBULAR EMPHYSEMA (HCC): ICD-10-CM

## 2025-03-26 DIAGNOSIS — Z87.891 PERSONAL HISTORY OF TOBACCO USE: ICD-10-CM

## 2025-03-26 DIAGNOSIS — E55.9 VITAMIN D DEFICIENCY: ICD-10-CM

## 2025-03-26 DIAGNOSIS — Z23 NEED FOR VACCINATION: ICD-10-CM

## 2025-03-26 DIAGNOSIS — K76.0 FATTY LIVER DISEASE, NONALCOHOLIC: ICD-10-CM

## 2025-03-26 DIAGNOSIS — Z12.31 ENCOUNTER FOR SCREENING MAMMOGRAM FOR MALIGNANT NEOPLASM OF BREAST: ICD-10-CM

## 2025-03-26 DIAGNOSIS — M85.89 OSTEOPENIA OF MULTIPLE SITES: ICD-10-CM

## 2025-03-26 DIAGNOSIS — R73.01 IFG (IMPAIRED FASTING GLUCOSE): ICD-10-CM

## 2025-03-26 DIAGNOSIS — C79.31 NSCLC METASTATIC TO BRAIN (HCC): ICD-10-CM

## 2025-03-26 DIAGNOSIS — C34.90 NSCLC METASTATIC TO BRAIN (HCC): ICD-10-CM

## 2025-03-26 DIAGNOSIS — I25.10 MILD CORONARY ARTERY DISEASE: ICD-10-CM

## 2025-03-26 DIAGNOSIS — Z00.00 ANNUAL PHYSICAL EXAM: ICD-10-CM

## 2025-03-26 DIAGNOSIS — E78.00 PURE HYPERCHOLESTEROLEMIA: ICD-10-CM

## 2025-03-26 DIAGNOSIS — R59.0 MEDIASTINAL ADENOPATHY: ICD-10-CM

## 2025-03-26 DIAGNOSIS — R31.29 MICROHEMATURIA: ICD-10-CM

## 2025-03-26 DIAGNOSIS — R91.1 LUNG NODULE: ICD-10-CM

## 2025-03-26 DIAGNOSIS — E03.8 SUBCLINICAL HYPOTHYROIDISM: ICD-10-CM

## 2025-03-26 LAB
25(OH)D3 SERPL-MCNC: 53.8 NG/ML
ALBUMIN SERPL-MCNC: 4.1 G/DL (ref 3.5–5.2)
ALP SERPL-CCNC: 94 U/L (ref 35–104)
ALT SERPL-CCNC: 51 U/L (ref 10–35)
ANION GAP SERPL CALCULATED.3IONS-SCNC: 11 MMOL/L (ref 8–16)
AST SERPL-CCNC: 34 U/L (ref 10–35)
BILIRUB SERPL-MCNC: 0.3 MG/DL (ref 0.2–1.2)
BUN SERPL-MCNC: 21 MG/DL (ref 8–23)
CALCIUM SERPL-MCNC: 9.5 MG/DL (ref 8.8–10.2)
CHLORIDE SERPL-SCNC: 101 MMOL/L (ref 98–107)
CHOLEST SERPL-MCNC: 138 MG/DL (ref 0–199)
CO2 SERPL-SCNC: 26 MMOL/L (ref 22–29)
CREAT SERPL-MCNC: 0.5 MG/DL (ref 0.5–0.9)
GLUCOSE SERPL-MCNC: 109 MG/DL (ref 70–99)
HBA1C MFR BLD: 6.2 % (ref 4–5.6)
HDLC SERPL-MCNC: 54 MG/DL (ref 40–60)
LDLC SERPL CALC-MCNC: 60 MG/DL
POTASSIUM SERPL-SCNC: 4 MMOL/L (ref 3.5–5.1)
PROT SERPL-MCNC: 6.8 G/DL (ref 6.4–8.3)
SODIUM SERPL-SCNC: 138 MMOL/L (ref 136–145)
TRIGL SERPL-MCNC: 121 MG/DL (ref 0–149)
TSH SERPL DL<=0.005 MIU/L-ACNC: 2.02 UIU/ML (ref 0.27–4.2)

## 2025-03-26 PROCEDURE — 3080F DIAST BP >= 90 MM HG: CPT | Performed by: INTERNAL MEDICINE

## 2025-03-26 PROCEDURE — 99214 OFFICE O/P EST MOD 30 MIN: CPT | Performed by: INTERNAL MEDICINE

## 2025-03-26 PROCEDURE — 3077F SYST BP >= 140 MM HG: CPT | Performed by: INTERNAL MEDICINE

## 2025-03-26 ASSESSMENT — ENCOUNTER SYMPTOMS
SHORTNESS OF BREATH: 0
WHEEZING: 0
COUGH: 0
RHINORRHEA: 0
BACK PAIN: 0
ABDOMINAL DISTENTION: 0
ANAL BLEEDING: 0
CHEST TIGHTNESS: 0
ABDOMINAL PAIN: 0
APNEA: 0

## 2025-03-26 NOTE — PROGRESS NOTES
Patient Assistance                   Additional notes: Reviewed chart and patient has met $4,000.00 OOP MAX for 2025.  Will follow up end of year to see if assistance is needed for 2025.

## 2025-03-26 NOTE — PROGRESS NOTES
Pulmonary and Sleep Medicine    Michelle Long (:  1960) is a 64 y.o. female,Established patient, here for evaluation of the following chief complaint(s):  Follow-up (/Lung nodule/MRI 3/24/25)      Referring physician:  No referring provider defined for this encounter.     ASSESSMENT/PLAN:  1. Squamous cell carcinoma of right lung (HCC)  2. Mediastinal adenopathy  3. Lung nodule  4. History of smoking. Quit smoking in 2016. Smoked a pack a day for 25 years.  5. Mild coronary artery disease  6. 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  She will be initiating chemotherapy and radiation.  [facility]ort referral was placed to general surgery.  Will expedite her referral.       Mel Allison MD, [facility], [facility]    No follow-ups on file.    SUBJECTIVE/OBJECTIVE:        Patient is here for follow-up after her biopsy.  The biopsy did show squamous cell carcinoma.  Her MRI of the brain and showed 1 focus of metastatic disease according to my interpretation.          Continue the following medications as reported by the patient:    Prior to Visit Medications    Medication Sig Taking? Authorizing Provider   dexAMETHasone (DECADRON) 4 MG tablet Take 20 mg (5 pills) the night before and the morning of chemotherapy  Loyl Parson MD   promethazine (PHENERGAN) 25 MG tablet Take 0.5 tablets by mouth every 8 hours as needed for Nausea  Loly Parson MD   ondansetron (ZOFRAN) 4 MG tablet Take 1 tablet by mouth every 8 hours as needed for Nausea or Vomiting  Loly Parson MD   albuterol sulfate HFA (PROVENTIL;VENTOLIN;PROAIR) 108 (90 Base) MCG/ACT inhaler INHALE 2 PUFFS INTO THE LUNGS 4 TIMES DAILY AS NEEDED FOR WHEEZING  Brandy Gonzalez MD   ezetimibe (ZETIA) 10 MG tablet TAKE 1 TABLET BY MOUTH DAILY  Brandy Gonzalez MD   busPIRone (BUSPAR) 10 MG tablet TAKE 1 TABLET BY MOUTH 2 TIMES DAILY AS NEEDED (ANXIETY)  Brandy Gonzalez MD   sodium chloride (OCEAN NASAL

## 2025-03-28 ENCOUNTER — CLINICAL DOCUMENTATION (OUTPATIENT)
Dept: OTHER | Age: 65
End: 2025-03-28

## 2025-03-28 NOTE — PROGRESS NOTES
Patient's case was presented and discussed at General tumor board today.  Multidisciplinary oncology teams present including medical oncologist, radiation oncologist, radiologist, pathologist, general surgery and gastroenterology.         Recommendations:  Solitary brain met, Stage IV.  Genetic testing ordered.  Concurrent radiation, SRS and chemo, immunotherapy.  Aggressively treat both sites.  Possible surgical candidate depending on response.  Will re access after two doses with imaging.

## 2025-03-31 ENCOUNTER — OFFICE VISIT (OUTPATIENT)
Dept: INTERNAL MEDICINE | Age: 65
End: 2025-03-31
Payer: COMMERCIAL

## 2025-03-31 VITALS
BODY MASS INDEX: 26.4 KG/M2 | SYSTOLIC BLOOD PRESSURE: 112 MMHG | DIASTOLIC BLOOD PRESSURE: 80 MMHG | HEART RATE: 99 BPM | HEIGHT: 63 IN | OXYGEN SATURATION: 99 % | WEIGHT: 149 LBS

## 2025-03-31 DIAGNOSIS — C34.91 NON-SMALL CELL CANCER OF RIGHT LUNG (HCC): ICD-10-CM

## 2025-03-31 DIAGNOSIS — M89.8X1 PAIN OF RIGHT SCAPULA: Primary | ICD-10-CM

## 2025-03-31 DIAGNOSIS — C34.90 LUNG CANCER METASTATIC TO BRAIN (HCC): ICD-10-CM

## 2025-03-31 DIAGNOSIS — C79.31 LUNG CANCER METASTATIC TO BRAIN (HCC): ICD-10-CM

## 2025-03-31 DIAGNOSIS — M25.511 ACUTE PAIN OF RIGHT SHOULDER: ICD-10-CM

## 2025-03-31 PROBLEM — Z87.891 FORMER SMOKER: Status: ACTIVE | Noted: 2025-03-31

## 2025-03-31 PROBLEM — Z92.3 HISTORY OF RADIATION THERAPY: Status: ACTIVE | Noted: 2025-03-31

## 2025-03-31 PROCEDURE — 3074F SYST BP LT 130 MM HG: CPT | Performed by: INTERNAL MEDICINE

## 2025-03-31 PROCEDURE — 3079F DIAST BP 80-89 MM HG: CPT | Performed by: INTERNAL MEDICINE

## 2025-03-31 PROCEDURE — 99214 OFFICE O/P EST MOD 30 MIN: CPT | Performed by: INTERNAL MEDICINE

## 2025-03-31 RX ORDER — HYDROCODONE BITARTRATE AND ACETAMINOPHEN 5; 325 MG/1; MG/1
1 TABLET ORAL NIGHTLY PRN
Qty: 30 TABLET | Refills: 0 | Status: SHIPPED | OUTPATIENT
Start: 2025-03-31 | End: 2025-04-30

## 2025-03-31 ASSESSMENT — ENCOUNTER SYMPTOMS
SORE THROAT: 0
WHEEZING: 0
CONSTIPATION: 0
CHEST TIGHTNESS: 0
ABDOMINAL PAIN: 0
COUGH: 0

## 2025-03-31 NOTE — PROGRESS NOTES
Chief Complaint   Patient presents with    Shoulder Pain     Right shoulder blade-on going      History of presenting illness:  Michelle Long is a64 y.o. female who presents today for follow up on her chronic medical conditions as noted below.    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) 03/25/2025    Mediastinal adenopathy 03/10/2025    Lung nodule 03/10/2025    Chronic right maxillary sinusitis 03/03/2025    Panlobular emphysema (HCC) 10/20/2021    History of smoking 10/20/2021    Gastroesophageal reflux disease without esophagitis 10/20/2021    Atypical chest pain 09/02/2021    COPD (chronic obstructive pulmonary disease) (HCC) 09/02/2021    Enlarged lymph nodes 07/19/2021    Essential hypertension 07/19/2021    Transaminitis 11/18/2020    Abnormal CT of the abdomen 11/18/2020    Fatty liver 11/18/2020    Alcohol consumption of one to four drinks per week 11/18/2020    Mild coronary artery disease 07/18/2019    Other chest pain 06/18/2018     Overview Note:     12/30/2016  SE negative for myocardial ischemia  6/19/18  SE negative for myocardial ischemia  7/13/19 ACS FERNANDO RISK Score 2 (angina, + troponin ), AUC indication 3, AUC score 7   7/14/19  Cath  Mild CAD, normal LVFX        Vitamin D deficiency 11/04/2017    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

## 2025-03-31 NOTE — PROGRESS NOTES
[facility]  Radiation Oncology Clinic   Pietro Andre MD, FACR  Lior Ventura APRN  _______________________________________________  [facility]  Department of Radiation Oncology  [address] [place] [id number]  Office: [phone]  Fax: [phone]    DATE: 04/03/2025  PATIENT: Jayla Graves  1960                         MEDICAL RECORD #: [id number]    1. 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 Ductal carcinoma in situ (DCIS) of left breast    3. Retroperitoneal sarcoma    4. History of radiation therapy    5. Former smoker    6. Lung cancer metastatic to brain                                               REASON FOR VISIT:    No chief complaint on file.    REASON FOR CONSULTATION: Jayla Graves is a 64 y.o. female with a history of retroperitoneal sarcoma in 2007 status post surgical resection and postoperative radiation therapy at [facility] in [facility].  She also has a history of DCIS of the left breast treated in 2008 with breast conserving surgery followed by adjuvant radiation therapy here in our department.  Most recently she has a new diagnosis of stage IV non-small cell carcinoma of the lung with a solitary brain metastasis and has been referred to our office for consideration of radiotherapy to the lung and brain.     Workup and evaluation is summarized below:    History of Present Illness:    02/14/2025 - CT low dose chest screening:  Multiple right upper lobe pulmonary nodules which are large and with measurements as above.  According to the NCCN guidelines,  PET CT or biopsy is recommended.   Lymphadenopathy as described above.   Moderate to severe stable emphysema with posterior right upper lobe pleural thickening and dependent atelectasis.   LungRADS Modifier:   LungRADS Recommendations: PET CT or Biopsy   Unexpected findings:  Right upper lobe pulmonary nodules and lymphadenopathy     03/10/2025 -  Appointment with Debby Ardon MD:  Lung nodule seen on the CT is definitely suspicious for possible malignancy.   We will proceed with bronchoscopy and biopsy. The risk benefit was explained to the patient.   She is willing to undergo the procedure.     03/11/2025 - CT Chest with contrast:   Right upper lobe suspicious pulmonary nodules with mediastinal lymphadenopathy. This is   concerning for neoplasm and tissue sampling/pud CT is recommended to further evaluate.   Stable changes of emphysema.     03/13/2025 - Bronchoscopy with biopsy per Debby Ardon MD:  Lung, right upper lobe fine-needle aspiration, smears and ThinPrep:   Non-small cell carcinoma, consistent with squamous cell carcinoma.  Lymph node, station 10R lymph node fine-needle aspiration, smears and ThinPrep and cellblock:   Malignant cells present admixed with rare small round lymphocytes and anthracotic pigment, consistent with metastatic squamous cell carcinoma.  Lung, right upper lobe bronchioloalveolar lavage, ThinPrep:   Non-small cell carcinoma, consistent with squamous cell carcinoma.   Lung, right upper lobe biopsy:   Benign bronchial mucosa and adjacent alveolar parenchyma demonstrating anthracosis, focal hemorrhage and mild pneumocyte hyperplasia.     03/24/2025 - MRI Brain with and without contrast:  There is a solitary metastatic lesion seen within the lateral left temporal lobe as described above measuring 5 mm maximum.   There is no acute cerebral infarction.     03/24/2025 - PET Scan:  There is a metabolically active 1.6 x 0.8 cm right upper lobe mass with extensive right hilar and mediastinal activity, compatible with primary lung neoplasm with hilar/mediastinal extension. Representative sizes and locations are as noted above.   No abnormal metabolic activity is observed below the diaphragm.     03/25/2025 - Appointment with :  Essentially, non-small cell lung cancer, squamous cell subtype with a  solitary brain lesion. Stage IV with oligometastatic disease. I recommended aggressive curative intent treatment with SRS of the solitary brain lesion and concurrent chemoradiation and immunotherapy for her intrathoracic disease. Care plan discussed with the patient  Treatment plan-curative intent  Carboplatin AUC 5-day 1  Paclitaxel 1 7 5 mg/m² day 1  Pembrolizumab 200 mg IV day 1  Q. 21 days  X 4 cycles  To be followed by pembrolizumab 200 mg / 400 mg until disease progression or intolerable toxicity  Subcentimeter brain metastases  5 mm solitary brain metastasis  Referred to Dr. Pietro Guevara  Consideration SRS  PLAN:  RTC with MD cycle #2  Recommend Carbo/Taxol/Keytruda every 21 days x 4 cycles then maintenance Keytruda-pending insurance  NGS [employer] on Tissue and Liquid   Patient education oral and written material performed  Treatment consent   Referral to Dr Guevara for SBRT to brain lesion and lung fields   Recommend Phenergan 12.5 mg every 8 hours PRN-script sent  Recommend Zofran 4 mg every 8 hours PRN-script sent   Recommend Decadron 20 mg the night before and morning of chemotherapy  Referral to Gena Kim for social support  Referral to Katherin Baumann Dietician  Referral to Dr. Sosa general surgery for port placement   Follow Up:  Return for cycle #2.  Anticipate Carbo/Taxol/Keytruda every 21 days x 4 cycles then maintenance Keytruda-pending insurance  Referral to Dr. Guevara for radiation  Referral to Dr. Dorado general surgery for port placement     03/26/2025 - Appointment with Debby Ardon MD:  She was evaluated by oncology yesterday.   She will be initiating chemotherapy and radiation. [facility] to general surgery. Will expedite her referral.    ----------------------------------------------------------------------------------------------------------------------------------------    History of Retroperitoneal sarcoma:    12/3/2007 - Hysterectomy and left pelvic  tumor resection:  Mass of left pelvic retroperitoneal space:   Sarcoma, high grade.   Comment:  This case was referred in consultation to Dr. Sita Zacarias, an expert in soft tissue pathology, at [facility]. The above diagnosis reflects her official consultative report which is appended below.  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  Dr. Zacarias stresses that this is a high grade malignancy and would recommend that the patient be evaluated by a multidisciplinary team experienced in treating retroperitoneal sarcoma.   Uterus with right and left ovaries and fallopian tubes:   Chronic cervicitis.   Squamous metaplasia of endocervix.   Benign disordered weakly proliferative/inactive endometrium.   Benign endometrial polyp.   Superficial adenomyosis, focal.   Left ovary and fallopian tube:  Benign atrophic changes.   Right ovary and fallopian tube:  Benign simple follicle cyst of ovary, and acute congestion of mesosalpinx.                              12/31/2007 - CT Abdomen/Pelvis:  Status post hysterectomy and left pelvic tumor resection. Tiny subcentimeter left pelvic lymph nodes without evidence of pathologic adenopathy.  Two tiny cysts in the liver. Focally fatty infiltration in the left hepatic lobe.    01/23/2008 - PET Scan:  There is a 2 cm focus of moderately increased uptake within the pelvis just posterior to the bladder. This may represent post-surgical inflammation given recent surgical history, although residual malignancy cannot be excluded.  There is a small focus of intensely increased uptake within the inferior aspect of the left breast adjacent to the chest wall that is highly suggestive of malignancy; biopsy is recommended.  Additional transmission CT findings include a small infiltrate within the apex of the right lung without associated FDG uptake. There is  evidence of prior hysterectomy    09/23/2008 - CT Chest:  Stable CT chest with mild diffuse emphysema and pleural thickening in the upper lobes. No evidence of metastatic disease is identified.    09/23/2008 - CT Abdomen/Pelvis:  Stable CT abdomen and pelvis since 4/26/08 with evidence of previous hysterectomy.  No evidence of recurrent pelvic or retroperitoneal tumor.    ---------------------------------------------------------------------------------------------------------------------------------------    History of Ductal carcinoma in situ (DCIS) of left breast:    01/31/2008 - Left Breast Ultrasound/Biopsy:  The mammogram demonstrates suggestion of a subtle lobulated density on the left MLO view seen along the posterior nipple line and the extreme posterior portion of the breast.  On ultrasound, in the left retroareolar region, 1 cm inferior to the nipple there is an irregular hypoechoic mass for which biopsy was recommended.  Technically successful ultrasound-guided biopsy of the left breast mass.    01/31/2008 - Breast, left, needle core biopsy:  Intermediate grade solid ductal carcinoma in-situ, measuring at least 6 mm in greatest extent, involving four cores, involving a complex sclerosing lesion and sclerosing adenosis    02/26/2008 - Bilateral Breast MRI:  There is a mass in the left retroareolar breast as described above, which corresponds to the patient's known ductal carcinoma in situ.  There are not additional kinetic or morphologic abnormalities on this bilateral breast MRI.    03/06/2008 - Left Partial Mastectomy/sentinel lymph node mapping and biopsy of deep left axillary lymph nodes - Dr. Russell Swanson:  Breast, left, partial mastectomy:  High grade DCIS involving a complex sclerosing lesion, 8.0 mm in greatest extent, similar to previous; DCIS present focally 2 mm of anterior resection margin  Lymph node, sentinel node #1:  2 lymph nodes negative  Lymph node, sentinel node #2:  2 lymph  nodes, negative  Lymph node, sentinel node #3:  1 lymph node negative    04/10/2008 - Left Diagnostic Mammogram:  No new masses, suspicious calcifications, or other significant pathology is identified.     04/24/2008 - 06/03/2008 - Completed Radiation course:  Received 5040 cGy in 28 fractions to left breast via External Beam Radiation - EBRT.    07/17/2008 - Left Diagnostic Mammogram:  There are scattered fibroglandular densities. This decreases mammographic sensitivity. There is post-surgical scarring in the left breast.  Benign findings.  Annual mammographic screening is recommended.    01/13/2023 - Bilateral mammogram:  No mammographic evidence of malignancy.    Recommend routine annual screening mammography.   BI-RADS CATEGORY 2: BENIGN FINDINGS.   Management Recommendation: Routine annual mammography.     01/15/2024 - Bilateral mammogram:  BI-RADS 2 - Benign   RECOMMENDATION: Recommend patient return in 1 year for annual screening mammogram.     02/14/2025 - Bilateral mammogram:  No new or suspicious mass or calcification   Annual screening mammogram   BIRADS category  2:  Benign findings     History obtained from  PATIENT and CHART    PAST MEDICAL HISTORY  Past Medical History:   Diagnosis Date    Anxiety     Brain cancer     Breast cancer 2008    left    Depression     Fatty liver disease, nonalcoholic     History of colon polyps     Hx of radiation therapy 2008    Hypercholesterolemia     Lung cancer     Osteopenia     Retroperitoneal sarcoma     Vitamin D deficiency      PAST SURGICAL HISTORY  Past Surgical History:   Procedure Laterality Date    BILATERAL SALPINGO OOPHORECTOMY      BREAST BIOPSY Left 2008    positive    BREAST LUMPECTOMY Left 2008    BREAST LUMPECTOMY Left     CARDIAC CATHETERIZATION      2018/2019 pt unsure. done by dr calderon @ [facility]    CHOLECYSTECTOMY      COLONOSCOPY      MASTECTOMY PARTIAL / LUMPECTOMY Left     TONSILLECTOMY      TOTAL ABDOMINAL HYSTERECTOMY        FAMILY  HISTORY  family history includes Alzheimer's disease in her mother; Cancer in her brother and mother; Diabetes in her father; Heart disease in her father.    SOCIAL HISTORY  Social History     Tobacco Use    Smoking status: Former     Current packs/day: 0.00     Average packs/day: 0.8 packs/day for 25.0 years (18.8 ttl pk-yrs)     Types: Cigarettes     Start date: 10/13/1992     Quit date: 10/13/2017     Years since quittin.4     Passive exposure: Past    Smokeless tobacco: Never   Vaping Use    Vaping status: Never Used   Substance Use Topics    Alcohol use: Yes     Comment: Rarely    Drug use: No     ALLERGIES  Adhesive tape and Sulfa antibiotics     MEDICATIONS    Current Outpatient Medications:     albuterol sulfate  (90 Base) MCG/ACT inhaler, Inhale 2 puffs., Disp: , Rfl:     amLODIPine (NORVASC) 2.5 MG tablet, Take 1 tablet by mouth Daily., Disp: , Rfl:     aspirin 81 MG EC tablet, Take 1 tablet by mouth Daily., Disp: , Rfl:     azelastine (ASTELIN) 0.1 % nasal spray, Administer 1 spray into the nostril(s) as directed by provider., Disp: , Rfl:     busPIRone (BUSPAR) 10 MG tablet, TAKE 1 TABLET BY MOUTH 2 TIMES DAILY AS NEEDED (ANXIETY), Disp: , Rfl:     cloNIDine (CATAPRES) 0.1 MG tablet, TAKE BP EVERY 12 HOURS IF BP OVER 155 SYSTOLIC OR 95 DIASTOLIC, Disp: , Rfl:     dexAMETHasone (DECADRON) 4 MG tablet, Take 1 tablet by mouth 2 (Two) Times a Day. To take with chemotherapy, Disp: , Rfl:     ergocalciferol (ERGOCALCIFEROL) 1.25 MG (51652 UT) capsule, Take 1 capsule by mouth 1 (One) Time Per Week., Disp: , Rfl:     ezetimibe (ZETIA) 10 MG tablet, Take 1 tablet by mouth Daily., Disp: , Rfl:     Flovent  MCG/ACT inhaler, INHALE 2 PUFFS INTO THE LUNGS TWO TIMES A DAY, Disp: , Rfl:     fluticasone (FLONASE) 50 MCG/ACT nasal spray, 2 sprays by Each Nare route Daily., Disp: , Rfl:     fluticasone (FLONASE) 50 MCG/ACT nasal spray, 2 sprays., Disp: , Rfl:     hydroCHLOROthiazide (HYDRODIURIL) 25 MG  tablet, Take 1 tablet by mouth Every Morning., Disp: , Rfl:     HYDROcodone-acetaminophen (NORCO) 5-325 MG per tablet, Take 1 tablet by mouth At Night As Needed for Moderate Pain., Disp: , Rfl:     ibandronate (BONIVA) 150 MG tablet, Take 1 tablet by mouth Every 30 (Thirty) Days., Disp: , Rfl:     Ibandronate Sodium (BONIVA PO), Take 150 mg by mouth Every 30 (Thirty) Days., Disp: , Rfl:     irbesartan (AVAPRO) 300 MG tablet, Take 1 tablet by mouth Daily., Disp: , Rfl:     isosorbide mononitrate (IMDUR) 30 MG 24 hr tablet, Take 1 tablet by mouth Daily., Disp: , Rfl:     isosorbide mononitrate (IMDUR) 30 MG 24 hr tablet, Take 1 tablet by mouth Daily., Disp: , Rfl:     metoprolol tartrate (LOPRESSOR) 50 MG tablet, Take 1 tablet by mouth Every 12 (Twelve) Hours., Disp: , Rfl:     ondansetron (ZOFRAN) 4 MG tablet, Take 1 tablet by mouth Every 8 (Eight) Hours As Needed., Disp: , Rfl:     Oxymetazoline HCl (Nasal Spray) 0.05 % solution, 2 sprays into the nostril(s) as directed by provider., Disp: , Rfl:     promethazine (PHENERGAN) 25 MG tablet, Take 1 tablet by mouth Every 8 (Eight) Hours As Needed for Vomiting or Nausea., Disp: , Rfl:     rosuvastatin (CRESTOR) 20 MG tablet, Take 1 tablet by mouth Every Night., Disp: , Rfl:     rosuvastatin (CRESTOR) 20 MG tablet, Take 1 tablet by mouth Every Night., Disp: , Rfl:     venlafaxine XR (EFFEXOR-XR) 150 MG 24 hr capsule, Take 1 capsule by mouth Daily., Disp: , Rfl:     vitamin D (ERGOCALCIFEROL) 1.25 MG (83504 UT) capsule capsule, Take 1 capsule by mouth 1 (One) Time Per Week. Monday, Disp: , Rfl:     Current outpatient and discharge medications have been reconciled for the patient.  Reviewed by: Mykel Andre MD    The following portions of the patient's history were reviewed and updated as appropriate: allergies, current medications, past family history, past medical history, past social history, past surgical history and problem list.    REVIEW OF SYSTEMS  Review of  "Systems   Constitutional: Negative.    HENT: Negative.     Eyes: Negative.    Respiratory: Negative.     Gastrointestinal: Negative.    Genitourinary: Negative.    Musculoskeletal:  Positive for arthralgias (right shoulder blade; ongoing for the last month).   Skin: Negative.    Neurological: Negative.    Hematological: Negative.    Psychiatric/Behavioral: Negative.       Implant: None.     PHYSICAL EXAM  VITAL SIGNS:   Vitals:    04/03/25 1011   BP: 152/81   Pulse: 89   SpO2: 92%  Comment: room air   Weight: 69.2 kg (152 lb 8 oz)   Height: 160 cm (63\")   PainSc: 0-No pain     Physical Exam  Vitals and nursing note reviewed. Exam conducted with a chaperone present.   Constitutional:       General: She is not in acute distress.     Appearance: Normal appearance. She is normal weight.   HENT:      Head: Normocephalic.      Mouth/Throat:      Mouth: Mucous membranes are moist.      Pharynx: Oropharynx is clear.   Eyes:      Extraocular Movements: Extraocular movements intact.      Pupils: Pupils are equal, round, and reactive to light.   Cardiovascular:      Rate and Rhythm: Normal rate and regular rhythm.   Pulmonary:      Effort: Pulmonary effort is normal. No respiratory distress.      Breath sounds: Normal breath sounds. No wheezing or rales.   Abdominal:      Palpations: Abdomen is soft.      Tenderness: There is no abdominal tenderness. There is no guarding.   Musculoskeletal:         General: Normal range of motion.      Cervical back: Normal range of motion.      Right lower leg: No edema.      Left lower leg: No edema.   Lymphadenopathy:      Cervical: No cervical adenopathy.      Upper Body:      Right upper body: No supraclavicular or axillary adenopathy.      Left upper body: No supraclavicular or axillary adenopathy.   Neurological:      General: No focal deficit present.      Mental Status: She is alert and oriented to person, place, and time.      Cranial Nerves: No cranial nerve deficit.      Motor: No " weakness.      Coordination: Coordination abnormal.      Gait: Gait normal.      Comments: Slight imbalance with heel-to-toe tandem gait   Psychiatric:         Mood and Affect: Mood normal.         Behavior: Behavior normal.         Thought Content: Thought content normal.         Judgment: Judgment normal.         Performance Status: ECOG (1) Restricted in physically strenuous activity, ambulatory and able to do work of light nature    Clinical Quality Measures  -Pain Documented by Standardized Tool, FPS  Jayla Graves reports a pain score of 0. Given her pain assessment as noted, treatment options were discussed and the following options were decided upon as a follow-up plan to address the patient's pain:  No pain, no plan given .  Pain Medications              aspirin 81 MG EC tablet Take 1 tablet by mouth Daily.    dexAMETHasone (DECADRON) 4 MG tablet Take 1 tablet by mouth 2 (Two) Times a Day. To take with chemotherapy    HYDROcodone-acetaminophen (NORCO) 5-325 MG per tablet Take 1 tablet by mouth At Night As Needed for Moderate Pain.    venlafaxine XR (EFFEXOR-XR) 150 MG 24 hr capsule Take 1 capsule by mouth Daily.          -Advanced Care Planning  Advance Care Planning   ACP discussion was held with the patient during this visit. Patient does not have an advance directive, information provided.     - Body Mass Index Screening and Follow-Up Plan  BMI is >= 25 and <30. (Overweight) The following options were offered after discussion;: none (medical contraindication)    -Tobacco Use: Screening and Cessation Intervention: Social History    Tobacco Use      Smoking status: Former        Packs/day: 0.00        Years: 0.8 packs/day for 25.0 years (18.8 ttl pk-yrs)        Types: Cigarettes        Start date: 10/13/1992        Quit date: 10/13/2017        Years since quittin.4        Passive exposure: Past      Smokeless tobacco: Never     - PHQ-2 Depression Screening:  Little interest or pleasure in doing  things? Not at all   Feeling down, depressed, or hopeless? Not at all   PHQ-2 Total Score 0     ASSESSMENT AND PLAN  1. 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 Ductal carcinoma in situ (DCIS) of left breast    3. Retroperitoneal sarcoma    4. History of radiation therapy    5. Former smoker    6. Lung cancer metastatic to brain      Orders Placed This Encounter   Procedures    MRI Brain With & Without Contrast    Ambulatory Referral to Genetic Counseling/Testing    Ambulatory Referral to ONC Social Work     RECOMMENDATIONS: Jayla Graves is a very pleasant 64-year-old female who was diagnosed with multiple metachronous cancer primaries that include retroperitoneal sarcoma, breast cancer, and now stage MAGNOLIA (T1b N2 M1b) oligometastatic non-small cell carcinoma of the right lung with solitary intracranial metastasis.  She has a very good performance status, appears EDINSON from her previous cancer diagnoses, and we will plan to treat definitively given her isolated oligometastasis.      We discussed the role of radiation therapy with this diagnosis as well as the indications and rationale according to the NCCN Guidelines. We have reviewed the risks and benefits of stereotactic radiosurgery vs whole brain radiation, I have extensively reviewed the risks, benefits and alternatives of therapy. Risks include headaches, hair loss in the area treated, nausea, vomiting, fatigue, hearing loss, skin and scalp changes, trouble with memory and speech, seizures and progression of disease in spite of therapy with either local or systemic failure.  I have seen, examined and reviewed this patient's medication list, appropriate labs and imaging studies, reviewed relevant medical records and discussed the goals/plans of care with the patient and family and answered all questions.     The option of definitive surgery has also been reviewed as well as surveillance. We will order a SRS protocol MRI of the brain, necessary to assist with  planning.     At this time, I am recommending Stereotactic radiosurgery (SRS was (which will likely consist of 1 treatment fraction, final course pending.  Dr. Clay has initiated chemotherapy today.  We will plan to subsequently treat the thorax, targeting the hypermetabolic lesions identified on PET/CT scan to a definitive dose of 6000 cGy over 30 fractions.  The patient and family verbalize understanding of this discussion, voice no further questions and wish to proceed with recommended therapy. We will simulate treatment fields to initiate the treatment planning. Continue ongoing management per primary care physician and other specialists.    Thank you for allowing me to assist in this patients care.    Patient Instructions   Proceed today for CT simulation in preparation for stereotactic radiosurgery of solitary brain lesion.  Return another day for CT simulation of lung cancer.    Continue with chemotherapy as planned.    Return today (on 4/3/2025) for  CT simulation for brain.    Time Spent: I spent over 60 minutes caring for Jayla on this date of service. This time includes time spent by me in the following activities: preparing for the visit, reviewing tests, obtaining and/or reviewing a separately obtained history, performing a medically appropriate examination and/or evaluation, counseling and educating the patient/family/caregiver, ordering medications, tests, or procedures, referring and communicating with other health care professionals, documenting information in the medical record, independently interpreting results and communicating that information with the patient/family/caregiver, and care coordination.   Mykel Andre MD  04/03/2025

## 2025-04-02 ENCOUNTER — HOSPITAL ENCOUNTER (OUTPATIENT)
Dept: INFUSION THERAPY | Age: 65
Discharge: HOME | End: 2025-04-02
Payer: COMMERCIAL

## 2025-04-02 ENCOUNTER — HOSPITAL ENCOUNTER (OUTPATIENT)
Dept: RADIATION ONCOLOGY | Facility: HOSPITAL | Age: 65
Setting detail: RADIATION/ONCOLOGY SERIES
End: 2025-04-02
Payer: COMMERCIAL

## 2025-04-02 ENCOUNTER — OFFICE VISIT (OUTPATIENT)
Dept: HEMATOLOGY | Age: 65
End: 2025-04-02

## 2025-04-02 VITALS
DIASTOLIC BLOOD PRESSURE: 77 MMHG | BODY MASS INDEX: 26.56 KG/M2 | OXYGEN SATURATION: 95 % | HEIGHT: 63 IN | HEART RATE: 87 BPM | RESPIRATION RATE: 16 BRPM | WEIGHT: 149.9 LBS | SYSTOLIC BLOOD PRESSURE: 119 MMHG | TEMPERATURE: 98.3 F

## 2025-04-02 VITALS — HEIGHT: 63 IN | BODY MASS INDEX: 26.98 KG/M2

## 2025-04-02 DIAGNOSIS — Z51.12 ENCOUNTER FOR ANTINEOPLASTIC IMMUNOTHERAPY: ICD-10-CM

## 2025-04-02 DIAGNOSIS — R53.0 NEOPLASTIC (MALIGNANT) RELATED FATIGUE: ICD-10-CM

## 2025-04-02 DIAGNOSIS — C79.31 NSCLC METASTATIC TO BRAIN (HCC): Primary | ICD-10-CM

## 2025-04-02 DIAGNOSIS — C34.90 NSCLC METASTATIC TO BRAIN (HCC): Primary | ICD-10-CM

## 2025-04-02 LAB
ALBUMIN SERPL-MCNC: 3.6 G/DL (ref 3.5–5.2)
ALP SERPL-CCNC: 100 U/L (ref 35–104)
ALT SERPL-CCNC: 28 U/L (ref 5–33)
ANION GAP SERPL CALCULATED.3IONS-SCNC: 16 MMOL/L (ref 7–19)
AST SERPL-CCNC: 27 U/L (ref 5–32)
BASOPHILS # BLD: 0.01 K/UL (ref 0–0.2)
BASOPHILS NFR BLD: 0.1 % (ref 0–1)
BILIRUB SERPL-MCNC: <0.2 MG/DL (ref 0–1.2)
BUN SERPL-MCNC: 11 MG/DL (ref 8–23)
CALCIUM SERPL-MCNC: 9.1 MG/DL (ref 8.8–10.2)
CHLORIDE SERPL-SCNC: 99 MMOL/L (ref 98–107)
CO2 SERPL-SCNC: 22 MMOL/L (ref 22–29)
CORTIS AM PEAK SERPL-MCNC: 3.2 UG/DL (ref 4.8–19.5)
CREAT SERPL-MCNC: <0.5 MG/DL (ref 0.5–0.9)
DNA RANGE(S) EXAMINED NAR: NORMAL
EOSINOPHIL # BLD: 0.01 K/UL (ref 0–0.6)
EOSINOPHIL NFR BLD: 0.1 % (ref 0–5)
ERYTHROCYTE [DISTWIDTH] IN BLOOD BY AUTOMATED COUNT: 13.3 % (ref 11.5–14.5)
GENE DIS ANL INTERP-IMP: POSITIVE
GENE DIS ASSESSED: NORMAL
GLUCOSE SERPL-MCNC: 171 MG/DL (ref 70–99)
HCT VFR BLD AUTO: 35.8 % (ref 37–47)
HGB BLD-MCNC: 11.7 G/DL (ref 12–16)
LYMPHOCYTES # BLD: 0.53 K/UL (ref 1.1–4.5)
LYMPHOCYTES NFR BLD: 5.6 % (ref 20–40)
MCH RBC QN AUTO: 28.7 PG (ref 27–31)
MCHC RBC AUTO-ENTMCNC: 32.7 G/DL (ref 33–37)
MCV RBC AUTO: 87.7 FL (ref 81–99)
MONOCYTES # BLD: 0.16 K/UL (ref 0–0.9)
MONOCYTES NFR BLD: 1.7 % (ref 1–10)
NEUTROPHILS # BLD: 8.69 K/UL (ref 1.5–7.5)
NEUTS SEG NFR BLD: 92.1 % (ref 50–65)
PLATELET # BLD AUTO: 430 K/UL (ref 130–400)
PMV BLD AUTO: 8.8 FL (ref 9.4–12.3)
POTASSIUM SERPL-SCNC: 4.3 MMOL/L (ref 3.5–5.1)
PROT SERPL-MCNC: 6.8 G/DL (ref 6.4–8.3)
RBC # BLD AUTO: 4.08 M/UL (ref 4.2–5.4)
REASON FOR STUDY: NORMAL
SODIUM SERPL-SCNC: 137 MMOL/L (ref 136–145)
T4 FREE SERPL-MCNC: 1.23 NG/DL (ref 0.93–1.7)
TEMPUS BLOOD TUMOR MUTATIONAL BURDEN NOTE: NORMAL
TEMPUS LCA: NORMAL
TEMPUS MSI NOTE: NORMAL
TEMPUS PORTAL: NORMAL
TEMPUS THERAPY1: NORMAL
TEMPUS THERAPY2: NORMAL
TEMPUS THERAPY3: NORMAL
TEMPUS THERAPY4: NORMAL
TEMPUS THERAPYCOUNT: 4
TEMPUS TRIAL1: NORMAL
TEMPUS TRIAL3: NORMAL
TEMPUS TRIALCOUNT: 3
TEMPUS TRIALMATCHES2: NORMAL
TSH SERPL DL<=0.005 MIU/L-ACNC: 0.5 UIU/ML (ref 0.27–4.2)
WBC # BLD AUTO: 9.44 K/UL (ref 4.8–10.8)

## 2025-04-02 PROCEDURE — 96375 TX/PRO/DX INJ NEW DRUG ADDON: CPT

## 2025-04-02 PROCEDURE — 96367 TX/PROPH/DG ADDL SEQ IV INF: CPT

## 2025-04-02 PROCEDURE — 36415 COLL VENOUS BLD VENIPUNCTURE: CPT

## 2025-04-02 PROCEDURE — 96413 CHEMO IV INFUSION 1 HR: CPT

## 2025-04-02 PROCEDURE — 2580000003 HC RX 258: Performed by: INTERNAL MEDICINE

## 2025-04-02 PROCEDURE — 82533 TOTAL CORTISOL: CPT

## 2025-04-02 PROCEDURE — 80053 COMPREHEN METABOLIC PANEL: CPT

## 2025-04-02 PROCEDURE — 85025 COMPLETE CBC W/AUTO DIFF WBC: CPT

## 2025-04-02 PROCEDURE — 6360000002 HC RX W HCPCS: Performed by: INTERNAL MEDICINE

## 2025-04-02 PROCEDURE — 96415 CHEMO IV INFUSION ADDL HR: CPT

## 2025-04-02 PROCEDURE — 84443 ASSAY THYROID STIM HORMONE: CPT

## 2025-04-02 PROCEDURE — 96417 CHEMO IV INFUS EACH ADDL SEQ: CPT

## 2025-04-02 PROCEDURE — 84439 ASSAY OF FREE THYROXINE: CPT

## 2025-04-02 RX ORDER — SODIUM CHLORIDE 9 MG/ML
INJECTION, SOLUTION INTRAVENOUS CONTINUOUS
Status: CANCELLED | OUTPATIENT
Start: 2025-04-02

## 2025-04-02 RX ORDER — MEPERIDINE HYDROCHLORIDE 50 MG/ML
12.5 INJECTION INTRAMUSCULAR; INTRAVENOUS; SUBCUTANEOUS PRN
Status: CANCELLED | OUTPATIENT
Start: 2025-04-02

## 2025-04-02 RX ORDER — HYDROCORTISONE SODIUM SUCCINATE 100 MG/2ML
100 INJECTION INTRAMUSCULAR; INTRAVENOUS
Status: CANCELLED | OUTPATIENT
Start: 2025-04-02

## 2025-04-02 RX ORDER — SODIUM CHLORIDE 9 MG/ML
5-250 INJECTION, SOLUTION INTRAVENOUS PRN
Status: CANCELLED | OUTPATIENT
Start: 2025-04-02

## 2025-04-02 RX ORDER — FAMOTIDINE 10 MG/ML
20 INJECTION, SOLUTION INTRAVENOUS
Status: CANCELLED | OUTPATIENT
Start: 2025-04-02

## 2025-04-02 RX ORDER — DEXAMETHASONE SODIUM PHOSPHATE 10 MG/ML
10 INJECTION, SOLUTION INTRAMUSCULAR; INTRAVENOUS ONCE
Status: COMPLETED | OUTPATIENT
Start: 2025-04-02 | End: 2025-04-02

## 2025-04-02 RX ORDER — HEPARIN SODIUM (PORCINE) LOCK FLUSH IV SOLN 100 UNIT/ML 100 UNIT/ML
500 SOLUTION INTRAVENOUS PRN
Status: CANCELLED | OUTPATIENT
Start: 2025-04-02

## 2025-04-02 RX ORDER — DIPHENHYDRAMINE HYDROCHLORIDE 50 MG/ML
50 INJECTION, SOLUTION INTRAMUSCULAR; INTRAVENOUS
Status: CANCELLED | OUTPATIENT
Start: 2025-04-02

## 2025-04-02 RX ORDER — ACETAMINOPHEN 325 MG/1
650 TABLET ORAL
Status: CANCELLED | OUTPATIENT
Start: 2025-04-02

## 2025-04-02 RX ORDER — ALBUTEROL SULFATE 90 UG/1
4 INHALANT RESPIRATORY (INHALATION) PRN
Status: CANCELLED | OUTPATIENT
Start: 2025-04-02

## 2025-04-02 RX ORDER — PROCHLORPERAZINE EDISYLATE 5 MG/ML
5 INJECTION INTRAMUSCULAR; INTRAVENOUS
Status: CANCELLED | OUTPATIENT
Start: 2025-04-02

## 2025-04-02 RX ORDER — EPINEPHRINE 1 MG/ML
0.3 INJECTION, SOLUTION, CONCENTRATE INTRAVENOUS PRN
Status: CANCELLED | OUTPATIENT
Start: 2025-04-02

## 2025-04-02 RX ORDER — ONDANSETRON 2 MG/ML
8 INJECTION INTRAMUSCULAR; INTRAVENOUS
Status: CANCELLED | OUTPATIENT
Start: 2025-04-02

## 2025-04-02 RX ORDER — FAMOTIDINE 10 MG/ML
20 INJECTION, SOLUTION INTRAVENOUS ONCE
Status: COMPLETED | OUTPATIENT
Start: 2025-04-02 | End: 2025-04-02

## 2025-04-02 RX ORDER — SODIUM CHLORIDE 9 MG/ML
5-250 INJECTION, SOLUTION INTRAVENOUS PRN
Status: DISCONTINUED | OUTPATIENT
Start: 2025-04-02 | End: 2025-04-03 | Stop reason: HOSPADM

## 2025-04-02 RX ORDER — DIPHENHYDRAMINE HYDROCHLORIDE 50 MG/ML
50 INJECTION, SOLUTION INTRAMUSCULAR; INTRAVENOUS ONCE
Status: COMPLETED | OUTPATIENT
Start: 2025-04-02 | End: 2025-04-02

## 2025-04-02 RX ORDER — SODIUM CHLORIDE 0.9 % (FLUSH) 0.9 %
5-40 SYRINGE (ML) INJECTION PRN
Status: CANCELLED | OUTPATIENT
Start: 2025-04-02

## 2025-04-02 RX ORDER — PALONOSETRON 0.05 MG/ML
0.25 INJECTION, SOLUTION INTRAVENOUS ONCE
Status: COMPLETED | OUTPATIENT
Start: 2025-04-02 | End: 2025-04-02

## 2025-04-02 RX ADMIN — SODIUM CHLORIDE 200 MG: 9 INJECTION, SOLUTION INTRAVENOUS at 09:53

## 2025-04-02 RX ADMIN — PALONOSETRON 0.25 MG: 0.05 INJECTION, SOLUTION INTRAVENOUS at 08:51

## 2025-04-02 RX ADMIN — DIPHENHYDRAMINE HYDROCHLORIDE 50 MG: 50 INJECTION INTRAMUSCULAR; INTRAVENOUS at 08:51

## 2025-04-02 RX ADMIN — CARBOPLATIN 500 MG: 10 INJECTION, SOLUTION INTRAVENOUS at 14:13

## 2025-04-02 RX ADMIN — DEXAMETHASONE SODIUM PHOSPHATE 10 MG: 10 INJECTION, SOLUTION INTRAMUSCULAR; INTRAVENOUS at 08:50

## 2025-04-02 RX ADMIN — SODIUM CHLORIDE 25 ML/HR: 9 INJECTION, SOLUTION INTRAVENOUS at 08:49

## 2025-04-02 RX ADMIN — FAMOTIDINE 20 MG: 10 INJECTION, SOLUTION INTRAVENOUS at 08:51

## 2025-04-02 RX ADMIN — PACLITAXEL 300 MG: 6 INJECTION, SOLUTION INTRAVENOUS at 10:27

## 2025-04-02 RX ADMIN — SODIUM CHLORIDE 150 MG: 0.9 INJECTION, SOLUTION INTRAVENOUS at 09:28

## 2025-04-02 NOTE — PROGRESS NOTES
[facility]  [address] [place] [address] [place] [address]  Phone: [phone]  Fax: [phone]    Essie Zapata, MS, RD, LD   Michelle Long 64 y.o.   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 Referral from Dr. 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 Pt presents adequately nourished with no recent wt loss or change in appetite. Pt is receiving cycle 1 of Carbo, Taxol, Keytruda today. She notes no specific nutrition-related issues at baseline apart from occasional diarrhea.    Diet History:  Pt reports she avoids fried foods. She tries to avoid added sugars unless it is a special occasion. Pt does not drink soda, energy drinks, juice, or flavored water. She does drink water throughout the day. Pt often eats breakfast and dinner meal. She will either skip lunch or eat a small snack.     Nutrition Related Laboratory Data:  Na+=137  K+=4.3  Qje=362  BUN:Cr=22  GFR >90    Anthropometric Measures:  Height: 158.8 cm (5' 2.5\")  Ideal Body Weight (IBW): 113 lbs (51 kg)       Current Body Weight: 68 kg (149 lb 14.4 oz), 132.7 % IBW.    Current BMI (kg/m2): 27  Wt Readings from Last 5 Encounters:   04/02/25 68 kg (149 lb 14.4 oz)   03/31/25 67.6 kg (149 lb)   03/26/25 67.4 kg (148 lb 9.6 oz)   03/25/25 67.4 kg (148 lb 8 oz)   03/13/25 67.1 kg (148 lb)        Nutrition Interventions:   Food and/or Nutrient Delivery: Continue Current Diet, Snacks  Nutrition Education/Counseling: Education/Counseling initiated     Plan of Care discussed with: pt,

## 2025-04-02 NOTE — PROGRESS NOTES
Lab Results   Component Value Date    WBC 9.44 04/02/2025    HGB 11.7 (L) 04/02/2025    HCT 35.8 (L) 04/02/2025    MCV 87.7 04/02/2025     (H) 04/02/2025     Lab Results   Component Value Date    NEUTROABS 8.69 (H) 04/02/2025     Lab Results   Component Value Date     04/02/2025    K 4.3 04/02/2025    CL 99 04/02/2025    CO2 22 04/02/2025    BUN 11 04/02/2025    CREATININE <0.5 04/02/2025    GLUCOSE 171 (H) 04/02/2025    CALCIUM 9.1 04/02/2025    BILITOT <0.2 04/02/2025    ALKPHOS 100 04/02/2025    AST 27 04/02/2025    ALT 28 04/02/2025    LABGLOM >90 04/02/2025    GFRAA >59 05/13/2022    GLOB 2.4 12/30/2016

## 2025-04-03 ENCOUNTER — FOLLOWUP TELEPHONE ENCOUNTER (OUTPATIENT)
Dept: HEMATOLOGY | Age: 65
End: 2025-04-03

## 2025-04-03 ENCOUNTER — CONSULT (OUTPATIENT)
Age: 65
End: 2025-04-03
Payer: COMMERCIAL

## 2025-04-03 VITALS
HEART RATE: 89 BPM | WEIGHT: 152.5 LBS | OXYGEN SATURATION: 92 % | HEIGHT: 63 IN | BODY MASS INDEX: 27.02 KG/M2 | DIASTOLIC BLOOD PRESSURE: 81 MMHG | SYSTOLIC BLOOD PRESSURE: 152 MMHG

## 2025-04-03 DIAGNOSIS — Z87.891 FORMER SMOKER: ICD-10-CM

## 2025-04-03 DIAGNOSIS — C79.31 NSCLC METASTATIC TO BRAIN: Primary | ICD-10-CM

## 2025-04-03 DIAGNOSIS — C34.90 NSCLC METASTATIC TO BRAIN: Primary | ICD-10-CM

## 2025-04-03 DIAGNOSIS — D05.12 DUCTAL CARCINOMA IN SITU (DCIS) OF LEFT BREAST: ICD-10-CM

## 2025-04-03 DIAGNOSIS — C48.0 RETROPERITONEAL SARCOMA: ICD-10-CM

## 2025-04-03 DIAGNOSIS — C34.90 LUNG CANCER METASTATIC TO BRAIN: ICD-10-CM

## 2025-04-03 DIAGNOSIS — C79.31 LUNG CANCER METASTATIC TO BRAIN: ICD-10-CM

## 2025-04-03 DIAGNOSIS — Z92.3 HISTORY OF RADIATION THERAPY: ICD-10-CM

## 2025-04-03 PROBLEM — C34.91 SQUAMOUS CELL CARCINOMA OF RIGHT LUNG: Status: ACTIVE | Noted: 2025-03-26

## 2025-04-03 PROCEDURE — 77334 RADIATION TREATMENT AID(S): CPT | Performed by: RADIOLOGY

## 2025-04-03 PROCEDURE — 77470 SPECIAL RADIATION TREATMENT: CPT | Performed by: RADIOLOGY

## 2025-04-03 PROCEDURE — 77263 THER RADIOLOGY TX PLNG CPLX: CPT | Performed by: RADIOLOGY

## 2025-04-03 PROCEDURE — G0463 HOSPITAL OUTPT CLINIC VISIT: HCPCS | Performed by: RADIOLOGY

## 2025-04-03 RX ORDER — PROMETHAZINE HYDROCHLORIDE 25 MG/1
25 TABLET ORAL EVERY 8 HOURS PRN
COMMUNITY
Start: 2025-03-25 | End: 2025-05-18

## 2025-04-03 RX ORDER — ONDANSETRON 4 MG/1
1 TABLET, FILM COATED ORAL EVERY 8 HOURS PRN
COMMUNITY
Start: 2025-03-25

## 2025-04-03 RX ORDER — DEXAMETHASONE 4 MG/1
4 TABLET ORAL 2 TIMES DAILY
COMMUNITY
Start: 2025-03-25

## 2025-04-03 RX ORDER — EZETIMIBE 10 MG/1
1 TABLET ORAL DAILY
COMMUNITY
Start: 2025-03-07

## 2025-04-03 RX ORDER — HYDROCODONE BITARTRATE AND ACETAMINOPHEN 5; 325 MG/1; MG/1
1 TABLET ORAL NIGHTLY PRN
COMMUNITY
Start: 2025-03-31 | End: 2025-05-01

## 2025-04-03 NOTE — TELEPHONE ENCOUNTER
CONSTANCE Thomas called patient to follow up on Distress screening. Patient did not answer this SW call.     “Ciro greene is Bri the  with [facility]. No reason for alarm or concern regarding my call. I am calling to introduce myself, my role, and sources of support, as well as follow up on the distress screening completed at your initial visit with us. Please give me a call back at your convenience at [phone]”.       Patients voicemail was not set up.  MYA was unable to leave a message.

## 2025-04-03 NOTE — PATIENT INSTRUCTIONS
Proceed today for CT simulation in preparation for stereotactic radiosurgery of solitary brain lesion.  Return another day for CT simulation of lung cancer.    Continue with chemotherapy as planned.

## 2025-04-03 NOTE — TELEPHONE ENCOUNTER
Patient Michelle Ledesma returned this social workers phone call regarding available resources sources of support and distress screening.  Patient denied any questions concerns or needs at this time.   encouraged the patient to call if any assistance is needed in the future.

## 2025-04-04 ENCOUNTER — TELEPHONE (OUTPATIENT)
Dept: RADIATION ONCOLOGY | Facility: HOSPITAL | Age: 65
End: 2025-04-04
Payer: COMMERCIAL

## 2025-04-04 ENCOUNTER — HOSPITAL ENCOUNTER (OUTPATIENT)
Dept: MRI IMAGING | Facility: HOSPITAL | Age: 65
Discharge: HOME | End: 2025-04-04
Admitting: RADIOLOGY
Payer: COMMERCIAL

## 2025-04-04 DIAGNOSIS — C34.90 NSCLC METASTATIC TO BRAIN: ICD-10-CM

## 2025-04-04 DIAGNOSIS — C79.31 NSCLC METASTATIC TO BRAIN: ICD-10-CM

## 2025-04-04 DIAGNOSIS — Z92.3 HISTORY OF RADIATION THERAPY: ICD-10-CM

## 2025-04-04 PROCEDURE — 25510000001 GADOPICLENOL 0.5 MMOL/ML SOLUTION: Performed by: RADIOLOGY

## 2025-04-04 PROCEDURE — A9579 GAD-BASE MR CONTRAST NOS,1ML: HCPCS | Performed by: RADIOLOGY

## 2025-04-04 PROCEDURE — 70553 MRI BRAIN STEM W/O & W/DYE: CPT

## 2025-04-04 RX ADMIN — GADOPICLENOL 7 ML: 485.1 INJECTION INTRAVENOUS at 12:04

## 2025-04-04 NOTE — TELEPHONE ENCOUNTER
HUSSEIN received referral on Mrs. Graves. 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 HUSSEIN explained role and source of support. She is 64 years old and lives with her spouse. Her support system incudes her family. Currently she does not have any transportation concerns. Mrs. Graves works full time for [employer]. She is currently working from home. She plans to continue to work throughout treatment but is aware this can change due to the side effects of treatment. She does have short term disability through work.     Mrs. Graves is processing her treatment plan along with her diagnosis. She had been cancer free for 18 years and she felt cancer was behind her. Mrs. Graves planned to retire later this year but didn't anticipate having to do treatment. She does take an antidepressant and has been on it for a while. In the past she did receive radiation and surgery for treatment, but this will be her first with chemo and it makes her nervous. Emotional support provided throughout, utilizing empathy, and validating and normalizing identified emotions.  HUSSEIN spoke to her about [id number] groups, and she was interested in counseling services. SW provided her with information for this. HUSSEIN encouraged her to call as needed and will follow up once she starts treatment.

## 2025-04-07 ENCOUNTER — OFFICE VISIT (OUTPATIENT)
Dept: SURGERY | Age: 65
End: 2025-04-07
Payer: COMMERCIAL

## 2025-04-07 VITALS — HEART RATE: 100 BPM | WEIGHT: 149 LBS | BODY MASS INDEX: 27.42 KG/M2 | HEIGHT: 62 IN

## 2025-04-07 DIAGNOSIS — I10 ESSENTIAL HYPERTENSION: ICD-10-CM

## 2025-04-07 DIAGNOSIS — C34.90 NSCLC METASTATIC TO BRAIN (HCC): Primary | ICD-10-CM

## 2025-04-07 DIAGNOSIS — J43.9 PULMONARY EMPHYSEMA, UNSPECIFIED EMPHYSEMA TYPE (HCC): ICD-10-CM

## 2025-04-07 DIAGNOSIS — Z45.2 ENCOUNTER FOR FITTING AND ADJUSTMENT OF VASCULAR CATHETER: ICD-10-CM

## 2025-04-07 DIAGNOSIS — C79.31 NSCLC METASTATIC TO BRAIN (HCC): Primary | ICD-10-CM

## 2025-04-07 PROCEDURE — 99204 OFFICE O/P NEW MOD 45 MIN: CPT | Performed by: SURGERY

## 2025-04-07 NOTE — PROGRESS NOTES
control, adjustment of the mA and/or kV according to size, and the use of iterative reconstruction technique.      ______________________________________   Electronically signed by: FLORINDA MACK D.O.  Date:     03/28/2025  Time:    11:08        Labs  Lab Results   Component Value Date    WBC 9.44 04/02/2025    HGB 11.7 (L) 04/02/2025    HCT 35.8 (L) 04/02/2025    MCV 87.7 04/02/2025     (H) 04/02/2025      Lab Results   Component Value Date     04/02/2025    K 4.3 04/02/2025    CL 99 04/02/2025    CO2 22 04/02/2025    BUN 11 04/02/2025    CREATININE <0.5 04/02/2025    GLUCOSE 171 (H) 04/02/2025    CALCIUM 9.1 04/02/2025    BILITOT <0.2 04/02/2025    ALKPHOS 100 04/02/2025    AST 27 04/02/2025    ALT 28 04/02/2025    LABGLOM >90 04/02/2025    GFRAA >59 05/13/2022    GLOB 2.4 12/30/2016          Assessment  1. 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 Essential hypertension  3. Pulmonary emphysema, unspecified emphysema type (HCC)  4. Encounter for fitting and adjustment of vascular catheter       Plan  She will be scheduled for placement of right internal jugular central venous catheter, tunneled with subcutaneous port with the use of ultrasound and fluoroscopy.  She was informed of the risk benefits and alternatives which include but not limited to bleeding infection and port malfunction.    Engagement of the patient, family, and/or caregiver in the development of the treatment plan.    Hypertension can increase cardiac complications after surgery.  COPD can increase pulmonary complications after surgery.      All patient questions answered.  Pt voiced understanding. Patient agreed with treatment plan. Follow up as needed.    This dictation was generated by voice recognition computer software. Although all attempts are made to edit the dictation for accuracy, there may be errors in the transcription that are not intended.    Electronically signed by Tom Garcia MD on 4/7/2025 at 3:47 PM

## 2025-04-08 PROCEDURE — 77334 RADIATION TREATMENT AID(S): CPT | Performed by: RADIOLOGY

## 2025-04-09 ENCOUNTER — CLINICAL DOCUMENTATION (OUTPATIENT)
Dept: HEMATOLOGY | Age: 65
End: 2025-04-09

## 2025-04-09 NOTE — PROGRESS NOTES
Dr Parson reviewed [facility] xF results. 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 He recommends to do [place] genetic test at next visit.

## 2025-04-10 ENCOUNTER — TELEPHONE (OUTPATIENT)
Dept: GENETICS | Facility: HOSPITAL | Age: 65
End: 2025-04-10
Payer: COMMERCIAL

## 2025-04-10 LAB
RAD ONC ARIA COURSE END DATE: NORMAL
RAD ONC ARIA COURSE ID: NORMAL
RAD ONC ARIA COURSE INTENT: NORMAL
RAD ONC ARIA COURSE LAST TREATMENT DATE: NORMAL
RAD ONC ARIA COURSE START DATE: NORMAL
RAD ONC ARIA COURSE TREATMENT ELAPSED DAYS: 40
RAD ONC ARIA FIRST TREATMENT DATE: NORMAL
RAD ONC ARIA PLAN FRACTIONS TREATED TO DATE: 28
RAD ONC ARIA PLAN ID: NORMAL
RAD ONC ARIA PLAN NAME: NORMAL
RAD ONC ARIA PLAN PRESCRIBED DOSE PER FRACTION: 1.8 GY
RAD ONC ARIA PLAN PRIMARY REFERENCE POINT: NORMAL
RAD ONC ARIA PLAN TOTAL FRACTIONS PRESCRIBED: 28
RAD ONC ARIA PLAN TOTAL PRESCRIBED DOSE: 5040 CGY
RAD ONC ARIA REFERENCE POINT DOSAGE GIVEN TO DATE: 50.4 GY
RAD ONC ARIA REFERENCE POINT DOSAGE GIVEN TO DATE: 50.92 GY
RAD ONC ARIA REFERENCE POINT DOSAGE GIVEN TO DATE: 51.4 GY
RAD ONC ARIA REFERENCE POINT DOSAGE GIVEN TO DATE: 52.5 GY
RAD ONC ARIA REFERENCE POINT ID: NORMAL

## 2025-04-10 PROCEDURE — 77338 DESIGN MLC DEVICE FOR IMRT: CPT | Performed by: RADIOLOGY

## 2025-04-10 PROCEDURE — 77300 RADIATION THERAPY DOSE PLAN: CPT | Performed by: RADIOLOGY

## 2025-04-10 PROCEDURE — 77301 RADIOTHERAPY DOSE PLAN IMRT: CPT | Performed by: RADIOLOGY

## 2025-04-10 RX ORDER — SODIUM CHLORIDE 9 MG/ML
INJECTION, SOLUTION INTRAVENOUS PRN
Status: CANCELLED | OUTPATIENT
Start: 2025-04-10

## 2025-04-10 RX ORDER — SODIUM CHLORIDE, SODIUM LACTATE, POTASSIUM CHLORIDE, CALCIUM CHLORIDE 600; 310; 30; 20 MG/100ML; MG/100ML; MG/100ML; MG/100ML
INJECTION, SOLUTION INTRAVENOUS CONTINUOUS
Status: CANCELLED | OUTPATIENT
Start: 2025-04-10

## 2025-04-10 RX ORDER — SODIUM CHLORIDE 0.9 % (FLUSH) 0.9 %
5-40 SYRINGE (ML) INJECTION EVERY 12 HOURS SCHEDULED
Status: CANCELLED | OUTPATIENT
Start: 2025-04-10

## 2025-04-10 RX ORDER — SODIUM CHLORIDE 0.9 % (FLUSH) 0.9 %
5-40 SYRINGE (ML) INJECTION PRN
Status: CANCELLED | OUTPATIENT
Start: 2025-04-10

## 2025-04-10 NOTE — TELEPHONE ENCOUNTER
Called pt to ask about previous genetic test results. Pt is unsure if she's had genetic testing, but plans to reach out to her provider's office. Pt has the fax number to send those results to if she has had testing.

## 2025-04-11 ENCOUNTER — HOSPITAL ENCOUNTER (OUTPATIENT)
Dept: PREADMISSION TESTING | Age: 65
Discharge: HOME | End: 2025-04-15
Payer: COMMERCIAL

## 2025-04-11 VITALS — HEIGHT: 62 IN | BODY MASS INDEX: 27.02 KG/M2 | WEIGHT: 146.8 LBS

## 2025-04-11 LAB — MRSA DNA SPEC QL NAA+PROBE: NOT DETECTED

## 2025-04-11 PROCEDURE — 93005 ELECTROCARDIOGRAM TRACING: CPT | Performed by: ANESTHESIOLOGY

## 2025-04-11 PROCEDURE — 87641 MR-STAPH DNA AMP PROBE: CPT

## 2025-04-13 LAB
EKG P AXIS: 74 DEGREES
EKG P-R INTERVAL: 138 MS
EKG Q-T INTERVAL: 340 MS
EKG QRS DURATION: 84 MS
EKG QTC CALCULATION (BAZETT): 403 MS
EKG T AXIS: 58 DEGREES

## 2025-04-14 ENCOUNTER — CLINICAL SUPPORT (OUTPATIENT)
Dept: GENETICS | Facility: HOSPITAL | Age: 65
End: 2025-04-14
Payer: COMMERCIAL

## 2025-04-14 DIAGNOSIS — C48.0 RETROPERITONEAL SARCOMA: ICD-10-CM

## 2025-04-14 DIAGNOSIS — D05.12 DUCTAL CARCINOMA IN SITU (DCIS) OF LEFT BREAST: ICD-10-CM

## 2025-04-14 DIAGNOSIS — Z80.3 FAMILY HISTORY OF BREAST CANCER: ICD-10-CM

## 2025-04-14 DIAGNOSIS — Z13.79 GENETIC TESTING: Primary | ICD-10-CM

## 2025-04-14 NOTE — PROGRESS NOTES
Jayla Graves is a 64 y.o. female who was referred for genetic counseling due to a personal history of cancer. Genetic counseling was performed via telephone. Ms. Graves confirmed her full name, date of birth, and that she was physically located in the [facility] at the time of the appointment. She was diagnosed with a retroperitoneal sarcoma in 2007 and underwent surgical resection and radiation at [facility]. 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 She was recently diagnosed with a stage 4 non-small cell lung cancer with brain metastasis. She is currently undergoing radiation therapy and chemotherapy. She doesn't report any abnormal exposures, but that she is [place] the [employer] [place] in [place]. She does report a history of smoking since age 23 and continued until 2016. She has colonoscopies every 5 years and she has had around 6-7 polyps total. Ms. Graves had a total hysterectomy in 2007 due to the sarcoma seen on imaging and was initially suspected to be attached to the ovary. Ms. Graves was interested in discussing her risk for a hereditary cancer syndrome, and decided to pursue genetic testing. The CancerNext-Expanded panel was ordered through [employer] which analyzes 76 genes associated with an increased cancer risk. She plans to have her blood drawn on 4/16/2025 at [facility]. Results are expected 2-3 weeks after the lab receives her sample.    PERTINENT FAMILY HISTORY:  Pat. Grandmother:    Breast cancer, 79  Brother:    Squamous cell cancer on tongue, 41  Mother:    Breast cancer, 48  Mat. Great-Aunt:   Breast cancer, mid-50s    Medical records regarding the diagnoses in the family were not available for review.     RISK ASSESSMENT:  Ms. Graves's personal history of cancer led to concern for a hereditary cancer syndrome. she clearly meets NCCN criteria for genetic testing for high-penetrance breast cancer genes based on her personal history of breast cancer  [FreeTextEntry1] : Hordeolum\par - right upper eyelid\par - continue with tobradex\par - warm compresses and massage as often as possible \par - advised visit with ophto as not resolving and small ulceration - may need drainage diagnosed at age 47 and her maternal family history of breast cancer. We discussed multigene panel testing that would evaluate multiple genes simultaneously associated with hereditary cancer risk. This risk assessment is based on the family history information provided at the time of the appointment and could change in the future should new information be obtained.    GENETIC COUNSELING: We reviewed the family history information in detail.  Cases of cancer follow three general patterns: sporadic, familial, and hereditary.  While most cancer is sporadic, some cases appear to occur in family clusters.  These cases are said to be familial and account for 10-20% of cancer cases.  Familial cases may be due to a combination of shared genes and environmental factors among family members.  In even fewer families, the cancer is said to be inherited, and the genes responsible for the cancer are known.      Family histories typical of hereditary cancer syndromes usually include multiple first- and second-degree relatives diagnosed with cancer types that define a syndrome.  These cases tend to be diagnosed at younger-than-expected ages and can be bilateral or multifocal.  The cancer in these families follows an autosomal dominant inheritance pattern, which indicates the likely presence of a mutation in a cancer susceptibility gene.  Children and siblings of an individual believed to carry this mutation have a 50% chance of inheriting that mutation, thereby inheriting the increased risk to develop cancer.  These mutations can be passed down from the maternal or the paternal lineage.    Li-Fraumeni syndrome (LFS) is associated with high lifetime risks of cancer, with the risk of cancer estimated at 50% by age 30 years and 90% by age 60. LFS-related cancers can occur in childhood or young adulthood, and individuals who have had one diagnosis have an increased risk for multiple primary cancers. LFS is characterized by development  of the hallmark cancers including; soft tissue sarcoma, osteosarcoma, pre-menopausal breast cancer, brain tumors, adrenocortical carcinoma (ACC), and leukemias. In addition, a variety of other neoplasms may occur.     Due to the CHEK2 variant identified on her [employer] testing, we briefly reviewed breast cancer risks associated with CHEK2.    There are other genes that are known to be associated with an increased risk for cancer.  Some of these genes have well defined risks and established management guidelines.  Other genes that can be tested for have been more recently described, and there may be less data regarding the risks and therefore may not have established management guidelines. Based on Ms. Graves's desire to get as much information as possible regarding her personal risks and potential risks for her family, she opted to pursue testing through a panel that would evaluate multiple genes that have been associated with cancer risk.     GENETIC TESTING:  The risks, benefits and limitations of genetic testing and implications for clinical management following testing were reviewed.  DNA test results can influence decisions regarding screening, prevention and surgical management. Genetic testing can have significant psychological implications for both individuals and families.  Also discussed was the possibility of employment and insurance discrimination based on genetic test results and the laws in place to prevent this (INDY).    We discussed panel testing that would evaluate 76 genes associated with increased cancer risk. The implications of a positive or negative test result were discussed. We discussed the possibility that, in some cases, genetic test results may be informative or may be ambiguous due to the identification of a genetic variant. These variants may or may not be associated with an increased cancer risk.  With multigene panel testing, it is not uncommon for a variant of uncertain significance  (VUS) to be identified.  If a VUS is identified, testing unaffected family members is typically not recommended and screening recommendations are made based on the family history.  The laboratories that perform genetic testing work to reclassify the VUS and send out an amended report if and when a VUS is reclassified.  The majority of variant findings are ultimately reclassified to a negative result.  Given her personal and family history, a negative test result would not eliminate all risk to her relatives.      PLAN: Genetic testing was ordered via the CancerCritiTecht-Expanded Panel through [employer]. She plans to have her blood drawn on 4/16/2025 at [facility]. Results are expected 2-3 weeks after the lab receives her sample. If she has any questions in the meantime, she is welcome to call me at [phone].     Carly Rodriguez MS, [facility], [facility]  Licensed Certified Genetic Counselor     Total time spent caring for the patient today was 65 minutes.  This time includes chart review, time spent during the visit, and time spent after the visit on documentation and follow-up.

## 2025-04-15 ENCOUNTER — ANESTHESIA (OUTPATIENT)
Dept: OPERATING ROOM | Age: 65
End: 2025-04-15
Payer: COMMERCIAL

## 2025-04-15 ENCOUNTER — HOSPITAL ENCOUNTER (OUTPATIENT)
Age: 65
Setting detail: OUTPATIENT SURGERY
Discharge: HOME | End: 2025-04-15
Attending: SURGERY | Admitting: SURGERY
Payer: COMMERCIAL

## 2025-04-15 ENCOUNTER — APPOINTMENT (OUTPATIENT)
Dept: GENERAL RADIOLOGY | Age: 65
End: 2025-04-15
Attending: SURGERY
Payer: COMMERCIAL

## 2025-04-15 ENCOUNTER — ANESTHESIA EVENT (OUTPATIENT)
Dept: OPERATING ROOM | Age: 65
End: 2025-04-15
Payer: COMMERCIAL

## 2025-04-15 VITALS
TEMPERATURE: 97.4 F | HEART RATE: 78 BPM | SYSTOLIC BLOOD PRESSURE: 106 MMHG | WEIGHT: 146 LBS | HEIGHT: 62 IN | RESPIRATION RATE: 20 BRPM | BODY MASS INDEX: 26.87 KG/M2 | OXYGEN SATURATION: 96 % | DIASTOLIC BLOOD PRESSURE: 81 MMHG

## 2025-04-15 PROCEDURE — 77300 RADIATION THERAPY DOSE PLAN: CPT | Performed by: RADIOLOGY

## 2025-04-15 PROCEDURE — 77301 RADIOTHERAPY DOSE PLAN IMRT: CPT | Performed by: RADIOLOGY

## 2025-04-15 PROCEDURE — 36561 INSERT TUNNELED CV CATH: CPT | Performed by: SURGERY

## 2025-04-15 PROCEDURE — 2500000003 HC RX 250 WO HCPCS: Performed by: SURGERY

## 2025-04-15 PROCEDURE — 3600000013 HC SURGERY LEVEL 3 ADDTL 15MIN: Performed by: SURGERY

## 2025-04-15 PROCEDURE — 7100000000 HC PACU RECOVERY - FIRST 15 MIN: Performed by: SURGERY

## 2025-04-15 PROCEDURE — 77338 DESIGN MLC DEVICE FOR IMRT: CPT | Performed by: RADIOLOGY

## 2025-04-15 PROCEDURE — 7100000011 HC PHASE II RECOVERY - ADDTL 15 MIN: Performed by: SURGERY

## 2025-04-15 PROCEDURE — 6360000002 HC RX W HCPCS: Performed by: NURSE ANESTHETIST, CERTIFIED REGISTERED

## 2025-04-15 PROCEDURE — 3700000001 HC ADD 15 MINUTES (ANESTHESIA): Performed by: SURGERY

## 2025-04-15 PROCEDURE — 3600000003 HC SURGERY LEVEL 3 BASE: Performed by: SURGERY

## 2025-04-15 PROCEDURE — 6370000000 HC RX 637 (ALT 250 FOR IP): Performed by: ANESTHESIOLOGY

## 2025-04-15 PROCEDURE — C1788 PORT, INDWELLING, IMP: HCPCS | Performed by: SURGERY

## 2025-04-15 PROCEDURE — 7100000001 HC PACU RECOVERY - ADDTL 15 MIN: Performed by: SURGERY

## 2025-04-15 PROCEDURE — 2580000003 HC RX 258: Performed by: SURGERY

## 2025-04-15 PROCEDURE — 2580000003 HC RX 258: Performed by: NURSE ANESTHETIST, CERTIFIED REGISTERED

## 2025-04-15 PROCEDURE — 3700000000 HC ANESTHESIA ATTENDED CARE: Performed by: SURGERY

## 2025-04-15 PROCEDURE — 77001 FLUOROGUIDE FOR VEIN DEVICE: CPT | Performed by: SURGERY

## 2025-04-15 PROCEDURE — 71045 X-RAY EXAM CHEST 1 VIEW: CPT

## 2025-04-15 PROCEDURE — 2709999900 HC NON-CHARGEABLE SUPPLY: Performed by: SURGERY

## 2025-04-15 PROCEDURE — 6360000002 HC RX W HCPCS: Performed by: SURGERY

## 2025-04-15 PROCEDURE — 7100000010 HC PHASE II RECOVERY - FIRST 15 MIN: Performed by: SURGERY

## 2025-04-15 DEVICE — PORT INFUS PLAS SGL LUMN W/ 9.6FR SIL CATH AIRGUARD VLV: Type: IMPLANTABLE DEVICE | Site: CHEST | Status: FUNCTIONAL

## 2025-04-15 RX ORDER — SODIUM CHLORIDE 9 MG/ML
INJECTION, SOLUTION INTRAVENOUS PRN
Status: DISCONTINUED | OUTPATIENT
Start: 2025-04-15 | End: 2025-04-15 | Stop reason: HOSPADM

## 2025-04-15 RX ORDER — SODIUM CHLORIDE 0.9 % (FLUSH) 0.9 %
5-40 SYRINGE (ML) INJECTION EVERY 12 HOURS SCHEDULED
Status: DISCONTINUED | OUTPATIENT
Start: 2025-04-15 | End: 2025-04-15 | Stop reason: HOSPADM

## 2025-04-15 RX ORDER — FENTANYL CITRATE 50 UG/ML
INJECTION, SOLUTION INTRAMUSCULAR; INTRAVENOUS
Status: DISCONTINUED
Start: 2025-04-15 | End: 2025-04-15 | Stop reason: HOSPADM

## 2025-04-15 RX ORDER — HEPARIN SODIUM,PORCINE/PF 10 UNIT/ML
SYRINGE (ML) INTRAVENOUS PRN
Status: DISCONTINUED | OUTPATIENT
Start: 2025-04-15 | End: 2025-04-15 | Stop reason: ALTCHOICE

## 2025-04-15 RX ORDER — SCOPOLAMINE 1 MG/3D
1 PATCH, EXTENDED RELEASE TRANSDERMAL
Status: DISCONTINUED | OUTPATIENT
Start: 2025-04-15 | End: 2025-04-15 | Stop reason: HOSPADM

## 2025-04-15 RX ORDER — LIDOCAINE HYDROCHLORIDE 10 MG/ML
1 INJECTION, SOLUTION EPIDURAL; INFILTRATION; INTRACAUDAL; PERINEURAL
Status: DISCONTINUED | OUTPATIENT
Start: 2025-04-15 | End: 2025-04-15 | Stop reason: HOSPADM

## 2025-04-15 RX ORDER — PROPOFOL 10 MG/ML
INJECTION, EMULSION INTRAVENOUS
Status: DISCONTINUED | OUTPATIENT
Start: 2025-04-15 | End: 2025-04-15 | Stop reason: SDUPTHER

## 2025-04-15 RX ORDER — FENTANYL CITRATE 50 UG/ML
INJECTION, SOLUTION INTRAMUSCULAR; INTRAVENOUS
Status: DISCONTINUED | OUTPATIENT
Start: 2025-04-15 | End: 2025-04-15 | Stop reason: SDUPTHER

## 2025-04-15 RX ORDER — SODIUM CHLORIDE 0.9 % (FLUSH) 0.9 %
5-40 SYRINGE (ML) INJECTION PRN
Status: DISCONTINUED | OUTPATIENT
Start: 2025-04-15 | End: 2025-04-15 | Stop reason: HOSPADM

## 2025-04-15 RX ORDER — SODIUM CHLORIDE, SODIUM LACTATE, POTASSIUM CHLORIDE, CALCIUM CHLORIDE 600; 310; 30; 20 MG/100ML; MG/100ML; MG/100ML; MG/100ML
INJECTION, SOLUTION INTRAVENOUS
Status: DISCONTINUED | OUTPATIENT
Start: 2025-04-15 | End: 2025-04-15 | Stop reason: SDUPTHER

## 2025-04-15 RX ORDER — MIDAZOLAM HYDROCHLORIDE 1 MG/ML
INJECTION, SOLUTION INTRAMUSCULAR; INTRAVENOUS
Status: DISCONTINUED | OUTPATIENT
Start: 2025-04-15 | End: 2025-04-15 | Stop reason: SDUPTHER

## 2025-04-15 RX ORDER — MIDAZOLAM HYDROCHLORIDE 2 MG/2ML
2 INJECTION, SOLUTION INTRAMUSCULAR; INTRAVENOUS
Status: DISCONTINUED | OUTPATIENT
Start: 2025-04-15 | End: 2025-04-15 | Stop reason: HOSPADM

## 2025-04-15 RX ORDER — HYDROCODONE BITARTRATE AND ACETAMINOPHEN 5; 325 MG/1; MG/1
1 TABLET ORAL ONCE
Status: COMPLETED | OUTPATIENT
Start: 2025-04-15 | End: 2025-04-15

## 2025-04-15 RX ORDER — LIDOCAINE HYDROCHLORIDE 10 MG/ML
INJECTION, SOLUTION INFILTRATION; PERINEURAL
Status: DISCONTINUED | OUTPATIENT
Start: 2025-04-15 | End: 2025-04-15 | Stop reason: SDUPTHER

## 2025-04-15 RX ORDER — HYDROMORPHONE HYDROCHLORIDE 1 MG/ML
0.25 INJECTION, SOLUTION INTRAMUSCULAR; INTRAVENOUS; SUBCUTANEOUS EVERY 5 MIN PRN
Status: DISCONTINUED | OUTPATIENT
Start: 2025-04-15 | End: 2025-04-15 | Stop reason: HOSPADM

## 2025-04-15 RX ORDER — SODIUM CHLORIDE, SODIUM LACTATE, POTASSIUM CHLORIDE, CALCIUM CHLORIDE 600; 310; 30; 20 MG/100ML; MG/100ML; MG/100ML; MG/100ML
INJECTION, SOLUTION INTRAVENOUS CONTINUOUS
Status: DISCONTINUED | OUTPATIENT
Start: 2025-04-15 | End: 2025-04-15 | Stop reason: HOSPADM

## 2025-04-15 RX ORDER — BUPIVACAINE HYDROCHLORIDE 2.5 MG/ML
INJECTION, SOLUTION INFILTRATION; PERINEURAL PRN
Status: DISCONTINUED | OUTPATIENT
Start: 2025-04-15 | End: 2025-04-15 | Stop reason: ALTCHOICE

## 2025-04-15 RX ORDER — MIDAZOLAM HYDROCHLORIDE 1 MG/ML
INJECTION, SOLUTION INTRAMUSCULAR; INTRAVENOUS
Status: DISCONTINUED
Start: 2025-04-15 | End: 2025-04-15 | Stop reason: HOSPADM

## 2025-04-15 RX ORDER — DIPHENHYDRAMINE HYDROCHLORIDE 50 MG/ML
12.5 INJECTION, SOLUTION INTRAMUSCULAR; INTRAVENOUS
Status: DISCONTINUED | OUTPATIENT
Start: 2025-04-15 | End: 2025-04-15 | Stop reason: HOSPADM

## 2025-04-15 RX ORDER — MORPHINE SULFATE 4 MG/ML
4 INJECTION, SOLUTION INTRAMUSCULAR; INTRAVENOUS ONCE
Refills: 0 | Status: CANCELLED | OUTPATIENT
Start: 2025-04-15

## 2025-04-15 RX ORDER — MEPERIDINE HYDROCHLORIDE 25 MG/ML
12.5 INJECTION INTRAMUSCULAR; INTRAVENOUS; SUBCUTANEOUS EVERY 5 MIN PRN
Status: DISCONTINUED | OUTPATIENT
Start: 2025-04-15 | End: 2025-04-15 | Stop reason: HOSPADM

## 2025-04-15 RX ORDER — HYDROMORPHONE HYDROCHLORIDE 1 MG/ML
0.5 INJECTION, SOLUTION INTRAMUSCULAR; INTRAVENOUS; SUBCUTANEOUS EVERY 5 MIN PRN
Status: DISCONTINUED | OUTPATIENT
Start: 2025-04-15 | End: 2025-04-15 | Stop reason: HOSPADM

## 2025-04-15 RX ORDER — NALOXONE HYDROCHLORIDE 0.4 MG/ML
INJECTION, SOLUTION INTRAMUSCULAR; INTRAVENOUS; SUBCUTANEOUS PRN
Status: DISCONTINUED | OUTPATIENT
Start: 2025-04-15 | End: 2025-04-15 | Stop reason: HOSPADM

## 2025-04-15 RX ORDER — ONDANSETRON 2 MG/ML
INJECTION INTRAMUSCULAR; INTRAVENOUS
Status: DISCONTINUED | OUTPATIENT
Start: 2025-04-15 | End: 2025-04-15 | Stop reason: SDUPTHER

## 2025-04-15 RX ORDER — FAMOTIDINE 20 MG/1
20 TABLET, FILM COATED ORAL ONCE
Status: DISCONTINUED | OUTPATIENT
Start: 2025-04-15 | End: 2025-04-15 | Stop reason: HOSPADM

## 2025-04-15 RX ORDER — METOCLOPRAMIDE HYDROCHLORIDE 5 MG/ML
10 INJECTION INTRAMUSCULAR; INTRAVENOUS
Status: DISCONTINUED | OUTPATIENT
Start: 2025-04-15 | End: 2025-04-15 | Stop reason: HOSPADM

## 2025-04-15 RX ADMIN — PHENYLEPHRINE HYDROCHLORIDE 100 MCG: 10 INJECTION INTRAVENOUS at 09:36

## 2025-04-15 RX ADMIN — MIDAZOLAM 5 MG: 1 INJECTION INTRAMUSCULAR; INTRAVENOUS at 08:40

## 2025-04-15 RX ADMIN — PROPOFOL 40 MG: 10 INJECTION, EMULSION INTRAVENOUS at 08:48

## 2025-04-15 RX ADMIN — FENTANYL CITRATE 25 MCG: 0.05 INJECTION, SOLUTION INTRAMUSCULAR; INTRAVENOUS at 09:23

## 2025-04-15 RX ADMIN — FENTANYL CITRATE 25 MCG: 0.05 INJECTION, SOLUTION INTRAMUSCULAR; INTRAVENOUS at 09:03

## 2025-04-15 RX ADMIN — PHENYLEPHRINE HYDROCHLORIDE 100 MCG: 10 INJECTION INTRAVENOUS at 09:01

## 2025-04-15 RX ADMIN — HYDROCODONE BITARTRATE AND ACETAMINOPHEN 1 TABLET: 5; 325 TABLET ORAL at 10:40

## 2025-04-15 RX ADMIN — ONDANSETRON 4 MG: 2 INJECTION INTRAMUSCULAR; INTRAVENOUS at 08:54

## 2025-04-15 RX ADMIN — PROPOFOL 180 MCG/KG/MIN: 10 INJECTION, EMULSION INTRAVENOUS at 08:49

## 2025-04-15 RX ADMIN — PROPOFOL 40 MG: 10 INJECTION, EMULSION INTRAVENOUS at 08:50

## 2025-04-15 RX ADMIN — SODIUM CHLORIDE, SODIUM LACTATE, POTASSIUM CHLORIDE, AND CALCIUM CHLORIDE: 600; 310; 30; 20 INJECTION, SOLUTION INTRAVENOUS at 08:40

## 2025-04-15 RX ADMIN — LIDOCAINE HYDROCHLORIDE 50 MG: 10 INJECTION, SOLUTION INFILTRATION; PERINEURAL at 08:47

## 2025-04-15 RX ADMIN — FENTANYL CITRATE 25 MCG: 0.05 INJECTION, SOLUTION INTRAMUSCULAR; INTRAVENOUS at 08:57

## 2025-04-15 RX ADMIN — PHENYLEPHRINE HYDROCHLORIDE 100 MCG: 10 INJECTION INTRAVENOUS at 09:09

## 2025-04-15 RX ADMIN — FENTANYL CITRATE 25 MCG: 0.05 INJECTION, SOLUTION INTRAMUSCULAR; INTRAVENOUS at 08:48

## 2025-04-15 RX ADMIN — PHENYLEPHRINE HYDROCHLORIDE 100 MCG: 10 INJECTION INTRAVENOUS at 09:12

## 2025-04-15 RX ADMIN — PHENYLEPHRINE HYDROCHLORIDE 100 MCG: 10 INJECTION INTRAVENOUS at 09:20

## 2025-04-15 RX ADMIN — PHENYLEPHRINE HYDROCHLORIDE 100 MCG: 10 INJECTION INTRAVENOUS at 09:22

## 2025-04-15 RX ADMIN — SODIUM CHLORIDE, SODIUM LACTATE, POTASSIUM CHLORIDE, AND CALCIUM CHLORIDE: 600; 310; 30; 20 INJECTION, SOLUTION INTRAVENOUS at 07:54

## 2025-04-15 ASSESSMENT — PAIN - FUNCTIONAL ASSESSMENT
PAIN_FUNCTIONAL_ASSESSMENT: ADULT NONVERBAL PAIN SCALE (NPVS)
PAIN_FUNCTIONAL_ASSESSMENT: NONE - DENIES PAIN
PAIN_FUNCTIONAL_ASSESSMENT: 0-10

## 2025-04-15 ASSESSMENT — LIFESTYLE VARIABLES: SMOKING_STATUS: 0

## 2025-04-15 ASSESSMENT — PAIN DESCRIPTION - DESCRIPTORS: DESCRIPTORS: ACHING

## 2025-04-15 ASSESSMENT — PAIN SCALES - GENERAL: PAINLEVEL_OUTOF10: 2

## 2025-04-15 NOTE — OP NOTE
[facility] General Surgery Operative Note    Patient ID: Michelle Long  64 y.o.  female  YOB: 1960        NAME OF SURGEON: Tom Garcia MD     DATE OF SERVICE: 4/15/2025    PREOPERATIVE DIAGNOSIS  Non-small cell lung cancer metastatic to the brain  In need of an [id number]    POSTOPERATIVE DIAGNOSIS  Same    PROCEDURE  Right internal jugular central venous catheter, tunneled with subcutaneous port with the use of ultrasound and fluoroscopy    SURGEON  Tom Garcia MD       SPECIMEN:  None    INDICATIONS  64-year-old female who has lung cancer metastatic to the brain and is in need of chemotherapy    *Findings:  Infection Present At Time Of Surgery (PATOS) (choose all levels that have infection present):  No infection present  Other Findings:       PROCEDURE  After informed consent was obtained the patient brought to the operating room placed in supine position on the operating table.  After adequate anesthesia a timeout was performed to adequately identify the patient's name number and procedure.  Her right chest and neck were sterilely prepped and draped as was the left chest.  She was then placed in Trendelenburg.    The right internal jugular vein was identified using ultrasound.  It was easily compressible.  We then used a needle to access the right internal jugular vein.  This was visualized with the ultrasound-the needle was entering into the vein.  There was good return of nonpulsatile dark blood.  We then passed the J-wire with ease and there was no ectopy.  The wire was then identified and confirmed to be in the right venous system with fluoroscopy.    Then on the right chest we injected 0.25% Marcaine and created an incision.  A subcutaneous pocket was created with electrocautery large enough to fit the port.  Then the catheter was attached to the subcutaneous tunneler and the catheter was tunneled from the right chest port incision up to the right neck venipuncture site.

## 2025-04-15 NOTE — H&P
scans are performed using dose optimization techniques as appropriate to the performed exam and include   at least one of the following: Automated exposure control, adjustment of the mA and/or kV according to size, and the use of iterative reconstruction technique.      ______________________________________   Electronically signed by: FLORINDA MACK D.O.  Date:     03/28/2025  Time:    11:08        Labs  Lab Results   Component Value Date    WBC 9.44 04/02/2025    HGB 11.7 (L) 04/02/2025    HCT 35.8 (L) 04/02/2025    MCV 87.7 04/02/2025     (H) 04/02/2025      Lab Results   Component Value Date     04/02/2025    K 4.3 04/02/2025    CL 99 04/02/2025    CO2 22 04/02/2025    BUN 11 04/02/2025    CREATININE <0.5 04/02/2025    GLUCOSE 171 (H) 04/02/2025    CALCIUM 9.1 04/02/2025    BILITOT <0.2 04/02/2025    ALKPHOS 100 04/02/2025    AST 27 04/02/2025    ALT 28 04/02/2025    LABGLOM >90 04/02/2025    GFRAA >59 05/13/2022    GLOB 2.4 12/30/2016          Assessment  1. 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 Essential hypertension  3. Pulmonary emphysema, unspecified emphysema type (HCC)  4. Encounter for fitting and adjustment of vascular catheter       Plan  She will be scheduled for placement of right internal jugular central venous catheter, tunneled with subcutaneous port with the use of ultrasound and fluoroscopy.  She was informed of the risk benefits and alternatives which include but not limited to bleeding infection and port malfunction.    Engagement of the patient, family, and/or caregiver in the development of the treatment plan.    Hypertension can increase cardiac complications after surgery.  COPD can increase pulmonary complications after surgery.      All patient questions answered.  Pt voiced understanding. Patient agreed with treatment plan. Follow up as needed.    This dictation was generated by voice recognition computer software. Although all attempts are made to edit the dictation for

## 2025-04-15 NOTE — ANESTHESIA PRE PROCEDURE
Department of Anesthesiology  Preprocedure Note       Name:  Michelle Long   Age:  64 y.o.  :  1960                                          MRN:  [id number]         Date:  4/15/2025      Surgeon: Surgeon(s):  Tom Garcia MD    Procedure: Procedure(s):  PLACEMENT OF RIGHT INTERNAL JUGULAR CENTRAL VENOUS CATHETER, TUNNELED WITH SUBCUTANEOUS PORT WITH THE USE OF ULTRASOUND & FLUOROSCOPY    Medications prior to admission:   Prior to Admission medications    Medication Sig Start Date End Date Taking? Authorizing Provider   HYDROcodone-acetaminophen (NORCO) 5-325 MG per tablet Take 1 tablet by mouth nightly as needed for Pain for up to 30 days. Intended supply: 5 days. Take lowest dose possible to manage pain Max Daily Amount: 1 tablet 3/31/25 4/30/25 Yes Brandy Gonzalez MD   dexAMETHasone (DECADRON) 4 MG tablet Take 20 mg (5 pills) the night before and the morning of chemotherapy  Patient taking differently: Take 1 tablet by mouth See Admin Instructions Take 20 mg (5 pills) the night before and the morning of chemotherapy 3/25/25  Yes Loly Parson MD   promethazine (PHENERGAN) 25 MG tablet Take 0.5 tablets by mouth every 8 hours as needed for Nausea 3/25/25 5/17/25 Yes Loly Parson MD   ondansetron (ZOFRAN) 4 MG tablet Take 1 tablet by mouth every 8 hours as needed for Nausea or Vomiting 3/25/25  Yes Loly Parson MD   albuterol sulfate HFA (PROVENTIL;VENTOLIN;PROAIR) 108 (90 Base) MCG/ACT inhaler INHALE 2 PUFFS INTO THE LUNGS 4 TIMES DAILY AS NEEDED FOR WHEEZING 3/10/25  Yes Brandy Gonzalez MD   ezetimibe (ZETIA) 10 MG tablet TAKE 1 TABLET BY MOUTH DAILY  Patient taking differently: Take 1 tablet by mouth at bedtime 3/7/25  Yes Brandy Gonzalez MD   busPIRone (BUSPAR) 10 MG tablet TAKE 1 TABLET BY MOUTH 2 TIMES DAILY AS NEEDED (ANXIETY) 3/7/25  Yes Brandy Gonzalez MD   sodium chloride (OCEAN NASAL SPRAY) 0.65 % nasal spray 2 squirts to each nostril q AM and nightly 3/3/25  Yes Addison

## 2025-04-15 NOTE — OP NOTE
[facility]    Patient ID: Michelle Long  64 y.o.  female  YOB: 1960      Brief Operative Report    Pre-operative Diagnosis: Non-small cell lung cancer metastatic to the brain   in need of an [id number]    Post-operative Diagnosis: Same    Procedure: Placement of right internal jugular central venous catheter tunneled with subcutaneous port with the use of ultrasound and fluoroscopy    Surgeon:  Tom Garcia MD      Anesthesia: MAC/local    Findings: Findings:  Infection Present At Time Of Surgery (PATOS) (choose all levels that have infection present):  No infection present  Other Findings:     Estimated blood loss: 1 ml    Specimens: None    Complications:  none    Condition:  stable        See dictated operative report for full det

## 2025-04-15 NOTE — PROGRESS NOTES
Patient to room 6. She is alert, oriented and able to make her needs known. VSS.  at bedside. She denies pain.

## 2025-04-15 NOTE — ANESTHESIA POSTPROCEDURE EVALUATION
Department of Anesthesiology  Postprocedure Note    Patient: Michelle Long  MRN: [id number]  YOB: 1960  Date of evaluation: 4/15/2025    Procedure Summary       Date: 04/15/25 Room / Location: [address]    Anesthesia Start: 0840 Anesthesia Stop: 0938    Procedure: PLACEMENT OF RIGHT INTERNAL JUGULAR CENTRAL VENOUS CATHETER, TUNNELED WITH SUBCUTANEOUS PORT WITH THE USE OF ULTRASOUND & FLUOROSCOPY (Chest) Diagnosis:       Non-small cell lung cancer metastatic to brain (HCC)      Encounter for venous access device care      (Non-small cell lung cancer metastatic to brain (HCC) [C34.90, C79.31])      (Encounter for venous access device care [Z45.2])    Surgeons: Tom Garcia MD Responsible Provider: Herb Gregory APRN - CRNA    Anesthesia Type: MAC, TIVA ASA Status: 3            Anesthesia Type: No value filed.    Mahin Phase I: Mahin Score: 10    Mahin Phase II:      Anesthesia Post Evaluation    Patient location during evaluation: PACU  Patient participation: complete - patient participated  Level of consciousness: sleepy but conscious  Pain score: 0  Airway patency: patent  Nausea & Vomiting: no nausea and no vomiting  Cardiovascular status: hemodynamically stable  Respiratory status: acceptable, spontaneous ventilation, nonlabored ventilation, nasal cannula and oral airway  Hydration status: stable  Comments: BP (!) 80/58   Pulse 68   Temp 97.2 °F (36.2 °C) (Skin)   Resp 13   Ht 1.575 m (5' 2\")   Wt 66.2 kg (146 lb)   SpO2 92%   BMI 26.70 kg/m²     Pain management: adequate    No notable events documented.

## 2025-04-16 ENCOUNTER — HOSPITAL ENCOUNTER (OUTPATIENT)
Dept: RADIATION ONCOLOGY | Facility: HOSPITAL | Age: 65
Discharge: HOME | End: 2025-04-16

## 2025-04-16 VITALS — OXYGEN SATURATION: 96 % | HEART RATE: 91 BPM | SYSTOLIC BLOOD PRESSURE: 125 MMHG | DIASTOLIC BLOOD PRESSURE: 76 MMHG

## 2025-04-16 LAB
RAD ONC ARIA COURSE ID: NORMAL
RAD ONC ARIA COURSE INTENT: NORMAL
RAD ONC ARIA COURSE LAST TREATMENT DATE: NORMAL
RAD ONC ARIA COURSE START DATE: NORMAL
RAD ONC ARIA COURSE TREATMENT ELAPSED DAYS: 0
RAD ONC ARIA FIRST TREATMENT DATE: NORMAL
RAD ONC ARIA PLAN FRACTIONS TREATED TO DATE: 1
RAD ONC ARIA PLAN ID: NORMAL
RAD ONC ARIA PLAN PRESCRIBED DOSE PER FRACTION: 9 GY
RAD ONC ARIA PLAN PRIMARY REFERENCE POINT: NORMAL
RAD ONC ARIA PLAN TOTAL FRACTIONS PRESCRIBED: 3
RAD ONC ARIA PLAN TOTAL PRESCRIBED DOSE: 2700 CGY
RAD ONC ARIA REFERENCE POINT DOSAGE GIVEN TO DATE: 9 GY
RAD ONC ARIA REFERENCE POINT ID: NORMAL
RAD ONC ARIA REFERENCE POINT SESSION DOSAGE GIVEN: 9 GY

## 2025-04-16 PROCEDURE — 77373 STRTCTC BDY RAD THER TX DLVR: CPT | Performed by: RADIOLOGY

## 2025-04-17 ENCOUNTER — PATIENT MESSAGE (OUTPATIENT)
Dept: INTERNAL MEDICINE | Age: 65
End: 2025-04-17

## 2025-04-17 DIAGNOSIS — M25.511 ACUTE PAIN OF RIGHT SHOULDER: ICD-10-CM

## 2025-04-17 RX ORDER — HYDROCODONE BITARTRATE AND ACETAMINOPHEN 5; 325 MG/1; MG/1
1 TABLET ORAL NIGHTLY PRN
Qty: 30 TABLET | Refills: 0 | Status: CANCELLED | OUTPATIENT
Start: 2025-04-17 | End: 2025-05-17

## 2025-04-17 RX ORDER — HYDROCODONE BITARTRATE AND ACETAMINOPHEN 5; 325 MG/1; MG/1
1 TABLET ORAL 2 TIMES DAILY PRN
Qty: 60 TABLET | Refills: 0 | Status: SHIPPED | OUTPATIENT
Start: 2025-04-17 | End: 2025-05-17

## 2025-04-17 RX ORDER — HYDROCODONE BITARTRATE AND ACETAMINOPHEN 5; 325 MG/1; MG/1
1 TABLET ORAL NIGHTLY PRN
Qty: 30 TABLET | Refills: 0 | OUTPATIENT
Start: 2025-04-17 | End: 2025-05-17

## 2025-04-18 ENCOUNTER — HOSPITAL ENCOUNTER (OUTPATIENT)
Dept: RADIATION ONCOLOGY | Facility: HOSPITAL | Age: 65
Discharge: HOME | End: 2025-04-18

## 2025-04-18 VITALS — SYSTOLIC BLOOD PRESSURE: 142 MMHG | OXYGEN SATURATION: 96 % | HEART RATE: 94 BPM | DIASTOLIC BLOOD PRESSURE: 77 MMHG

## 2025-04-18 LAB
RAD ONC ARIA COURSE ID: NORMAL
RAD ONC ARIA COURSE INTENT: NORMAL
RAD ONC ARIA COURSE LAST TREATMENT DATE: NORMAL
RAD ONC ARIA COURSE START DATE: NORMAL
RAD ONC ARIA COURSE TREATMENT ELAPSED DAYS: 2
RAD ONC ARIA FIRST TREATMENT DATE: NORMAL
RAD ONC ARIA PLAN FRACTIONS TREATED TO DATE: 2
RAD ONC ARIA PLAN ID: NORMAL
RAD ONC ARIA PLAN PRESCRIBED DOSE PER FRACTION: 9 GY
RAD ONC ARIA PLAN PRIMARY REFERENCE POINT: NORMAL
RAD ONC ARIA PLAN TOTAL FRACTIONS PRESCRIBED: 3
RAD ONC ARIA PLAN TOTAL PRESCRIBED DOSE: 2700 CGY
RAD ONC ARIA REFERENCE POINT DOSAGE GIVEN TO DATE: 18 GY
RAD ONC ARIA REFERENCE POINT ID: NORMAL
RAD ONC ARIA REFERENCE POINT SESSION DOSAGE GIVEN: 9 GY

## 2025-04-18 PROCEDURE — 77373 STRTCTC BDY RAD THER TX DLVR: CPT | Performed by: RADIOLOGY

## 2025-04-21 ENCOUNTER — HOSPITAL ENCOUNTER (OUTPATIENT)
Dept: RADIATION ONCOLOGY | Facility: HOSPITAL | Age: 65
Discharge: HOME | End: 2025-04-21
Payer: COMMERCIAL

## 2025-04-21 VITALS — OXYGEN SATURATION: 95 % | DIASTOLIC BLOOD PRESSURE: 81 MMHG | HEART RATE: 100 BPM | SYSTOLIC BLOOD PRESSURE: 153 MMHG

## 2025-04-21 LAB
RAD ONC ARIA COURSE ID: NORMAL
RAD ONC ARIA COURSE INTENT: NORMAL
RAD ONC ARIA COURSE LAST TREATMENT DATE: NORMAL
RAD ONC ARIA COURSE START DATE: NORMAL
RAD ONC ARIA COURSE TREATMENT ELAPSED DAYS: 5
RAD ONC ARIA FIRST TREATMENT DATE: NORMAL
RAD ONC ARIA PLAN FRACTIONS TREATED TO DATE: 3
RAD ONC ARIA PLAN ID: NORMAL
RAD ONC ARIA PLAN PRESCRIBED DOSE PER FRACTION: 9 GY
RAD ONC ARIA PLAN PRIMARY REFERENCE POINT: NORMAL
RAD ONC ARIA PLAN TOTAL FRACTIONS PRESCRIBED: 3
RAD ONC ARIA PLAN TOTAL PRESCRIBED DOSE: 2700 CGY
RAD ONC ARIA REFERENCE POINT DOSAGE GIVEN TO DATE: 27 GY
RAD ONC ARIA REFERENCE POINT ID: NORMAL
RAD ONC ARIA REFERENCE POINT SESSION DOSAGE GIVEN: 9 GY

## 2025-04-21 PROCEDURE — 77373 STRTCTC BDY RAD THER TX DLVR: CPT | Performed by: RADIOLOGY

## 2025-04-21 PROCEDURE — 77336 RADIATION PHYSICS CONSULT: CPT | Performed by: RADIOLOGY

## 2025-04-22 NOTE — PROGRESS NOTES
MEDICAL ONCOLOGY PROGRESS NOTE     Patient Name: Michelle Long  MRN: [id number]  YOB: 1960  Date of evaluation: 4/24/2025        HISTORY OF PRESENT ILLNESS:   History of Present Illness  The patient is a 64-year-old female with a diagnosis of non-small cell lung cancer, specifically in the right upper lobe with mediastinal adenopathy, left frontal brain metastasis, and a KRAS G12C mutation. She is currently receiving palliative chemotherapy with carboplatin, paclitaxel. She is also receiving concurrent XRT. She has completed SRS to the brain lesion and is here for her second cycle of treatment.    She reports experiencing nausea, which she has discussed with the nursing staff and feels confident in managing. She has not experienced any episodes of vomiting. Mild tingling sensations are noted in the soles of her feet but not in her hands. No oral sores have developed. Her oral hygiene routine includes rinsing with Biotene at least three times daily and using a baking soda and warm water solution after each meal one week post-chemotherapy. Her diet remains stable, consisting of two Greek yogurts, a bottle of Ensure, and half a cup of strawberries or blueberries for breakfast; [employer] bread soup for lunch; and a plain chicken breast for dinner.    She was previously prescribed hydrocodone 5 mg by Dr. Funes on 03/31/2025, which she takes once daily. This medication has been effective in managing her shoulder pain, allowing her to sleep well. The pain, which radiates into her ear, was initially attributed to lymph nodes pressing on nerves by Dr. Funes.    She has a history of breast cancer diagnosed in 2008 and underwent genetic testing at that time.    FAMILY HISTORY  Her mother had breast cancer.        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   History DCIS left breast Lumpectomy 2009->WBRT Dr Arita  NSCLC-squamous cell carcinoma, right upper lobe lung, March

## 2025-04-23 DIAGNOSIS — C79.31 NON-SMALL CELL LUNG CANCER METASTATIC TO BRAIN (HCC): Primary | ICD-10-CM

## 2025-04-23 DIAGNOSIS — C34.90 NON-SMALL CELL LUNG CANCER METASTATIC TO BRAIN (HCC): Primary | ICD-10-CM

## 2025-04-24 ENCOUNTER — OFFICE VISIT (OUTPATIENT)
Dept: PALLATIVE CARE | Age: 65
End: 2025-04-24
Payer: COMMERCIAL

## 2025-04-24 ENCOUNTER — OFFICE VISIT (OUTPATIENT)
Dept: HEMATOLOGY | Age: 65
End: 2025-04-24

## 2025-04-24 ENCOUNTER — HOSPITAL ENCOUNTER (OUTPATIENT)
Dept: INFUSION THERAPY | Age: 65
Discharge: HOME | End: 2025-04-24
Payer: COMMERCIAL

## 2025-04-24 ENCOUNTER — OFFICE VISIT (OUTPATIENT)
Dept: HEMATOLOGY | Age: 65
End: 2025-04-24
Payer: COMMERCIAL

## 2025-04-24 VITALS
SYSTOLIC BLOOD PRESSURE: 144 MMHG | DIASTOLIC BLOOD PRESSURE: 74 MMHG | TEMPERATURE: 97 F | OXYGEN SATURATION: 99 % | HEIGHT: 62 IN | BODY MASS INDEX: 26.61 KG/M2 | HEART RATE: 110 BPM | WEIGHT: 144.6 LBS | RESPIRATION RATE: 16 BRPM

## 2025-04-24 VITALS — HEIGHT: 62 IN | BODY MASS INDEX: 26.45 KG/M2

## 2025-04-24 DIAGNOSIS — Z15.89 CHEK2 GENE MUTATION POSITIVE: ICD-10-CM

## 2025-04-24 DIAGNOSIS — C34.90 NSCLC METASTATIC TO BRAIN (HCC): Primary | ICD-10-CM

## 2025-04-24 DIAGNOSIS — R73.9 HYPERGLYCEMIA: ICD-10-CM

## 2025-04-24 DIAGNOSIS — T45.1X5A ANEMIA ASSOCIATED WITH CHEMOTHERAPY: ICD-10-CM

## 2025-04-24 DIAGNOSIS — R53.83 CHEMOTHERAPY-INDUCED FATIGUE: ICD-10-CM

## 2025-04-24 DIAGNOSIS — D64.81 ANEMIA ASSOCIATED WITH CHEMOTHERAPY: ICD-10-CM

## 2025-04-24 DIAGNOSIS — Z51.11 CHEMOTHERAPY MANAGEMENT, ENCOUNTER FOR: ICD-10-CM

## 2025-04-24 DIAGNOSIS — G62.0 CHEMOTHERAPY-INDUCED PERIPHERAL NEUROPATHY: ICD-10-CM

## 2025-04-24 DIAGNOSIS — C79.31 NON-SMALL CELL LUNG CANCER METASTATIC TO BRAIN (HCC): ICD-10-CM

## 2025-04-24 DIAGNOSIS — C79.31 NSCLC METASTATIC TO BRAIN (HCC): Primary | ICD-10-CM

## 2025-04-24 DIAGNOSIS — C34.91 NON-SMALL CELL CANCER OF RIGHT LUNG (HCC): ICD-10-CM

## 2025-04-24 DIAGNOSIS — R53.0 NEOPLASTIC (MALIGNANT) RELATED FATIGUE: ICD-10-CM

## 2025-04-24 DIAGNOSIS — C34.90 NON-SMALL CELL LUNG CANCER METASTATIC TO BRAIN (HCC): ICD-10-CM

## 2025-04-24 DIAGNOSIS — C80.1 CANCER (HCC): Primary | ICD-10-CM

## 2025-04-24 DIAGNOSIS — Z74.09 DECREASED MOBILITY AND ENDURANCE: Primary | ICD-10-CM

## 2025-04-24 DIAGNOSIS — Z51.5 ENCOUNTER FOR PALLIATIVE CARE: ICD-10-CM

## 2025-04-24 DIAGNOSIS — Z86.000 HISTORY OF DUCTAL CARCINOMA IN SITU OF BREAST: ICD-10-CM

## 2025-04-24 DIAGNOSIS — T45.1X5A CHEMOTHERAPY-INDUCED FATIGUE: ICD-10-CM

## 2025-04-24 DIAGNOSIS — Z51.12 ENCOUNTER FOR ANTINEOPLASTIC IMMUNOTHERAPY: ICD-10-CM

## 2025-04-24 DIAGNOSIS — Z71.89 CARE PLAN DISCUSSED WITH PATIENT: ICD-10-CM

## 2025-04-24 DIAGNOSIS — Z85.831 HISTORY OF SARCOMA: ICD-10-CM

## 2025-04-24 DIAGNOSIS — T45.1X5A CHEMOTHERAPY-INDUCED PERIPHERAL NEUROPATHY: ICD-10-CM

## 2025-04-24 DIAGNOSIS — D75.838 REACTIVE THROMBOCYTOSIS: ICD-10-CM

## 2025-04-24 DIAGNOSIS — T45.1X5A ADVERSE EFFECT OF CHEMOTHERAPY, INITIAL ENCOUNTER: ICD-10-CM

## 2025-04-24 LAB
ALBUMIN SERPL-MCNC: 3.8 G/DL (ref 3.5–5.2)
ALP SERPL-CCNC: 105 U/L (ref 35–104)
ALT SERPL-CCNC: 19 U/L (ref 5–33)
ANION GAP SERPL CALCULATED.3IONS-SCNC: 14 MMOL/L (ref 7–19)
AST SERPL-CCNC: 18 U/L (ref 5–32)
BASOPHILS # BLD: 0.02 K/UL (ref 0–0.2)
BASOPHILS NFR BLD: 0.2 % (ref 0–1)
BILIRUB SERPL-MCNC: <0.2 MG/DL (ref 0–1.2)
BUN SERPL-MCNC: 14 MG/DL (ref 8–23)
CALCIUM SERPL-MCNC: 8.9 MG/DL (ref 8.8–10.2)
CHLORIDE SERPL-SCNC: 99 MMOL/L (ref 98–107)
CO2 SERPL-SCNC: 23 MMOL/L (ref 22–29)
CREAT SERPL-MCNC: <0.5 MG/DL (ref 0.5–0.9)
EOSINOPHIL # BLD: 0.01 K/UL (ref 0–0.6)
EOSINOPHIL NFR BLD: 0.1 % (ref 0–5)
ERYTHROCYTE [DISTWIDTH] IN BLOOD BY AUTOMATED COUNT: 14.6 % (ref 11.5–14.5)
GLUCOSE SERPL-MCNC: 235 MG/DL (ref 70–99)
HCT VFR BLD AUTO: 33.9 % (ref 37–47)
HGB BLD-MCNC: 10.7 G/DL (ref 12–16)
LYMPHOCYTES # BLD: 0.53 K/UL (ref 1.1–4.5)
LYMPHOCYTES NFR BLD: 4.4 % (ref 20–40)
MCH RBC QN AUTO: 27.4 PG (ref 27–31)
MCHC RBC AUTO-ENTMCNC: 31.6 G/DL (ref 33–37)
MCV RBC AUTO: 86.9 FL (ref 81–99)
MONOCYTES # BLD: 0.11 K/UL (ref 0–0.9)
MONOCYTES NFR BLD: 0.9 % (ref 1–10)
NEUTROPHILS # BLD: 11.25 K/UL (ref 1.5–7.5)
NEUTS SEG NFR BLD: 93.8 % (ref 50–65)
PLATELET # BLD AUTO: 528 K/UL (ref 130–400)
PMV BLD AUTO: 8.4 FL (ref 9.4–12.3)
POTASSIUM SERPL-SCNC: 4.3 MMOL/L (ref 3.5–5.1)
PROT SERPL-MCNC: 7.1 G/DL (ref 6.4–8.3)
RBC # BLD AUTO: 3.9 M/UL (ref 4.2–5.4)
SODIUM SERPL-SCNC: 136 MMOL/L (ref 136–145)
WBC # BLD AUTO: 11.99 K/UL (ref 4.8–10.8)

## 2025-04-24 PROCEDURE — 77014 CHG CT GUIDANCE RADIATION THERAPY FLDS PLACEMENT: CPT | Performed by: RADIOLOGY

## 2025-04-24 PROCEDURE — 96415 CHEMO IV INFUSION ADDL HR: CPT

## 2025-04-24 PROCEDURE — 77386: CPT | Performed by: RADIOLOGY

## 2025-04-24 PROCEDURE — 96367 TX/PROPH/DG ADDL SEQ IV INF: CPT

## 2025-04-24 PROCEDURE — 99215 OFFICE O/P EST HI 40 MIN: CPT

## 2025-04-24 PROCEDURE — 85025 COMPLETE CBC W/AUTO DIFF WBC: CPT

## 2025-04-24 PROCEDURE — 36415 COLL VENOUS BLD VENIPUNCTURE: CPT

## 2025-04-24 PROCEDURE — 96375 TX/PRO/DX INJ NEW DRUG ADDON: CPT

## 2025-04-24 PROCEDURE — 80053 COMPREHEN METABOLIC PANEL: CPT

## 2025-04-24 PROCEDURE — 96417 CHEMO IV INFUS EACH ADDL SEQ: CPT

## 2025-04-24 PROCEDURE — NBSRV NON-BILLABLE SERVICE: Performed by: INTERNAL MEDICINE

## 2025-04-24 PROCEDURE — 3078F DIAST BP <80 MM HG: CPT | Performed by: INTERNAL MEDICINE

## 2025-04-24 PROCEDURE — 6360000002 HC RX W HCPCS: Performed by: INTERNAL MEDICINE

## 2025-04-24 PROCEDURE — 96413 CHEMO IV INFUSION 1 HR: CPT

## 2025-04-24 PROCEDURE — G2211 COMPLEX E/M VISIT ADD ON: HCPCS | Performed by: INTERNAL MEDICINE

## 2025-04-24 PROCEDURE — 99214 OFFICE O/P EST MOD 30 MIN: CPT | Performed by: INTERNAL MEDICINE

## 2025-04-24 PROCEDURE — 2500000003 HC RX 250 WO HCPCS: Performed by: INTERNAL MEDICINE

## 2025-04-24 PROCEDURE — 2580000003 HC RX 258: Performed by: INTERNAL MEDICINE

## 2025-04-24 PROCEDURE — 3077F SYST BP >= 140 MM HG: CPT | Performed by: INTERNAL MEDICINE

## 2025-04-24 RX ORDER — ACETAMINOPHEN 325 MG/1
650 TABLET ORAL
Status: CANCELLED | OUTPATIENT
Start: 2025-04-24

## 2025-04-24 RX ORDER — DEXAMETHASONE SODIUM PHOSPHATE 10 MG/ML
10 INJECTION, SOLUTION INTRAMUSCULAR; INTRAVENOUS ONCE
Status: COMPLETED | OUTPATIENT
Start: 2025-04-24 | End: 2025-04-24

## 2025-04-24 RX ORDER — VENLAFAXINE HYDROCHLORIDE 150 MG/1
150 CAPSULE, EXTENDED RELEASE ORAL DAILY
Qty: 90 CAPSULE | Refills: 1 | Status: SHIPPED | OUTPATIENT
Start: 2025-04-24

## 2025-04-24 RX ORDER — DIPHENHYDRAMINE HYDROCHLORIDE 50 MG/ML
50 INJECTION, SOLUTION INTRAMUSCULAR; INTRAVENOUS ONCE
Status: CANCELLED | OUTPATIENT
Start: 2025-04-24 | End: 2025-04-24

## 2025-04-24 RX ORDER — FAMOTIDINE 10 MG/ML
20 INJECTION, SOLUTION INTRAVENOUS ONCE
Status: CANCELLED | OUTPATIENT
Start: 2025-04-24 | End: 2025-04-24

## 2025-04-24 RX ORDER — SODIUM CHLORIDE 9 MG/ML
INJECTION, SOLUTION INTRAVENOUS CONTINUOUS
Status: CANCELLED | OUTPATIENT
Start: 2025-04-24

## 2025-04-24 RX ORDER — DIPHENHYDRAMINE HYDROCHLORIDE 50 MG/ML
50 INJECTION, SOLUTION INTRAMUSCULAR; INTRAVENOUS
Status: CANCELLED | OUTPATIENT
Start: 2025-04-24

## 2025-04-24 RX ORDER — SODIUM CHLORIDE 9 MG/ML
5-250 INJECTION, SOLUTION INTRAVENOUS PRN
Status: CANCELLED | OUTPATIENT
Start: 2025-04-24

## 2025-04-24 RX ORDER — DIPHENHYDRAMINE HYDROCHLORIDE 50 MG/ML
50 INJECTION, SOLUTION INTRAMUSCULAR; INTRAVENOUS ONCE
Status: COMPLETED | OUTPATIENT
Start: 2025-04-24 | End: 2025-04-24

## 2025-04-24 RX ORDER — PALONOSETRON 0.05 MG/ML
0.25 INJECTION, SOLUTION INTRAVENOUS ONCE
Status: COMPLETED | OUTPATIENT
Start: 2025-04-24 | End: 2025-04-24

## 2025-04-24 RX ORDER — SODIUM CHLORIDE 9 MG/ML
5-250 INJECTION, SOLUTION INTRAVENOUS PRN
Status: DISCONTINUED | OUTPATIENT
Start: 2025-04-24 | End: 2025-04-25 | Stop reason: HOSPADM

## 2025-04-24 RX ORDER — MEPERIDINE HYDROCHLORIDE 50 MG/ML
12.5 INJECTION INTRAMUSCULAR; INTRAVENOUS; SUBCUTANEOUS PRN
Status: CANCELLED | OUTPATIENT
Start: 2025-04-24

## 2025-04-24 RX ORDER — HYDROCORTISONE SODIUM SUCCINATE 100 MG/2ML
100 INJECTION INTRAMUSCULAR; INTRAVENOUS
Status: CANCELLED | OUTPATIENT
Start: 2025-04-24

## 2025-04-24 RX ORDER — FAMOTIDINE 10 MG/ML
20 INJECTION, SOLUTION INTRAVENOUS ONCE
Status: COMPLETED | OUTPATIENT
Start: 2025-04-24 | End: 2025-04-24

## 2025-04-24 RX ORDER — ONDANSETRON 2 MG/ML
8 INJECTION INTRAMUSCULAR; INTRAVENOUS
Status: CANCELLED | OUTPATIENT
Start: 2025-04-24

## 2025-04-24 RX ORDER — FAMOTIDINE 10 MG/ML
20 INJECTION, SOLUTION INTRAVENOUS
Status: CANCELLED | OUTPATIENT
Start: 2025-04-24

## 2025-04-24 RX ORDER — ALBUTEROL SULFATE 90 UG/1
4 INHALANT RESPIRATORY (INHALATION) PRN
Status: CANCELLED | OUTPATIENT
Start: 2025-04-24

## 2025-04-24 RX ORDER — SODIUM CHLORIDE 0.9 % (FLUSH) 0.9 %
5-40 SYRINGE (ML) INJECTION PRN
Status: DISCONTINUED | OUTPATIENT
Start: 2025-04-24 | End: 2025-04-25 | Stop reason: HOSPADM

## 2025-04-24 RX ORDER — PROCHLORPERAZINE EDISYLATE 5 MG/ML
5 INJECTION INTRAMUSCULAR; INTRAVENOUS
Status: CANCELLED | OUTPATIENT
Start: 2025-04-24

## 2025-04-24 RX ORDER — HEPARIN SODIUM (PORCINE) LOCK FLUSH IV SOLN 100 UNIT/ML 100 UNIT/ML
500 SOLUTION INTRAVENOUS PRN
Status: CANCELLED | OUTPATIENT
Start: 2025-04-24

## 2025-04-24 RX ORDER — SODIUM CHLORIDE 0.9 % (FLUSH) 0.9 %
5-40 SYRINGE (ML) INJECTION PRN
Status: CANCELLED | OUTPATIENT
Start: 2025-04-24

## 2025-04-24 RX ORDER — HEPARIN 100 UNIT/ML
500 SYRINGE INTRAVENOUS PRN
Status: DISCONTINUED | OUTPATIENT
Start: 2025-04-24 | End: 2025-04-25 | Stop reason: HOSPADM

## 2025-04-24 RX ORDER — PALONOSETRON 0.05 MG/ML
0.25 INJECTION, SOLUTION INTRAVENOUS ONCE
Status: CANCELLED | OUTPATIENT
Start: 2025-04-24 | End: 2025-04-24

## 2025-04-24 RX ORDER — EPINEPHRINE 1 MG/ML
0.3 INJECTION, SOLUTION, CONCENTRATE INTRAVENOUS PRN
Status: CANCELLED | OUTPATIENT
Start: 2025-04-24

## 2025-04-24 RX ADMIN — DIPHENHYDRAMINE HYDROCHLORIDE 50 MG: 50 INJECTION INTRAMUSCULAR; INTRAVENOUS at 09:15

## 2025-04-24 RX ADMIN — PACLITAXEL 300 MG: 6 INJECTION, SOLUTION, CONCENTRATE INTRAVENOUS at 09:50

## 2025-04-24 RX ADMIN — HEPARIN 500 UNITS: 100 SYRINGE at 13:26

## 2025-04-24 RX ADMIN — FAMOTIDINE 20 MG: 10 INJECTION, SOLUTION INTRAVENOUS at 09:15

## 2025-04-24 RX ADMIN — SODIUM CHLORIDE, PRESERVATIVE FREE 10 ML: 5 INJECTION INTRAVENOUS at 13:26

## 2025-04-24 RX ADMIN — DEXAMETHASONE SODIUM PHOSPHATE 10 MG: 10 INJECTION, SOLUTION INTRAMUSCULAR; INTRAVENOUS at 09:15

## 2025-04-24 RX ADMIN — CARBOPLATIN 485 MG: 10 INJECTION, SOLUTION INTRAVENOUS at 12:53

## 2025-04-24 RX ADMIN — SODIUM CHLORIDE 50 ML/HR: 0.9 INJECTION, SOLUTION INTRAVENOUS at 09:16

## 2025-04-24 RX ADMIN — SODIUM CHLORIDE 150 MG: 0.9 INJECTION, SOLUTION INTRAVENOUS at 09:25

## 2025-04-24 RX ADMIN — PALONOSETRON 0.25 MG: 0.05 INJECTION, SOLUTION INTRAVENOUS at 09:15

## 2025-04-24 NOTE — PROGRESS NOTES
Supportive Care/Community Based Palliative Care  Initial Consultation Note      Patient Name:  Michelle Long  Medical Record Number:  [id number]  YOB: 1960    Date of Visit: 4/24/2025  Location of Visit:  Other - [facility]    Referring Provider: Loly Parson MD  Patient Care Team:  Brandy Gonzalez MD as PCP - General (Internal Medicine)  Brandy Gonzalez MD as PCP - Empaneled Provider  Aidee Sam APRN as Nurse Practitioner (Family Nurse Practitioner)  Silva Levine APRN - CNP as Advanced Practice Nurse (Hospice and Palliative Medicine)    Reason for Consult:   Goals of care   Symptom Management    ACP  Family Support  Patient Support    History obtained from:  patient, spouse, electronic medical record    PALLIATIVE DIAGNOSES AND ORDERS/RECOMMENDATIONS/PLAN:   1. Decreased mobility and endurance  Discussed ways to slowly increase activity and maximize endurance    2. Non-small cell lung cancer metastatic to brain (HCC)  Continue chemotherapy  Discussed radiation    3. Encounter for palliative care  Current goals of care include: Continue treatment with palliative intent, Preserve independence/control/autonomy, Maintain or improve functional status, Maintain or improve quality of life, Remain at home, Would want hospitalization if needed, Live longer    Code status confirmed: Full Code  Will continue ACP discussions at future visit  Will continue goals of care discussions based on clinical course  Follow up home visit in 3 weeks and as needed    CHIEF COMPLAINT:     Chief Complaint   Patient presents with    Referral - General     Non-small cell lung cancer with brain metastasis -discussed goals of care, symptom management       CLINICAL SUMMARY:          Michelle Long is a 64 y.o. female with PMH of non-small cell lung cancer of right upper lobe with left frontal brain metastasis and mediastinal adenopathy, KRAS G12C mutation, panlobular emphysema, coronary artery

## 2025-04-24 NOTE — PROGRESS NOTES
[facility] Medical Oncology & Hematology  [address] [place] [address] [place] [address]  Phone: [phone]  Fax: [phone]          Bri Long 64 y.o. White (non-) Jain     Diagnosis (Copied from Dr. 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   History DCIS left breast Lumpectomy 2009->WBRT Dr Arita  NSCLC-squamous cell carcinoma, right upper lobe lung, March 2025  Mediastinal lymphadenopathy  Left frontal brain lesion  Clinical T1N2M1(subcentimeter brain metastasis).  Oligometastatic disease  TEMPUS xF WPIO67F, CHEK2  Tempus xF Biological Relevant: KMT2C, DNMT3A, KEAP1, STK11  ctDNA 1.5%  MSI-Stable: Not detected     Treatment Summary  4/2/25 Carbo/Taxol/Keytruda every 21 days x 4 cycles then maintenance Keytruda  4/24/2008-6/3/2008 Completed radiation therapy 5040 cGy over 28 fractions to left breast at [facility]  4/16/25-4/21/25 Completed radiation therapy 2700 cGy over 3 fractions to brain with Dr Bradly Dudley/[facility]  4/24/25 Initiate 6000cGy radiation therapy in 30 fractions to right lung with Dr Bradly Dudley/[facility]  Current Outpatient Medications   Medication Sig Dispense Refill    HYDROcodone-acetaminophen (NORCO) 5-325 MG per tablet Take 1 tablet by mouth 2 times daily as needed for Pain for up to 30 days. This has been increased per Dr. Funes on 04/17/2025 Max Daily Amount: 2 tablets 60 tablet 0    dexAMETHasone (DECADRON) 4 MG tablet Take 20 mg (5 pills) the night before and the morning of chemotherapy 40 tablet 0    promethazine (PHENERGAN) 25 MG tablet Take 0.5 tablets by mouth every 8 hours as needed for Nausea 20 tablet 3    ondansetron (ZOFRAN) 4 MG tablet Take 1 tablet by mouth every 8 hours as needed for Nausea or Vomiting 30 tablet 0    albuterol sulfate HFA (PROVENTIL;VENTOLIN;PROAIR) 108 (90 Base) MCG/ACT inhaler INHALE 2 PUFFS INTO THE LUNGS 4 TIMES

## 2025-04-24 NOTE — PROGRESS NOTES
Spiritual Health History and Assessment/Progress Note  [facility] Daily    (P) Initial Encounter,  ,  ,      Name: Michelle Long MRN: [id number]    Age: 64 y.o.     Sex: female   Language: English   Latter day: Latter day   <principal problem not specified>     Date: 4/24/2025            Total Time Calculated: (P) 30 min              Spiritual Assessment began in [facility]        Referral/Consult From: (P) Rounding   Encounter Overview/Reason: (P) Initial Encounter  Service Provided For: (P) Patient and family together (with  present during the efusion treatment)    Patient and her  are in good spirits going through the treatment for the lung and the brain. Patient stated that we have strong zuleyka in God. Has good support from family, friends and her Yarsani. Bedside said prayer.     Zuleyka, Belief, Meaning:   Patient identifies as spiritual and is connected with a zuleyka tradition or spiritual practice  Family/Friends identify as spiritual and are connected with a zuleyka tradition or spiritual practice      Importance and Influence:  Patient has spiritual/personal beliefs that influence decisions regarding their health  Family/Friends have spiritual/personal beliefs that influence decisions regarding the patient's health    Community:  Patient is connected with a spiritual community and feels well-supported. Support system includes: Spouse/Partner, Children, Zuleyka Community, and Extended family  Family/Friends Other:    are connected with a spiritual community and feels well-supported. Support system includes: Spouse/Partner, Children, Zuleyka Community, and Extended family    Assessment and Plan of Care:     Patient Interventions include: Facilitated expression of thoughts and feelings, Explored spiritual coping/struggle/distress, and Affirmed coping skills/support systems  Family/Friends Interventions include: Facilitated expression of thoughts and feelings and Affirmed

## 2025-04-24 NOTE — TELEPHONE ENCOUNTER
Michelle Long called to request a refill on her medication.      Last office visit : 3/31/2025   Next office visit : 5/30/2025     Requested Prescriptions     Pending Prescriptions Disp Refills    venlafaxine (EFFEXOR XR) 150 MG extended release capsule [Pharmacy Med Name: VENLAFAXINE HCL  MG C 150 Capsule] 90 capsule 1     Sig: TAKE ONE CAPSULE BY MOUTH EVERY DAY            Alexa Watts MA

## 2025-04-24 NOTE — PROGRESS NOTES
[facility] Oncology & Hematology  [address] [place] [address] [place] [address]  Phone: [phone]  Fax: [phone]    Essie Zapata, MS, RD, LD   Michelle Long 64 y.o.   Diagnosis, staging, date of diagnosis: NSCLC, SCC metastatic to brain, March 2025   Current Treatment: Carbo/Taxol, Keytruda q21d x4 cycles then Keytruda q21d       Comprehensive Nutrition Assessment    Type and Reason for Visit:  Reassess    Malnutrition Assessment:  Malnutrition Status:  At risk for malnutrition (04/24/25 0982)    Context:  Chronic Illness       Nutrition Assessment:    KT 65 y/o female presents 4/24/25 for follow-up RD evaluation. Referral from Dr. 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 Pt remains adequately nourished. However, she is at risk for nutritional compromise r/t chemotherapy-related nausea and decrease in appetite. Pt has lost 5lbs over the past 3 weeks following initial treatment session. She noted no specific nutrition-related issues at baseline apart from occasional diarrhea. Pt now endorses mild, but persistent, nausea. She has PRN Zofran and Phenergan at home which did provide some relief.     Diet History:  Pt reports she avoids fried foods. She tries to avoid added sugars unless it is a special occasion. Pt does not drink soda, energy drinks, juice, or flavored water. She does drink water throughout the day.     Pt has modified her eating pattern to try to increase calorie/protein intake. She is tracking protein content of foods.     Breakfast: smoothie made with Ensure, berries, yogurt  Lunch: soup  Dinner: varies  Snacks: 1 cup cottage cheese, fruit    Nutrition Related Laboratory Data:  Na+=136  K+=4.3  Vdk=888 -- Decadron 20mg PM/AM prior to treatment as premed  BUN:Cr=28  GFR >90    Anthropometric Measures:  Height: 157.5 cm (5' 2\")  Ideal Body Weight (IBW): 110 lbs (50 kg)       Current Body Weight: 65.6 kg (144 lb 9.6 oz), 131.5 % IBW.    Current BMI

## 2025-04-25 ENCOUNTER — HOSPITAL ENCOUNTER (OUTPATIENT)
Dept: RADIATION ONCOLOGY | Facility: HOSPITAL | Age: 65
Discharge: HOME | End: 2025-04-25

## 2025-04-25 LAB
RAD ONC ARIA COURSE ID: NORMAL
RAD ONC ARIA COURSE INTENT: NORMAL
RAD ONC ARIA COURSE LAST TREATMENT DATE: NORMAL
RAD ONC ARIA COURSE START DATE: NORMAL
RAD ONC ARIA COURSE TREATMENT ELAPSED DAYS: 1
RAD ONC ARIA FIRST TREATMENT DATE: NORMAL
RAD ONC ARIA PLAN FRACTIONS TREATED TO DATE: 2
RAD ONC ARIA PLAN ID: NORMAL
RAD ONC ARIA PLAN PRESCRIBED DOSE PER FRACTION: 2 GY
RAD ONC ARIA PLAN PRIMARY REFERENCE POINT: NORMAL
RAD ONC ARIA PLAN TOTAL FRACTIONS PRESCRIBED: 30
RAD ONC ARIA PLAN TOTAL PRESCRIBED DOSE: 6000 CGY
RAD ONC ARIA REFERENCE POINT DOSAGE GIVEN TO DATE: 4 GY
RAD ONC ARIA REFERENCE POINT ID: NORMAL
RAD ONC ARIA REFERENCE POINT SESSION DOSAGE GIVEN: 2 GY

## 2025-04-28 ENCOUNTER — HOSPITAL ENCOUNTER (OUTPATIENT)
Dept: RADIATION ONCOLOGY | Facility: HOSPITAL | Age: 65
Discharge: HOME | End: 2025-04-28
Payer: COMMERCIAL

## 2025-04-28 LAB
RAD ONC ARIA COURSE ID: NORMAL
RAD ONC ARIA COURSE INTENT: NORMAL
RAD ONC ARIA COURSE LAST TREATMENT DATE: NORMAL
RAD ONC ARIA COURSE START DATE: NORMAL
RAD ONC ARIA COURSE TREATMENT ELAPSED DAYS: 4
RAD ONC ARIA FIRST TREATMENT DATE: NORMAL
RAD ONC ARIA PLAN FRACTIONS TREATED TO DATE: 3
RAD ONC ARIA PLAN ID: NORMAL
RAD ONC ARIA PLAN PRESCRIBED DOSE PER FRACTION: 2 GY
RAD ONC ARIA PLAN PRIMARY REFERENCE POINT: NORMAL
RAD ONC ARIA PLAN TOTAL FRACTIONS PRESCRIBED: 30
RAD ONC ARIA PLAN TOTAL PRESCRIBED DOSE: 6000 CGY
RAD ONC ARIA REFERENCE POINT DOSAGE GIVEN TO DATE: 6 GY
RAD ONC ARIA REFERENCE POINT ID: NORMAL
RAD ONC ARIA REFERENCE POINT SESSION DOSAGE GIVEN: 2 GY

## 2025-04-29 ENCOUNTER — HOSPITAL ENCOUNTER (OUTPATIENT)
Dept: RADIATION ONCOLOGY | Facility: HOSPITAL | Age: 65
Discharge: HOME | End: 2025-04-29

## 2025-04-29 LAB
RAD ONC ARIA COURSE ID: NORMAL
RAD ONC ARIA COURSE INTENT: NORMAL
RAD ONC ARIA COURSE LAST TREATMENT DATE: NORMAL
RAD ONC ARIA COURSE START DATE: NORMAL
RAD ONC ARIA COURSE TREATMENT ELAPSED DAYS: 5
RAD ONC ARIA FIRST TREATMENT DATE: NORMAL
RAD ONC ARIA PLAN FRACTIONS TREATED TO DATE: 4
RAD ONC ARIA PLAN ID: NORMAL
RAD ONC ARIA PLAN PRESCRIBED DOSE PER FRACTION: 2 GY
RAD ONC ARIA PLAN PRIMARY REFERENCE POINT: NORMAL
RAD ONC ARIA PLAN TOTAL FRACTIONS PRESCRIBED: 30
RAD ONC ARIA PLAN TOTAL PRESCRIBED DOSE: 6000 CGY
RAD ONC ARIA REFERENCE POINT DOSAGE GIVEN TO DATE: 8 GY
RAD ONC ARIA REFERENCE POINT ID: NORMAL
RAD ONC ARIA REFERENCE POINT SESSION DOSAGE GIVEN: 2 GY

## 2025-04-30 ENCOUNTER — DOCUMENTATION (OUTPATIENT)
Dept: RADIATION ONCOLOGY | Facility: HOSPITAL | Age: 65
End: 2025-04-30
Payer: COMMERCIAL

## 2025-04-30 ENCOUNTER — HOSPITAL ENCOUNTER (OUTPATIENT)
Dept: RADIATION ONCOLOGY | Facility: HOSPITAL | Age: 65
Discharge: HOME | End: 2025-04-30

## 2025-04-30 LAB
RAD ONC ARIA COURSE ID: NORMAL
RAD ONC ARIA COURSE INTENT: NORMAL
RAD ONC ARIA COURSE LAST TREATMENT DATE: NORMAL
RAD ONC ARIA COURSE START DATE: NORMAL
RAD ONC ARIA COURSE TREATMENT ELAPSED DAYS: 6
RAD ONC ARIA FIRST TREATMENT DATE: NORMAL
RAD ONC ARIA PLAN FRACTIONS TREATED TO DATE: 5
RAD ONC ARIA PLAN ID: NORMAL
RAD ONC ARIA PLAN PRESCRIBED DOSE PER FRACTION: 2 GY
RAD ONC ARIA PLAN PRIMARY REFERENCE POINT: NORMAL
RAD ONC ARIA PLAN TOTAL FRACTIONS PRESCRIBED: 30
RAD ONC ARIA PLAN TOTAL PRESCRIBED DOSE: 6000 CGY
RAD ONC ARIA REFERENCE POINT DOSAGE GIVEN TO DATE: 10 GY
RAD ONC ARIA REFERENCE POINT ID: NORMAL
RAD ONC ARIA REFERENCE POINT SESSION DOSAGE GIVEN: 2 GY

## 2025-04-30 NOTE — PROGRESS NOTES
HUSSEIN met with Mrs. Graves who is currently receiving radiation treatment for NSCLC metastatic to brain. SW introduced self and explained role and source of support. She is 64 years old and lives with her spouse. She continues to have a strong support system. She is going on short term disability from work. She does not have transportation or financial concerns. SW encouraged her to call if assistance is needed and SW will remain available.

## 2025-05-01 ENCOUNTER — HOSPITAL ENCOUNTER (OUTPATIENT)
Dept: RADIATION ONCOLOGY | Facility: HOSPITAL | Age: 65
Discharge: HOME | End: 2025-05-01

## 2025-05-01 ENCOUNTER — HOSPITAL ENCOUNTER (OUTPATIENT)
Dept: RADIATION ONCOLOGY | Facility: HOSPITAL | Age: 65
Setting detail: RADIATION/ONCOLOGY SERIES
End: 2025-05-01
Payer: COMMERCIAL

## 2025-05-01 LAB
RAD ONC ARIA COURSE ID: NORMAL
RAD ONC ARIA COURSE INTENT: NORMAL
RAD ONC ARIA COURSE LAST TREATMENT DATE: NORMAL
RAD ONC ARIA COURSE START DATE: NORMAL
RAD ONC ARIA COURSE TREATMENT ELAPSED DAYS: 7
RAD ONC ARIA FIRST TREATMENT DATE: NORMAL
RAD ONC ARIA PLAN FRACTIONS TREATED TO DATE: 6
RAD ONC ARIA PLAN ID: NORMAL
RAD ONC ARIA PLAN PRESCRIBED DOSE PER FRACTION: 2 GY
RAD ONC ARIA PLAN PRIMARY REFERENCE POINT: NORMAL
RAD ONC ARIA PLAN TOTAL FRACTIONS PRESCRIBED: 30
RAD ONC ARIA PLAN TOTAL PRESCRIBED DOSE: 6000 CGY
RAD ONC ARIA REFERENCE POINT DOSAGE GIVEN TO DATE: 12 GY
RAD ONC ARIA REFERENCE POINT ID: NORMAL
RAD ONC ARIA REFERENCE POINT SESSION DOSAGE GIVEN: 2 GY

## 2025-05-01 PROCEDURE — 77014 CHG CT GUIDANCE RADIATION THERAPY FLDS PLACEMENT: CPT | Performed by: RADIOLOGY

## 2025-05-01 PROCEDURE — 77386: CPT | Performed by: RADIOLOGY

## 2025-05-01 PROCEDURE — 77427 RADIATION TX MANAGEMENT X5: CPT | Performed by: RADIOLOGY

## 2025-05-01 RX ORDER — VENLAFAXINE HYDROCHLORIDE 150 MG/1
150 CAPSULE, EXTENDED RELEASE ORAL DAILY
Qty: 90 CAPSULE | Refills: 1 | OUTPATIENT
Start: 2025-05-01

## 2025-05-01 RX ORDER — AMLODIPINE BESYLATE 2.5 MG/1
2.5 TABLET ORAL DAILY
Qty: 30 TABLET | Refills: 5 | Status: SHIPPED | OUTPATIENT
Start: 2025-05-01

## 2025-05-01 RX ORDER — METOPROLOL TARTRATE 50 MG
50 TABLET ORAL 2 TIMES DAILY
Qty: 180 TABLET | Refills: 1 | Status: SHIPPED | OUTPATIENT
Start: 2025-05-01

## 2025-05-01 NOTE — TELEPHONE ENCOUNTER
Michellecody Long called to request a refill on her medication.      Last office visit : 3/31/2025   Next office visit : 5/30/2025     Requested Prescriptions     Pending Prescriptions Disp Refills    amLODIPine (NORVASC) 2.5 MG tablet 30 tablet 5     Sig: Take 1 tablet by mouth daily            Alexa Watts MA

## 2025-05-01 NOTE — TELEPHONE ENCOUNTER
Michelle Long called to request a refill on her medication.      Last office visit : 3/31/2025   Next office visit : 5/1/2025     Requested Prescriptions     Pending Prescriptions Disp Refills    metoprolol tartrate (LOPRESSOR) 50 MG tablet 180 tablet 3     Sig: Take 1 tablet by mouth 2 times daily            Alexa Watts MA

## 2025-05-02 ENCOUNTER — CLINICAL DOCUMENTATION (OUTPATIENT)
Dept: HEMATOLOGY | Age: 65
End: 2025-05-02

## 2025-05-02 ENCOUNTER — HOSPITAL ENCOUNTER (OUTPATIENT)
Dept: RADIATION ONCOLOGY | Facility: HOSPITAL | Age: 65
Discharge: HOME | End: 2025-05-02

## 2025-05-02 LAB
RAD ONC ARIA COURSE ID: NORMAL
RAD ONC ARIA COURSE INTENT: NORMAL
RAD ONC ARIA COURSE LAST TREATMENT DATE: NORMAL
RAD ONC ARIA COURSE START DATE: NORMAL
RAD ONC ARIA COURSE TREATMENT ELAPSED DAYS: 8
RAD ONC ARIA FIRST TREATMENT DATE: NORMAL
RAD ONC ARIA PLAN FRACTIONS TREATED TO DATE: 7
RAD ONC ARIA PLAN ID: NORMAL
RAD ONC ARIA PLAN PRESCRIBED DOSE PER FRACTION: 2 GY
RAD ONC ARIA PLAN PRIMARY REFERENCE POINT: NORMAL
RAD ONC ARIA PLAN TOTAL FRACTIONS PRESCRIBED: 30
RAD ONC ARIA PLAN TOTAL PRESCRIBED DOSE: 6000 CGY
RAD ONC ARIA REFERENCE POINT DOSAGE GIVEN TO DATE: 14 GY
RAD ONC ARIA REFERENCE POINT ID: NORMAL
RAD ONC ARIA REFERENCE POINT SESSION DOSAGE GIVEN: 2 GY

## 2025-05-02 PROCEDURE — 77014 CHG CT GUIDANCE RADIATION THERAPY FLDS PLACEMENT: CPT | Performed by: RADIOLOGY

## 2025-05-02 PROCEDURE — 77386: CPT | Performed by: RADIOLOGY

## 2025-05-02 NOTE — PROGRESS NOTES
[employer] wig/head covering order placed this day.     Order Number: [id number]    Item Number: [id number]  Item Name: Ivette  Color: 811 Mocha with braxton brown highlights    Item Number: [id number]  Item Name: Bamboo Cool Comfort  Color:Beige   Size: Large

## 2025-05-05 ENCOUNTER — HOSPITAL ENCOUNTER (OUTPATIENT)
Dept: RADIATION ONCOLOGY | Facility: HOSPITAL | Age: 65
Discharge: HOME | End: 2025-05-05
Payer: COMMERCIAL

## 2025-05-05 LAB
RAD ONC ARIA COURSE ID: NORMAL
RAD ONC ARIA COURSE INTENT: NORMAL
RAD ONC ARIA COURSE LAST TREATMENT DATE: NORMAL
RAD ONC ARIA COURSE START DATE: NORMAL
RAD ONC ARIA COURSE TREATMENT ELAPSED DAYS: 11
RAD ONC ARIA FIRST TREATMENT DATE: NORMAL
RAD ONC ARIA PLAN FRACTIONS TREATED TO DATE: 8
RAD ONC ARIA PLAN ID: NORMAL
RAD ONC ARIA PLAN PRESCRIBED DOSE PER FRACTION: 2 GY
RAD ONC ARIA PLAN PRIMARY REFERENCE POINT: NORMAL
RAD ONC ARIA PLAN TOTAL FRACTIONS PRESCRIBED: 30
RAD ONC ARIA PLAN TOTAL PRESCRIBED DOSE: 6000 CGY
RAD ONC ARIA REFERENCE POINT DOSAGE GIVEN TO DATE: 16 GY
RAD ONC ARIA REFERENCE POINT ID: NORMAL
RAD ONC ARIA REFERENCE POINT SESSION DOSAGE GIVEN: 2 GY

## 2025-05-05 PROCEDURE — 77386: CPT | Performed by: RADIOLOGY

## 2025-05-05 PROCEDURE — 77014 CHG CT GUIDANCE RADIATION THERAPY FLDS PLACEMENT: CPT | Performed by: RADIOLOGY

## 2025-05-06 ENCOUNTER — PATIENT MESSAGE (OUTPATIENT)
Dept: PALLATIVE CARE | Age: 65
End: 2025-05-06

## 2025-05-06 ENCOUNTER — HOSPITAL ENCOUNTER (OUTPATIENT)
Dept: RADIATION ONCOLOGY | Facility: HOSPITAL | Age: 65
Discharge: HOME | End: 2025-05-06

## 2025-05-06 LAB
DNA RANGE(S) EXAMINED NAR: NORMAL
GENE DIS ANL INTERP-IMP: POSITIVE
GENE DIS ASSESSED: NORMAL
GENE MUT TESTED BLD/T: 6.3 M/MB
HLA-A HIGH RES: NORMAL
HLA-A UNRESLVD ALLELES HIGH RES: YES
HLA-B HIGH RES: NORMAL
HLA-B UNRESLVD ALLELES HIGH RES: YES
HLA-C HIGH RES: NORMAL
HLA-C UNRESLVD ALLELES HIGH RES: YES
MSI CA SPEC-IMP: NORMAL
RAD ONC ARIA COURSE ID: NORMAL
RAD ONC ARIA COURSE INTENT: NORMAL
RAD ONC ARIA COURSE LAST TREATMENT DATE: NORMAL
RAD ONC ARIA COURSE START DATE: NORMAL
RAD ONC ARIA COURSE TREATMENT ELAPSED DAYS: 12
RAD ONC ARIA FIRST TREATMENT DATE: NORMAL
RAD ONC ARIA PLAN FRACTIONS TREATED TO DATE: 9
RAD ONC ARIA PLAN ID: NORMAL
RAD ONC ARIA PLAN PRESCRIBED DOSE PER FRACTION: 2 GY
RAD ONC ARIA PLAN PRIMARY REFERENCE POINT: NORMAL
RAD ONC ARIA PLAN TOTAL FRACTIONS PRESCRIBED: 30
RAD ONC ARIA PLAN TOTAL PRESCRIBED DOSE: 6000 CGY
RAD ONC ARIA REFERENCE POINT DOSAGE GIVEN TO DATE: 18 GY
RAD ONC ARIA REFERENCE POINT ID: NORMAL
RAD ONC ARIA REFERENCE POINT SESSION DOSAGE GIVEN: 2 GY
REASON FOR STUDY: NORMAL
TEMPUS GERMLINE NOTE: NORMAL
TEMPUS HLA-A SAMPLE TYPE: NORMAL
TEMPUS HLA-B SAMPLE TYPE: NORMAL
TEMPUS HLA-C SAMPLE TYPE: NORMAL
TEMPUS LCA: NORMAL
TEMPUS PERTINENTNEGATIVES: NORMAL
TEMPUS PORTAL: NORMAL
TEMPUS THERAPY1: NORMAL
TEMPUS THERAPY2: NORMAL
TEMPUS THERAPY3: NORMAL
TEMPUS THERAPYCOUNT: 3
TEMPUS TRIAL1: NORMAL
TEMPUS TRIAL3: NORMAL
TEMPUS TRIALCOUNT: 3
TEMPUS TRIALMATCHES2: NORMAL
TEMPUS XR RESULT 1: NORMAL

## 2025-05-06 PROCEDURE — 77386: CPT | Performed by: RADIOLOGY

## 2025-05-06 PROCEDURE — 77014 CHG CT GUIDANCE RADIATION THERAPY FLDS PLACEMENT: CPT | Performed by: RADIOLOGY

## 2025-05-07 ENCOUNTER — FOLLOWUP TELEPHONE ENCOUNTER (OUTPATIENT)
Dept: HEMATOLOGY | Age: 65
End: 2025-05-07

## 2025-05-07 LAB
RAD ONC ARIA COURSE ID: NORMAL
RAD ONC ARIA COURSE INTENT: NORMAL
RAD ONC ARIA COURSE LAST TREATMENT DATE: NORMAL
RAD ONC ARIA COURSE START DATE: NORMAL
RAD ONC ARIA COURSE TREATMENT ELAPSED DAYS: 13
RAD ONC ARIA FIRST TREATMENT DATE: NORMAL
RAD ONC ARIA PLAN FRACTIONS TREATED TO DATE: 10
RAD ONC ARIA PLAN ID: NORMAL
RAD ONC ARIA PLAN PRESCRIBED DOSE PER FRACTION: 2 GY
RAD ONC ARIA PLAN PRIMARY REFERENCE POINT: NORMAL
RAD ONC ARIA PLAN TOTAL FRACTIONS PRESCRIBED: 30
RAD ONC ARIA PLAN TOTAL PRESCRIBED DOSE: 6000 CGY
RAD ONC ARIA REFERENCE POINT DOSAGE GIVEN TO DATE: 20 GY
RAD ONC ARIA REFERENCE POINT ID: NORMAL
RAD ONC ARIA REFERENCE POINT SESSION DOSAGE GIVEN: 2 GY

## 2025-05-07 PROCEDURE — 77386: CPT | Performed by: RADIOLOGY

## 2025-05-07 PROCEDURE — 77014 CHG CT GUIDANCE RADIATION THERAPY FLDS PLACEMENT: CPT | Performed by: RADIOLOGY

## 2025-05-07 PROCEDURE — 77336 RADIATION PHYSICS CONSULT: CPT | Performed by: RADIOLOGY

## 2025-05-07 NOTE — TELEPHONE ENCOUNTER
GUADALUPE Davis called patient to inform her the head coverings we ordered on her behalf from [employer] courtesy of the [facility] [employer] have arrived and are ready to be picked up at her convenience.  Patient reports that she will come by today and  her head coverings.  SW to leave them at the .

## 2025-05-08 ENCOUNTER — HOSPITAL ENCOUNTER (OUTPATIENT)
Dept: RADIATION ONCOLOGY | Facility: HOSPITAL | Age: 65
Discharge: HOME | End: 2025-05-08

## 2025-05-08 LAB
RAD ONC ARIA COURSE ID: NORMAL
RAD ONC ARIA COURSE INTENT: NORMAL
RAD ONC ARIA COURSE LAST TREATMENT DATE: NORMAL
RAD ONC ARIA COURSE START DATE: NORMAL
RAD ONC ARIA COURSE TREATMENT ELAPSED DAYS: 14
RAD ONC ARIA FIRST TREATMENT DATE: NORMAL
RAD ONC ARIA PLAN FRACTIONS TREATED TO DATE: 11
RAD ONC ARIA PLAN ID: NORMAL
RAD ONC ARIA PLAN PRESCRIBED DOSE PER FRACTION: 2 GY
RAD ONC ARIA PLAN PRIMARY REFERENCE POINT: NORMAL
RAD ONC ARIA PLAN TOTAL FRACTIONS PRESCRIBED: 30
RAD ONC ARIA PLAN TOTAL PRESCRIBED DOSE: 6000 CGY
RAD ONC ARIA REFERENCE POINT DOSAGE GIVEN TO DATE: 22 GY
RAD ONC ARIA REFERENCE POINT ID: NORMAL
RAD ONC ARIA REFERENCE POINT SESSION DOSAGE GIVEN: 2 GY

## 2025-05-08 PROCEDURE — 77386: CPT | Performed by: RADIOLOGY

## 2025-05-08 PROCEDURE — 77014 CHG CT GUIDANCE RADIATION THERAPY FLDS PLACEMENT: CPT | Performed by: RADIOLOGY

## 2025-05-08 PROCEDURE — 77427 RADIATION TX MANAGEMENT X5: CPT | Performed by: RADIOLOGY

## 2025-05-09 ENCOUNTER — HOSPITAL ENCOUNTER (OUTPATIENT)
Dept: RADIATION ONCOLOGY | Facility: HOSPITAL | Age: 65
Discharge: HOME | End: 2025-05-09

## 2025-05-09 LAB
RAD ONC ARIA COURSE ID: NORMAL
RAD ONC ARIA COURSE INTENT: NORMAL
RAD ONC ARIA COURSE LAST TREATMENT DATE: NORMAL
RAD ONC ARIA COURSE START DATE: NORMAL
RAD ONC ARIA COURSE TREATMENT ELAPSED DAYS: 15
RAD ONC ARIA FIRST TREATMENT DATE: NORMAL
RAD ONC ARIA PLAN FRACTIONS TREATED TO DATE: 12
RAD ONC ARIA PLAN ID: NORMAL
RAD ONC ARIA PLAN PRESCRIBED DOSE PER FRACTION: 2 GY
RAD ONC ARIA PLAN PRIMARY REFERENCE POINT: NORMAL
RAD ONC ARIA PLAN TOTAL FRACTIONS PRESCRIBED: 30
RAD ONC ARIA PLAN TOTAL PRESCRIBED DOSE: 6000 CGY
RAD ONC ARIA REFERENCE POINT DOSAGE GIVEN TO DATE: 24 GY
RAD ONC ARIA REFERENCE POINT ID: NORMAL
RAD ONC ARIA REFERENCE POINT SESSION DOSAGE GIVEN: 2 GY

## 2025-05-09 PROCEDURE — 77386: CPT | Performed by: RADIOLOGY

## 2025-05-09 PROCEDURE — 77014 CHG CT GUIDANCE RADIATION THERAPY FLDS PLACEMENT: CPT | Performed by: RADIOLOGY

## 2025-05-10 LAB
Lab: NEGATIVE
Lab: NORMAL

## 2025-05-12 ENCOUNTER — HOSPITAL ENCOUNTER (OUTPATIENT)
Dept: RADIATION ONCOLOGY | Facility: HOSPITAL | Age: 65
Discharge: HOME | End: 2025-05-12
Payer: COMMERCIAL

## 2025-05-12 LAB
RAD ONC ARIA COURSE ID: NORMAL
RAD ONC ARIA COURSE INTENT: NORMAL
RAD ONC ARIA COURSE LAST TREATMENT DATE: NORMAL
RAD ONC ARIA COURSE START DATE: NORMAL
RAD ONC ARIA COURSE TREATMENT ELAPSED DAYS: 18
RAD ONC ARIA FIRST TREATMENT DATE: NORMAL
RAD ONC ARIA PLAN FRACTIONS TREATED TO DATE: 13
RAD ONC ARIA PLAN ID: NORMAL
RAD ONC ARIA PLAN PRESCRIBED DOSE PER FRACTION: 2 GY
RAD ONC ARIA PLAN PRIMARY REFERENCE POINT: NORMAL
RAD ONC ARIA PLAN TOTAL FRACTIONS PRESCRIBED: 30
RAD ONC ARIA PLAN TOTAL PRESCRIBED DOSE: 6000 CGY
RAD ONC ARIA REFERENCE POINT DOSAGE GIVEN TO DATE: 26 GY
RAD ONC ARIA REFERENCE POINT ID: NORMAL
RAD ONC ARIA REFERENCE POINT SESSION DOSAGE GIVEN: 2 GY

## 2025-05-12 PROCEDURE — 77386: CPT | Performed by: RADIOLOGY

## 2025-05-12 PROCEDURE — 77014 CHG CT GUIDANCE RADIATION THERAPY FLDS PLACEMENT: CPT | Performed by: RADIOLOGY

## 2025-05-13 ENCOUNTER — HOSPITAL ENCOUNTER (OUTPATIENT)
Dept: RADIATION ONCOLOGY | Facility: HOSPITAL | Age: 65
Discharge: HOME | End: 2025-05-13

## 2025-05-13 LAB
RAD ONC ARIA COURSE ID: NORMAL
RAD ONC ARIA COURSE INTENT: NORMAL
RAD ONC ARIA COURSE LAST TREATMENT DATE: NORMAL
RAD ONC ARIA COURSE START DATE: NORMAL
RAD ONC ARIA COURSE TREATMENT ELAPSED DAYS: 19
RAD ONC ARIA FIRST TREATMENT DATE: NORMAL
RAD ONC ARIA PLAN FRACTIONS TREATED TO DATE: 14
RAD ONC ARIA PLAN ID: NORMAL
RAD ONC ARIA PLAN PRESCRIBED DOSE PER FRACTION: 2 GY
RAD ONC ARIA PLAN PRIMARY REFERENCE POINT: NORMAL
RAD ONC ARIA PLAN TOTAL FRACTIONS PRESCRIBED: 30
RAD ONC ARIA PLAN TOTAL PRESCRIBED DOSE: 6000 CGY
RAD ONC ARIA REFERENCE POINT DOSAGE GIVEN TO DATE: 28 GY
RAD ONC ARIA REFERENCE POINT ID: NORMAL
RAD ONC ARIA REFERENCE POINT SESSION DOSAGE GIVEN: 2 GY

## 2025-05-13 PROCEDURE — 77386: CPT | Performed by: RADIOLOGY

## 2025-05-13 PROCEDURE — 77014 CHG CT GUIDANCE RADIATION THERAPY FLDS PLACEMENT: CPT | Performed by: RADIOLOGY

## 2025-05-13 NOTE — PROGRESS NOTES
MEDICAL ONCOLOGY PROGRESS NOTE     Patient Name: Michelle Long  MRN: [id number]  YOB: 1960  Date of evaluation: 5/15/2025        HISTORY OF PRESENT ILLNESS:   History of Present Illness  The patient is a 64-year-old female with a diagnosis of non-small cell lung cancer, specifically in the right upper lobe with mediastinal adenopathy, left frontal brain metastasis, and a KRAS G12C mutation. She is currently receiving palliative chemotherapy with carboplatin, paclitaxel. She is also receiving concurrent XRT. She has completed SRS to the brain lesion and is here for her second cycle of treatment.    She has completed SRS treatment with Dr. Bradly Dudley and is currently receiving EBRT for the chest malignancy.     She has tolerated the treatment with complains of increased fatigue, nausea, diarrhea.  She has been managed on medication with Zofran.  She requires a refill of this medication. Additionally, she reports a sensation of a small ball in her throat upon swallowing but does not report any difficulty in eating.     She has significant pain.  She is requesting a refill of her Norco 5 mg prescription, which was previously prescribed by Dr. Funes. She typically takes 1.5 tablets at approximately 6:00 PM to aid in sleep.        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   History DCIS left breast Lumpectomy 2009->WBRT Dr Arita  NSCLC-squamous cell carcinoma, right upper lobe lung, March 2025  Mediastinal lymphadenopathy  Left frontal brain lesion  Clinical T1N2M1(subcentimeter brain metastasis).  Oligometastatic disease  TEMPUS xF SERP07N, CHEK2  Tempus xF Biological Relevant: KMT2C, DNMT3A, KEAP1, STK11  ctDNA 1.5%  MSI-Stable: Not detected  [place]: Negative for pathogenetic germline mutations  Tempus xT:  KRAS:  Potential Actionable; STK11, KEAP1, BCOR, CDKN2A, CDKN2B,MTAP: Biologically Relevant  EGFR, BRAF,ALK, ROS1, RET, MET, ERBB2: Negative  TMB: 6.3.m/MB

## 2025-05-14 ENCOUNTER — HOSPITAL ENCOUNTER (OUTPATIENT)
Dept: RADIATION ONCOLOGY | Facility: HOSPITAL | Age: 65
Discharge: HOME | End: 2025-05-14

## 2025-05-14 LAB
RAD ONC ARIA COURSE ID: NORMAL
RAD ONC ARIA COURSE INTENT: NORMAL
RAD ONC ARIA COURSE LAST TREATMENT DATE: NORMAL
RAD ONC ARIA COURSE START DATE: NORMAL
RAD ONC ARIA COURSE TREATMENT ELAPSED DAYS: 20
RAD ONC ARIA FIRST TREATMENT DATE: NORMAL
RAD ONC ARIA PLAN FRACTIONS TREATED TO DATE: 15
RAD ONC ARIA PLAN ID: NORMAL
RAD ONC ARIA PLAN PRESCRIBED DOSE PER FRACTION: 2 GY
RAD ONC ARIA PLAN PRIMARY REFERENCE POINT: NORMAL
RAD ONC ARIA PLAN TOTAL FRACTIONS PRESCRIBED: 30
RAD ONC ARIA PLAN TOTAL PRESCRIBED DOSE: 6000 CGY
RAD ONC ARIA REFERENCE POINT DOSAGE GIVEN TO DATE: 30 GY
RAD ONC ARIA REFERENCE POINT ID: NORMAL
RAD ONC ARIA REFERENCE POINT SESSION DOSAGE GIVEN: 2 GY

## 2025-05-14 PROCEDURE — 77336 RADIATION PHYSICS CONSULT: CPT | Performed by: RADIOLOGY

## 2025-05-14 PROCEDURE — 77014 CHG CT GUIDANCE RADIATION THERAPY FLDS PLACEMENT: CPT | Performed by: RADIOLOGY

## 2025-05-14 PROCEDURE — 77386: CPT | Performed by: RADIOLOGY

## 2025-05-15 ENCOUNTER — HOSPITAL ENCOUNTER (OUTPATIENT)
Dept: RADIATION ONCOLOGY | Facility: HOSPITAL | Age: 65
Discharge: HOME | End: 2025-05-15

## 2025-05-15 ENCOUNTER — HOSPITAL ENCOUNTER (OUTPATIENT)
Dept: INFUSION THERAPY | Age: 65
Discharge: HOME | End: 2025-05-15
Payer: COMMERCIAL

## 2025-05-15 ENCOUNTER — OFFICE VISIT (OUTPATIENT)
Dept: PALLATIVE CARE | Age: 65
End: 2025-05-15

## 2025-05-15 ENCOUNTER — OFFICE VISIT (OUTPATIENT)
Dept: HEMATOLOGY | Age: 65
End: 2025-05-15

## 2025-05-15 VITALS
RESPIRATION RATE: 16 BRPM | SYSTOLIC BLOOD PRESSURE: 120 MMHG | OXYGEN SATURATION: 96 % | BODY MASS INDEX: 25.71 KG/M2 | WEIGHT: 139.7 LBS | HEIGHT: 62 IN | TEMPERATURE: 98 F | HEART RATE: 98 BPM | DIASTOLIC BLOOD PRESSURE: 79 MMHG

## 2025-05-15 VITALS
SYSTOLIC BLOOD PRESSURE: 137 MMHG | TEMPERATURE: 97.1 F | HEART RATE: 99 BPM | OXYGEN SATURATION: 97 % | RESPIRATION RATE: 18 BRPM | DIASTOLIC BLOOD PRESSURE: 91 MMHG

## 2025-05-15 VITALS — BODY MASS INDEX: 25.55 KG/M2 | HEIGHT: 62 IN

## 2025-05-15 DIAGNOSIS — C79.31 NSCLC METASTATIC TO BRAIN (HCC): Primary | ICD-10-CM

## 2025-05-15 DIAGNOSIS — R11.0 CHEMOTHERAPY-INDUCED NAUSEA: ICD-10-CM

## 2025-05-15 DIAGNOSIS — Z51.12 ENCOUNTER FOR ANTINEOPLASTIC IMMUNOTHERAPY: ICD-10-CM

## 2025-05-15 DIAGNOSIS — C34.91 NON-SMALL CELL CANCER OF RIGHT LUNG (HCC): Primary | ICD-10-CM

## 2025-05-15 DIAGNOSIS — Z71.89 CARE PLAN DISCUSSED WITH PATIENT: ICD-10-CM

## 2025-05-15 DIAGNOSIS — G89.3 CANCER ASSOCIATED PAIN: ICD-10-CM

## 2025-05-15 DIAGNOSIS — Z51.5 ENCOUNTER FOR PALLIATIVE CARE: ICD-10-CM

## 2025-05-15 DIAGNOSIS — R53.0 NEOPLASTIC (MALIGNANT) RELATED FATIGUE: ICD-10-CM

## 2025-05-15 DIAGNOSIS — C79.31 NSCLC METASTATIC TO BRAIN (HCC): ICD-10-CM

## 2025-05-15 DIAGNOSIS — Z74.09 DECREASED MOBILITY AND ENDURANCE: Primary | ICD-10-CM

## 2025-05-15 DIAGNOSIS — C34.91 NON-SMALL CELL CANCER OF RIGHT LUNG (HCC): ICD-10-CM

## 2025-05-15 DIAGNOSIS — C34.91 SQUAMOUS CELL CARCINOMA OF RIGHT LUNG (HCC): ICD-10-CM

## 2025-05-15 DIAGNOSIS — M25.511 ACUTE PAIN OF RIGHT SHOULDER: ICD-10-CM

## 2025-05-15 DIAGNOSIS — T45.1X5A CHEMOTHERAPY-INDUCED NAUSEA: ICD-10-CM

## 2025-05-15 DIAGNOSIS — R53.83 CHEMOTHERAPY-INDUCED FATIGUE: ICD-10-CM

## 2025-05-15 DIAGNOSIS — Z51.11 CHEMOTHERAPY MANAGEMENT, ENCOUNTER FOR: ICD-10-CM

## 2025-05-15 DIAGNOSIS — R11.0 NAUSEA: ICD-10-CM

## 2025-05-15 DIAGNOSIS — F41.9 ANXIETY: ICD-10-CM

## 2025-05-15 DIAGNOSIS — T45.1X5D ADVERSE EFFECT OF CHEMOTHERAPY, SUBSEQUENT ENCOUNTER: ICD-10-CM

## 2025-05-15 DIAGNOSIS — C34.90 NSCLC METASTATIC TO BRAIN (HCC): Primary | ICD-10-CM

## 2025-05-15 DIAGNOSIS — Z15.89 CHEK2 GENE MUTATION POSITIVE: ICD-10-CM

## 2025-05-15 DIAGNOSIS — C34.90 NSCLC METASTATIC TO BRAIN (HCC): ICD-10-CM

## 2025-05-15 DIAGNOSIS — T45.1X5A CHEMOTHERAPY-INDUCED FATIGUE: ICD-10-CM

## 2025-05-15 LAB
ALBUMIN SERPL-MCNC: 3.3 G/DL (ref 3.5–5.2)
ALP SERPL-CCNC: 107 U/L (ref 35–104)
ALT SERPL-CCNC: 15 U/L (ref 5–33)
ANION GAP SERPL CALCULATED.3IONS-SCNC: 13 MMOL/L (ref 7–19)
AST SERPL-CCNC: 17 U/L (ref 5–32)
BASOPHILS # BLD: 0.01 K/UL (ref 0–0.2)
BASOPHILS NFR BLD: 0.1 % (ref 0–1)
BILIRUB SERPL-MCNC: <0.2 MG/DL (ref 0–1.2)
BUN SERPL-MCNC: 14 MG/DL (ref 8–23)
CALCIUM SERPL-MCNC: 8.9 MG/DL (ref 8.8–10.2)
CHLORIDE SERPL-SCNC: 99 MMOL/L (ref 98–107)
CO2 SERPL-SCNC: 24 MMOL/L (ref 22–29)
CORTIS AM PEAK SERPL-MCNC: 6.7 UG/DL (ref 4.8–19.5)
CREAT SERPL-MCNC: <0.5 MG/DL (ref 0.5–0.9)
EOSINOPHIL # BLD: 0 K/UL (ref 0–0.6)
EOSINOPHIL NFR BLD: 0 % (ref 0–5)
ERYTHROCYTE [DISTWIDTH] IN BLOOD BY AUTOMATED COUNT: 15.6 % (ref 11.5–14.5)
GLUCOSE SERPL-MCNC: 114 MG/DL (ref 70–99)
HCT VFR BLD AUTO: 26.9 % (ref 37–47)
HGB BLD-MCNC: 8.5 G/DL (ref 12–16)
LYMPHOCYTES # BLD: 0.28 K/UL (ref 1.1–4.5)
LYMPHOCYTES NFR BLD: 4.1 % (ref 20–40)
MCH RBC QN AUTO: 26.8 PG (ref 27–31)
MCHC RBC AUTO-ENTMCNC: 31.6 G/DL (ref 33–37)
MCV RBC AUTO: 84.9 FL (ref 81–99)
MONOCYTES # BLD: 0.19 K/UL (ref 0–0.9)
MONOCYTES NFR BLD: 2.8 % (ref 1–10)
NEUTROPHILS # BLD: 6.3 K/UL (ref 1.5–7.5)
NEUTS SEG NFR BLD: 92.1 % (ref 50–65)
PLATELET # BLD AUTO: 493 K/UL (ref 130–400)
PMV BLD AUTO: 8.2 FL (ref 9.4–12.3)
POTASSIUM SERPL-SCNC: 4.7 MMOL/L (ref 3.5–5.1)
PROT SERPL-MCNC: 6.7 G/DL (ref 6.4–8.3)
RAD ONC ARIA COURSE ID: NORMAL
RAD ONC ARIA COURSE INTENT: NORMAL
RAD ONC ARIA COURSE LAST TREATMENT DATE: NORMAL
RAD ONC ARIA COURSE START DATE: NORMAL
RAD ONC ARIA COURSE TREATMENT ELAPSED DAYS: 21
RAD ONC ARIA FIRST TREATMENT DATE: NORMAL
RAD ONC ARIA PLAN FRACTIONS TREATED TO DATE: 16
RAD ONC ARIA PLAN ID: NORMAL
RAD ONC ARIA PLAN PRESCRIBED DOSE PER FRACTION: 2 GY
RAD ONC ARIA PLAN PRIMARY REFERENCE POINT: NORMAL
RAD ONC ARIA PLAN TOTAL FRACTIONS PRESCRIBED: 30
RAD ONC ARIA PLAN TOTAL PRESCRIBED DOSE: 6000 CGY
RAD ONC ARIA REFERENCE POINT DOSAGE GIVEN TO DATE: 32 GY
RAD ONC ARIA REFERENCE POINT ID: NORMAL
RAD ONC ARIA REFERENCE POINT SESSION DOSAGE GIVEN: 2 GY
RBC # BLD AUTO: 3.17 M/UL (ref 4.2–5.4)
SODIUM SERPL-SCNC: 136 MMOL/L (ref 136–145)
T4 FREE SERPL-MCNC: 1.08 NG/DL (ref 0.93–1.7)
TSH SERPL DL<=0.005 MIU/L-ACNC: 0.27 UIU/ML (ref 0.27–4.2)
WBC # BLD AUTO: 6.84 K/UL (ref 4.8–10.8)

## 2025-05-15 PROCEDURE — 82533 TOTAL CORTISOL: CPT

## 2025-05-15 PROCEDURE — 6360000002 HC RX W HCPCS: Performed by: INTERNAL MEDICINE

## 2025-05-15 PROCEDURE — 96375 TX/PRO/DX INJ NEW DRUG ADDON: CPT

## 2025-05-15 PROCEDURE — 85025 COMPLETE CBC W/AUTO DIFF WBC: CPT

## 2025-05-15 PROCEDURE — 84439 ASSAY OF FREE THYROXINE: CPT

## 2025-05-15 PROCEDURE — 96415 CHEMO IV INFUSION ADDL HR: CPT

## 2025-05-15 PROCEDURE — 96367 TX/PROPH/DG ADDL SEQ IV INF: CPT

## 2025-05-15 PROCEDURE — 77014 CHG CT GUIDANCE RADIATION THERAPY FLDS PLACEMENT: CPT | Performed by: RADIOLOGY

## 2025-05-15 PROCEDURE — 77386: CPT | Performed by: RADIOLOGY

## 2025-05-15 PROCEDURE — 2580000003 HC RX 258: Performed by: INTERNAL MEDICINE

## 2025-05-15 PROCEDURE — 96413 CHEMO IV INFUSION 1 HR: CPT

## 2025-05-15 PROCEDURE — 96417 CHEMO IV INFUS EACH ADDL SEQ: CPT

## 2025-05-15 PROCEDURE — 77427 RADIATION TX MANAGEMENT X5: CPT | Performed by: RADIOLOGY

## 2025-05-15 PROCEDURE — 80053 COMPREHEN METABOLIC PANEL: CPT

## 2025-05-15 PROCEDURE — 2500000003 HC RX 250 WO HCPCS: Performed by: INTERNAL MEDICINE

## 2025-05-15 PROCEDURE — 36415 COLL VENOUS BLD VENIPUNCTURE: CPT

## 2025-05-15 PROCEDURE — 84443 ASSAY THYROID STIM HORMONE: CPT

## 2025-05-15 RX ORDER — DIPHENHYDRAMINE HYDROCHLORIDE 50 MG/ML
50 INJECTION, SOLUTION INTRAMUSCULAR; INTRAVENOUS ONCE
Status: CANCELLED | OUTPATIENT
Start: 2025-05-15 | End: 2025-05-15

## 2025-05-15 RX ORDER — ACETAMINOPHEN 325 MG/1
650 TABLET ORAL
Status: CANCELLED | OUTPATIENT
Start: 2025-05-15

## 2025-05-15 RX ORDER — SODIUM CHLORIDE 9 MG/ML
5-250 INJECTION, SOLUTION INTRAVENOUS PRN
Status: CANCELLED | OUTPATIENT
Start: 2025-05-15

## 2025-05-15 RX ORDER — HEPARIN SODIUM (PORCINE) LOCK FLUSH IV SOLN 100 UNIT/ML 100 UNIT/ML
500 SOLUTION INTRAVENOUS PRN
Status: CANCELLED | OUTPATIENT
Start: 2025-05-15

## 2025-05-15 RX ORDER — ONDANSETRON 2 MG/ML
8 INJECTION INTRAMUSCULAR; INTRAVENOUS
Status: CANCELLED | OUTPATIENT
Start: 2025-05-15

## 2025-05-15 RX ORDER — FAMOTIDINE 10 MG/ML
20 INJECTION, SOLUTION INTRAVENOUS ONCE
Status: CANCELLED | OUTPATIENT
Start: 2025-05-15 | End: 2025-05-15

## 2025-05-15 RX ORDER — FAMOTIDINE 10 MG/ML
20 INJECTION, SOLUTION INTRAVENOUS
Status: CANCELLED | OUTPATIENT
Start: 2025-05-15

## 2025-05-15 RX ORDER — SODIUM CHLORIDE 0.9 % (FLUSH) 0.9 %
5-40 SYRINGE (ML) INJECTION PRN
Status: DISCONTINUED | OUTPATIENT
Start: 2025-05-15 | End: 2025-05-16 | Stop reason: HOSPADM

## 2025-05-15 RX ORDER — SODIUM CHLORIDE 9 MG/ML
INJECTION, SOLUTION INTRAVENOUS CONTINUOUS
Status: CANCELLED | OUTPATIENT
Start: 2025-05-15

## 2025-05-15 RX ORDER — MEPERIDINE HYDROCHLORIDE 50 MG/ML
12.5 INJECTION INTRAMUSCULAR; INTRAVENOUS; SUBCUTANEOUS PRN
Status: CANCELLED | OUTPATIENT
Start: 2025-05-15

## 2025-05-15 RX ORDER — PALONOSETRON 0.05 MG/ML
0.25 INJECTION, SOLUTION INTRAVENOUS ONCE
Status: CANCELLED | OUTPATIENT
Start: 2025-05-15 | End: 2025-05-15

## 2025-05-15 RX ORDER — HEPARIN 100 UNIT/ML
500 SYRINGE INTRAVENOUS PRN
Status: DISCONTINUED | OUTPATIENT
Start: 2025-05-15 | End: 2025-05-16 | Stop reason: HOSPADM

## 2025-05-15 RX ORDER — HYDROCODONE BITARTRATE AND ACETAMINOPHEN 7.5; 325 MG/1; MG/1
1 TABLET ORAL EVERY 6 HOURS PRN
Qty: 120 TABLET | Refills: 0 | Status: SHIPPED | OUTPATIENT
Start: 2025-05-15 | End: 2025-06-14

## 2025-05-15 RX ORDER — ONDANSETRON 4 MG/1
4 TABLET, FILM COATED ORAL EVERY 6 HOURS PRN
Qty: 30 TABLET | Refills: 5 | Status: SHIPPED | OUTPATIENT
Start: 2025-05-15

## 2025-05-15 RX ORDER — DIPHENHYDRAMINE HYDROCHLORIDE 50 MG/ML
50 INJECTION, SOLUTION INTRAMUSCULAR; INTRAVENOUS
Status: CANCELLED | OUTPATIENT
Start: 2025-05-15

## 2025-05-15 RX ORDER — ALBUTEROL SULFATE 90 UG/1
4 INHALANT RESPIRATORY (INHALATION) PRN
Status: CANCELLED | OUTPATIENT
Start: 2025-05-15

## 2025-05-15 RX ORDER — HYDROCORTISONE SODIUM SUCCINATE 100 MG/2ML
100 INJECTION INTRAMUSCULAR; INTRAVENOUS
Status: CANCELLED | OUTPATIENT
Start: 2025-05-15

## 2025-05-15 RX ORDER — DIPHENHYDRAMINE HYDROCHLORIDE 50 MG/ML
50 INJECTION, SOLUTION INTRAMUSCULAR; INTRAVENOUS ONCE
Status: COMPLETED | OUTPATIENT
Start: 2025-05-15 | End: 2025-05-15

## 2025-05-15 RX ORDER — EPINEPHRINE 1 MG/ML
0.3 INJECTION, SOLUTION, CONCENTRATE INTRAVENOUS PRN
Status: CANCELLED | OUTPATIENT
Start: 2025-05-15

## 2025-05-15 RX ORDER — PALONOSETRON 0.05 MG/ML
0.25 INJECTION, SOLUTION INTRAVENOUS ONCE
Status: COMPLETED | OUTPATIENT
Start: 2025-05-15 | End: 2025-05-15

## 2025-05-15 RX ORDER — SODIUM CHLORIDE 0.9 % (FLUSH) 0.9 %
5-40 SYRINGE (ML) INJECTION PRN
Status: CANCELLED | OUTPATIENT
Start: 2025-05-15

## 2025-05-15 RX ORDER — FAMOTIDINE 10 MG/ML
20 INJECTION, SOLUTION INTRAVENOUS ONCE
Status: COMPLETED | OUTPATIENT
Start: 2025-05-15 | End: 2025-05-15

## 2025-05-15 RX ORDER — PROCHLORPERAZINE EDISYLATE 5 MG/ML
5 INJECTION INTRAMUSCULAR; INTRAVENOUS
Status: CANCELLED | OUTPATIENT
Start: 2025-05-15

## 2025-05-15 RX ORDER — SODIUM CHLORIDE 9 MG/ML
5-250 INJECTION, SOLUTION INTRAVENOUS PRN
Status: DISCONTINUED | OUTPATIENT
Start: 2025-05-15 | End: 2025-05-16 | Stop reason: HOSPADM

## 2025-05-15 RX ORDER — DEXAMETHASONE SODIUM PHOSPHATE 10 MG/ML
10 INJECTION, SOLUTION INTRAMUSCULAR; INTRAVENOUS ONCE
Status: COMPLETED | OUTPATIENT
Start: 2025-05-15 | End: 2025-05-15

## 2025-05-15 RX ADMIN — PACLITAXEL 258 MG: 6 INJECTION, SOLUTION INTRAVENOUS at 09:38

## 2025-05-15 RX ADMIN — SODIUM CHLORIDE 150 MG: 0.9 INJECTION, SOLUTION INTRAVENOUS at 09:11

## 2025-05-15 RX ADMIN — CARBOPLATIN 380 MG: 10 INJECTION INTRAVENOUS at 12:53

## 2025-05-15 RX ADMIN — HEPARIN 500 UNITS: 100 SYRINGE at 13:27

## 2025-05-15 RX ADMIN — FAMOTIDINE 20 MG: 10 INJECTION, SOLUTION INTRAVENOUS at 09:00

## 2025-05-15 RX ADMIN — SODIUM CHLORIDE, PRESERVATIVE FREE 10 ML: 5 INJECTION INTRAVENOUS at 13:27

## 2025-05-15 RX ADMIN — SODIUM CHLORIDE 25 ML/HR: 9 INJECTION, SOLUTION INTRAVENOUS at 09:11

## 2025-05-15 RX ADMIN — DIPHENHYDRAMINE HYDROCHLORIDE 50 MG: 50 INJECTION INTRAMUSCULAR; INTRAVENOUS at 09:00

## 2025-05-15 RX ADMIN — DEXAMETHASONE SODIUM PHOSPHATE 10 MG: 10 INJECTION, SOLUTION INTRAMUSCULAR; INTRAVENOUS at 09:00

## 2025-05-15 RX ADMIN — PALONOSETRON 0.25 MG: 0.05 INJECTION, SOLUTION INTRAVENOUS at 09:00

## 2025-05-15 NOTE — PROGRESS NOTES
Supportive Care/Community Based Palliative Care  Follow Up Note        Patient Name:  Michelle Long  Medical Record Number:  [id number]  YOB: 1960    Date of Visit: 5/15/2025  Location of Visit:  Other - [facility]    Patient Care Team:  Brandy Gonzalez MD as PCP - General (Internal Medicine)  Brandy Gonzalez MD as PCP - Empaneled Provider  Aidee Sam APRN as Nurse Practitioner (Family Nurse Practitioner)  Silva Levine APRN - CNP as Advanced Practice Nurse (Hospice and Palliative Medicine)    History obtained from:  Patient, spouse, EMR    PALLIATIVE DIAGNOSES AND ORDERS/RECOMMENDATIONS/PLAN:   1. Decreased mobility and endurance  - [place] [employer] [facility] PT WT<=300LB    2. Anxiety  Continue venlafaxine  May use BuSpar as needed    3. Nausea  Continue Zofran as needed  Patient feels it is related to anxiety, has venlafaxine routine and BuSpar as needed    4. Encounter for palliative care  - [place] [employer] [facility] PT WT<=300LB  Current goals of care include: Continue treatment with palliative intent, Preserve independence/control/autonomy, Maintain or improve functional status, Maintain or improve quality of life, Remain at home, Would want hospitalization if needed, Live longer     Code status confirmed: Full Code  Will continue ACP discussions at future visit  Will continue goals of care discussions based on clinical course  Follow up home visit in 3 weeks and as needed    CHIEF COMPLAINT:     Chief Complaint   Patient presents with    Extremity Weakness     Extremity weakness and fatigue, decreased mobility and endurance       CLINICAL SUMMARY:          Michelle Long is a 64 y.o. female with PMH of non-small cell lung cancer of right upper lobe with left frontal brain metastasis and mediastinal adenopathy, KRAS G12C mutation, panlobular emphysema, coronary artery disease, hypertension, osteopenia, GERD, fatty liver disease, anxiety, and other comorbidities.     HPI AND VISIT

## 2025-05-15 NOTE — PROGRESS NOTES
[facility] Oncology & Hematology  [address] [place] [address] [place] [address]  Phone: [phone]  Fax: [phone]    Essie Zapata MS, RD, LD   Michelle Long 64 y.o.   Diagnosis, staging, date of diagnosis: NSCLC, SCC metastatic to brain, March 2025   Current Treatment: Carbo/Taxol, Keytruda q21d x4 cycles then Keytruda q21d; Keytruda currently held     Comprehensive Nutrition Assessment    Type and Reason for Visit:  Reassess    Malnutrition Assessment:  Malnutrition Status:  At risk for malnutrition (05/15/25 1245)    Context:  Chronic Illness       Nutrition Assessment:   KT 65 y/o female presents 5/15/25 for follow-up RD evaluation. Referral from Dr. 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 Pt remains adequately nourished. However, she is at risk for nutritional compromise r/t chemotherapy-related nausea and decrease in appetite. Pt has lost 10lbs over the past 6 weeks since starting treatment. She noted no specific nutrition-related issues at baseline apart from occasional diarrhea. Pt now endorses mild, but persistent, nausea. She has PRN Zofran and Phenergan at home which does provide some relief. She notes the first 10 days following treatment are harder than the remaining days.     Diet History:  Pt reports she avoids fried foods. She tries to avoid added sugars unless it is a special occasion. Pt does not drink soda, energy drinks, juice, or flavored water. She does drink water throughout the day.     Pt has modified her eating pattern to try to increase calorie/protein intake. She is tracking protein content of foods but admits it has been less lately.     Breakfast: Ensure, berries, yogurt; no longer making a smoothie  Lunch: soup or oatmeal  Dinner: varies; often chicken breast   Snacks: 1 cup cottage cheese, fruit      Nutrition Related Laboratory Data:  Na+=136  K+=4.7  Net=943  GFR >90    Anthropometric Measures:  Height: 157.5 cm (5' 2\")  Ideal

## 2025-05-16 ENCOUNTER — HOSPITAL ENCOUNTER (OUTPATIENT)
Dept: RADIATION ONCOLOGY | Facility: HOSPITAL | Age: 65
Discharge: HOME | End: 2025-05-16

## 2025-05-16 LAB
RAD ONC ARIA COURSE ID: NORMAL
RAD ONC ARIA COURSE INTENT: NORMAL
RAD ONC ARIA COURSE LAST TREATMENT DATE: NORMAL
RAD ONC ARIA COURSE START DATE: NORMAL
RAD ONC ARIA COURSE TREATMENT ELAPSED DAYS: 22
RAD ONC ARIA FIRST TREATMENT DATE: NORMAL
RAD ONC ARIA PLAN FRACTIONS TREATED TO DATE: 17
RAD ONC ARIA PLAN ID: NORMAL
RAD ONC ARIA PLAN PRESCRIBED DOSE PER FRACTION: 2 GY
RAD ONC ARIA PLAN PRIMARY REFERENCE POINT: NORMAL
RAD ONC ARIA PLAN TOTAL FRACTIONS PRESCRIBED: 30
RAD ONC ARIA PLAN TOTAL PRESCRIBED DOSE: 6000 CGY
RAD ONC ARIA REFERENCE POINT DOSAGE GIVEN TO DATE: 34 GY
RAD ONC ARIA REFERENCE POINT ID: NORMAL
RAD ONC ARIA REFERENCE POINT SESSION DOSAGE GIVEN: 2 GY

## 2025-05-16 PROCEDURE — 77386: CPT | Performed by: RADIOLOGY

## 2025-05-16 PROCEDURE — 77014 CHG CT GUIDANCE RADIATION THERAPY FLDS PLACEMENT: CPT | Performed by: RADIOLOGY

## 2025-05-19 ENCOUNTER — TELEPHONE (OUTPATIENT)
Dept: HEMATOLOGY | Age: 65
End: 2025-05-19

## 2025-05-19 ENCOUNTER — HOSPITAL ENCOUNTER (OUTPATIENT)
Dept: RADIATION ONCOLOGY | Facility: HOSPITAL | Age: 65
Discharge: HOME | End: 2025-05-19
Payer: COMMERCIAL

## 2025-05-19 ENCOUNTER — DOCUMENTATION (OUTPATIENT)
Age: 65
End: 2025-05-19
Payer: COMMERCIAL

## 2025-05-19 PROBLEM — E86.0 DEHYDRATION: Status: ACTIVE | Noted: 2025-05-19

## 2025-05-19 LAB
RAD ONC ARIA COURSE END DATE: NORMAL
RAD ONC ARIA COURSE ID: NORMAL
RAD ONC ARIA COURSE ID: NORMAL
RAD ONC ARIA COURSE INTENT: NORMAL
RAD ONC ARIA COURSE INTENT: NORMAL
RAD ONC ARIA COURSE LAST TREATMENT DATE: NORMAL
RAD ONC ARIA COURSE LAST TREATMENT DATE: NORMAL
RAD ONC ARIA COURSE START DATE: NORMAL
RAD ONC ARIA COURSE START DATE: NORMAL
RAD ONC ARIA COURSE TREATMENT ELAPSED DAYS: 25
RAD ONC ARIA COURSE TREATMENT ELAPSED DAYS: 5
RAD ONC ARIA FIRST TREATMENT DATE: NORMAL
RAD ONC ARIA FIRST TREATMENT DATE: NORMAL
RAD ONC ARIA PLAN FRACTIONS TREATED TO DATE: 18
RAD ONC ARIA PLAN FRACTIONS TREATED TO DATE: 3
RAD ONC ARIA PLAN ID: NORMAL
RAD ONC ARIA PLAN ID: NORMAL
RAD ONC ARIA PLAN NAME: NORMAL
RAD ONC ARIA PLAN PRESCRIBED DOSE PER FRACTION: 2 GY
RAD ONC ARIA PLAN PRESCRIBED DOSE PER FRACTION: 9 GY
RAD ONC ARIA PLAN PRIMARY REFERENCE POINT: NORMAL
RAD ONC ARIA PLAN PRIMARY REFERENCE POINT: NORMAL
RAD ONC ARIA PLAN TOTAL FRACTIONS PRESCRIBED: 3
RAD ONC ARIA PLAN TOTAL FRACTIONS PRESCRIBED: 30
RAD ONC ARIA PLAN TOTAL PRESCRIBED DOSE: 2700 CGY
RAD ONC ARIA PLAN TOTAL PRESCRIBED DOSE: 6000 CGY
RAD ONC ARIA REFERENCE POINT DOSAGE GIVEN TO DATE: 27 GY
RAD ONC ARIA REFERENCE POINT DOSAGE GIVEN TO DATE: 36 GY
RAD ONC ARIA REFERENCE POINT ID: NORMAL
RAD ONC ARIA REFERENCE POINT ID: NORMAL
RAD ONC ARIA REFERENCE POINT SESSION DOSAGE GIVEN: 2 GY

## 2025-05-19 PROCEDURE — 77014 CHG CT GUIDANCE RADIATION THERAPY FLDS PLACEMENT: CPT | Performed by: RADIOLOGY

## 2025-05-19 PROCEDURE — 77386: CPT | Performed by: RADIOLOGY

## 2025-05-19 RX ORDER — SODIUM CHLORIDE 9 MG/ML
500 INJECTION, SOLUTION INTRAVENOUS ONCE
Status: CANCELLED | OUTPATIENT
Start: 2025-05-20

## 2025-05-19 NOTE — PROGRESS NOTES
"Patient presented to clinic today for her radiation treatment with complaints of dizziness. She states that she \"always feels like this a few days after receiving chemotherapy.\"  She states that she received chemo last Thursday.  Sitting BP 91/61, pulse 124.  Patient states that she is eating and drinking.  Discussed issues with Dr. Andre as well as Dr. Clay's office. Patient to be scheduled for IV hydration Tuesday at 1:00pm.  Patient is aware.  "

## 2025-05-19 NOTE — TELEPHONE ENCOUNTER
Clinic received phone call from VICTORIA Yancey at [facility] regarding patient feeling lightheaded and some orthostatic blood pressure being 90/58. Patient has stated\" She always feels this way after receiving treatment\". Patient last received treatment on 5/15/25. Patient will receive 1L of NS at [facility].  will be notified of condition of patient.

## 2025-05-20 ENCOUNTER — INFUSION (OUTPATIENT)
Dept: ONCOLOGY | Facility: HOSPITAL | Age: 65
End: 2025-05-20
Payer: COMMERCIAL

## 2025-05-20 ENCOUNTER — HOSPITAL ENCOUNTER (OUTPATIENT)
Dept: RADIATION ONCOLOGY | Facility: HOSPITAL | Age: 65
Discharge: HOME | End: 2025-05-20

## 2025-05-20 VITALS
WEIGHT: 139 LBS | OXYGEN SATURATION: 94 % | DIASTOLIC BLOOD PRESSURE: 57 MMHG | TEMPERATURE: 98.3 F | HEART RATE: 109 BPM | HEIGHT: 62 IN | RESPIRATION RATE: 18 BRPM | SYSTOLIC BLOOD PRESSURE: 126 MMHG | BODY MASS INDEX: 25.58 KG/M2

## 2025-05-20 DIAGNOSIS — D05.12 DUCTAL CARCINOMA IN SITU (DCIS) OF LEFT BREAST: ICD-10-CM

## 2025-05-20 DIAGNOSIS — E86.0 DEHYDRATION: Primary | ICD-10-CM

## 2025-05-20 LAB
RAD ONC ARIA COURSE ID: NORMAL
RAD ONC ARIA COURSE INTENT: NORMAL
RAD ONC ARIA COURSE LAST TREATMENT DATE: NORMAL
RAD ONC ARIA COURSE START DATE: NORMAL
RAD ONC ARIA COURSE TREATMENT ELAPSED DAYS: 26
RAD ONC ARIA FIRST TREATMENT DATE: NORMAL
RAD ONC ARIA PLAN FRACTIONS TREATED TO DATE: 19
RAD ONC ARIA PLAN ID: NORMAL
RAD ONC ARIA PLAN PRESCRIBED DOSE PER FRACTION: 2 GY
RAD ONC ARIA PLAN PRIMARY REFERENCE POINT: NORMAL
RAD ONC ARIA PLAN TOTAL FRACTIONS PRESCRIBED: 30
RAD ONC ARIA PLAN TOTAL PRESCRIBED DOSE: 6000 CGY
RAD ONC ARIA REFERENCE POINT DOSAGE GIVEN TO DATE: 38 GY
RAD ONC ARIA REFERENCE POINT ID: NORMAL
RAD ONC ARIA REFERENCE POINT SESSION DOSAGE GIVEN: 2 GY

## 2025-05-20 PROCEDURE — 77014 CHG CT GUIDANCE RADIATION THERAPY FLDS PLACEMENT: CPT | Performed by: RADIOLOGY

## 2025-05-20 PROCEDURE — 96360 HYDRATION IV INFUSION INIT: CPT

## 2025-05-20 PROCEDURE — 25010000002 HEPARIN LOCK FLUSH PER 10 UNITS: Performed by: INTERNAL MEDICINE

## 2025-05-20 PROCEDURE — 77386: CPT | Performed by: RADIOLOGY

## 2025-05-20 PROCEDURE — 96361 HYDRATE IV INFUSION ADD-ON: CPT

## 2025-05-20 PROCEDURE — 25810000003 SODIUM CHLORIDE 0.9 % SOLUTION

## 2025-05-20 RX ORDER — SODIUM CHLORIDE 9 MG/ML
500 INJECTION, SOLUTION INTRAVENOUS ONCE
Status: CANCELLED | OUTPATIENT
Start: 2025-05-20

## 2025-05-20 RX ORDER — SODIUM CHLORIDE 0.9 % (FLUSH) 0.9 %
20 SYRINGE (ML) INJECTION AS NEEDED
OUTPATIENT
Start: 2025-05-20

## 2025-05-20 RX ORDER — SODIUM CHLORIDE 9 MG/ML
500 INJECTION, SOLUTION INTRAVENOUS ONCE
Status: COMPLETED | OUTPATIENT
Start: 2025-05-20 | End: 2025-05-20

## 2025-05-20 RX ORDER — HEPARIN SODIUM (PORCINE) LOCK FLUSH IV SOLN 100 UNIT/ML 100 UNIT/ML
500 SOLUTION INTRAVENOUS AS NEEDED
OUTPATIENT
Start: 2025-05-20

## 2025-05-20 RX ORDER — SODIUM CHLORIDE 0.9 % (FLUSH) 0.9 %
10 SYRINGE (ML) INJECTION AS NEEDED
OUTPATIENT
Start: 2025-05-20

## 2025-05-20 RX ORDER — HEPARIN SODIUM (PORCINE) LOCK FLUSH IV SOLN 100 UNIT/ML 100 UNIT/ML
500 SOLUTION INTRAVENOUS AS NEEDED
Status: DISCONTINUED | OUTPATIENT
Start: 2025-05-20 | End: 2025-05-20 | Stop reason: HOSPADM

## 2025-05-20 RX ORDER — HEPARIN SODIUM (PORCINE) LOCK FLUSH IV SOLN 100 UNIT/ML 100 UNIT/ML
300 SOLUTION INTRAVENOUS ONCE
OUTPATIENT
Start: 2025-05-20

## 2025-05-20 RX ORDER — HEPARIN SODIUM (PORCINE) LOCK FLUSH IV SOLN 100 UNIT/ML 100 UNIT/ML
300 SOLUTION INTRAVENOUS ONCE
Status: CANCELLED | OUTPATIENT
Start: 2025-05-20

## 2025-05-20 RX ORDER — SODIUM CHLORIDE 0.9 % (FLUSH) 0.9 %
10 SYRINGE (ML) INJECTION AS NEEDED
Status: DISCONTINUED | OUTPATIENT
Start: 2025-05-20 | End: 2025-05-20 | Stop reason: HOSPADM

## 2025-05-20 RX ORDER — SODIUM CHLORIDE 0.9 % (FLUSH) 0.9 %
20 SYRINGE (ML) INJECTION AS NEEDED
Status: CANCELLED | OUTPATIENT
Start: 2025-05-20

## 2025-05-20 RX ADMIN — SODIUM CHLORIDE 500 ML/HR: 9 INJECTION, SOLUTION INTRAVENOUS at 12:37

## 2025-05-20 RX ADMIN — Medication 10 ML: at 14:40

## 2025-05-20 RX ADMIN — HEPARIN 500 UNITS: 100 SYRINGE at 14:40

## 2025-05-21 ENCOUNTER — HOSPITAL ENCOUNTER (OUTPATIENT)
Dept: RADIATION ONCOLOGY | Facility: HOSPITAL | Age: 65
Discharge: HOME | End: 2025-05-21

## 2025-05-21 LAB
RAD ONC ARIA COURSE ID: NORMAL
RAD ONC ARIA COURSE INTENT: NORMAL
RAD ONC ARIA COURSE LAST TREATMENT DATE: NORMAL
RAD ONC ARIA COURSE START DATE: NORMAL
RAD ONC ARIA COURSE TREATMENT ELAPSED DAYS: 27
RAD ONC ARIA FIRST TREATMENT DATE: NORMAL
RAD ONC ARIA PLAN FRACTIONS TREATED TO DATE: 20
RAD ONC ARIA PLAN ID: NORMAL
RAD ONC ARIA PLAN PRESCRIBED DOSE PER FRACTION: 2 GY
RAD ONC ARIA PLAN PRIMARY REFERENCE POINT: NORMAL
RAD ONC ARIA PLAN TOTAL FRACTIONS PRESCRIBED: 30
RAD ONC ARIA PLAN TOTAL PRESCRIBED DOSE: 6000 CGY
RAD ONC ARIA REFERENCE POINT DOSAGE GIVEN TO DATE: 40 GY
RAD ONC ARIA REFERENCE POINT ID: NORMAL
RAD ONC ARIA REFERENCE POINT SESSION DOSAGE GIVEN: 2 GY

## 2025-05-21 PROCEDURE — 77014 CHG CT GUIDANCE RADIATION THERAPY FLDS PLACEMENT: CPT | Performed by: RADIOLOGY

## 2025-05-21 PROCEDURE — 77386: CPT | Performed by: RADIOLOGY

## 2025-05-21 PROCEDURE — 77336 RADIATION PHYSICS CONSULT: CPT | Performed by: RADIOLOGY

## 2025-05-21 RX ORDER — AZITHROMYCIN 250 MG/1
TABLET, FILM COATED ORAL
Qty: 6 TABLET | Refills: 0 | Status: SHIPPED | OUTPATIENT
Start: 2025-05-21

## 2025-05-22 ENCOUNTER — HOSPITAL ENCOUNTER (OUTPATIENT)
Dept: RADIATION ONCOLOGY | Facility: HOSPITAL | Age: 65
Discharge: HOME | End: 2025-05-22

## 2025-05-22 LAB
RAD ONC ARIA COURSE ID: NORMAL
RAD ONC ARIA COURSE INTENT: NORMAL
RAD ONC ARIA COURSE LAST TREATMENT DATE: NORMAL
RAD ONC ARIA COURSE START DATE: NORMAL
RAD ONC ARIA COURSE TREATMENT ELAPSED DAYS: 28
RAD ONC ARIA FIRST TREATMENT DATE: NORMAL
RAD ONC ARIA PLAN FRACTIONS TREATED TO DATE: 21
RAD ONC ARIA PLAN ID: NORMAL
RAD ONC ARIA PLAN PRESCRIBED DOSE PER FRACTION: 2 GY
RAD ONC ARIA PLAN PRIMARY REFERENCE POINT: NORMAL
RAD ONC ARIA PLAN TOTAL FRACTIONS PRESCRIBED: 30
RAD ONC ARIA PLAN TOTAL PRESCRIBED DOSE: 6000 CGY
RAD ONC ARIA REFERENCE POINT DOSAGE GIVEN TO DATE: 42 GY
RAD ONC ARIA REFERENCE POINT ID: NORMAL
RAD ONC ARIA REFERENCE POINT SESSION DOSAGE GIVEN: 2 GY

## 2025-05-22 PROCEDURE — 77427 RADIATION TX MANAGEMENT X5: CPT | Performed by: RADIOLOGY

## 2025-05-22 PROCEDURE — 77386: CPT | Performed by: RADIOLOGY

## 2025-05-22 PROCEDURE — 77014 CHG CT GUIDANCE RADIATION THERAPY FLDS PLACEMENT: CPT | Performed by: RADIOLOGY

## 2025-05-23 ENCOUNTER — HOSPITAL ENCOUNTER (OUTPATIENT)
Dept: RADIATION ONCOLOGY | Facility: HOSPITAL | Age: 65
Discharge: HOME | End: 2025-05-23

## 2025-05-23 LAB
RAD ONC ARIA COURSE ID: NORMAL
RAD ONC ARIA COURSE INTENT: NORMAL
RAD ONC ARIA COURSE LAST TREATMENT DATE: NORMAL
RAD ONC ARIA COURSE START DATE: NORMAL
RAD ONC ARIA COURSE TREATMENT ELAPSED DAYS: 29
RAD ONC ARIA FIRST TREATMENT DATE: NORMAL
RAD ONC ARIA PLAN FRACTIONS TREATED TO DATE: 22
RAD ONC ARIA PLAN ID: NORMAL
RAD ONC ARIA PLAN PRESCRIBED DOSE PER FRACTION: 2 GY
RAD ONC ARIA PLAN PRIMARY REFERENCE POINT: NORMAL
RAD ONC ARIA PLAN TOTAL FRACTIONS PRESCRIBED: 30
RAD ONC ARIA PLAN TOTAL PRESCRIBED DOSE: 6000 CGY
RAD ONC ARIA REFERENCE POINT DOSAGE GIVEN TO DATE: 44 GY
RAD ONC ARIA REFERENCE POINT ID: NORMAL
RAD ONC ARIA REFERENCE POINT SESSION DOSAGE GIVEN: 2 GY

## 2025-05-23 PROCEDURE — 77014 CHG CT GUIDANCE RADIATION THERAPY FLDS PLACEMENT: CPT | Performed by: RADIOLOGY

## 2025-05-23 PROCEDURE — 77386: CPT | Performed by: RADIOLOGY

## 2025-05-27 ENCOUNTER — HOSPITAL ENCOUNTER (OUTPATIENT)
Dept: RADIATION ONCOLOGY | Facility: HOSPITAL | Age: 65
Discharge: HOME | End: 2025-05-27

## 2025-05-27 LAB
RAD ONC ARIA COURSE ID: NORMAL
RAD ONC ARIA COURSE INTENT: NORMAL
RAD ONC ARIA COURSE LAST TREATMENT DATE: NORMAL
RAD ONC ARIA COURSE START DATE: NORMAL
RAD ONC ARIA COURSE TREATMENT ELAPSED DAYS: 33
RAD ONC ARIA FIRST TREATMENT DATE: NORMAL
RAD ONC ARIA PLAN FRACTIONS TREATED TO DATE: 23
RAD ONC ARIA PLAN ID: NORMAL
RAD ONC ARIA PLAN PRESCRIBED DOSE PER FRACTION: 2 GY
RAD ONC ARIA PLAN PRIMARY REFERENCE POINT: NORMAL
RAD ONC ARIA PLAN TOTAL FRACTIONS PRESCRIBED: 30
RAD ONC ARIA PLAN TOTAL PRESCRIBED DOSE: 6000 CGY
RAD ONC ARIA REFERENCE POINT DOSAGE GIVEN TO DATE: 46 GY
RAD ONC ARIA REFERENCE POINT ID: NORMAL
RAD ONC ARIA REFERENCE POINT SESSION DOSAGE GIVEN: 2 GY

## 2025-05-27 PROCEDURE — 77014 CHG CT GUIDANCE RADIATION THERAPY FLDS PLACEMENT: CPT | Performed by: RADIOLOGY

## 2025-05-27 PROCEDURE — 77386: CPT | Performed by: RADIOLOGY

## 2025-05-28 ENCOUNTER — HOSPITAL ENCOUNTER (OUTPATIENT)
Dept: RADIATION ONCOLOGY | Facility: HOSPITAL | Age: 65
Discharge: HOME | End: 2025-05-28

## 2025-05-28 LAB
RAD ONC ARIA COURSE ID: NORMAL
RAD ONC ARIA COURSE INTENT: NORMAL
RAD ONC ARIA COURSE LAST TREATMENT DATE: NORMAL
RAD ONC ARIA COURSE START DATE: NORMAL
RAD ONC ARIA COURSE TREATMENT ELAPSED DAYS: 34
RAD ONC ARIA FIRST TREATMENT DATE: NORMAL
RAD ONC ARIA PLAN FRACTIONS TREATED TO DATE: 24
RAD ONC ARIA PLAN ID: NORMAL
RAD ONC ARIA PLAN PRESCRIBED DOSE PER FRACTION: 2 GY
RAD ONC ARIA PLAN PRIMARY REFERENCE POINT: NORMAL
RAD ONC ARIA PLAN TOTAL FRACTIONS PRESCRIBED: 30
RAD ONC ARIA PLAN TOTAL PRESCRIBED DOSE: 6000 CGY
RAD ONC ARIA REFERENCE POINT DOSAGE GIVEN TO DATE: 48 GY
RAD ONC ARIA REFERENCE POINT ID: NORMAL
RAD ONC ARIA REFERENCE POINT SESSION DOSAGE GIVEN: 2 GY

## 2025-05-28 PROCEDURE — 77014 CHG CT GUIDANCE RADIATION THERAPY FLDS PLACEMENT: CPT | Performed by: RADIOLOGY

## 2025-05-28 PROCEDURE — 77386: CPT | Performed by: RADIOLOGY

## 2025-05-29 ENCOUNTER — HOSPITAL ENCOUNTER (OUTPATIENT)
Dept: RADIATION ONCOLOGY | Facility: HOSPITAL | Age: 65
Discharge: HOME | End: 2025-05-29

## 2025-05-29 LAB
RAD ONC ARIA COURSE ID: NORMAL
RAD ONC ARIA COURSE INTENT: NORMAL
RAD ONC ARIA COURSE LAST TREATMENT DATE: NORMAL
RAD ONC ARIA COURSE START DATE: NORMAL
RAD ONC ARIA COURSE TREATMENT ELAPSED DAYS: 35
RAD ONC ARIA FIRST TREATMENT DATE: NORMAL
RAD ONC ARIA PLAN FRACTIONS TREATED TO DATE: 25
RAD ONC ARIA PLAN ID: NORMAL
RAD ONC ARIA PLAN PRESCRIBED DOSE PER FRACTION: 2 GY
RAD ONC ARIA PLAN PRIMARY REFERENCE POINT: NORMAL
RAD ONC ARIA PLAN TOTAL FRACTIONS PRESCRIBED: 30
RAD ONC ARIA PLAN TOTAL PRESCRIBED DOSE: 6000 CGY
RAD ONC ARIA REFERENCE POINT DOSAGE GIVEN TO DATE: 50 GY
RAD ONC ARIA REFERENCE POINT ID: NORMAL
RAD ONC ARIA REFERENCE POINT SESSION DOSAGE GIVEN: 2 GY

## 2025-05-29 PROCEDURE — 77336 RADIATION PHYSICS CONSULT: CPT | Performed by: RADIOLOGY

## 2025-05-29 PROCEDURE — 77386: CPT | Performed by: RADIOLOGY

## 2025-05-29 PROCEDURE — 77014 CHG CT GUIDANCE RADIATION THERAPY FLDS PLACEMENT: CPT | Performed by: RADIOLOGY

## 2025-05-30 ENCOUNTER — HOSPITAL ENCOUNTER (OUTPATIENT)
Dept: RADIATION ONCOLOGY | Facility: HOSPITAL | Age: 65
Discharge: HOME | End: 2025-05-30

## 2025-05-30 LAB
RAD ONC ARIA COURSE ID: NORMAL
RAD ONC ARIA COURSE INTENT: NORMAL
RAD ONC ARIA COURSE LAST TREATMENT DATE: NORMAL
RAD ONC ARIA COURSE START DATE: NORMAL
RAD ONC ARIA COURSE TREATMENT ELAPSED DAYS: 36
RAD ONC ARIA FIRST TREATMENT DATE: NORMAL
RAD ONC ARIA PLAN FRACTIONS TREATED TO DATE: 26
RAD ONC ARIA PLAN ID: NORMAL
RAD ONC ARIA PLAN PRESCRIBED DOSE PER FRACTION: 2 GY
RAD ONC ARIA PLAN PRIMARY REFERENCE POINT: NORMAL
RAD ONC ARIA PLAN TOTAL FRACTIONS PRESCRIBED: 30
RAD ONC ARIA PLAN TOTAL PRESCRIBED DOSE: 6000 CGY
RAD ONC ARIA REFERENCE POINT DOSAGE GIVEN TO DATE: 52 GY
RAD ONC ARIA REFERENCE POINT ID: NORMAL
RAD ONC ARIA REFERENCE POINT SESSION DOSAGE GIVEN: 2 GY

## 2025-05-30 PROCEDURE — 77386: CPT | Performed by: RADIOLOGY

## 2025-05-30 PROCEDURE — 77014 CHG CT GUIDANCE RADIATION THERAPY FLDS PLACEMENT: CPT | Performed by: RADIOLOGY

## 2025-06-02 ENCOUNTER — HOSPITAL ENCOUNTER (OUTPATIENT)
Dept: RADIATION ONCOLOGY | Facility: HOSPITAL | Age: 65
Setting detail: RADIATION/ONCOLOGY SERIES
End: 2025-06-02
Payer: COMMERCIAL

## 2025-06-02 ENCOUNTER — HOSPITAL ENCOUNTER (OUTPATIENT)
Dept: RADIATION ONCOLOGY | Facility: HOSPITAL | Age: 65
Discharge: HOME | End: 2025-06-02
Payer: COMMERCIAL

## 2025-06-02 LAB
RAD ONC ARIA COURSE ID: NORMAL
RAD ONC ARIA COURSE INTENT: NORMAL
RAD ONC ARIA COURSE LAST TREATMENT DATE: NORMAL
RAD ONC ARIA COURSE START DATE: NORMAL
RAD ONC ARIA COURSE TREATMENT ELAPSED DAYS: 39
RAD ONC ARIA FIRST TREATMENT DATE: NORMAL
RAD ONC ARIA PLAN FRACTIONS TREATED TO DATE: 27
RAD ONC ARIA PLAN ID: NORMAL
RAD ONC ARIA PLAN PRESCRIBED DOSE PER FRACTION: 2 GY
RAD ONC ARIA PLAN PRIMARY REFERENCE POINT: NORMAL
RAD ONC ARIA PLAN TOTAL FRACTIONS PRESCRIBED: 30
RAD ONC ARIA PLAN TOTAL PRESCRIBED DOSE: 6000 CGY
RAD ONC ARIA REFERENCE POINT DOSAGE GIVEN TO DATE: 54 GY
RAD ONC ARIA REFERENCE POINT ID: NORMAL
RAD ONC ARIA REFERENCE POINT SESSION DOSAGE GIVEN: 2 GY

## 2025-06-02 PROCEDURE — 77386: CPT | Performed by: RADIOLOGY

## 2025-06-02 PROCEDURE — 77014 CHG CT GUIDANCE RADIATION THERAPY FLDS PLACEMENT: CPT | Performed by: RADIOLOGY

## 2025-06-03 ENCOUNTER — HOSPITAL ENCOUNTER (OUTPATIENT)
Dept: RADIATION ONCOLOGY | Facility: HOSPITAL | Age: 65
Discharge: HOME | End: 2025-06-03

## 2025-06-03 LAB
RAD ONC ARIA COURSE ID: NORMAL
RAD ONC ARIA COURSE INTENT: NORMAL
RAD ONC ARIA COURSE LAST TREATMENT DATE: NORMAL
RAD ONC ARIA COURSE START DATE: NORMAL
RAD ONC ARIA COURSE TREATMENT ELAPSED DAYS: 40
RAD ONC ARIA FIRST TREATMENT DATE: NORMAL
RAD ONC ARIA PLAN FRACTIONS TREATED TO DATE: 28
RAD ONC ARIA PLAN ID: NORMAL
RAD ONC ARIA PLAN PRESCRIBED DOSE PER FRACTION: 2 GY
RAD ONC ARIA PLAN PRIMARY REFERENCE POINT: NORMAL
RAD ONC ARIA PLAN TOTAL FRACTIONS PRESCRIBED: 30
RAD ONC ARIA PLAN TOTAL PRESCRIBED DOSE: 6000 CGY
RAD ONC ARIA REFERENCE POINT DOSAGE GIVEN TO DATE: 56 GY
RAD ONC ARIA REFERENCE POINT ID: NORMAL
RAD ONC ARIA REFERENCE POINT SESSION DOSAGE GIVEN: 2 GY

## 2025-06-03 PROCEDURE — 77014 CHG CT GUIDANCE RADIATION THERAPY FLDS PLACEMENT: CPT | Performed by: RADIOLOGY

## 2025-06-03 PROCEDURE — 77386: CPT | Performed by: RADIOLOGY

## 2025-06-04 ENCOUNTER — HOSPITAL ENCOUNTER (OUTPATIENT)
Dept: RADIATION ONCOLOGY | Facility: HOSPITAL | Age: 65
Discharge: HOME | End: 2025-06-04

## 2025-06-04 LAB
RAD ONC ARIA COURSE ID: NORMAL
RAD ONC ARIA COURSE INTENT: NORMAL
RAD ONC ARIA COURSE LAST TREATMENT DATE: NORMAL
RAD ONC ARIA COURSE START DATE: NORMAL
RAD ONC ARIA COURSE TREATMENT ELAPSED DAYS: 41
RAD ONC ARIA FIRST TREATMENT DATE: NORMAL
RAD ONC ARIA PLAN FRACTIONS TREATED TO DATE: 29
RAD ONC ARIA PLAN ID: NORMAL
RAD ONC ARIA PLAN PRESCRIBED DOSE PER FRACTION: 2 GY
RAD ONC ARIA PLAN PRIMARY REFERENCE POINT: NORMAL
RAD ONC ARIA PLAN TOTAL FRACTIONS PRESCRIBED: 30
RAD ONC ARIA PLAN TOTAL PRESCRIBED DOSE: 6000 CGY
RAD ONC ARIA REFERENCE POINT DOSAGE GIVEN TO DATE: 58 GY
RAD ONC ARIA REFERENCE POINT ID: NORMAL
RAD ONC ARIA REFERENCE POINT SESSION DOSAGE GIVEN: 2 GY

## 2025-06-04 PROCEDURE — 77386: CPT | Performed by: RADIOLOGY

## 2025-06-04 PROCEDURE — 77014 CHG CT GUIDANCE RADIATION THERAPY FLDS PLACEMENT: CPT | Performed by: RADIOLOGY

## 2025-06-05 ENCOUNTER — HOSPITAL ENCOUNTER (OUTPATIENT)
Dept: RADIATION ONCOLOGY | Facility: HOSPITAL | Age: 65
Discharge: HOME | End: 2025-06-05

## 2025-06-05 ENCOUNTER — OFFICE VISIT (OUTPATIENT)
Dept: HEMATOLOGY | Age: 65
End: 2025-06-05
Payer: COMMERCIAL

## 2025-06-05 ENCOUNTER — OFFICE VISIT (OUTPATIENT)
Dept: HEMATOLOGY | Age: 65
End: 2025-06-05

## 2025-06-05 ENCOUNTER — OFFICE VISIT (OUTPATIENT)
Dept: PALLATIVE CARE | Age: 65
End: 2025-06-05
Payer: COMMERCIAL

## 2025-06-05 ENCOUNTER — HOSPITAL ENCOUNTER (OUTPATIENT)
Dept: INFUSION THERAPY | Age: 65
Discharge: HOME | End: 2025-06-05
Payer: COMMERCIAL

## 2025-06-05 VITALS
BODY MASS INDEX: 24.53 KG/M2 | HEIGHT: 62 IN | HEART RATE: 111 BPM | SYSTOLIC BLOOD PRESSURE: 104 MMHG | WEIGHT: 133.3 LBS | TEMPERATURE: 97.3 F | DIASTOLIC BLOOD PRESSURE: 63 MMHG | OXYGEN SATURATION: 97 % | RESPIRATION RATE: 18 BRPM

## 2025-06-05 VITALS
DIASTOLIC BLOOD PRESSURE: 89 MMHG | RESPIRATION RATE: 16 BRPM | HEART RATE: 105 BPM | TEMPERATURE: 97.2 F | SYSTOLIC BLOOD PRESSURE: 145 MMHG

## 2025-06-05 VITALS — HEIGHT: 62 IN | BODY MASS INDEX: 24.38 KG/M2

## 2025-06-05 DIAGNOSIS — C34.91 NON-SMALL CELL CANCER OF RIGHT LUNG (HCC): Primary | ICD-10-CM

## 2025-06-05 DIAGNOSIS — R53.0 NEOPLASTIC (MALIGNANT) RELATED FATIGUE: ICD-10-CM

## 2025-06-05 DIAGNOSIS — Z51.12 ENCOUNTER FOR ANTINEOPLASTIC IMMUNOTHERAPY: ICD-10-CM

## 2025-06-05 DIAGNOSIS — D75.838 REACTIVE THROMBOCYTOSIS: ICD-10-CM

## 2025-06-05 DIAGNOSIS — Z15.89 CHEK2 GENE MUTATION POSITIVE: ICD-10-CM

## 2025-06-05 DIAGNOSIS — C79.31 NON-SMALL CELL LUNG CANCER METASTATIC TO BRAIN (HCC): ICD-10-CM

## 2025-06-05 DIAGNOSIS — R73.9 CHRONIC HYPERGLYCEMIA: ICD-10-CM

## 2025-06-05 DIAGNOSIS — C34.90 NSCLC METASTATIC TO BRAIN (HCC): ICD-10-CM

## 2025-06-05 DIAGNOSIS — C79.31 NSCLC METASTATIC TO BRAIN (HCC): Primary | ICD-10-CM

## 2025-06-05 DIAGNOSIS — C34.90 NON-SMALL CELL LUNG CANCER METASTATIC TO BRAIN (HCC): ICD-10-CM

## 2025-06-05 DIAGNOSIS — R11.2 CHEMOTHERAPY INDUCED NAUSEA AND VOMITING: ICD-10-CM

## 2025-06-05 DIAGNOSIS — C34.90 NSCLC METASTATIC TO BRAIN (HCC): Primary | ICD-10-CM

## 2025-06-05 DIAGNOSIS — C34.91 SQUAMOUS CELL CARCINOMA OF RIGHT LUNG (HCC): ICD-10-CM

## 2025-06-05 DIAGNOSIS — D64.81 ANTINEOPLASTIC CHEMOTHERAPY INDUCED ANEMIA: ICD-10-CM

## 2025-06-05 DIAGNOSIS — C79.31 NSCLC METASTATIC TO BRAIN (HCC): ICD-10-CM

## 2025-06-05 DIAGNOSIS — R63.4 UNINTENTIONAL WEIGHT LOSS: ICD-10-CM

## 2025-06-05 DIAGNOSIS — Z71.89 CARE PLAN DISCUSSED WITH PATIENT: ICD-10-CM

## 2025-06-05 DIAGNOSIS — T45.1X5A ANTINEOPLASTIC CHEMOTHERAPY INDUCED ANEMIA: ICD-10-CM

## 2025-06-05 DIAGNOSIS — T45.1X5A CHEMOTHERAPY INDUCED NAUSEA AND VOMITING: ICD-10-CM

## 2025-06-05 DIAGNOSIS — Z51.5 ENCOUNTER FOR PALLIATIVE CARE: ICD-10-CM

## 2025-06-05 DIAGNOSIS — R11.0 NAUSEA: Primary | ICD-10-CM

## 2025-06-05 DIAGNOSIS — Z74.09 DECREASED MOBILITY AND ENDURANCE: ICD-10-CM

## 2025-06-05 DIAGNOSIS — E86.0 DEHYDRATION: Primary | ICD-10-CM

## 2025-06-05 DIAGNOSIS — E87.1 HYPONATREMIA: ICD-10-CM

## 2025-06-05 DIAGNOSIS — C34.91 NON-SMALL CELL CANCER OF RIGHT LUNG (HCC): ICD-10-CM

## 2025-06-05 DIAGNOSIS — Z51.11 CHEMOTHERAPY MANAGEMENT, ENCOUNTER FOR: ICD-10-CM

## 2025-06-05 LAB
ALBUMIN SERPL-MCNC: 3.6 G/DL (ref 3.5–5.2)
ALP SERPL-CCNC: 118 U/L (ref 35–104)
ALT SERPL-CCNC: 16 U/L (ref 5–33)
ANION GAP SERPL CALCULATED.3IONS-SCNC: 15 MMOL/L (ref 7–19)
AST SERPL-CCNC: 19 U/L (ref 5–32)
BASOPHILS # BLD: 0 K/UL (ref 0–0.2)
BASOPHILS NFR BLD: 0 % (ref 0–1)
BILIRUB SERPL-MCNC: <0.2 MG/DL (ref 0–1.2)
BUN SERPL-MCNC: 15 MG/DL (ref 8–23)
CALCIUM SERPL-MCNC: 9.2 MG/DL (ref 8.8–10.2)
CHLORIDE SERPL-SCNC: 96 MMOL/L (ref 98–107)
CO2 SERPL-SCNC: 23 MMOL/L (ref 22–29)
CORTIS AM PEAK SERPL-MCNC: 4 UG/DL (ref 4.8–19.5)
CREAT SERPL-MCNC: 0.5 MG/DL (ref 0.5–0.9)
EOSINOPHIL # BLD: 0 K/UL (ref 0–0.6)
EOSINOPHIL NFR BLD: 0 % (ref 0–5)
ERYTHROCYTE [DISTWIDTH] IN BLOOD BY AUTOMATED COUNT: 17.8 % (ref 11.5–14.5)
GLUCOSE SERPL-MCNC: 231 MG/DL (ref 70–99)
HCT VFR BLD AUTO: 27.5 % (ref 37–47)
HGB BLD-MCNC: 8.5 G/DL (ref 12–16)
LYMPHOCYTES # BLD: 0.19 K/UL (ref 1.1–4.5)
LYMPHOCYTES NFR BLD: 4.7 % (ref 20–40)
MAGNESIUM SERPL-MCNC: 1.9 MG/DL (ref 1.6–2.4)
MCH RBC QN AUTO: 26.4 PG (ref 27–31)
MCHC RBC AUTO-ENTMCNC: 30.9 G/DL (ref 33–37)
MCV RBC AUTO: 85.4 FL (ref 81–99)
MONOCYTES # BLD: 0.05 K/UL (ref 0–0.9)
MONOCYTES NFR BLD: 1.2 % (ref 1–10)
NEUTROPHILS # BLD: 3.81 K/UL (ref 1.5–7.5)
NEUTS SEG NFR BLD: 93.6 % (ref 50–65)
PLATELET # BLD AUTO: 564 K/UL (ref 130–400)
PMV BLD AUTO: 8.5 FL (ref 9.4–12.3)
POTASSIUM SERPL-SCNC: 4.7 MMOL/L (ref 3.5–5.1)
PROT SERPL-MCNC: 7 G/DL (ref 6.4–8.3)
RAD ONC ARIA COURSE ID: NORMAL
RAD ONC ARIA COURSE INTENT: NORMAL
RAD ONC ARIA COURSE LAST TREATMENT DATE: NORMAL
RAD ONC ARIA COURSE START DATE: NORMAL
RAD ONC ARIA COURSE TREATMENT ELAPSED DAYS: 42
RAD ONC ARIA FIRST TREATMENT DATE: NORMAL
RAD ONC ARIA PLAN FRACTIONS TREATED TO DATE: 30
RAD ONC ARIA PLAN ID: NORMAL
RAD ONC ARIA PLAN PRESCRIBED DOSE PER FRACTION: 2 GY
RAD ONC ARIA PLAN PRIMARY REFERENCE POINT: NORMAL
RAD ONC ARIA PLAN TOTAL FRACTIONS PRESCRIBED: 30
RAD ONC ARIA PLAN TOTAL PRESCRIBED DOSE: 6000 CGY
RAD ONC ARIA REFERENCE POINT DOSAGE GIVEN TO DATE: 60 GY
RAD ONC ARIA REFERENCE POINT ID: NORMAL
RAD ONC ARIA REFERENCE POINT SESSION DOSAGE GIVEN: 2 GY
RBC # BLD AUTO: 3.22 M/UL (ref 4.2–5.4)
SODIUM SERPL-SCNC: 134 MMOL/L (ref 136–145)
T4 FREE SERPL-MCNC: 1.03 NG/DL (ref 0.93–1.7)
TSH SERPL DL<=0.005 MIU/L-ACNC: 0.53 UIU/ML (ref 0.27–4.2)
WBC # BLD AUTO: 4.07 K/UL (ref 4.8–10.8)

## 2025-06-05 PROCEDURE — 77014 CHG CT GUIDANCE RADIATION THERAPY FLDS PLACEMENT: CPT | Performed by: RADIOLOGY

## 2025-06-05 PROCEDURE — 2580000003 HC RX 258: Performed by: INTERNAL MEDICINE

## 2025-06-05 PROCEDURE — 96367 TX/PROPH/DG ADDL SEQ IV INF: CPT

## 2025-06-05 PROCEDURE — 3074F SYST BP LT 130 MM HG: CPT | Performed by: INTERNAL MEDICINE

## 2025-06-05 PROCEDURE — 2500000003 HC RX 250 WO HCPCS: Performed by: INTERNAL MEDICINE

## 2025-06-05 PROCEDURE — 84443 ASSAY THYROID STIM HORMONE: CPT

## 2025-06-05 PROCEDURE — 99215 OFFICE O/P EST HI 40 MIN: CPT | Performed by: INTERNAL MEDICINE

## 2025-06-05 PROCEDURE — 96417 CHEMO IV INFUS EACH ADDL SEQ: CPT

## 2025-06-05 PROCEDURE — 83735 ASSAY OF MAGNESIUM: CPT

## 2025-06-05 PROCEDURE — 99215 OFFICE O/P EST HI 40 MIN: CPT

## 2025-06-05 PROCEDURE — 6360000002 HC RX W HCPCS: Performed by: INTERNAL MEDICINE

## 2025-06-05 PROCEDURE — 85025 COMPLETE CBC W/AUTO DIFF WBC: CPT

## 2025-06-05 PROCEDURE — 36415 COLL VENOUS BLD VENIPUNCTURE: CPT

## 2025-06-05 PROCEDURE — 77336 RADIATION PHYSICS CONSULT: CPT | Performed by: RADIOLOGY

## 2025-06-05 PROCEDURE — 96413 CHEMO IV INFUSION 1 HR: CPT

## 2025-06-05 PROCEDURE — G2211 COMPLEX E/M VISIT ADD ON: HCPCS | Performed by: INTERNAL MEDICINE

## 2025-06-05 PROCEDURE — 82533 TOTAL CORTISOL: CPT

## 2025-06-05 PROCEDURE — 96360 HYDRATION IV INFUSION INIT: CPT

## 2025-06-05 PROCEDURE — 77386: CPT | Performed by: RADIOLOGY

## 2025-06-05 PROCEDURE — 96415 CHEMO IV INFUSION ADDL HR: CPT

## 2025-06-05 PROCEDURE — 84439 ASSAY OF FREE THYROXINE: CPT

## 2025-06-05 PROCEDURE — 96375 TX/PRO/DX INJ NEW DRUG ADDON: CPT

## 2025-06-05 PROCEDURE — 96361 HYDRATE IV INFUSION ADD-ON: CPT

## 2025-06-05 PROCEDURE — 80053 COMPREHEN METABOLIC PANEL: CPT

## 2025-06-05 PROCEDURE — 3078F DIAST BP <80 MM HG: CPT | Performed by: INTERNAL MEDICINE

## 2025-06-05 PROCEDURE — 1123F ACP DISCUSS/DSCN MKR DOCD: CPT

## 2025-06-05 PROCEDURE — 1123F ACP DISCUSS/DSCN MKR DOCD: CPT | Performed by: INTERNAL MEDICINE

## 2025-06-05 RX ORDER — HYDROCORTISONE SODIUM SUCCINATE 100 MG/2ML
100 INJECTION INTRAMUSCULAR; INTRAVENOUS
OUTPATIENT
Start: 2025-06-05

## 2025-06-05 RX ORDER — MEPERIDINE HYDROCHLORIDE 50 MG/ML
12.5 INJECTION INTRAMUSCULAR; INTRAVENOUS; SUBCUTANEOUS PRN
OUTPATIENT
Start: 2025-06-05

## 2025-06-05 RX ORDER — EPINEPHRINE 1 MG/ML
0.3 INJECTION, SOLUTION, CONCENTRATE INTRAVENOUS PRN
OUTPATIENT
Start: 2025-06-05

## 2025-06-05 RX ORDER — SODIUM CHLORIDE 9 MG/ML
INJECTION, SOLUTION INTRAVENOUS CONTINUOUS
OUTPATIENT
Start: 2025-06-05

## 2025-06-05 RX ORDER — ACETAMINOPHEN 325 MG/1
650 TABLET ORAL
OUTPATIENT
Start: 2025-06-05

## 2025-06-05 RX ORDER — FAMOTIDINE 10 MG/ML
20 INJECTION, SOLUTION INTRAVENOUS
OUTPATIENT
Start: 2025-06-05

## 2025-06-05 RX ORDER — 0.9 % SODIUM CHLORIDE 0.9 %
1000 INTRAVENOUS SOLUTION INTRAVENOUS ONCE
Status: CANCELLED | OUTPATIENT
Start: 2025-06-05 | End: 2025-06-05

## 2025-06-05 RX ORDER — SODIUM CHLORIDE 0.9 % (FLUSH) 0.9 %
5-40 SYRINGE (ML) INJECTION PRN
Status: CANCELLED | OUTPATIENT
Start: 2025-06-05

## 2025-06-05 RX ORDER — SODIUM CHLORIDE 9 MG/ML
5-250 INJECTION, SOLUTION INTRAVENOUS PRN
Status: CANCELLED | OUTPATIENT
Start: 2025-06-05

## 2025-06-05 RX ORDER — DIPHENHYDRAMINE HYDROCHLORIDE 50 MG/ML
50 INJECTION, SOLUTION INTRAMUSCULAR; INTRAVENOUS ONCE
Status: COMPLETED | OUTPATIENT
Start: 2025-06-05 | End: 2025-06-05

## 2025-06-05 RX ORDER — SODIUM CHLORIDE 9 MG/ML
5-250 INJECTION, SOLUTION INTRAVENOUS PRN
OUTPATIENT
Start: 2025-06-05

## 2025-06-05 RX ORDER — HEPARIN 100 UNIT/ML
500 SYRINGE INTRAVENOUS PRN
Status: DISCONTINUED | OUTPATIENT
Start: 2025-06-05 | End: 2025-06-06 | Stop reason: HOSPADM

## 2025-06-05 RX ORDER — PROCHLORPERAZINE EDISYLATE 5 MG/ML
5 INJECTION INTRAMUSCULAR; INTRAVENOUS
OUTPATIENT
Start: 2025-06-05

## 2025-06-05 RX ORDER — DIPHENHYDRAMINE HYDROCHLORIDE 50 MG/ML
50 INJECTION, SOLUTION INTRAMUSCULAR; INTRAVENOUS
OUTPATIENT
Start: 2025-06-05

## 2025-06-05 RX ORDER — PALONOSETRON 0.05 MG/ML
0.25 INJECTION, SOLUTION INTRAVENOUS ONCE
Status: COMPLETED | OUTPATIENT
Start: 2025-06-05 | End: 2025-06-05

## 2025-06-05 RX ORDER — DEXAMETHASONE SODIUM PHOSPHATE 10 MG/ML
10 INJECTION, SOLUTION INTRAMUSCULAR; INTRAVENOUS ONCE
Status: COMPLETED | OUTPATIENT
Start: 2025-06-05 | End: 2025-06-05

## 2025-06-05 RX ORDER — HEPARIN 100 UNIT/ML
500 SYRINGE INTRAVENOUS PRN
Status: CANCELLED | OUTPATIENT
Start: 2025-06-05

## 2025-06-05 RX ORDER — OLANZAPINE 10 MG/1
TABLET, FILM COATED ORAL
Qty: 5 TABLET | Refills: 0 | Status: SHIPPED | OUTPATIENT
Start: 2025-06-05

## 2025-06-05 RX ORDER — ALBUTEROL SULFATE 90 UG/1
4 INHALANT RESPIRATORY (INHALATION) PRN
OUTPATIENT
Start: 2025-06-05

## 2025-06-05 RX ORDER — FAMOTIDINE 10 MG/ML
20 INJECTION, SOLUTION INTRAVENOUS ONCE
Status: COMPLETED | OUTPATIENT
Start: 2025-06-05 | End: 2025-06-05

## 2025-06-05 RX ORDER — ONDANSETRON 2 MG/ML
8 INJECTION INTRAMUSCULAR; INTRAVENOUS
OUTPATIENT
Start: 2025-06-05

## 2025-06-05 RX ORDER — SODIUM CHLORIDE 0.9 % (FLUSH) 0.9 %
5-40 SYRINGE (ML) INJECTION PRN
Status: DISCONTINUED | OUTPATIENT
Start: 2025-06-05 | End: 2025-06-06 | Stop reason: HOSPADM

## 2025-06-05 RX ORDER — 0.9 % SODIUM CHLORIDE 0.9 %
1000 INTRAVENOUS SOLUTION INTRAVENOUS ONCE
Status: COMPLETED | OUTPATIENT
Start: 2025-06-05 | End: 2025-06-05

## 2025-06-05 RX ADMIN — PACLITAXEL 258 MG: 6 INJECTION, SOLUTION, CONCENTRATE INTRAVENOUS at 10:48

## 2025-06-05 RX ADMIN — SODIUM CHLORIDE, PRESERVATIVE FREE 10 ML: 5 INJECTION INTRAVENOUS at 14:38

## 2025-06-05 RX ADMIN — HEPARIN 500 UNITS: 100 SYRINGE at 14:38

## 2025-06-05 RX ADMIN — DEXAMETHASONE SODIUM PHOSPHATE 10 MG: 10 INJECTION, SOLUTION INTRAMUSCULAR; INTRAVENOUS at 09:30

## 2025-06-05 RX ADMIN — SODIUM CHLORIDE 150 MG: 0.9 INJECTION, SOLUTION INTRAVENOUS at 09:50

## 2025-06-05 RX ADMIN — PALONOSETRON 0.25 MG: 0.05 INJECTION, SOLUTION INTRAVENOUS at 09:30

## 2025-06-05 RX ADMIN — DIPHENHYDRAMINE HYDROCHLORIDE 50 MG: 50 INJECTION INTRAMUSCULAR; INTRAVENOUS at 09:30

## 2025-06-05 RX ADMIN — SODIUM CHLORIDE 1000 ML: 0.9 INJECTION, SOLUTION INTRAVENOUS at 08:14

## 2025-06-05 RX ADMIN — FAMOTIDINE 20 MG: 10 INJECTION, SOLUTION INTRAVENOUS at 09:30

## 2025-06-05 RX ADMIN — SODIUM CHLORIDE 200 MG: 9 INJECTION, SOLUTION INTRAVENOUS at 10:16

## 2025-06-05 RX ADMIN — CARBOPLATIN 410 MG: 10 INJECTION, SOLUTION INTRAVENOUS at 14:05

## 2025-06-05 NOTE — PROGRESS NOTES
Supportive Care/Community Based Palliative Care  Follow Up Note        Patient Name:  Michelle Long  Medical Record Number:  [id number]  YOB: 1960    Date of Visit: 6/5/2025  Location of Visit:  Other - [facility]    Patient Care Team:  Brandy Gonzalez MD as PCP - General (Internal Medicine)  Brandy Gonzalez MD as PCP - Empaneled Provider  Aidee Sam APRN as Nurse Practitioner (Family Nurse Practitioner)  Silva Levine APRN - CNP as Advanced Practice Nurse (Hospice and Palliative Medicine)    History obtained from:  patient, spouse, electronic medical record    PALLIATIVE DIAGNOSES AND ORDERS/RECOMMENDATIONS/PLAN:   1. Nausea  Continue zofran and phenergan  Zyprexa ordered by oncologist.    2. Decreased mobility and endurance  Continue use of assistive devices as needed with ambulation.    3. Non-small cell lung cancer metastatic to brain (HCC)  Patient receiving last chemotherapy and radiation treatment today.  Follow-up with oncology    4. Encounter for palliative care  Current goals of care include: Continue treatment with palliative intent, Preserve independence/control/autonomy, Maintain or improve functional status, Maintain or improve quality of life, Remain at home, Would want hospitalization if needed, Live longer     Code status confirmed: Full Code  Will continue ACP discussions at future visit  Will continue goals of care discussions based on clinical course  Follow up home visit in 4-6 weeks and as needed      CHIEF COMPLAINT:     Chief Complaint   Patient presents with    Fatigue       CLINICAL SUMMARY:          Michelle Long is a 65 y.o. female with PMH of non-small cell lung cancer of right upper lobe with left frontal brain metastasis and mediastinal adenopathy, KRAS G12C mutation, panlobular emphysema, coronary artery disease, hypertension, osteopenia, GERD, fatty liver disease, anxiety, and other comorbidities.   HPI AND VISIT SUMMARY:   I saw Michelle ACOSTA

## 2025-06-05 NOTE — PROGRESS NOTES
[facility] Oncology & Hematology  [address] [place] [address] [place] [address]  Phone: [phone]  Fax: [phone]    Essie Zapata MS, RD, LD   Michelle Long 65 y.o.   Diagnosis, staging, date of diagnosis: NSCLC, SCC metastatic to brain, March 2025   Current Treatment: Carbo/Taxol, Keytruda q21d x4 cycles then Keytruda q21d; Keytruda currently held     Comprehensive Nutrition Assessment    Type and Reason for Visit:  Reassess    Malnutrition Assessment:  Malnutrition Status:  At risk for malnutrition (06/05/25 1158)    Context:  Chronic Illness       Nutrition Assessment:   KT 64 y/o female presents 6/5/25 for follow-up RD evaluation. Referral from Dr. 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 Pt remains at risk for nutritional compromise r/t chemotherapy-related nausea and decrease in appetite. Pt has lost 16lbs over the past 8 weeks. She noted no specific nutrition-related issues at baseline apart from occasional diarrhea. Pt now endorses mild, but persistent, nausea. Pt also endorses altered taste. She has PRN Zofran and Phenergan at home which does provide some relief.    Diet History:  Pt able to tolerate Boost shakes at times but usually only one per day. Pt cna often eat 50% of a [employer] soup. She tolerates fruit, cottage cheese, and ice cream fairly well.      Nutrition Related Laboratory Data:  Na+=134  K+=4.7  Imj=772  BUN:Cr=30  GFR >90  Mag=1.9      Anthropometric Measures:  Height: 157.5 cm (5' 2\")  Ideal Body Weight (IBW): 110 lbs (50 kg)       Current Body Weight: 60.5 kg (133 lb 4.8 oz), 121.2 % IBW.    Current BMI (kg/m2): 24.4  Wt Readings from Last 5 Encounters:   06/05/25 60.5 kg (133 lb 4.8 oz)   05/15/25 63.4 kg (139 lb 11.2 oz)   04/24/25 65.6 kg (144 lb 9.6 oz)   04/11/25 66.6 kg (146 lb 12.8 oz)   04/15/25 66.2 kg (146 lb)      Nutrition Diagnosis:   Unintended weight loss related to catabolic illness, early satiety, decreased

## 2025-06-06 ENCOUNTER — HOSPITAL ENCOUNTER (OUTPATIENT)
Dept: RADIATION ONCOLOGY | Facility: HOSPITAL | Age: 65
Discharge: HOME | End: 2025-06-06

## 2025-06-06 LAB
RAD ONC ARIA COURSE END DATE: NORMAL
RAD ONC ARIA COURSE END DATE: NORMAL
RAD ONC ARIA COURSE ID: NORMAL
RAD ONC ARIA COURSE ID: NORMAL
RAD ONC ARIA COURSE INTENT: NORMAL
RAD ONC ARIA COURSE INTENT: NORMAL
RAD ONC ARIA COURSE LAST TREATMENT DATE: NORMAL
RAD ONC ARIA COURSE LAST TREATMENT DATE: NORMAL
RAD ONC ARIA COURSE START DATE: NORMAL
RAD ONC ARIA COURSE START DATE: NORMAL
RAD ONC ARIA COURSE TREATMENT ELAPSED DAYS: 42
RAD ONC ARIA COURSE TREATMENT ELAPSED DAYS: 5
RAD ONC ARIA FIRST TREATMENT DATE: NORMAL
RAD ONC ARIA FIRST TREATMENT DATE: NORMAL
RAD ONC ARIA PLAN FRACTIONS TREATED TO DATE: 3
RAD ONC ARIA PLAN FRACTIONS TREATED TO DATE: 30
RAD ONC ARIA PLAN ID: NORMAL
RAD ONC ARIA PLAN ID: NORMAL
RAD ONC ARIA PLAN NAME: NORMAL
RAD ONC ARIA PLAN NAME: NORMAL
RAD ONC ARIA PLAN PRESCRIBED DOSE PER FRACTION: 2 GY
RAD ONC ARIA PLAN PRESCRIBED DOSE PER FRACTION: 9 GY
RAD ONC ARIA PLAN PRIMARY REFERENCE POINT: NORMAL
RAD ONC ARIA PLAN PRIMARY REFERENCE POINT: NORMAL
RAD ONC ARIA PLAN TOTAL FRACTIONS PRESCRIBED: 3
RAD ONC ARIA PLAN TOTAL FRACTIONS PRESCRIBED: 30
RAD ONC ARIA PLAN TOTAL PRESCRIBED DOSE: 2700 CGY
RAD ONC ARIA PLAN TOTAL PRESCRIBED DOSE: 6000 CGY
RAD ONC ARIA REFERENCE POINT DOSAGE GIVEN TO DATE: 27 GY
RAD ONC ARIA REFERENCE POINT DOSAGE GIVEN TO DATE: 60 GY
RAD ONC ARIA REFERENCE POINT ID: NORMAL
RAD ONC ARIA REFERENCE POINT ID: NORMAL

## 2025-06-06 RX ORDER — ROSUVASTATIN CALCIUM 40 MG/1
40 TABLET, COATED ORAL DAILY
Qty: 90 TABLET | Refills: 1 | Status: SHIPPED | OUTPATIENT
Start: 2025-06-06

## 2025-06-06 RX ORDER — ERGOCALCIFEROL 1.25 MG/1
50000 CAPSULE, LIQUID FILLED ORAL WEEKLY
Qty: 12 CAPSULE | Refills: 1 | Status: SHIPPED | OUTPATIENT
Start: 2025-06-06

## 2025-06-06 RX ORDER — IRBESARTAN 300 MG/1
300 TABLET ORAL DAILY
Qty: 90 TABLET | Refills: 1 | Status: SHIPPED | OUTPATIENT
Start: 2025-06-06

## 2025-06-09 ENCOUNTER — HOSPITAL ENCOUNTER (OUTPATIENT)
Dept: INFUSION THERAPY | Age: 65
Discharge: HOME | End: 2025-06-09
Payer: COMMERCIAL

## 2025-06-09 ENCOUNTER — OFFICE VISIT (OUTPATIENT)
Dept: INTERNAL MEDICINE | Age: 65
End: 2025-06-09
Payer: COMMERCIAL

## 2025-06-09 VITALS
OXYGEN SATURATION: 98 % | BODY MASS INDEX: 23.87 KG/M2 | WEIGHT: 129.7 LBS | DIASTOLIC BLOOD PRESSURE: 92 MMHG | HEIGHT: 62 IN | TEMPERATURE: 97.7 F | SYSTOLIC BLOOD PRESSURE: 144 MMHG | RESPIRATION RATE: 18 BRPM | HEART RATE: 112 BPM

## 2025-06-09 VITALS
DIASTOLIC BLOOD PRESSURE: 76 MMHG | HEIGHT: 62 IN | WEIGHT: 130.2 LBS | SYSTOLIC BLOOD PRESSURE: 118 MMHG | HEART RATE: 136 BPM | OXYGEN SATURATION: 96 % | BODY MASS INDEX: 23.96 KG/M2

## 2025-06-09 DIAGNOSIS — C79.31 NON-SMALL CELL LUNG CANCER METASTATIC TO BRAIN (HCC): ICD-10-CM

## 2025-06-09 DIAGNOSIS — R53.0 NEOPLASTIC (MALIGNANT) RELATED FATIGUE: ICD-10-CM

## 2025-06-09 DIAGNOSIS — C34.90 NON-SMALL CELL LUNG CANCER METASTATIC TO BRAIN (HCC): ICD-10-CM

## 2025-06-09 DIAGNOSIS — C79.31 NSCLC METASTATIC TO BRAIN (HCC): ICD-10-CM

## 2025-06-09 DIAGNOSIS — E86.0 DEHYDRATION: Primary | ICD-10-CM

## 2025-06-09 DIAGNOSIS — Z51.12 ENCOUNTER FOR ANTINEOPLASTIC IMMUNOTHERAPY: ICD-10-CM

## 2025-06-09 DIAGNOSIS — C34.91 NON-SMALL CELL CANCER OF RIGHT LUNG (HCC): Primary | ICD-10-CM

## 2025-06-09 DIAGNOSIS — E78.00 PURE HYPERCHOLESTEROLEMIA: ICD-10-CM

## 2025-06-09 DIAGNOSIS — R74.01 ELEVATED TRANSAMINASE LEVEL: ICD-10-CM

## 2025-06-09 DIAGNOSIS — C34.90 NSCLC METASTATIC TO BRAIN (HCC): ICD-10-CM

## 2025-06-09 DIAGNOSIS — E03.8 SUBCLINICAL HYPOTHYROIDISM: ICD-10-CM

## 2025-06-09 DIAGNOSIS — I10 PRIMARY HYPERTENSION: ICD-10-CM

## 2025-06-09 DIAGNOSIS — C34.91 SQUAMOUS CELL CARCINOMA OF RIGHT LUNG (HCC): ICD-10-CM

## 2025-06-09 DIAGNOSIS — R73.01 IFG (IMPAIRED FASTING GLUCOSE): ICD-10-CM

## 2025-06-09 DIAGNOSIS — E55.9 VITAMIN D DEFICIENCY: ICD-10-CM

## 2025-06-09 LAB
ALBUMIN SERPL-MCNC: 3.4 G/DL (ref 3.5–5.2)
ALP SERPL-CCNC: 104 U/L (ref 35–104)
ALT SERPL-CCNC: 14 U/L (ref 5–33)
ANION GAP SERPL CALCULATED.3IONS-SCNC: 14 MMOL/L (ref 7–19)
AST SERPL-CCNC: 19 U/L (ref 5–32)
BASOPHILS # BLD: 0.02 K/UL (ref 0–0.2)
BASOPHILS NFR BLD: 0.4 % (ref 0–1)
BILIRUB SERPL-MCNC: <0.2 MG/DL (ref 0–1.2)
BUN SERPL-MCNC: 15 MG/DL (ref 8–23)
CALCIUM SERPL-MCNC: 8.9 MG/DL (ref 8.8–10.2)
CHLORIDE SERPL-SCNC: 96 MMOL/L (ref 98–107)
CO2 SERPL-SCNC: 26 MMOL/L (ref 22–29)
CREAT SERPL-MCNC: 0.5 MG/DL (ref 0.5–0.9)
EOSINOPHIL # BLD: 0.17 K/UL (ref 0–0.6)
EOSINOPHIL NFR BLD: 3.1 % (ref 0–5)
ERYTHROCYTE [DISTWIDTH] IN BLOOD BY AUTOMATED COUNT: 18.1 % (ref 11.5–14.5)
GLUCOSE SERPL-MCNC: 188 MG/DL (ref 70–99)
HCT VFR BLD AUTO: 28.5 % (ref 37–47)
HGB BLD-MCNC: 8.9 G/DL (ref 12–16)
LYMPHOCYTES # BLD: 0.29 K/UL (ref 1.1–4.5)
LYMPHOCYTES NFR BLD: 5.2 % (ref 20–40)
MAGNESIUM SERPL-MCNC: 1.9 MG/DL (ref 1.6–2.4)
MCH RBC QN AUTO: 26.5 PG (ref 27–31)
MCHC RBC AUTO-ENTMCNC: 31.2 G/DL (ref 33–37)
MCV RBC AUTO: 84.8 FL (ref 81–99)
MONOCYTES # BLD: 0.27 K/UL (ref 0–0.9)
MONOCYTES NFR BLD: 4.9 % (ref 1–10)
NEUTROPHILS # BLD: 4.78 K/UL (ref 1.5–7.5)
NEUTS SEG NFR BLD: 86 % (ref 50–65)
PLATELET # BLD AUTO: 543 K/UL (ref 130–400)
PMV BLD AUTO: 8.7 FL (ref 9.4–12.3)
POTASSIUM SERPL-SCNC: 4.1 MMOL/L (ref 3.5–5.1)
PROT SERPL-MCNC: 6.7 G/DL (ref 6.4–8.3)
RBC # BLD AUTO: 3.36 M/UL (ref 4.2–5.4)
SODIUM SERPL-SCNC: 136 MMOL/L (ref 136–145)
WBC # BLD AUTO: 5.55 K/UL (ref 4.8–10.8)

## 2025-06-09 PROCEDURE — 3074F SYST BP LT 130 MM HG: CPT | Performed by: INTERNAL MEDICINE

## 2025-06-09 PROCEDURE — 1123F ACP DISCUSS/DSCN MKR DOCD: CPT | Performed by: INTERNAL MEDICINE

## 2025-06-09 PROCEDURE — 96361 HYDRATE IV INFUSION ADD-ON: CPT

## 2025-06-09 PROCEDURE — 83735 ASSAY OF MAGNESIUM: CPT

## 2025-06-09 PROCEDURE — 3078F DIAST BP <80 MM HG: CPT | Performed by: INTERNAL MEDICINE

## 2025-06-09 PROCEDURE — 85025 COMPLETE CBC W/AUTO DIFF WBC: CPT

## 2025-06-09 PROCEDURE — 36415 COLL VENOUS BLD VENIPUNCTURE: CPT

## 2025-06-09 PROCEDURE — 2580000003 HC RX 258: Performed by: INTERNAL MEDICINE

## 2025-06-09 PROCEDURE — 2500000003 HC RX 250 WO HCPCS: Performed by: INTERNAL MEDICINE

## 2025-06-09 PROCEDURE — 6360000002 HC RX W HCPCS: Performed by: INTERNAL MEDICINE

## 2025-06-09 PROCEDURE — 99214 OFFICE O/P EST MOD 30 MIN: CPT | Performed by: INTERNAL MEDICINE

## 2025-06-09 PROCEDURE — 80053 COMPREHEN METABOLIC PANEL: CPT

## 2025-06-09 PROCEDURE — 96360 HYDRATION IV INFUSION INIT: CPT

## 2025-06-09 RX ORDER — HEPARIN 100 UNIT/ML
500 SYRINGE INTRAVENOUS PRN
Status: CANCELLED | OUTPATIENT
Start: 2025-06-09

## 2025-06-09 RX ORDER — SODIUM CHLORIDE 0.9 % (FLUSH) 0.9 %
5-40 SYRINGE (ML) INJECTION PRN
Status: DISCONTINUED | OUTPATIENT
Start: 2025-06-09 | End: 2025-06-10 | Stop reason: HOSPADM

## 2025-06-09 RX ORDER — SODIUM CHLORIDE 0.9 % (FLUSH) 0.9 %
5-40 SYRINGE (ML) INJECTION PRN
Status: CANCELLED | OUTPATIENT
Start: 2025-06-09

## 2025-06-09 RX ORDER — SODIUM CHLORIDE 9 MG/ML
5-250 INJECTION, SOLUTION INTRAVENOUS PRN
Status: CANCELLED | OUTPATIENT
Start: 2025-06-09

## 2025-06-09 RX ORDER — 0.9 % SODIUM CHLORIDE 0.9 %
1000 INTRAVENOUS SOLUTION INTRAVENOUS ONCE
Status: CANCELLED | OUTPATIENT
Start: 2025-06-09 | End: 2025-06-09

## 2025-06-09 RX ORDER — 0.9 % SODIUM CHLORIDE 0.9 %
1000 INTRAVENOUS SOLUTION INTRAVENOUS ONCE
Status: COMPLETED | OUTPATIENT
Start: 2025-06-09 | End: 2025-06-09

## 2025-06-09 RX ORDER — HEPARIN 100 UNIT/ML
500 SYRINGE INTRAVENOUS PRN
Status: DISCONTINUED | OUTPATIENT
Start: 2025-06-09 | End: 2025-06-10 | Stop reason: HOSPADM

## 2025-06-09 RX ADMIN — SODIUM CHLORIDE, PRESERVATIVE FREE 10 ML: 5 INJECTION INTRAVENOUS at 14:11

## 2025-06-09 RX ADMIN — HEPARIN 500 UNITS: 100 SYRINGE at 14:11

## 2025-06-09 RX ADMIN — SODIUM CHLORIDE 1000 ML: 0.9 INJECTION, SOLUTION INTRAVENOUS at 12:46

## 2025-06-09 ASSESSMENT — ENCOUNTER SYMPTOMS
WHEEZING: 0
NAUSEA: 1
CONSTIPATION: 0
ABDOMINAL PAIN: 0
COUGH: 1
SORE THROAT: 0
CHEST TIGHTNESS: 0

## 2025-06-09 NOTE — PROGRESS NOTES
Rx Boniva  Repeat bd needed  Orders placed     Hypertension -   Now blood pressure readings have been low  Kidney function is normal  Rx   Metoprolol 50 bid- decrease to 1/2 BID  Avapro 300-DC   Amlodipine 2.5- DC  IMDUR 30 mg daily-hold         CAD  BP has been better  Abnormal left ventricular global longitudinal left ventricular strain of   -16.6% per echo 9/2021  Mild Coronary artery disease as per cardiac cath July 2019  Management plans as per cardiology        Pure hypercholesterolemia-  Lab results called to patient after the appointment since patient did not have lab done prior to appointment  Cholesterol, Total 12/10/2024 143    Triglycerides 12/10/2024 77    HDL 12/10/2024 78 (H)    LDL Cholesterol 12/10/2024 50       Cholesterol, Total 09/05/2024 148    Triglycerides 09/05/2024 91    HDL 09/05/2024 71 (H)    LDL Cholesterol 09/05/2024 59       Patient has known mild coronary artery disease from 2019 cardiac cath  She obviously has not been taking her Crestor as prescribed  Importance of statin to prevent progressive coronary disease/MI is very highly recommended  Liver function   RX increased to Crestor 40 mg daily 2022  Stopped imdur related to low BP  Zetia 10 added 7/2023  RX crestor 40 \  RX zetia 10  Repeat in 4 months     Vitamin D deficiency-  Lab results reviewed with patient   Vitamin D level is 41  RX vitamin D 50,000 IU weekly     Panlobular emphysema  Pulmonology notes reviewed independently by me and discussed with patient  PPSV 20- 2023  Use advair twice daily    Subclinical hypothyroidism  TSH 06/05/2025 0.528    T4 Free 06/05/2025 1.03      T4 Free 09/05/2024 1.26    TSH 09/05/2024 4.400 (H)    Repeat in 4 months     Anxiety stress  RX effexor 150 + buspar 10 daily           Orders Placed This Encounter   Procedures    Comprehensive Metabolic Panel    Hemoglobin A1C    Albumin/Creatinine Ratio, Urine    TSH    Lipid Panel     New Prescriptions    No medications on file         No

## 2025-06-12 ENCOUNTER — HOSPITAL ENCOUNTER (OUTPATIENT)
Dept: INFUSION THERAPY | Age: 65
Discharge: HOME | End: 2025-06-12
Payer: COMMERCIAL

## 2025-06-12 VITALS
RESPIRATION RATE: 20 BRPM | OXYGEN SATURATION: 97 % | TEMPERATURE: 97.2 F | DIASTOLIC BLOOD PRESSURE: 74 MMHG | HEART RATE: 120 BPM | SYSTOLIC BLOOD PRESSURE: 109 MMHG

## 2025-06-12 DIAGNOSIS — R53.0 NEOPLASTIC (MALIGNANT) RELATED FATIGUE: ICD-10-CM

## 2025-06-12 DIAGNOSIS — C79.31 NSCLC METASTATIC TO BRAIN (HCC): ICD-10-CM

## 2025-06-12 DIAGNOSIS — Z51.12 ENCOUNTER FOR ANTINEOPLASTIC IMMUNOTHERAPY: ICD-10-CM

## 2025-06-12 DIAGNOSIS — C34.90 NON-SMALL CELL LUNG CANCER METASTATIC TO BRAIN (HCC): ICD-10-CM

## 2025-06-12 DIAGNOSIS — E86.0 DEHYDRATION: Primary | ICD-10-CM

## 2025-06-12 DIAGNOSIS — C79.31 NON-SMALL CELL LUNG CANCER METASTATIC TO BRAIN (HCC): ICD-10-CM

## 2025-06-12 DIAGNOSIS — C34.91 SQUAMOUS CELL CARCINOMA OF RIGHT LUNG (HCC): ICD-10-CM

## 2025-06-12 DIAGNOSIS — C34.90 NSCLC METASTATIC TO BRAIN (HCC): ICD-10-CM

## 2025-06-12 LAB
ALBUMIN SERPL-MCNC: 3.3 G/DL (ref 3.5–5.2)
ALP SERPL-CCNC: 92 U/L (ref 35–104)
ALT SERPL-CCNC: 17 U/L (ref 5–33)
ANION GAP SERPL CALCULATED.3IONS-SCNC: 14 MMOL/L (ref 7–19)
AST SERPL-CCNC: 23 U/L (ref 5–32)
BASOPHILS # BLD: 0.01 K/UL (ref 0–0.2)
BASOPHILS NFR BLD: 0.3 % (ref 0–1)
BILIRUB SERPL-MCNC: <0.2 MG/DL (ref 0–1.2)
BUN SERPL-MCNC: 13 MG/DL (ref 8–23)
CALCIUM SERPL-MCNC: 8.5 MG/DL (ref 8.8–10.2)
CHLORIDE SERPL-SCNC: 101 MMOL/L (ref 98–107)
CO2 SERPL-SCNC: 23 MMOL/L (ref 22–29)
CREAT SERPL-MCNC: 0.5 MG/DL (ref 0.5–0.9)
EOSINOPHIL # BLD: 0.08 K/UL (ref 0–0.6)
EOSINOPHIL NFR BLD: 2 % (ref 0–5)
ERYTHROCYTE [DISTWIDTH] IN BLOOD BY AUTOMATED COUNT: 18.2 % (ref 11.5–14.5)
GLUCOSE SERPL-MCNC: 147 MG/DL (ref 70–99)
HCT VFR BLD AUTO: 24.5 % (ref 37–47)
HGB BLD-MCNC: 7.3 G/DL (ref 12–16)
LYMPHOCYTES # BLD: 0.31 K/UL (ref 1.1–4.5)
LYMPHOCYTES NFR BLD: 7.8 % (ref 20–40)
MCH RBC QN AUTO: 25.8 PG (ref 27–31)
MCHC RBC AUTO-ENTMCNC: 29.8 G/DL (ref 33–37)
MCV RBC AUTO: 86.6 FL (ref 81–99)
MONOCYTES # BLD: 0.55 K/UL (ref 0–0.9)
MONOCYTES NFR BLD: 13.9 % (ref 1–10)
NEUTROPHILS # BLD: 2.98 K/UL (ref 1.5–7.5)
NEUTS SEG NFR BLD: 75.5 % (ref 50–65)
PLATELET # BLD AUTO: 354 K/UL (ref 130–400)
PMV BLD AUTO: 8.4 FL (ref 9.4–12.3)
POTASSIUM SERPL-SCNC: 3.8 MMOL/L (ref 3.5–5.1)
PROT SERPL-MCNC: 6.4 G/DL (ref 6.4–8.3)
RBC # BLD AUTO: 2.83 M/UL (ref 4.2–5.4)
SODIUM SERPL-SCNC: 138 MMOL/L (ref 136–145)
WBC # BLD AUTO: 3.95 K/UL (ref 4.8–10.8)

## 2025-06-12 PROCEDURE — 2500000003 HC RX 250 WO HCPCS: Performed by: INTERNAL MEDICINE

## 2025-06-12 PROCEDURE — 96360 HYDRATION IV INFUSION INIT: CPT

## 2025-06-12 PROCEDURE — 36415 COLL VENOUS BLD VENIPUNCTURE: CPT

## 2025-06-12 PROCEDURE — 2580000003 HC RX 258: Performed by: INTERNAL MEDICINE

## 2025-06-12 PROCEDURE — 85025 COMPLETE CBC W/AUTO DIFF WBC: CPT

## 2025-06-12 PROCEDURE — 80053 COMPREHEN METABOLIC PANEL: CPT

## 2025-06-12 PROCEDURE — 96361 HYDRATE IV INFUSION ADD-ON: CPT

## 2025-06-12 PROCEDURE — 6360000002 HC RX W HCPCS: Performed by: INTERNAL MEDICINE

## 2025-06-12 RX ORDER — 0.9 % SODIUM CHLORIDE 0.9 %
1000 INTRAVENOUS SOLUTION INTRAVENOUS ONCE
Status: COMPLETED | OUTPATIENT
Start: 2025-06-12 | End: 2025-06-12

## 2025-06-12 RX ORDER — 0.9 % SODIUM CHLORIDE 0.9 %
1000 INTRAVENOUS SOLUTION INTRAVENOUS ONCE
Status: CANCELLED | OUTPATIENT
Start: 2025-06-12 | End: 2025-06-12

## 2025-06-12 RX ORDER — HEPARIN 100 UNIT/ML
500 SYRINGE INTRAVENOUS PRN
Status: DISCONTINUED | OUTPATIENT
Start: 2025-06-12 | End: 2025-06-13 | Stop reason: HOSPADM

## 2025-06-12 RX ORDER — SODIUM CHLORIDE 0.9 % (FLUSH) 0.9 %
5-40 SYRINGE (ML) INJECTION PRN
Status: CANCELLED | OUTPATIENT
Start: 2025-06-12

## 2025-06-12 RX ORDER — HEPARIN 100 UNIT/ML
500 SYRINGE INTRAVENOUS PRN
Status: CANCELLED | OUTPATIENT
Start: 2025-06-12

## 2025-06-12 RX ORDER — SODIUM CHLORIDE 0.9 % (FLUSH) 0.9 %
5-40 SYRINGE (ML) INJECTION PRN
Status: DISCONTINUED | OUTPATIENT
Start: 2025-06-12 | End: 2025-06-13 | Stop reason: HOSPADM

## 2025-06-12 RX ORDER — SODIUM CHLORIDE 9 MG/ML
5-250 INJECTION, SOLUTION INTRAVENOUS PRN
OUTPATIENT
Start: 2025-06-12

## 2025-06-12 RX ADMIN — SODIUM CHLORIDE, PRESERVATIVE FREE 10 ML: 5 INJECTION INTRAVENOUS at 15:26

## 2025-06-12 RX ADMIN — HEPARIN 500 UNITS: 100 SYRINGE at 15:26

## 2025-06-12 RX ADMIN — SODIUM CHLORIDE 1000 ML: 9 INJECTION, SOLUTION INTRAVENOUS at 13:30

## 2025-06-12 NOTE — PROGRESS NOTES
Pt hgb is 7.3, MD notified, he suspects pt will rebound but recc pt return early next week for recheck. Pt informed and instructed to call or go to ED if she becomes inc weak or SOA as she may need a transfusion.

## 2025-06-13 ENCOUNTER — HOSPITAL ENCOUNTER (OUTPATIENT)
Dept: INFUSION THERAPY | Age: 65
Setting detail: INFUSION SERIES
Discharge: HOME | End: 2025-06-13
Payer: COMMERCIAL

## 2025-06-13 ENCOUNTER — HOSPITAL ENCOUNTER (OUTPATIENT)
Dept: INFUSION THERAPY | Age: 65
Discharge: HOME | End: 2025-06-13
Payer: COMMERCIAL

## 2025-06-13 ENCOUNTER — CLINICAL DOCUMENTATION (OUTPATIENT)
Dept: HEMATOLOGY | Age: 65
End: 2025-06-13

## 2025-06-13 VITALS
TEMPERATURE: 97.9 F | RESPIRATION RATE: 17 BRPM | OXYGEN SATURATION: 98 % | DIASTOLIC BLOOD PRESSURE: 68 MMHG | SYSTOLIC BLOOD PRESSURE: 142 MMHG | HEART RATE: 104 BPM

## 2025-06-13 DIAGNOSIS — C79.31 NSCLC METASTATIC TO BRAIN (HCC): ICD-10-CM

## 2025-06-13 DIAGNOSIS — C34.91 NON-SMALL CELL CANCER OF RIGHT LUNG (HCC): ICD-10-CM

## 2025-06-13 DIAGNOSIS — C34.90 NSCLC METASTATIC TO BRAIN (HCC): ICD-10-CM

## 2025-06-13 DIAGNOSIS — D64.9 ANEMIA REQUIRING TRANSFUSIONS: Primary | ICD-10-CM

## 2025-06-13 DIAGNOSIS — C34.91 NON-SMALL CELL CANCER OF RIGHT LUNG (HCC): Primary | ICD-10-CM

## 2025-06-13 LAB
ABO/RH: NORMAL
ANTIBODY SCREEN: NORMAL
BASOPHILS # BLD: 0.01 K/UL (ref 0–0.2)
BASOPHILS NFR BLD: 0.2 % (ref 0–1)
BLOOD BANK DISPENSE STATUS: NORMAL
BLOOD BANK PRODUCT CODE: NORMAL
BPU ID: NORMAL
DESCRIPTION BLOOD BANK: NORMAL
EOSINOPHIL # BLD: 0.07 K/UL (ref 0–0.6)
EOSINOPHIL NFR BLD: 1.7 % (ref 0–5)
ERYTHROCYTE [DISTWIDTH] IN BLOOD BY AUTOMATED COUNT: 18.6 % (ref 11.5–14.5)
HCT VFR BLD AUTO: 23.4 % (ref 37–47)
HGB BLD-MCNC: 7.1 G/DL (ref 12–16)
LYMPHOCYTES # BLD: 0.34 K/UL (ref 1.1–4.5)
LYMPHOCYTES NFR BLD: 8.2 % (ref 20–40)
MCH RBC QN AUTO: 26.2 PG (ref 27–31)
MCHC RBC AUTO-ENTMCNC: 30.3 G/DL (ref 33–37)
MCV RBC AUTO: 86.3 FL (ref 81–99)
MONOCYTES # BLD: 0.69 K/UL (ref 0–0.9)
MONOCYTES NFR BLD: 16.6 % (ref 1–10)
NEUTROPHILS # BLD: 3.02 K/UL (ref 1.5–7.5)
NEUTS SEG NFR BLD: 72.8 % (ref 50–65)
PLATELET # BLD AUTO: 303 K/UL (ref 130–400)
PMV BLD AUTO: 8.2 FL (ref 9.4–12.3)
RBC # BLD AUTO: 2.71 M/UL (ref 4.2–5.4)
WBC # BLD AUTO: 4.15 K/UL (ref 4.8–10.8)

## 2025-06-13 PROCEDURE — 86900 BLOOD TYPING SEROLOGIC ABO: CPT

## 2025-06-13 PROCEDURE — 36430 TRANSFUSION BLD/BLD COMPNT: CPT

## 2025-06-13 PROCEDURE — 86923 COMPATIBILITY TEST ELECTRIC: CPT

## 2025-06-13 PROCEDURE — P9040 RBC LEUKOREDUCED IRRADIATED: HCPCS

## 2025-06-13 PROCEDURE — 36415 COLL VENOUS BLD VENIPUNCTURE: CPT

## 2025-06-13 PROCEDURE — 86901 BLOOD TYPING SEROLOGIC RH(D): CPT

## 2025-06-13 PROCEDURE — 85025 COMPLETE CBC W/AUTO DIFF WBC: CPT

## 2025-06-13 PROCEDURE — 86850 RBC ANTIBODY SCREEN: CPT

## 2025-06-13 RX ORDER — ALBUTEROL SULFATE 90 UG/1
4 INHALANT RESPIRATORY (INHALATION) PRN
OUTPATIENT
Start: 2025-06-13

## 2025-06-13 RX ORDER — ACETAMINOPHEN 325 MG/1
650 TABLET ORAL
OUTPATIENT
Start: 2025-06-13

## 2025-06-13 RX ORDER — SODIUM CHLORIDE 9 MG/ML
25 INJECTION, SOLUTION INTRAVENOUS PRN
Status: CANCELLED | OUTPATIENT
Start: 2025-06-13

## 2025-06-13 RX ORDER — SODIUM CHLORIDE 9 MG/ML
20 INJECTION, SOLUTION INTRAVENOUS CONTINUOUS
OUTPATIENT
Start: 2025-06-13

## 2025-06-13 RX ORDER — SODIUM CHLORIDE 9 MG/ML
INJECTION, SOLUTION INTRAVENOUS CONTINUOUS
OUTPATIENT
Start: 2025-06-13

## 2025-06-13 RX ORDER — DIPHENHYDRAMINE HYDROCHLORIDE 50 MG/ML
50 INJECTION, SOLUTION INTRAMUSCULAR; INTRAVENOUS
Status: CANCELLED | OUTPATIENT
Start: 2025-06-13

## 2025-06-13 RX ORDER — SODIUM CHLORIDE 9 MG/ML
20 INJECTION, SOLUTION INTRAVENOUS CONTINUOUS
Status: DISCONTINUED | OUTPATIENT
Start: 2025-06-13 | End: 2025-06-15 | Stop reason: HOSPADM

## 2025-06-13 RX ORDER — EPINEPHRINE 1 MG/ML
0.3 INJECTION, SOLUTION, CONCENTRATE INTRAVENOUS PRN
OUTPATIENT
Start: 2025-06-13

## 2025-06-13 RX ORDER — ONDANSETRON 2 MG/ML
8 INJECTION INTRAMUSCULAR; INTRAVENOUS
Status: CANCELLED | OUTPATIENT
Start: 2025-06-13

## 2025-06-13 RX ORDER — ONDANSETRON 2 MG/ML
8 INJECTION INTRAMUSCULAR; INTRAVENOUS
OUTPATIENT
Start: 2025-06-13

## 2025-06-13 RX ORDER — ACETAMINOPHEN 325 MG/1
650 TABLET ORAL
Status: CANCELLED | OUTPATIENT
Start: 2025-06-13

## 2025-06-13 RX ORDER — HYDROCORTISONE SODIUM SUCCINATE 100 MG/2ML
100 INJECTION INTRAMUSCULAR; INTRAVENOUS
Status: CANCELLED | OUTPATIENT
Start: 2025-06-13

## 2025-06-13 RX ORDER — SODIUM CHLORIDE 9 MG/ML
INJECTION, SOLUTION INTRAVENOUS CONTINUOUS
Status: CANCELLED | OUTPATIENT
Start: 2025-06-13

## 2025-06-13 RX ORDER — SODIUM CHLORIDE 0.9 % (FLUSH) 0.9 %
5-40 SYRINGE (ML) INJECTION PRN
Status: CANCELLED | OUTPATIENT
Start: 2025-06-13

## 2025-06-13 RX ORDER — HYDROCORTISONE SODIUM SUCCINATE 100 MG/2ML
100 INJECTION INTRAMUSCULAR; INTRAVENOUS
OUTPATIENT
Start: 2025-06-13

## 2025-06-13 RX ORDER — SODIUM CHLORIDE 0.9 % (FLUSH) 0.9 %
5-40 SYRINGE (ML) INJECTION PRN
Status: DISCONTINUED | OUTPATIENT
Start: 2025-06-13 | End: 2025-06-14 | Stop reason: HOSPADM

## 2025-06-13 RX ORDER — SODIUM CHLORIDE 9 MG/ML
25 INJECTION, SOLUTION INTRAVENOUS PRN
OUTPATIENT
Start: 2025-06-13

## 2025-06-13 RX ORDER — EPINEPHRINE 1 MG/ML
0.3 INJECTION, SOLUTION, CONCENTRATE INTRAVENOUS PRN
Status: CANCELLED | OUTPATIENT
Start: 2025-06-13

## 2025-06-13 RX ORDER — ALBUTEROL SULFATE 90 UG/1
4 INHALANT RESPIRATORY (INHALATION) PRN
Status: CANCELLED | OUTPATIENT
Start: 2025-06-13

## 2025-06-13 RX ORDER — DIPHENHYDRAMINE HYDROCHLORIDE 50 MG/ML
50 INJECTION, SOLUTION INTRAMUSCULAR; INTRAVENOUS
OUTPATIENT
Start: 2025-06-13

## 2025-06-13 RX ORDER — SODIUM CHLORIDE 0.9 % (FLUSH) 0.9 %
5-40 SYRINGE (ML) INJECTION PRN
OUTPATIENT
Start: 2025-06-13

## 2025-06-13 RX ORDER — SODIUM CHLORIDE 9 MG/ML
20 INJECTION, SOLUTION INTRAVENOUS CONTINUOUS
Status: CANCELLED | OUTPATIENT
Start: 2025-06-13

## 2025-06-13 RX ORDER — HEPARIN 100 UNIT/ML
500 SYRINGE INTRAVENOUS PRN
Start: 2025-06-13

## 2025-06-13 NOTE — PROGRESS NOTES
Lab Results   Component Value Date    WBC 4.15 (L) 06/13/2025    HGB 7.1 (L) 06/13/2025    HCT 23.4 (LL) 06/13/2025    MCV 86.3 06/13/2025     06/13/2025     Lab Results   Component Value Date    NEUTROABS 3.02 06/13/2025   Pt here for CBC with c/o severe shortness of breath and weakness. Pt set up for 1U PRBC to be given today at [facility].

## 2025-06-13 NOTE — PROGRESS NOTES
Pt arrived to [facility] and port accessed using sterile techqnuie. Type and screen and confirm drawn. 1 unit of PRBC administered. Pt tolerated well. Port flushed and heparin locked.     Electronically signed by Clarisse Bach RN on 6/13/2025 at 3:02 PM

## 2025-06-16 ENCOUNTER — HOSPITAL ENCOUNTER (OUTPATIENT)
Dept: INFUSION THERAPY | Age: 65
Discharge: HOME | End: 2025-06-16
Payer: COMMERCIAL

## 2025-06-16 VITALS
RESPIRATION RATE: 18 BRPM | SYSTOLIC BLOOD PRESSURE: 110 MMHG | HEART RATE: 119 BPM | DIASTOLIC BLOOD PRESSURE: 78 MMHG | WEIGHT: 132.9 LBS | BODY MASS INDEX: 24.46 KG/M2 | TEMPERATURE: 97.9 F | HEIGHT: 62 IN | OXYGEN SATURATION: 100 %

## 2025-06-16 DIAGNOSIS — Z51.12 ENCOUNTER FOR ANTINEOPLASTIC IMMUNOTHERAPY: ICD-10-CM

## 2025-06-16 DIAGNOSIS — C34.90 NSCLC METASTATIC TO BRAIN (HCC): Primary | ICD-10-CM

## 2025-06-16 DIAGNOSIS — C79.31 NSCLC METASTATIC TO BRAIN (HCC): Primary | ICD-10-CM

## 2025-06-16 DIAGNOSIS — C34.91 NON-SMALL CELL CANCER OF RIGHT LUNG (HCC): ICD-10-CM

## 2025-06-16 DIAGNOSIS — E86.0 DEHYDRATION: ICD-10-CM

## 2025-06-16 DIAGNOSIS — C34.90 NON-SMALL CELL LUNG CANCER METASTATIC TO BRAIN (HCC): Primary | ICD-10-CM

## 2025-06-16 DIAGNOSIS — C79.31 NSCLC METASTATIC TO BRAIN (HCC): ICD-10-CM

## 2025-06-16 DIAGNOSIS — C79.31 NON-SMALL CELL LUNG CANCER METASTATIC TO BRAIN (HCC): Primary | ICD-10-CM

## 2025-06-16 DIAGNOSIS — C34.91 SQUAMOUS CELL CARCINOMA OF RIGHT LUNG (HCC): ICD-10-CM

## 2025-06-16 DIAGNOSIS — C34.90 NSCLC METASTATIC TO BRAIN (HCC): ICD-10-CM

## 2025-06-16 DIAGNOSIS — R53.0 NEOPLASTIC (MALIGNANT) RELATED FATIGUE: ICD-10-CM

## 2025-06-16 LAB
ALBUMIN SERPL-MCNC: 3.3 G/DL (ref 3.5–5.2)
ALP SERPL-CCNC: 95 U/L (ref 35–104)
ALT SERPL-CCNC: 20 U/L (ref 5–33)
ANION GAP SERPL CALCULATED.3IONS-SCNC: 13 MMOL/L (ref 7–19)
AST SERPL-CCNC: 23 U/L (ref 5–32)
BASOPHILS # BLD: 0.02 K/UL (ref 0–0.2)
BASOPHILS NFR BLD: 0.6 % (ref 0–1)
BILIRUB SERPL-MCNC: <0.2 MG/DL (ref 0–1.2)
BUN SERPL-MCNC: 12 MG/DL (ref 8–23)
CALCIUM SERPL-MCNC: 8.4 MG/DL (ref 8.8–10.2)
CHLORIDE SERPL-SCNC: 102 MMOL/L (ref 98–107)
CO2 SERPL-SCNC: 24 MMOL/L (ref 22–29)
CREAT SERPL-MCNC: 0.5 MG/DL (ref 0.5–0.9)
EOSINOPHIL # BLD: 0.1 K/UL (ref 0–0.6)
EOSINOPHIL NFR BLD: 2.9 % (ref 0–5)
ERYTHROCYTE [DISTWIDTH] IN BLOOD BY AUTOMATED COUNT: 17.8 % (ref 11.5–14.5)
GLUCOSE SERPL-MCNC: 111 MG/DL (ref 70–99)
HCT VFR BLD AUTO: 27.8 % (ref 37–47)
HGB BLD-MCNC: 8.5 G/DL (ref 12–16)
LYMPHOCYTES # BLD: 0.31 K/UL (ref 1.1–4.5)
LYMPHOCYTES NFR BLD: 8.9 % (ref 20–40)
MAGNESIUM SERPL-MCNC: 1.8 MG/DL (ref 1.6–2.4)
MCH RBC QN AUTO: 26.2 PG (ref 27–31)
MCHC RBC AUTO-ENTMCNC: 30.6 G/DL (ref 33–37)
MCV RBC AUTO: 85.5 FL (ref 81–99)
MONOCYTES # BLD: 0.79 K/UL (ref 0–0.9)
MONOCYTES NFR BLD: 22.8 % (ref 1–10)
NEUTROPHILS # BLD: 2.24 K/UL (ref 1.5–7.5)
NEUTS SEG NFR BLD: 64.5 % (ref 50–65)
PLATELET # BLD AUTO: 332 K/UL (ref 130–400)
PMV BLD AUTO: 8.7 FL (ref 9.4–12.3)
POTASSIUM SERPL-SCNC: 4.2 MMOL/L (ref 3.5–5.1)
PROT SERPL-MCNC: 6.3 G/DL (ref 6.4–8.3)
RBC # BLD AUTO: 3.25 M/UL (ref 4.2–5.4)
SODIUM SERPL-SCNC: 139 MMOL/L (ref 136–145)
WBC # BLD AUTO: 3.47 K/UL (ref 4.8–10.8)

## 2025-06-16 PROCEDURE — 2580000003 HC RX 258: Performed by: INTERNAL MEDICINE

## 2025-06-16 PROCEDURE — 80053 COMPREHEN METABOLIC PANEL: CPT

## 2025-06-16 PROCEDURE — 85025 COMPLETE CBC W/AUTO DIFF WBC: CPT

## 2025-06-16 PROCEDURE — 83735 ASSAY OF MAGNESIUM: CPT

## 2025-06-16 PROCEDURE — 96361 HYDRATE IV INFUSION ADD-ON: CPT

## 2025-06-16 PROCEDURE — 96360 HYDRATION IV INFUSION INIT: CPT

## 2025-06-16 PROCEDURE — 6360000002 HC RX W HCPCS: Performed by: INTERNAL MEDICINE

## 2025-06-16 PROCEDURE — 2500000003 HC RX 250 WO HCPCS: Performed by: INTERNAL MEDICINE

## 2025-06-16 PROCEDURE — 36415 COLL VENOUS BLD VENIPUNCTURE: CPT

## 2025-06-16 RX ORDER — SODIUM CHLORIDE 0.9 % (FLUSH) 0.9 %
5-40 SYRINGE (ML) INJECTION PRN
Status: DISCONTINUED | OUTPATIENT
Start: 2025-06-16 | End: 2025-06-17 | Stop reason: HOSPADM

## 2025-06-16 RX ORDER — HEPARIN 100 UNIT/ML
500 SYRINGE INTRAVENOUS PRN
OUTPATIENT
Start: 2025-06-16

## 2025-06-16 RX ORDER — SODIUM CHLORIDE 0.9 % (FLUSH) 0.9 %
5-40 SYRINGE (ML) INJECTION PRN
OUTPATIENT
Start: 2025-06-16

## 2025-06-16 RX ORDER — SODIUM CHLORIDE 9 MG/ML
5-250 INJECTION, SOLUTION INTRAVENOUS PRN
OUTPATIENT
Start: 2025-06-16

## 2025-06-16 RX ORDER — IBANDRONATE SODIUM 150 MG/1
150 TABLET, FILM COATED ORAL
Qty: 3 TABLET | Refills: 1 | Status: SHIPPED | OUTPATIENT
Start: 2025-06-16

## 2025-06-16 RX ORDER — 0.9 % SODIUM CHLORIDE 0.9 %
1000 INTRAVENOUS SOLUTION INTRAVENOUS ONCE
Status: CANCELLED | OUTPATIENT
Start: 2025-06-16 | End: 2025-06-16

## 2025-06-16 RX ORDER — HEPARIN 100 UNIT/ML
500 SYRINGE INTRAVENOUS PRN
Status: DISCONTINUED | OUTPATIENT
Start: 2025-06-16 | End: 2025-06-17 | Stop reason: HOSPADM

## 2025-06-16 RX ORDER — 0.9 % SODIUM CHLORIDE 0.9 %
1000 INTRAVENOUS SOLUTION INTRAVENOUS ONCE
Status: COMPLETED | OUTPATIENT
Start: 2025-06-16 | End: 2025-06-16

## 2025-06-16 RX ADMIN — HEPARIN 500 UNITS: 100 SYRINGE at 14:32

## 2025-06-16 RX ADMIN — SODIUM CHLORIDE 1000 ML: 0.9 INJECTION, SOLUTION INTRAVENOUS at 13:02

## 2025-06-16 RX ADMIN — SODIUM CHLORIDE, PRESERVATIVE FREE 10 ML: 5 INJECTION INTRAVENOUS at 14:32

## 2025-06-16 NOTE — TELEPHONE ENCOUNTER
Michelle Long called to request a refill on her medication.      Last office visit : 6/9/2025   Next office visit : 10/6/2025     Requested Prescriptions     Pending Prescriptions Disp Refills    ibandronate (BONIVA) 150 MG tablet [Pharmacy Med Name: IBANDRONATE SODIUM 150 MG T 150 Tablet] 3 tablet 1     Sig: TAKE 1 TABLET BY MOUTH EVERY 30 DAYS            Alexa Watts MA

## 2025-06-19 ENCOUNTER — HOSPITAL ENCOUNTER (OUTPATIENT)
Dept: MRI IMAGING | Age: 65
Discharge: HOME | End: 2025-06-19
Attending: INTERNAL MEDICINE
Payer: COMMERCIAL

## 2025-06-19 ENCOUNTER — TELEPHONE (OUTPATIENT)
Dept: GENETICS | Facility: HOSPITAL | Age: 65
End: 2025-06-19
Payer: COMMERCIAL

## 2025-06-19 DIAGNOSIS — C34.91 NON-SMALL CELL CANCER OF RIGHT LUNG (HCC): ICD-10-CM

## 2025-06-19 DIAGNOSIS — C79.31 NSCLC METASTATIC TO BRAIN (HCC): ICD-10-CM

## 2025-06-19 DIAGNOSIS — C34.90 NSCLC METASTATIC TO BRAIN (HCC): ICD-10-CM

## 2025-06-19 PROCEDURE — 70553 MRI BRAIN STEM W/O & W/DYE: CPT

## 2025-06-19 PROCEDURE — 6360000004 HC RX CONTRAST MEDICATION: Performed by: INTERNAL MEDICINE

## 2025-06-19 PROCEDURE — A9577 INJ MULTIHANCE: HCPCS | Performed by: INTERNAL MEDICINE

## 2025-06-19 RX ADMIN — GADOBENATE DIMEGLUMINE 11 ML: 529 INJECTION, SOLUTION INTRAVENOUS at 09:44

## 2025-06-19 NOTE — TELEPHONE ENCOUNTER
Called pt to follow up from her genetic counseling appt back in April. Asked pt if she was still interested in moving forward with genetic testing and if she would like to schedule a blood draw for testing. Pt was no longer interested in genetic testing and would like to cancel at this time.

## 2025-06-20 ENCOUNTER — HOSPITAL ENCOUNTER (OUTPATIENT)
Dept: INFUSION THERAPY | Age: 65
Discharge: HOME | End: 2025-06-20
Payer: COMMERCIAL

## 2025-06-20 DIAGNOSIS — C34.91 NON-SMALL CELL CANCER OF RIGHT LUNG (HCC): Primary | ICD-10-CM

## 2025-06-20 DIAGNOSIS — C34.91 NON-SMALL CELL CANCER OF RIGHT LUNG (HCC): ICD-10-CM

## 2025-06-20 DIAGNOSIS — C79.31 NSCLC METASTATIC TO BRAIN (HCC): ICD-10-CM

## 2025-06-20 DIAGNOSIS — Z51.12 ENCOUNTER FOR ANTINEOPLASTIC IMMUNOTHERAPY: ICD-10-CM

## 2025-06-20 DIAGNOSIS — R53.0 NEOPLASTIC (MALIGNANT) RELATED FATIGUE: ICD-10-CM

## 2025-06-20 DIAGNOSIS — C34.90 NSCLC METASTATIC TO BRAIN (HCC): ICD-10-CM

## 2025-06-20 LAB
ALBUMIN SERPL-MCNC: 3.4 G/DL (ref 3.5–5.2)
ALP SERPL-CCNC: 101 U/L (ref 35–104)
ALT SERPL-CCNC: 13 U/L (ref 5–33)
ANION GAP SERPL CALCULATED.3IONS-SCNC: 11 MMOL/L (ref 7–19)
AST SERPL-CCNC: 18 U/L (ref 5–32)
BASOPHILS # BLD: 0.03 K/UL (ref 0–0.2)
BASOPHILS NFR BLD: 1 % (ref 0–1)
BILIRUB SERPL-MCNC: <0.2 MG/DL (ref 0–1.2)
BUN SERPL-MCNC: 8 MG/DL (ref 8–23)
CALCIUM SERPL-MCNC: 8.6 MG/DL (ref 8.8–10.2)
CHLORIDE SERPL-SCNC: 103 MMOL/L (ref 98–107)
CO2 SERPL-SCNC: 22 MMOL/L (ref 22–29)
CORTIS AM PEAK SERPL-MCNC: 12.9 UG/DL (ref 4.8–19.5)
CREAT SERPL-MCNC: <0.5 MG/DL (ref 0.5–0.9)
EOSINOPHIL # BLD: 0.1 K/UL (ref 0–0.6)
EOSINOPHIL NFR BLD: 3.2 % (ref 0–5)
ERYTHROCYTE [DISTWIDTH] IN BLOOD BY AUTOMATED COUNT: 17.6 % (ref 11.5–14.5)
GLUCOSE SERPL-MCNC: 130 MG/DL (ref 70–99)
HCT VFR BLD AUTO: 29.1 % (ref 37–47)
HGB BLD-MCNC: 9 G/DL (ref 12–16)
LYMPHOCYTES # BLD: 0.38 K/UL (ref 1.1–4.5)
LYMPHOCYTES NFR BLD: 12.1 % (ref 20–40)
MAGNESIUM SERPL-MCNC: 2.1 MG/DL (ref 1.6–2.4)
MCH RBC QN AUTO: 26.2 PG (ref 27–31)
MCHC RBC AUTO-ENTMCNC: 30.9 G/DL (ref 33–37)
MCV RBC AUTO: 84.8 FL (ref 81–99)
MONOCYTES # BLD: 0.68 K/UL (ref 0–0.9)
MONOCYTES NFR BLD: 21.7 % (ref 1–10)
NEUTROPHILS # BLD: 1.94 K/UL (ref 1.5–7.5)
NEUTS SEG NFR BLD: 61.7 % (ref 50–65)
PHOSPHATE SERPL-MCNC: 2.4 MG/DL (ref 2.5–4.5)
PLATELET # BLD AUTO: 382 K/UL (ref 130–400)
PMV BLD AUTO: 8.7 FL (ref 9.4–12.3)
POTASSIUM SERPL-SCNC: 4.3 MMOL/L (ref 3.5–5.1)
PROT SERPL-MCNC: 6.4 G/DL (ref 6.4–8.3)
RBC # BLD AUTO: 3.43 M/UL (ref 4.2–5.4)
SODIUM SERPL-SCNC: 136 MMOL/L (ref 136–145)
T4 FREE SERPL-MCNC: 0.94 NG/DL (ref 0.93–1.7)
TSH SERPL DL<=0.005 MIU/L-ACNC: 1.28 UIU/ML (ref 0.27–4.2)
WBC # BLD AUTO: 3.14 K/UL (ref 4.8–10.8)

## 2025-06-20 PROCEDURE — 83735 ASSAY OF MAGNESIUM: CPT

## 2025-06-20 PROCEDURE — 36415 COLL VENOUS BLD VENIPUNCTURE: CPT

## 2025-06-20 PROCEDURE — 82533 TOTAL CORTISOL: CPT

## 2025-06-20 PROCEDURE — 84443 ASSAY THYROID STIM HORMONE: CPT

## 2025-06-20 PROCEDURE — 84100 ASSAY OF PHOSPHORUS: CPT

## 2025-06-20 PROCEDURE — 84439 ASSAY OF FREE THYROXINE: CPT

## 2025-06-20 PROCEDURE — 85025 COMPLETE CBC W/AUTO DIFF WBC: CPT

## 2025-06-20 PROCEDURE — 80053 COMPREHEN METABOLIC PANEL: CPT

## 2025-06-24 ENCOUNTER — OFFICE VISIT (OUTPATIENT)
Dept: PULMONOLOGY | Age: 65
End: 2025-06-24
Payer: COMMERCIAL

## 2025-06-24 VITALS
OXYGEN SATURATION: 97 % | SYSTOLIC BLOOD PRESSURE: 141 MMHG | DIASTOLIC BLOOD PRESSURE: 78 MMHG | HEIGHT: 62 IN | HEART RATE: 94 BPM | TEMPERATURE: 96.7 F | BODY MASS INDEX: 23.92 KG/M2 | WEIGHT: 130 LBS | RESPIRATION RATE: 20 BRPM

## 2025-06-24 DIAGNOSIS — I25.10 MILD CORONARY ARTERY DISEASE: ICD-10-CM

## 2025-06-24 DIAGNOSIS — Z87.891 HISTORY OF SMOKING: ICD-10-CM

## 2025-06-24 DIAGNOSIS — R91.1 LUNG NODULE: ICD-10-CM

## 2025-06-24 DIAGNOSIS — R59.0 MEDIASTINAL ADENOPATHY: ICD-10-CM

## 2025-06-24 DIAGNOSIS — C34.91 SQUAMOUS CELL CARCINOMA OF RIGHT LUNG (HCC): Primary | ICD-10-CM

## 2025-06-24 PROCEDURE — 3078F DIAST BP <80 MM HG: CPT | Performed by: INTERNAL MEDICINE

## 2025-06-24 PROCEDURE — 1123F ACP DISCUSS/DSCN MKR DOCD: CPT | Performed by: INTERNAL MEDICINE

## 2025-06-24 PROCEDURE — 3077F SYST BP >= 140 MM HG: CPT | Performed by: INTERNAL MEDICINE

## 2025-06-24 PROCEDURE — 99214 OFFICE O/P EST MOD 30 MIN: CPT | Performed by: INTERNAL MEDICINE

## 2025-06-24 ASSESSMENT — ENCOUNTER SYMPTOMS
NAUSEA: 1
ABDOMINAL PAIN: 0
ABDOMINAL DISTENTION: 0
RHINORRHEA: 0
BACK PAIN: 0
SHORTNESS OF BREATH: 0
COUGH: 0
ANAL BLEEDING: 0
WHEEZING: 0
CHEST TIGHTNESS: 0
APNEA: 0

## 2025-06-24 NOTE — PROGRESS NOTES
MEDICAL ONCOLOGY PROGRESS NOTE     Patient Name: Michelle Long  MRN: [id number]  YOB: 1960  Date of evaluation: 6/26/2025        HISTORY OF PRESENT ILLNESS:   History of Present Illness  The patient is a 65-year-old female with a diagnosis of non-small cell lung cancer, specifically in the right upper lobe with mediastinal adenopathy, left frontal brain metastasis, and a KRAS G12C mutation. She is currently receiving palliative chemotherapy with carboplatin, paclitaxel. She is also receiving concurrent XRT.  She presents here today for initiation of maintenance pembrolizumab.    She has been experiencing nausea, which she attributes to her current medication regimen. She expresses a desire to try an alternative anti-nausea medication, specifically Reglan. She also inquires about the possibility of taking Phenergan at night to manage her symptoms.  She also complains of significant fatigue and sleepiness.    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   History DCIS left breast Lumpectomy 2009->[facility] Dr Arita  NSCLC-squamous cell carcinoma, right upper lobe lung, March 2025  Mediastinal lymphadenopathy  Left frontal brain lesion  Clinical T1N2M1(subcentimeter brain metastasis).  Oligometastatic disease  TEMPUS xF OIQF34B, CHEK2  Tempus xF Biological Relevant: KMT2C, DNMT3A, KEAP1, STK11  ctDNA 1.5%  MSI-Stable: Not detected  [place]: Negative for pathogenetic germline mutations  Tempus xT:  KRAS:  Potential Actionable; STK11, KEAP1, BCOR, CDKN2A, CDKN2B,MTAP: Biologically Relevant  EGFR, BRAF,ALK, ROS1, RET, MET, ERBB2: Negative  TMB: 6.3.m/MB   MSI:Stable     Treatment Summary  4/2/25 Carbo/Taxol/Keytruda every 21 days x 4 cycles then maintenance Keytruda  4/24/2008-6/3/2008 Completed radiation therapy 5040 cGy over 28 fractions to left breast at [facility]  4/16/25-4/21/25 Completed radiation therapy 2700 cGy over 3 fractions to brain with Dr Bradly Dudley/[facility]

## 2025-06-24 NOTE — PROGRESS NOTES
Pulmonary and Sleep Medicine    Michelle Long (:  1960) is a 65 y.o. female,Established patient, here for evaluation of the following chief complaint(s):  Follow-up (3 month follow up- Squamous cell carcinoma R lung )      Referring physician:  No referring provider defined for this encounter.     ASSESSMENT/PLAN:  1. Squamous cell carcinoma of right lung (HCC)  2. Mediastinal adenopathy  3. Lung nodule  4. History of smoking. Quit smoking in 2016. Smoked a pack a day for 25 years.  5. Mild coronary artery disease        Will continue with the current management per oncology. She is on nutritional supplements.        Mel Allison MD[facility], [facility]    Return in about 6 months (around 2025).    SUBJECTIVE/OBJECTIVE:        The patient is here for follow up on lung cancer. She is having significant GI side effects from chemo. She feels weak. She had an MRI of the brain and it did show very small and suspicious new lesions.           Continue the following medications as reported by the patient:    Prior to Visit Medications    Medication Sig Taking? Authorizing Provider   ibandronate (BONIVA) 150 MG tablet TAKE 1 TABLET BY MOUTH EVERY 30 DAYS Yes Brandy Gonzalez MD   rosuvastatin (CRESTOR) 40 MG tablet TAKE 1 TABLET BY MOUTH EVERY DAY Yes Brandy Gonzalez MD   vitamin D (ERGOCALCIFEROL) 1.25 MG (82486 UT) CAPS capsule TAKE 1 CAPSULE BY MOUTH ONCE A WEEK Yes Brandy Gonzalez MD   OLANZapine (ZYPREXA) 10 MG tablet Take 1 tablet at night x5 days for chemo induced nausea Yes Loly Parson MD   ondansetron (ZOFRAN) 4 MG tablet Take 1 tablet by mouth every 6 hours as needed for Nausea or Vomiting Yes Loly Parson MD   venlafaxine (EFFEXOR XR) 150 MG extended release capsule TAKE ONE CAPSULE BY MOUTH EVERY DAY Yes Brandy Gonzalez MD   azelastine (ASTELIN) 0.1 % nasal spray 1 spray by Nasal route 2 times daily Use in each nostril as directed Yes Brandy Gonzalez MD   aspirin EC 81 MG EC

## 2025-06-26 ENCOUNTER — OFFICE VISIT (OUTPATIENT)
Dept: HEMATOLOGY | Age: 65
End: 2025-06-26

## 2025-06-26 ENCOUNTER — OFFICE VISIT (OUTPATIENT)
Dept: HEMATOLOGY | Age: 65
End: 2025-06-26
Payer: COMMERCIAL

## 2025-06-26 ENCOUNTER — HOSPITAL ENCOUNTER (OUTPATIENT)
Dept: INFUSION THERAPY | Age: 65
Discharge: HOME | End: 2025-06-26
Payer: COMMERCIAL

## 2025-06-26 ENCOUNTER — OFFICE VISIT (OUTPATIENT)
Dept: PALLATIVE CARE | Age: 65
End: 2025-06-26

## 2025-06-26 ENCOUNTER — TELEPHONE (OUTPATIENT)
Dept: HEMATOLOGY | Age: 65
End: 2025-06-26

## 2025-06-26 VITALS
DIASTOLIC BLOOD PRESSURE: 85 MMHG | RESPIRATION RATE: 18 BRPM | BODY MASS INDEX: 23.7 KG/M2 | SYSTOLIC BLOOD PRESSURE: 126 MMHG | WEIGHT: 128.8 LBS | OXYGEN SATURATION: 100 % | HEIGHT: 62 IN | TEMPERATURE: 98.6 F | HEART RATE: 100 BPM

## 2025-06-26 VITALS — HEIGHT: 62 IN | BODY MASS INDEX: 23.56 KG/M2

## 2025-06-26 DIAGNOSIS — C79.31 NON-SMALL CELL LUNG CANCER METASTATIC TO BRAIN (HCC): ICD-10-CM

## 2025-06-26 DIAGNOSIS — Z51.11 CHEMOTHERAPY MANAGEMENT, ENCOUNTER FOR: ICD-10-CM

## 2025-06-26 DIAGNOSIS — T45.1X5A CHEMOTHERAPY INDUCED NAUSEA AND VOMITING: ICD-10-CM

## 2025-06-26 DIAGNOSIS — C34.90 NSCLC METASTATIC TO BRAIN (HCC): ICD-10-CM

## 2025-06-26 DIAGNOSIS — C34.90 NON-SMALL CELL LUNG CANCER METASTATIC TO BRAIN (HCC): ICD-10-CM

## 2025-06-26 DIAGNOSIS — Z51.5 ENCOUNTER FOR PALLIATIVE CARE: Primary | ICD-10-CM

## 2025-06-26 DIAGNOSIS — C34.91 NON-SMALL CELL CANCER OF RIGHT LUNG (HCC): ICD-10-CM

## 2025-06-26 DIAGNOSIS — C34.91 SQUAMOUS CELL CARCINOMA OF RIGHT LUNG (HCC): ICD-10-CM

## 2025-06-26 DIAGNOSIS — Z51.12 ENCOUNTER FOR ANTINEOPLASTIC IMMUNOTHERAPY: ICD-10-CM

## 2025-06-26 DIAGNOSIS — C79.31 NSCLC METASTATIC TO BRAIN (HCC): ICD-10-CM

## 2025-06-26 DIAGNOSIS — Z15.89 CHEK2 GENE MUTATION POSITIVE: ICD-10-CM

## 2025-06-26 DIAGNOSIS — R11.0 NAUSEA: ICD-10-CM

## 2025-06-26 DIAGNOSIS — Z71.89 CARE PLAN DISCUSSED WITH PATIENT: ICD-10-CM

## 2025-06-26 DIAGNOSIS — E86.0 DEHYDRATION: Primary | ICD-10-CM

## 2025-06-26 DIAGNOSIS — C34.90 NSCLC METASTATIC TO BRAIN (HCC): Primary | ICD-10-CM

## 2025-06-26 DIAGNOSIS — R53.0 NEOPLASTIC (MALIGNANT) RELATED FATIGUE: ICD-10-CM

## 2025-06-26 DIAGNOSIS — C79.31 NSCLC METASTATIC TO BRAIN (HCC): Primary | ICD-10-CM

## 2025-06-26 DIAGNOSIS — E43 SEVERE MALNUTRITION: ICD-10-CM

## 2025-06-26 DIAGNOSIS — F32.A DEPRESSION, UNSPECIFIED DEPRESSION TYPE: ICD-10-CM

## 2025-06-26 DIAGNOSIS — R63.4 UNINTENTIONAL WEIGHT LOSS: ICD-10-CM

## 2025-06-26 DIAGNOSIS — C34.91 NON-SMALL CELL CANCER OF RIGHT LUNG (HCC): Primary | ICD-10-CM

## 2025-06-26 DIAGNOSIS — D64.81 ANTINEOPLASTIC CHEMOTHERAPY INDUCED ANEMIA: ICD-10-CM

## 2025-06-26 DIAGNOSIS — T45.1X5A ANTINEOPLASTIC CHEMOTHERAPY INDUCED ANEMIA: ICD-10-CM

## 2025-06-26 DIAGNOSIS — G89.3 CANCER ASSOCIATED PAIN: ICD-10-CM

## 2025-06-26 DIAGNOSIS — Z79.899 HIGH RISK MEDICATION USE: ICD-10-CM

## 2025-06-26 DIAGNOSIS — R11.2 CHEMOTHERAPY INDUCED NAUSEA AND VOMITING: ICD-10-CM

## 2025-06-26 LAB
ALBUMIN SERPL-MCNC: 3.6 G/DL (ref 3.5–5.2)
ALP SERPL-CCNC: 102 U/L (ref 35–104)
ALT SERPL-CCNC: 12 U/L (ref 5–33)
ANION GAP SERPL CALCULATED.3IONS-SCNC: 13 MMOL/L (ref 7–19)
AST SERPL-CCNC: 21 U/L (ref 5–32)
BASOPHILS # BLD: 0.05 K/UL (ref 0–0.2)
BASOPHILS NFR BLD: 1 % (ref 0–1)
BILIRUB SERPL-MCNC: <0.2 MG/DL (ref 0–1.2)
BUN SERPL-MCNC: 10 MG/DL (ref 8–23)
CALCIUM SERPL-MCNC: 9.2 MG/DL (ref 8.8–10.2)
CHLORIDE SERPL-SCNC: 102 MMOL/L (ref 98–107)
CO2 SERPL-SCNC: 24 MMOL/L (ref 22–29)
CREAT SERPL-MCNC: 0.5 MG/DL (ref 0.5–0.9)
EOSINOPHIL # BLD: 0.18 K/UL (ref 0–0.6)
EOSINOPHIL NFR BLD: 3.4 % (ref 0–5)
ERYTHROCYTE [DISTWIDTH] IN BLOOD BY AUTOMATED COUNT: 18 % (ref 11.5–14.5)
GLUCOSE SERPL-MCNC: 104 MG/DL (ref 70–99)
HCT VFR BLD AUTO: 31.8 % (ref 37–47)
HGB BLD-MCNC: 9.6 G/DL (ref 12–16)
LYMPHOCYTES # BLD: 0.58 K/UL (ref 1.1–4.5)
LYMPHOCYTES NFR BLD: 11 % (ref 20–40)
MCH RBC QN AUTO: 25.9 PG (ref 27–31)
MCHC RBC AUTO-ENTMCNC: 30.2 G/DL (ref 33–37)
MCV RBC AUTO: 85.7 FL (ref 81–99)
MONOCYTES # BLD: 1.01 K/UL (ref 0–0.9)
MONOCYTES NFR BLD: 19.2 % (ref 1–10)
NEUTROPHILS # BLD: 3.43 K/UL (ref 1.5–7.5)
NEUTS SEG NFR BLD: 65.2 % (ref 50–65)
PLATELET # BLD AUTO: 365 K/UL (ref 130–400)
PMV BLD AUTO: 8.4 FL (ref 9.4–12.3)
POTASSIUM SERPL-SCNC: 4 MMOL/L (ref 3.5–5.1)
PROT SERPL-MCNC: 6.5 G/DL (ref 6.4–8.3)
RBC # BLD AUTO: 3.71 M/UL (ref 4.2–5.4)
SODIUM SERPL-SCNC: 139 MMOL/L (ref 136–145)
WBC # BLD AUTO: 5.26 K/UL (ref 4.8–10.8)

## 2025-06-26 PROCEDURE — G8399 PT W/DXA RESULTS DOCUMENT: HCPCS | Performed by: INTERNAL MEDICINE

## 2025-06-26 PROCEDURE — 99214 OFFICE O/P EST MOD 30 MIN: CPT | Performed by: INTERNAL MEDICINE

## 2025-06-26 PROCEDURE — 80053 COMPREHEN METABOLIC PANEL: CPT

## 2025-06-26 PROCEDURE — 1123F ACP DISCUSS/DSCN MKR DOCD: CPT | Performed by: INTERNAL MEDICINE

## 2025-06-26 PROCEDURE — 36415 COLL VENOUS BLD VENIPUNCTURE: CPT

## 2025-06-26 PROCEDURE — 85025 COMPLETE CBC W/AUTO DIFF WBC: CPT

## 2025-06-26 PROCEDURE — 1090F PRES/ABSN URINE INCON ASSESS: CPT | Performed by: INTERNAL MEDICINE

## 2025-06-26 PROCEDURE — 3074F SYST BP LT 130 MM HG: CPT | Performed by: INTERNAL MEDICINE

## 2025-06-26 PROCEDURE — G8420 CALC BMI NORM PARAMETERS: HCPCS | Performed by: INTERNAL MEDICINE

## 2025-06-26 PROCEDURE — 6360000002 HC RX W HCPCS: Performed by: INTERNAL MEDICINE

## 2025-06-26 PROCEDURE — 1036F TOBACCO NON-USER: CPT | Performed by: INTERNAL MEDICINE

## 2025-06-26 PROCEDURE — 96361 HYDRATE IV INFUSION ADD-ON: CPT

## 2025-06-26 PROCEDURE — 2580000003 HC RX 258: Performed by: INTERNAL MEDICINE

## 2025-06-26 PROCEDURE — G8428 CUR MEDS NOT DOCUMENT: HCPCS | Performed by: INTERNAL MEDICINE

## 2025-06-26 PROCEDURE — 96360 HYDRATION IV INFUSION INIT: CPT

## 2025-06-26 PROCEDURE — 3017F COLORECTAL CA SCREEN DOC REV: CPT | Performed by: INTERNAL MEDICINE

## 2025-06-26 PROCEDURE — 96413 CHEMO IV INFUSION 1 HR: CPT

## 2025-06-26 PROCEDURE — G2211 COMPLEX E/M VISIT ADD ON: HCPCS | Performed by: INTERNAL MEDICINE

## 2025-06-26 PROCEDURE — 3079F DIAST BP 80-89 MM HG: CPT | Performed by: INTERNAL MEDICINE

## 2025-06-26 PROCEDURE — 2500000003 HC RX 250 WO HCPCS: Performed by: INTERNAL MEDICINE

## 2025-06-26 RX ORDER — 0.9 % SODIUM CHLORIDE 0.9 %
1000 INTRAVENOUS SOLUTION INTRAVENOUS ONCE
OUTPATIENT
Start: 2025-06-26 | End: 2025-06-26

## 2025-06-26 RX ORDER — SODIUM CHLORIDE 9 MG/ML
5-250 INJECTION, SOLUTION INTRAVENOUS PRN
Status: CANCELLED | OUTPATIENT
Start: 2025-06-26

## 2025-06-26 RX ORDER — PROMETHAZINE HYDROCHLORIDE 25 MG/1
25 TABLET ORAL EVERY 6 HOURS PRN
Qty: 30 TABLET | Refills: 5 | Status: SHIPPED | OUTPATIENT
Start: 2025-06-26

## 2025-06-26 RX ORDER — DIPHENHYDRAMINE HYDROCHLORIDE 50 MG/ML
50 INJECTION, SOLUTION INTRAMUSCULAR; INTRAVENOUS
Status: CANCELLED | OUTPATIENT
Start: 2025-06-26

## 2025-06-26 RX ORDER — HEPARIN SODIUM (PORCINE) LOCK FLUSH IV SOLN 100 UNIT/ML 100 UNIT/ML
500 SOLUTION INTRAVENOUS PRN
Status: CANCELLED | OUTPATIENT
Start: 2025-06-26

## 2025-06-26 RX ORDER — 0.9 % SODIUM CHLORIDE 0.9 %
1000 INTRAVENOUS SOLUTION INTRAVENOUS ONCE
Status: COMPLETED | OUTPATIENT
Start: 2025-06-26 | End: 2025-06-26

## 2025-06-26 RX ORDER — SODIUM CHLORIDE 0.9 % (FLUSH) 0.9 %
5-40 SYRINGE (ML) INJECTION PRN
Status: CANCELLED | OUTPATIENT
Start: 2025-06-26

## 2025-06-26 RX ORDER — ONDANSETRON 2 MG/ML
8 INJECTION INTRAMUSCULAR; INTRAVENOUS
Status: CANCELLED | OUTPATIENT
Start: 2025-06-26

## 2025-06-26 RX ORDER — MEPERIDINE HYDROCHLORIDE 50 MG/ML
12.5 INJECTION INTRAMUSCULAR; INTRAVENOUS; SUBCUTANEOUS PRN
Status: CANCELLED | OUTPATIENT
Start: 2025-06-26

## 2025-06-26 RX ORDER — METOCLOPRAMIDE 10 MG/1
10 TABLET ORAL
Qty: 120 TABLET | Refills: 3 | Status: SHIPPED | OUTPATIENT
Start: 2025-06-26

## 2025-06-26 RX ORDER — FAMOTIDINE 10 MG/ML
20 INJECTION, SOLUTION INTRAVENOUS
Status: CANCELLED | OUTPATIENT
Start: 2025-06-26

## 2025-06-26 RX ORDER — HEPARIN 100 UNIT/ML
500 SYRINGE INTRAVENOUS PRN
OUTPATIENT
Start: 2025-06-26

## 2025-06-26 RX ORDER — EPINEPHRINE 1 MG/ML
0.3 INJECTION, SOLUTION, CONCENTRATE INTRAVENOUS PRN
Status: CANCELLED | OUTPATIENT
Start: 2025-06-26

## 2025-06-26 RX ORDER — SODIUM CHLORIDE 0.9 % (FLUSH) 0.9 %
5-40 SYRINGE (ML) INJECTION PRN
OUTPATIENT
Start: 2025-06-26

## 2025-06-26 RX ORDER — SODIUM CHLORIDE 0.9 % (FLUSH) 0.9 %
5-40 SYRINGE (ML) INJECTION PRN
Status: DISCONTINUED | OUTPATIENT
Start: 2025-06-26 | End: 2025-06-27 | Stop reason: HOSPADM

## 2025-06-26 RX ORDER — HYDROCODONE BITARTRATE AND ACETAMINOPHEN 7.5; 325 MG/1; MG/1
1 TABLET ORAL EVERY 6 HOURS PRN
Qty: 120 TABLET | Refills: 0 | Status: SHIPPED | OUTPATIENT
Start: 2025-06-26 | End: 2025-07-26

## 2025-06-26 RX ORDER — ACETAMINOPHEN 325 MG/1
650 TABLET ORAL
Status: CANCELLED | OUTPATIENT
Start: 2025-06-26

## 2025-06-26 RX ORDER — SODIUM CHLORIDE 9 MG/ML
INJECTION, SOLUTION INTRAVENOUS CONTINUOUS
Status: CANCELLED | OUTPATIENT
Start: 2025-06-26

## 2025-06-26 RX ORDER — HEPARIN 100 UNIT/ML
500 SYRINGE INTRAVENOUS PRN
Status: DISCONTINUED | OUTPATIENT
Start: 2025-06-26 | End: 2025-06-27 | Stop reason: HOSPADM

## 2025-06-26 RX ORDER — HYDROCODONE BITARTRATE AND ACETAMINOPHEN 5; 325 MG/1; MG/1
1 TABLET ORAL EVERY 4 HOURS PRN
Qty: 42 TABLET | Refills: 0 | Status: SHIPPED | OUTPATIENT
Start: 2025-06-26 | End: 2025-06-26 | Stop reason: ALTCHOICE

## 2025-06-26 RX ORDER — HYDROCORTISONE SODIUM SUCCINATE 100 MG/2ML
100 INJECTION INTRAMUSCULAR; INTRAVENOUS
Status: CANCELLED | OUTPATIENT
Start: 2025-06-26

## 2025-06-26 RX ORDER — SODIUM CHLORIDE 9 MG/ML
5-250 INJECTION, SOLUTION INTRAVENOUS PRN
OUTPATIENT
Start: 2025-06-26

## 2025-06-26 RX ORDER — ALBUTEROL SULFATE 90 UG/1
4 INHALANT RESPIRATORY (INHALATION) PRN
Status: CANCELLED | OUTPATIENT
Start: 2025-06-26

## 2025-06-26 RX ADMIN — SODIUM CHLORIDE 1000 ML: 0.9 INJECTION, SOLUTION INTRAVENOUS at 08:30

## 2025-06-26 RX ADMIN — SODIUM CHLORIDE 400 MG: 9 INJECTION, SOLUTION INTRAVENOUS at 08:58

## 2025-06-26 RX ADMIN — SODIUM CHLORIDE, PRESERVATIVE FREE 10 ML: 5 INJECTION INTRAVENOUS at 10:12

## 2025-06-26 RX ADMIN — HEPARIN 500 UNITS: 100 SYRINGE at 10:12

## 2025-06-26 NOTE — PROGRESS NOTES
Supportive Care/Community Based Palliative Care  Follow Up Note        Patient Name:  Michelle Long  Medical Record Number:  [id number]  YOB: 1960    Date of Visit: 6/26/2025  Location of Visit:  Other - [facility]    Patient Care Team:  Brandy Gonzalez MD as PCP - General (Internal Medicine)  Brandy Gonzalez MD as PCP - Empaneled Provider  Aidee Sam APRN as Nurse Practitioner (Family Nurse Practitioner)  Silva Levine APRN - CNP as Advanced Practice Nurse (Hospice and Palliative Medicine)    History obtained from:  patient, spouse, electronic medical record    PALLIATIVE DIAGNOSES AND ORDERS/RECOMMENDATIONS/PLAN:   1. Nausea  Initiate Reglan, recommended taking schedule  Continue Phenergan at night    2. Depression, unspecified depression type  Continue Effexor 150 mg, patient would like to pursue radiation follow-up before considering increasing or changing dosage    3. Encounter for palliative care  Current goals of care include: Continue treatment with palliative intent, Preserve independence/control/autonomy, Maintain or improve functional status, Maintain or improve quality of life, Remain at home, Would want hospitalization if needed, Live longer     Code status confirmed: Full Code  Will continue ACP discussions at future visit  Will continue goals of care discussions based on clinical course  Follow up home visit in 4-6 weeks and as needed    CHIEF COMPLAINT:     Chief Complaint   Patient presents with    Nausea       CLINICAL SUMMARY:          Michelle Long is a 65 y.o. female with PMH of  non-small cell lung cancer of right upper lobe with left frontal brain metastasis and mediastinal adenopathy, KRAS G12C mutation, panlobular emphysema, coronary artery disease, hypertension, osteopenia, GERD, fatty liver disease, anxiety, and other comorbidities.     HPI AND VISIT SUMMARY:   I saw Michelle Long in Her infusion suite. Upon my arrival patient was noted to be

## 2025-06-26 NOTE — TELEPHONE ENCOUNTER
Called pt and advised that CT and f/u appt have been rescheduled. CT scans; 6/10 @ 1030AM at [facility] and f/u appt 7/17 11:30AM.  Advised of prep and info on [employer]

## 2025-06-26 NOTE — PROGRESS NOTES
Encounters:   06/26/25 58.4 kg (128 lb 12.8 oz)   06/24/25 59 kg (130 lb)   06/16/25 60.3 kg (132 lb 14.4 oz)   06/09/25 58.8 kg (129 lb 11.2 oz)   06/09/25 59.1 kg (130 lb 3.2 oz)      Nutrition Diagnosis:   Unintended weight loss related to catabolic illness, early satiety, decreased appetite, altered taste perception as evidenced by weight loss    Nutrition Interventions:   Food and/or Nutrient Delivery: Snacks, Continue Current Diet, Continue Oral Nutrition Supplement  Nutrition Education/Counseling: Education/Counseling completed     Plan of Care discussed with: pt, spouse  Continue efforts at adequate energy intake  Continue to alternate the few foods you tolerate well throughout the day; eating something small every 2 hours  Continue ONS with goal of twice daily  Try lemon drops to help with dry mouth and taste alteration  Continue antiemetics PRN  Try kaveh soda and SeaBands for nausea relief   Discussed focusing on colder foods to lessen effect of altered taste/smell on appetite  Continue adequate hydration    Goals:  Previous Goal Met: Progressing toward Goal(s)  Goals: Meet at least 75% of estimated needs, Maintain adequate nutrition status   Preserve lean body mass  Prevent any further physical deconditioning  Maintain weight  Aid in treatment tolerance  Prevent treatment delay    Nutrition Monitoring and Evaluation:      Food/Nutrient Intake Outcomes: Progression of Nutrition, Food and Nutrient Intake, Supplement Intake  Physical Signs/Symptoms Outcomes: Biochemical Data, Weight, Nutrition Focused Physical Findings, GI Status    RD will follow up in 6 weeks to assess nutrition progression.   Essie Zapata, MS, RD, LD, [facility]  Contact: [phone]

## 2025-06-26 NOTE — PROGRESS NOTES
Spiritual Health History and Assessment/Progress Note  [facility] Daily    (P) Spiritual/Emotional Needs,  ,  ,      Name: Michelle Long MRN: [id number]    Age: 65 y.o.     Sex: female   Language: English   Anabaptism: Temple   <principal problem not specified>     Date: 6/26/2025            Total Time Calculated: (P) 30 min              Spiritual Assessment continued in [facility]        Referral/Consult From: (P) Rounding   Encounter Overview/Reason: (P) Spiritual/Emotional Needs  Service Provided For: (P) Patient and family together (with thea present in PCC)    Patient and her  are in good spirits undertaking treatment. Patient stated that we have strong zuleyka in God. Has good support from family, friends and her Yarsanism.        Zuleyka, Belief, Meaning:   Patient identifies as spiritual and is connected with a zuleyka tradition or spiritual practice  Family/Friends identify as spiritual and are connected with a zuleyka tradition or spiritual practice      Importance and Influence:  Patient has spiritual/personal beliefs that influence decisions regarding their health  Family/Friends have spiritual/personal beliefs that influence decisions regarding the patient's health     Community:  Patient is connected with a spiritual community and feels well-supported. Support system includes: Spouse/Partner, Children, Zuleyka Community, and Extended family  Family/Friends Other:   are connected with a spiritual community and feels well-supported. Support system includes: Spouse/Partner, Children, Zuleyka Community, and Extended family     Assessment and Plan of Care:   The patient is coping well currently. She expressed the need of focusing spiritual support against possible depression. Emotional support and encouragement provided.  Patient Interventions include: Facilitated expression of thoughts and feelings, Explored spiritual coping/struggle/distress, and Affirmed coping

## 2025-07-10 ENCOUNTER — HOSPITAL ENCOUNTER (OUTPATIENT)
Dept: CT IMAGING | Age: 65
Discharge: HOME | End: 2025-07-10
Payer: COMMERCIAL

## 2025-07-10 DIAGNOSIS — C34.90 NSCLC METASTATIC TO BRAIN (HCC): ICD-10-CM

## 2025-07-10 DIAGNOSIS — C79.31 NSCLC METASTATIC TO BRAIN (HCC): ICD-10-CM

## 2025-07-10 DIAGNOSIS — C34.91 NON-SMALL CELL CANCER OF RIGHT LUNG (HCC): ICD-10-CM

## 2025-07-10 PROCEDURE — 71260 CT THORAX DX C+: CPT

## 2025-07-10 PROCEDURE — 74177 CT ABD & PELVIS W/CONTRAST: CPT

## 2025-07-10 PROCEDURE — 6360000004 HC RX CONTRAST MEDICATION: Performed by: INTERNAL MEDICINE

## 2025-07-10 RX ORDER — IOPAMIDOL 755 MG/ML
75 INJECTION, SOLUTION INTRAVASCULAR
Status: COMPLETED | OUTPATIENT
Start: 2025-07-10 | End: 2025-07-10

## 2025-07-10 RX ADMIN — IOPAMIDOL 75 ML: 755 INJECTION, SOLUTION INTRAVENOUS at 11:15

## 2025-07-14 ENCOUNTER — OFFICE VISIT (OUTPATIENT)
Dept: PALLATIVE CARE | Age: 65
End: 2025-07-14
Payer: COMMERCIAL

## 2025-07-14 ENCOUNTER — HOSPITAL ENCOUNTER (OUTPATIENT)
Dept: INFUSION THERAPY | Age: 65
Setting detail: INFUSION SERIES
Discharge: HOME | End: 2025-07-14
Payer: COMMERCIAL

## 2025-07-14 VITALS
TEMPERATURE: 97 F | DIASTOLIC BLOOD PRESSURE: 68 MMHG | RESPIRATION RATE: 18 BRPM | SYSTOLIC BLOOD PRESSURE: 126 MMHG | OXYGEN SATURATION: 100 % | HEART RATE: 99 BPM

## 2025-07-14 DIAGNOSIS — R00.0 TACHYCARDIA: ICD-10-CM

## 2025-07-14 DIAGNOSIS — E86.0 DEHYDRATION: Primary | ICD-10-CM

## 2025-07-14 DIAGNOSIS — F32.1 CURRENT MODERATE EPISODE OF MAJOR DEPRESSIVE DISORDER, UNSPECIFIED WHETHER RECURRENT (HCC): Primary | ICD-10-CM

## 2025-07-14 DIAGNOSIS — C79.31 NON-SMALL CELL LUNG CANCER METASTATIC TO BRAIN (HCC): ICD-10-CM

## 2025-07-14 DIAGNOSIS — C34.91 SQUAMOUS CELL CARCINOMA OF RIGHT LUNG (HCC): ICD-10-CM

## 2025-07-14 DIAGNOSIS — Z51.5 ENCOUNTER FOR PALLIATIVE CARE: ICD-10-CM

## 2025-07-14 DIAGNOSIS — C34.90 NON-SMALL CELL LUNG CANCER METASTATIC TO BRAIN (HCC): ICD-10-CM

## 2025-07-14 DIAGNOSIS — M25.551 ACUTE RIGHT HIP PAIN: ICD-10-CM

## 2025-07-14 LAB
ALBUMIN SERPL-MCNC: 3.5 G/DL (ref 3.5–5.2)
ALP SERPL-CCNC: 95 U/L (ref 35–104)
ALT SERPL-CCNC: 8 U/L (ref 10–35)
ANION GAP SERPL CALCULATED.3IONS-SCNC: 11 MMOL/L (ref 8–16)
AST SERPL-CCNC: 15 U/L (ref 10–35)
BASOPHILS # BLD: 0 K/UL (ref 0–0.2)
BASOPHILS NFR BLD: 0.4 % (ref 0–1)
BILIRUB SERPL-MCNC: <0.2 MG/DL (ref 0.2–1.2)
BUN SERPL-MCNC: 13 MG/DL (ref 8–23)
CALCIUM SERPL-MCNC: 9.3 MG/DL (ref 8.8–10.2)
CHLORIDE SERPL-SCNC: 100 MMOL/L (ref 98–107)
CO2 SERPL-SCNC: 25 MMOL/L (ref 22–29)
CREAT SERPL-MCNC: 0.5 MG/DL (ref 0.5–0.9)
EOSINOPHIL # BLD: 0.4 K/UL (ref 0–0.6)
EOSINOPHIL NFR BLD: 5.9 % (ref 0–5)
ERYTHROCYTE [DISTWIDTH] IN BLOOD BY AUTOMATED COUNT: 17 % (ref 11.5–14.5)
GLUCOSE SERPL-MCNC: 118 MG/DL (ref 70–99)
HCT VFR BLD AUTO: 29.6 % (ref 37–47)
HGB BLD-MCNC: 9 G/DL (ref 12–16)
IMM GRANULOCYTES # BLD: 0 K/UL
LYMPHOCYTES # BLD: 0.5 K/UL (ref 1.1–4.5)
LYMPHOCYTES NFR BLD: 6.9 % (ref 20–40)
MAGNESIUM SERPL-MCNC: 1.8 MG/DL (ref 1.6–2.4)
MCH RBC QN AUTO: 25.9 PG (ref 27–31)
MCHC RBC AUTO-ENTMCNC: 30.4 G/DL (ref 33–37)
MCV RBC AUTO: 85.3 FL (ref 81–99)
MONOCYTES # BLD: 0.9 K/UL (ref 0–0.9)
MONOCYTES NFR BLD: 13.1 % (ref 0–10)
NEUTROPHILS # BLD: 5 K/UL (ref 1.5–7.5)
NEUTS SEG NFR BLD: 73.4 % (ref 50–65)
PLATELET # BLD AUTO: 403 K/UL (ref 130–400)
PMV BLD AUTO: 8.3 FL (ref 9.4–12.3)
POTASSIUM SERPL-SCNC: 4 MMOL/L (ref 3.5–5.1)
PROT SERPL-MCNC: 6.5 G/DL (ref 6.4–8.3)
RBC # BLD AUTO: 3.47 M/UL (ref 4.2–5.4)
SODIUM SERPL-SCNC: 136 MMOL/L (ref 136–145)
WBC # BLD AUTO: 6.8 K/UL (ref 4.8–10.8)

## 2025-07-14 PROCEDURE — 80053 COMPREHEN METABOLIC PANEL: CPT

## 2025-07-14 PROCEDURE — 2580000003 HC RX 258

## 2025-07-14 PROCEDURE — 36591 DRAW BLOOD OFF VENOUS DEVICE: CPT

## 2025-07-14 PROCEDURE — 1123F ACP DISCUSS/DSCN MKR DOCD: CPT

## 2025-07-14 PROCEDURE — 96360 HYDRATION IV INFUSION INIT: CPT

## 2025-07-14 PROCEDURE — 99215 OFFICE O/P EST HI 40 MIN: CPT

## 2025-07-14 PROCEDURE — 85025 COMPLETE CBC W/AUTO DIFF WBC: CPT

## 2025-07-14 PROCEDURE — 83735 ASSAY OF MAGNESIUM: CPT

## 2025-07-14 PROCEDURE — 6360000002 HC RX W HCPCS

## 2025-07-14 RX ORDER — SODIUM CHLORIDE 0.9 % (FLUSH) 0.9 %
5-40 SYRINGE (ML) INJECTION PRN
OUTPATIENT
Start: 2025-07-14

## 2025-07-14 RX ORDER — HEPARIN 100 UNIT/ML
500 SYRINGE INTRAVENOUS PRN
Status: DISCONTINUED | OUTPATIENT
Start: 2025-07-14 | End: 2025-07-15 | Stop reason: HOSPADM

## 2025-07-14 RX ORDER — HEPARIN 100 UNIT/ML
500 SYRINGE INTRAVENOUS PRN
OUTPATIENT
Start: 2025-07-14

## 2025-07-14 RX ORDER — SODIUM CHLORIDE 0.9 % (FLUSH) 0.9 %
5-40 SYRINGE (ML) INJECTION PRN
Status: DISCONTINUED | OUTPATIENT
Start: 2025-07-14 | End: 2025-07-15 | Stop reason: HOSPADM

## 2025-07-14 RX ORDER — DULOXETIN HYDROCHLORIDE 30 MG/1
30 CAPSULE, DELAYED RELEASE ORAL DAILY
Qty: 30 CAPSULE | Refills: 0 | Status: SHIPPED | OUTPATIENT
Start: 2025-07-14

## 2025-07-14 RX ORDER — 0.9 % SODIUM CHLORIDE 0.9 %
1000 INTRAVENOUS SOLUTION INTRAVENOUS ONCE
OUTPATIENT
Start: 2025-07-14 | End: 2025-07-14

## 2025-07-14 RX ORDER — SODIUM CHLORIDE 9 MG/ML
5-250 INJECTION, SOLUTION INTRAVENOUS PRN
OUTPATIENT
Start: 2025-07-14

## 2025-07-14 RX ORDER — 0.9 % SODIUM CHLORIDE 0.9 %
1000 INTRAVENOUS SOLUTION INTRAVENOUS ONCE
Status: COMPLETED | OUTPATIENT
Start: 2025-07-14 | End: 2025-07-14

## 2025-07-14 RX ADMIN — HEPARIN 500 UNITS: 100 SYRINGE at 15:04

## 2025-07-14 RX ADMIN — SODIUM CHLORIDE 1000 ML: 0.9 INJECTION, SOLUTION INTRAVENOUS at 13:38

## 2025-07-15 ENCOUNTER — TELEPHONE (OUTPATIENT)
Dept: HEMATOLOGY | Age: 65
End: 2025-07-15

## 2025-07-15 NOTE — TELEPHONE ENCOUNTER
Called pt. to remind them of appointment on 07/17/2025 and had to leave a detailed voicemail with appointment date and time. Reminded patient to just come at appointment time, and to not come at the lab appointment time. We have now moved to the [facility] that is located between our old office and the ER at the [facility]. Letting the Pt know that our front entrance faces the [place] and leaving our address. Reminded pt to eat well and be well hydrated for their labs.

## 2025-07-16 NOTE — PROGRESS NOTES
MEDICAL ONCOLOGY PROGRESS NOTE     Patient Name: Michelle Long  MRN: [id number]  YOB: 1960  Date of evaluation: 7/17/2025        HISTORY OF PRESENT ILLNESS:   History of Present Illness  The patient is a 65-year-old female with a diagnosis of non-small cell lung cancer, specifically in the right upper lobe with mediastinal adenopathy, left frontal brain metastasis, and a KRAS G12C mutation. She received frontline palliative chemotherapy with carboplatin, paclitaxel and pembrolizumab.  She had evidence of brain metastasis and received SRS treatment with  at [facility].    She had repeat CT C/A/P and is here to discuss results further recommendations. She reports experiencing severe fatigue, which necessitates the use of a wheelchair on certain days. Her sleep pattern is irregular, often going to bed at 6:00 PM, waking up around 6:00 AM, staying awake for an hour or two, and then sleeping again until noon.    Approximately three weeks ago, she noticed a painful sensation in her right hip, which fluctuates in intensity. She woke up her  in the middle of the night due to difficulty turning over. Scans have revealed new right gluteal metastasis, right iliac bone metastasis, and superior right acetabular metastasis. She is currently under the care of Dr. Dudley for radiation therapy and has an upcoming appointment with his nurse practitioner.    For pain management, she takes hydrocodone 7.5 mg every six hours. Despite this, she continues to experience significant pain and is considering the addition of extra strength Tylenol. She is also experiencing reduced appetite due to persistent nausea, which she attempts to manage by taking medication 30 minutes before meals. Despite these efforts, she struggles with eating and often feels no hunger. She has tried protein shakes and continues to consume Ybarra's chicken noodle soup. Additionally, she is considering the use of marijuana gummies to stimulate

## 2025-07-17 ENCOUNTER — OFFICE VISIT (OUTPATIENT)
Dept: HEMATOLOGY | Age: 65
End: 2025-07-17

## 2025-07-17 ENCOUNTER — HOSPITAL ENCOUNTER (OUTPATIENT)
Dept: INFUSION THERAPY | Age: 65
Discharge: HOME | End: 2025-07-17
Payer: COMMERCIAL

## 2025-07-17 ENCOUNTER — OFFICE VISIT (OUTPATIENT)
Dept: PALLATIVE CARE | Age: 65
End: 2025-07-17
Payer: COMMERCIAL

## 2025-07-17 VITALS
OXYGEN SATURATION: 96 % | BODY MASS INDEX: 23.7 KG/M2 | WEIGHT: 128.8 LBS | DIASTOLIC BLOOD PRESSURE: 70 MMHG | HEART RATE: 115 BPM | HEIGHT: 62 IN | SYSTOLIC BLOOD PRESSURE: 112 MMHG | TEMPERATURE: 97.9 F

## 2025-07-17 DIAGNOSIS — E44.0 MODERATE PROTEIN-CALORIE MALNUTRITION: ICD-10-CM

## 2025-07-17 DIAGNOSIS — T45.1X5A ANTINEOPLASTIC CHEMOTHERAPY INDUCED ANEMIA: ICD-10-CM

## 2025-07-17 DIAGNOSIS — M25.551 ACUTE RIGHT HIP PAIN: Primary | ICD-10-CM

## 2025-07-17 DIAGNOSIS — C34.91 NON-SMALL CELL CANCER OF RIGHT LUNG (HCC): ICD-10-CM

## 2025-07-17 DIAGNOSIS — D64.81 ANTINEOPLASTIC CHEMOTHERAPY INDUCED ANEMIA: ICD-10-CM

## 2025-07-17 DIAGNOSIS — C79.31 NON-SMALL CELL LUNG CANCER METASTATIC TO BRAIN (HCC): ICD-10-CM

## 2025-07-17 DIAGNOSIS — Z71.89 CARE PLAN DISCUSSED WITH PATIENT: ICD-10-CM

## 2025-07-17 DIAGNOSIS — C79.31 NSCLC METASTATIC TO BRAIN (HCC): Primary | ICD-10-CM

## 2025-07-17 DIAGNOSIS — Z51.11 CHEMOTHERAPY MANAGEMENT, ENCOUNTER FOR: ICD-10-CM

## 2025-07-17 DIAGNOSIS — R63.4 WEIGHT LOSS, NON-INTENTIONAL: ICD-10-CM

## 2025-07-17 DIAGNOSIS — Z51.5 ENCOUNTER FOR PALLIATIVE CARE: ICD-10-CM

## 2025-07-17 DIAGNOSIS — Z71.89 COORDINATION OF COMPLEX CARE: ICD-10-CM

## 2025-07-17 DIAGNOSIS — C34.90 NON-SMALL CELL LUNG CANCER METASTATIC TO BRAIN (HCC): ICD-10-CM

## 2025-07-17 DIAGNOSIS — C34.90 NSCLC METASTATIC TO BRAIN (HCC): Primary | ICD-10-CM

## 2025-07-17 DIAGNOSIS — G89.3 CANCER RELATED PAIN: ICD-10-CM

## 2025-07-17 PROCEDURE — 99215 OFFICE O/P EST HI 40 MIN: CPT

## 2025-07-17 PROCEDURE — 99213 OFFICE O/P EST LOW 20 MIN: CPT

## 2025-07-17 PROCEDURE — 1123F ACP DISCUSS/DSCN MKR DOCD: CPT

## 2025-07-17 RX ORDER — MEGESTROL ACETATE 40 MG/ML
400 SUSPENSION ORAL DAILY
Qty: 480 ML | Refills: 3 | Status: SHIPPED | OUTPATIENT
Start: 2025-07-17

## 2025-07-17 RX ORDER — MORPHINE SULFATE 15 MG/1
15 TABLET, FILM COATED, EXTENDED RELEASE ORAL 2 TIMES DAILY
Qty: 60 TABLET | Refills: 0 | Status: SHIPPED | OUTPATIENT
Start: 2025-07-17 | End: 2025-08-16

## 2025-07-17 NOTE — PROGRESS NOTES
Neutrophils Absolute 07/14/2025 5.0     Immature Granulocytes # 07/14/2025 0.0     Lymphocytes Absolute 07/14/2025 0.5 (L)     Monocytes Absolute 07/14/2025 0.90     Eosinophils Absolute 07/14/2025 0.40     Basophils Absolute 07/14/2025 0.00     Sodium 07/14/2025 136     Potassium 07/14/2025 4.0     Chloride 07/14/2025 100     CO2 07/14/2025 25     Anion Gap 07/14/2025 11     Glucose 07/14/2025 118 (H)     BUN 07/14/2025 13     Creatinine 07/14/2025 0.5     Est, Glom Filt Rate 07/14/2025 >90     Calcium 07/14/2025 9.3     Total Protein 07/14/2025 6.5     Albumin 07/14/2025 3.5     Total Bilirubin 07/14/2025 <0.2     Alkaline Phosphatase 07/14/2025 95     ALT 07/14/2025 8 (L)     AST 07/14/2025 15     Magnesium 07/14/2025 1.8        Total Visit Time: 60 minutes spent including face-to-face, chart review, documentation    This dictation was generated by voice recognition computer software.  Although all attempts are made to edit the dictation for accuracy, there may be errors in the transcription that are not intended.      Electronically signed by VAZQUEZ Singh CNP on 7/17/2025 at 9:04 AM

## 2025-07-17 NOTE — PROGRESS NOTES
IV chemotherapy treatment discontinued at this time. Electronically signed by Heide Sanchez RN on 7/17/2025 at 1:34 PM

## 2025-07-22 NOTE — PROGRESS NOTES
Supportive Care/Community Based Palliative Care  Follow Up Note        Patient Name:  Michelle Long  Medical Record Number:  [id number]  YOB: 1960    Date of Visit: 7/17/2025  Location of Visit:  Other - [facility]    Patient Care Team:  Brandy Gonzalez MD as PCP - General (Internal Medicine)  Brandy Gonzalez MD as PCP - Empaneled Provider  Aidee Sam APRN as Nurse Practitioner (Family Nurse Practitioner)  Silva Levine APRN - CNP as Advanced Practice Nurse (Hospice and Palliative Medicine)    History obtained from:  patient, spouse, electronic medical record    PALLIATIVE DIAGNOSES AND ORDERS/RECOMMENDATIONS/PLAN:   Michelle \"Efren" was seen today for hip pain.    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 (HCC)  Initiate Krazati oral chemotherapy  Anticipate radiation therapy as well to the right hip    Encounter for palliative care  Current goals of care include: Continue treatment with palliative intent, Preserve independence/control/autonomy, Maintain or improve functional status, Maintain or improve quality of life, Remain at home, Would want hospitalization if needed, Live longer     Code status confirmed: Full Code  Will continue ACP discussions at future visit  Will continue goals of care discussions based on clinical course  Follow up in home/office in 1 to 2 weeks    CHIEF COMPLAINT:     Chief Complaint   Patient presents with    Hip Pain     Acute right hip/buttock pain related to malignancy       CLINICAL SUMMARY:          Michelle Long is a 65 y.o. female with PMH of  non-small cell lung cancer of right upper lobe with left frontal brain metastasis and mediastinal adenopathy, KRAS G12C mutation, panlobular emphysema, coronary artery disease,

## 2025-07-23 ENCOUNTER — OFFICE VISIT (OUTPATIENT)
Dept: PALLATIVE CARE | Age: 65
End: 2025-07-23
Payer: COMMERCIAL

## 2025-07-23 DIAGNOSIS — Z51.5 ENCOUNTER FOR PALLIATIVE CARE: ICD-10-CM

## 2025-07-23 DIAGNOSIS — C34.90 NON-SMALL CELL LUNG CANCER METASTATIC TO BRAIN (HCC): ICD-10-CM

## 2025-07-23 DIAGNOSIS — G89.3 CANCER ASSOCIATED PAIN: Primary | ICD-10-CM

## 2025-07-23 DIAGNOSIS — C79.31 NON-SMALL CELL LUNG CANCER METASTATIC TO BRAIN (HCC): ICD-10-CM

## 2025-07-23 PROCEDURE — 1123F ACP DISCUSS/DSCN MKR DOCD: CPT

## 2025-07-23 PROCEDURE — 99350 HOME/RES VST EST HIGH MDM 60: CPT

## 2025-07-23 NOTE — PROGRESS NOTES
Supportive Care/Community Based Palliative Care  Follow Up Note        Patient Name:  Michelle Long  Medical Record Number:  [id number]  YOB: 1960    Date of Visit: 7/23/2025  Location of Visit:  Home    Patient Care Team:  Brandy Gonzalez MD as PCP - General (Internal Medicine)  Brandy Gonzalez MD as PCP - Empaneled Provider  Aidee Sam APRN as Nurse Practitioner (Family Nurse Practitioner)  Silva Levine APRN - CNP as Advanced Practice Nurse (Hospice and Palliative Medicine)    History obtained from:  patient, electronic medical record    PALLIATIVE DIAGNOSES AND ORDERS/RECOMMENDATIONS/PLAN:   1. Cancer associated pain  Continue MS Contin, recommend 3 times a day, will communicate with Dr. Parson  Continue Norco as needed for breakthrough    2. Non-small cell lung cancer metastatic to brain (HCC)  Continue Krazati  Plan to initiate palliative radiation to right hip    3. Encounter for palliative care  Current goals of care include: Continue treatment with palliative intent, Preserve independence/control/autonomy, Maintain or improve functional status, Maintain or improve quality of life, Remain at home, Would want hospitalization if needed, Live longer     Code status confirmed: Full Code  Will continue ACP discussions at future visit  Will continue goals of care discussions based on clinical course  Follow up in home/office in 1 to 2 weeks    CHIEF COMPLAINT:     Chief Complaint   Patient presents with    Pain     Cancer associated pain       CLINICAL SUMMARY:          Michelle Long is a 65 y.o. female with PMH of non-small cell lung cancer of right upper lobe with left frontal brain metastasis and mediastinal adenopathy, KRAS G12C mutation, panlobular emphysema, coronary artery disease, hypertension, osteopenia, GERD, fatty liver disease, anxiety, and other comorbidities.     Imaging July 2025 showed progression of disease.  Patient will be starting Krazati and anticipate

## 2025-07-26 ENCOUNTER — APPOINTMENT (OUTPATIENT)
Dept: GENERAL RADIOLOGY | Age: 65
End: 2025-07-26
Payer: COMMERCIAL

## 2025-07-26 ENCOUNTER — HOSPITAL ENCOUNTER (EMERGENCY)
Age: 65
Discharge: HOME | End: 2025-07-26
Payer: COMMERCIAL

## 2025-07-26 VITALS
OXYGEN SATURATION: 95 % | DIASTOLIC BLOOD PRESSURE: 68 MMHG | BODY MASS INDEX: 23.55 KG/M2 | SYSTOLIC BLOOD PRESSURE: 125 MMHG | TEMPERATURE: 97.4 F | WEIGHT: 128 LBS | HEART RATE: 91 BPM | RESPIRATION RATE: 19 BRPM | HEIGHT: 62 IN

## 2025-07-26 DIAGNOSIS — M84.521A PATHOLOGICAL FRACTURE OF RIGHT HUMERUS DUE TO NEOPLASTIC DISEASE, INITIAL ENCOUNTER: Primary | ICD-10-CM

## 2025-07-26 PROCEDURE — 73060 X-RAY EXAM OF HUMERUS: CPT

## 2025-07-26 PROCEDURE — 99284 EMERGENCY DEPT VISIT MOD MDM: CPT

## 2025-07-26 PROCEDURE — 6360000002 HC RX W HCPCS

## 2025-07-26 PROCEDURE — 73030 X-RAY EXAM OF SHOULDER: CPT

## 2025-07-26 PROCEDURE — 96372 THER/PROPH/DIAG INJ SC/IM: CPT

## 2025-07-26 RX ORDER — KETOROLAC TROMETHAMINE 30 MG/ML
30 INJECTION, SOLUTION INTRAMUSCULAR; INTRAVENOUS ONCE
Status: COMPLETED | OUTPATIENT
Start: 2025-07-26 | End: 2025-07-26

## 2025-07-26 RX ORDER — DEXAMETHASONE SODIUM PHOSPHATE 10 MG/ML
10 INJECTION, SOLUTION INTRAMUSCULAR; INTRAVENOUS ONCE
Status: COMPLETED | OUTPATIENT
Start: 2025-07-26 | End: 2025-07-26

## 2025-07-26 RX ORDER — PREDNISONE 50 MG/1
50 TABLET ORAL DAILY
Qty: 5 TABLET | Refills: 0 | Status: SHIPPED | OUTPATIENT
Start: 2025-07-26 | End: 2025-07-31

## 2025-07-26 RX ADMIN — DEXAMETHASONE SODIUM PHOSPHATE 10 MG: 10 INJECTION, SOLUTION INTRAMUSCULAR; INTRAVENOUS at 16:26

## 2025-07-26 RX ADMIN — KETOROLAC TROMETHAMINE 30 MG: 30 INJECTION, SOLUTION INTRAMUSCULAR at 16:26

## 2025-07-26 ASSESSMENT — PAIN - FUNCTIONAL ASSESSMENT: PAIN_FUNCTIONAL_ASSESSMENT: PREVENTS OR INTERFERES SOME ACTIVE ACTIVITIES AND ADLS

## 2025-07-26 ASSESSMENT — PAIN DESCRIPTION - DESCRIPTORS: DESCRIPTORS: SHARP

## 2025-07-26 ASSESSMENT — PAIN SCALES - GENERAL: PAINLEVEL_OUTOF10: 10

## 2025-07-26 ASSESSMENT — PAIN DESCRIPTION - LOCATION: LOCATION: ARM

## 2025-07-26 ASSESSMENT — PAIN DESCRIPTION - ORIENTATION: ORIENTATION: RIGHT

## 2025-07-26 NOTE — DISCHARGE INSTRUCTIONS
Please wear your splint and follow-up with orthopedics for further evaluation and recommendations.  Please return to the ED for any new or worsening symptoms or concerns. You may continue taking your home pain medications, you may use Advil, but it is important to follow the recommendations for total maximum dosing on the bottle, and not exceed this, as it can cause damage to the kidneys.    Please return to the ED if you have any new or worsening symptoms or concerns.  Please follow-up with orthopedics and with your radiation oncologist for further evaluation and recommendations.

## 2025-07-26 NOTE — ED PROVIDER NOTES
[facility] EMERGENCY DEPARTMENT  EMERGENCY DEPARTMENT ENCOUNTER      Pt Name: Michelle Long  MRN: [id number]  Birthdate 1960  Date of evaluation: 7/26/2025  Provider: Shelby Concepcion PA-C    CHIEF COMPLAINT       Chief Complaint   Patient presents with    Arm Pain     Right arm pain, heard a \"pop\" in right shoulder          HISTORY OF PRESENT ILLNESS   (Location/Symptom, Timing/Onset,Context/Setting, Quality, Duration, Modifying Factors, Severity)  Note limiting factors.   HPI    Michelle Long is a 65 y.o. female with a past medical history significant for lung with mets to the brain, COPD, hypertension, hyperlipidemia, breast cancer who presents to the emergency department with a chief complaint of right arm pain.  Patient states that for quite some time she has been struggling with pain in her right shoulder.  Initially it was in her shoulder blade, it has been going on since March, and she was told that it was likely related to her cancer.  She did have a lung lesion that was near there, and they thought it was maybe nerve compression from the lesion, but it has not been getting better.  She is on morphine and [place] at home, but her shoulder pain has gotten significantly worse over the past few months, and then about a week ago, it seemed to get a little better so she cleaned her floor, was on her hands and knees scrubbing the floor and it got completely out of control.  She has been trying not to use her arm whenever possible, because she has pain with any weightbearing on the extremity, cannot lift herself up out of bed, cannot push herself up out of a chair, then today, she felt a pop in her upper arm beneath the deltoid, and has had severe excruciating pain since.  She denies weakness, numbness, tingling.  She has pain with any range of motion.  She has discussed this ongoing pain with her oncologist Dr. Parson, and he told her that he saw no lesions in the shoulder on her recent imaging

## 2025-07-26 NOTE — PROGRESS NOTES
[facility]  Radiation Oncology Clinic   Pietro Andre MD, FACR  Lior VIDES  _______________________________________________  [facility]  Department of Radiation Oncology  [address] [place] [id number]  Office: [phone]  Fax: [phone]    DATE: 07/29/2025  PATIENT: Jayla Graves  1960                         MEDICAL RECORD #: [id number]    REASON FOR VISIT:    No chief complaint on file.    1. 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 Ductal carcinoma in situ (DCIS) of left breast    3. Retroperitoneal sarcoma    4. History of radiation therapy    5. Former smoker                                            Jayla Graves is a very pleasant female that has been referred to our office for radiotherapy considerations.     History of Present Illness:  Diagnosed with Stage MAGNOLIA (T1b N2 M1b) oligometastatic non-small cell carcinoma of the right lung with solitary intracranial metastasis. She completed 2700 cGy in 3 fractions to the brain on 04/21/2025 and completed 6000 cGy in 30 fractions to the right lung on 06/05/2025.    02/14/2025 - CT low dose chest screening:  Multiple right upper lobe pulmonary nodules which are large and with measurements as above.  According to the NCCN guidelines,  PET CT or biopsy is recommended.   Lymphadenopathy as described above.   Moderate to severe stable emphysema with posterior right upper lobe pleural thickening and dependent atelectasis.   LungRADS Modifier:   LungRADS Recommendations: PET CT or Biopsy   Unexpected findings:  Right upper lobe pulmonary nodules and lymphadenopathy     03/10/2025 - Appointment with Debby Ardon MD:  Lung nodule seen on the CT is definitely suspicious for possible malignancy.   We will proceed with bronchoscopy and biopsy. The risk benefit was explained to the patient.   She is willing to undergo the procedure.     03/11/2025 - CT Chest with contrast:    Right upper lobe suspicious pulmonary nodules with mediastinal lymphadenopathy. This is   concerning for neoplasm and tissue sampling/pud CT is recommended to further evaluate.   Stable changes of emphysema.     03/13/2025 - Bronchoscopy with biopsy per Debby Ardon MD:  Lung, right upper lobe fine-needle aspiration, smears and ThinPrep:   Non-small cell carcinoma, consistent with squamous cell carcinoma.  Lymph node, station 10R lymph node fine-needle aspiration, smears and ThinPrep and cellblock:   Malignant cells present admixed with rare small round lymphocytes and anthracotic pigment, consistent with metastatic squamous cell carcinoma.  Lung, right upper lobe bronchioloalveolar lavage, ThinPrep:   Non-small cell carcinoma, consistent with squamous cell carcinoma.   Lung, right upper lobe biopsy:   Benign bronchial mucosa and adjacent alveolar parenchyma demonstrating anthracosis, focal hemorrhage and mild pneumocyte hyperplasia.     03/24/2025 - MRI Brain with and without contrast:  There is a solitary metastatic lesion seen within the lateral left temporal lobe as described above measuring 5 mm maximum.   There is no acute cerebral infarction.     03/24/2025 - PET Scan:  There is a metabolically active 1.6 x 0.8 cm right upper lobe mass with extensive right hilar and mediastinal activity, compatible with primary lung neoplasm with hilar/mediastinal extension. Representative sizes and locations are as noted above.   No abnormal metabolic activity is observed below the diaphragm.     03/25/2025 - Appointment with :  Essentially, non-small cell lung cancer, squamous cell subtype with a solitary brain lesion. Stage IV with oligometastatic disease. I recommended aggressive curative intent treatment with SRS of the solitary brain lesion and concurrent chemoradiation and immunotherapy for her intrathoracic disease. Care plan discussed with the patient  Treatment plan-curative  intent  Carboplatin AUC 5-day 1  Paclitaxel 1 7 5 mg/m² day 1  Pembrolizumab 200 mg IV day 1  Q. 21 days  X 4 cycles  To be followed by pembrolizumab 200 mg / 400 mg until disease progression or intolerable toxicity  Subcentimeter brain metastases  5 mm solitary brain metastasis  Referred to Dr. Pietro Guevara  Consideration SRS  PLAN:  RTC with MD cycle #2  Recommend Carbo/Taxol/Keytruda every 21 days x 4 cycles then maintenance Keytruda-pending insurance  NGS [employer] on Tissue and Liquid   Patient education oral and written material performed  Treatment consent   Referral to Dr Guevara for SBRT to brain lesion and lung fields   Recommend Phenergan 12.5 mg every 8 hours PRN-script sent  Recommend Zofran 4 mg every 8 hours PRN-script sent   Recommend Decadron 20 mg the night before and morning of chemotherapy  Referral to Gena Kim for social support  Referral to Katherin Jones  Referral to Dr. Sosa general surgery for port placement   Follow Up:  Return for cycle #2.  Anticipate Carbo/Taxol/Keytruda every 21 days x 4 cycles then maintenance Keytruda-pending insurance  Referral to Dr. Guevara for radiation  Referral to Dr. Dorado general surgery for port placement     03/26/2025 - Appointment with Debby Ardon MD:  She was evaluated by oncology yesterday.   She will be initiating chemotherapy and radiation. [facility] referral was placed to general surgery. Will expedite her referral.    04/03/2025 - Consult with :  The option of definitive surgery has also been reviewed as well as surveillance. We will order a SRS protocol MRI of the brain, necessary to assist with planning.   At this time, I am recommending Stereotactic radiosurgery (SRS was (which will likely consist of 1 treatment fraction, final course pending.  Dr. Clay has initiated chemotherapy today.  We will plan to subsequently treat the thorax, targeting the hypermetabolic lesions identified on PET/CT scan to a  definitive dose of 6000 cGy over 30 fractions.  The patient and family verbalize understanding of this discussion, voice no further questions and wish to proceed with recommended therapy. We will simulate treatment fields to initiate the treatment planning. Continue ongoing management per primary care physician and other specialists.  Plan:  Proceed today for CT simulation in preparation for stereotactic radiosurgery of solitary brain lesion.  Return another day for CT simulation of lung cancer.    Continue with chemotherapy as planned.    04/04/2025 - MRI Brain with and without contrast:  A total of 5 contrast-enhancing lesions are seen measuring from 1 mm up to about 6 mm in size. There are one in each of the cerebellar hemispheres. There are one in each of the lateral temporal lobes and one in the left parietal cortex. The findings are consistent with metastatic disease.  No other significant abnormality is appreciated.    04/16/2025 - 04/21/2025 - Completed radiation course:  Received 2700 cGy in 3 fractions to the brain lesion.    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 Hutchins/[facility], 6/24/25  Continue follow-up with Fernanda Kim/Oncology Social Worker for social support, 4/24/25  Continue follow-up with Katherin Baumann/Oncology Dietician, 4/24/25  Continue follow-up with Dr. Mykel Andre/[facility] Radiation  Oncology for radiation therapy to right lung  Follow Up:   Return in about 3 weeks (around 5/15/2025) for Treatment & see  in [place] RM Taxol Carbo.  Taxol Carbo 3 weeks     04/24/2025 - 06/05/2025 - Completed radiation course:  Received 6000 cGy in 30 fractions to the right lung.    05/15/2025 - Appointment with :  PLAN:  RTC with MD in treatment room 3 weeks  CBC, CMP, TSH, T4, Cortisol am today  C#3/4 Carbo/Taxol today and every 21 days x 4 cycles  HOLD Keytruda today and during radiation therapy  Anticipate maintenance Keytruda, once chemotherapy completed  Continue Phenergan and Zofran as needed for nausea-Zofran refills sent  Continue Decadron 20 mg the night before and morning of chemotherapy  Continue Norco 5mg every 6 hrs as needed for pain-WILL CALL FOR REFILL WHEN NEEDED  Continue follow-up with Dr. Hutchins/[facility], 6/24/25  Continue follow-up with Fernanda Kim/Oncology Social Worker for social support  Continue follow-up with Katherin Baumann/Oncology Dietician, 5/15/25  Continue follow-up with Dr. Mykel Andre/[facility] Radiation Oncology for radiation therapy to right lung  Follow Up:   Return in about 3 weeks (around 6/5/2025) for Treatment & see  in [place] RM Taxol Carbo Keytruda.  Taxol Carbo Keytruda 3 weeks     06/05/2025 - Appointment with :  PLAN:  RTC with MD in treatment room 3 weeks  CBC, CMP, TSH, T4, Cortisol am today  C#4/4 Carbo, Taxol, Keytruda today   RTC 3 weeks to start maintenance Keytruda 400mg every 6 weeks  Recommend IVFs as needed  Recommend Olanzapine 10mg nightly x5 nights with this treatment only -script sent  Continue Phenergan and Zofran as needed for nausea  Continue Decadron 20 mg the night before and morning of chemotherapy  Continue Norco 5mg every 6 hrs as needed for pain-WILL CALL FOR REFILL WHEN NEEDED  Continue follow-up with Dr. Hutchins/[facility], 6/24/25  Continue follow-up with Fernanda Kim/Oncology Social Worker for  social support  Continue follow-up with Katherin Baumann/Oncology Dietician, 6/5/25  Continue follow-up with Dr. Mykel Andre/[facility] Radiation Oncology for radiation therapy to right lung  Continue follow-up with MAHOGANY Campos/[facility], 6/5/25  MRI brain to assess response to radiation therapy at [facility] in 2 weeks  Follow Up:   Return in about 3 weeks (around 6/26/2025) for Treatment & see  in [place]  Keytruda.  IVFs as needed  MRI brain 2 weeks at [facility]  Keytruda 3 weeks     06/19/2025 - MRI Brain with and without contrast:  Stable appearance of a metastatic lesion seen in the lateral left temporal lobe.   Five new metastatic lesions are seen within the brain as described above, located within the frontal lobes as well as within the left parietal lobe and right caudate nucleus as described above.  The largest lesion is seen in the superior left frontal lobe measuring 3 mm.   ADDENDUM:    Comparison is made to previous outside MRI of the brain dated 04/04/2025. The lesion in the lateral left temporal lobe appears to be decreasing in size and measures 4.3 mm compared to previous measurements of approximately 5.7 mm.   Five new small lesions are again seen within the frontal lobes and left parietal lobe and within the right caudate nucleus.     06/24/2025 - Appointment with Debby Ardon MD:  Will continue with the current management per oncology. She is on nutritional supplements.   Return in about 6 months (around 12/24/2025).    06/26/2025 - Appointment with :  PLAN:  RTC with MD in 3 weeks to review CT scans  CBC, CMP, TSH, T4, Cortisol am today  C#5 Keytruda 400mg today and every 6 weeks  Continue IVFs as needed  Continue Phenergan 25mg every 6 hrs as needed for nausea-refills sent  Recommend Reglan 10mg before meals and at bedtime as needed for nausea-script sent  Discontinue Decadron 20 mg the night before and morning of chemotherapy  Discontinue Zofran  Continue Norco  7.5mg every 6 hrs as needed for pain-refill sent  Pain agreement signed today, 6/26/25  Continue follow-up with Dr. Hutchins/[facility], 12/29/25  Continue follow-up with Fernanda Kim/Oncology Social Worker for social support  Continue follow-up with Katherin Baumann/Oncology Dietician, 6/26/25  Continue follow-up with MAHOGANY Campos/[facility]  Continue follow-up with MAHOGANY Irvin/[facility], 9/16/25  CT c/a/p scans to assess response to treatment at [facility] in 2 weeks  Follow Up:   Return in about 3 weeks (around 7/17/2025) for CBC, CMP, TSH, Appointment with Dr. Clay.  CT c/a/p scans at [facility] 2 weeks    07/10/2025 - CT Chest with contrast:  A 4 mm nodule in the right lower lobe is new.   A 4 mm nodule in the left lower lobe is larger compared to the previous CT chest was poorly visualized on the previous PET CT.   Slightly decreased size of representative mediastinal and right hilar lymphadenopathy. No new thoracic lymphadenopathy.   Interval acute to subacute right 4th rib fracture.     07/10/2025 - CT abdomen/pelvis with contrast:  New left adrenal nodule consistent with metastatic disease.   New right gluteal metastasis.   New right iliac bone and superior right acetabular lytic metastases.     07/17/2025 - Appointment with :  Disease sites include new right gluteal metastasis, right iliac bone, and superior right acetabular metastasis. CT chest, abdomen, and pelvis on 07/10/2025 showed progressive disease.   Referral to Dr. You for consideration of palliative radiation therapy. Start new FDA-approved KRAS 12C blocker daily. Potential side effects include fatigue, gastrointestinal issues (nausea, vomiting, diarrhea), and anemia.   Supportive care measures include stool softeners like Senokot and MiraLAX to manage constipation from opioid use.  PLAN:  RTC with MD in 4 weeks   CBC, CMP, TSH, T4, Cortisol am today  C#5 Keytruda 400mg today and every 6 weeks,  8/28/25  Continue IVFs as needed  Recommend Morphine ER 15 mg BID-script sent   Recommend Narcan Kit-script sent  Recommend Adagrasib 600 mg BId-script sent  Recommend Megace 400 mg daily-script sent  Re refer to Dr. Andre for radiation consideration  Continue Phenergan 25mg every 6 hrs as needed for nausea  Continue Reglan 10mg before meals and at bedtime as needed for nausea  Continue Norco 7.5mg every 6 hrs as needed for pain  Continue follow-up with Dr. Hutchins/[facility], 12/29/25  Continue follow-up with Fernanda Kim/Oncology Social Worker for social support  Continue follow-up with Katherin Baumann/Oncology Dietician  Continue follow-up with MAHOGANY Campos/[facility]  CT c/a/p scans to assess response to treatment at [facility] in 2 weeks  Follow Up:   Return in about 4 weeks (around 8/14/2025) for CBC, CMP, Appointment with Dr. Clay.    08/14/2025 - Scheduled appointment with .    12/29/2025 - Scheduled appointment with Debby Ardon MD.     ----------------------------------------------------------------------------------------------------------------------------------------    History of Retroperitoneal sarcoma:    12/3/2007 - Hysterectomy and left pelvic tumor resection:  Mass of left pelvic retroperitoneal space:   Sarcoma, high grade.   Comment:  This case was referred in consultation to Dr. Sita Zacarias, an expert in soft tissue pathology, at [facility]. The above diagnosis reflects her official consultative report which is appended below.  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  Dr. Zacarias stresses that this is a high grade malignancy and would recommend that the patient be evaluated by a multidisciplinary team experienced in treating retroperitoneal sarcoma.   Uterus with right and left ovaries and fallopian tubes:    Chronic cervicitis.   Squamous metaplasia of endocervix.   Benign disordered weakly proliferative/inactive endometrium.   Benign endometrial polyp.   Superficial adenomyosis, focal.   Left ovary and fallopian tube:  Benign atrophic changes.   Right ovary and fallopian tube:  Benign simple follicle cyst of ovary, and acute congestion of mesosalpinx.                              12/31/2007 - CT Abdomen/Pelvis:  Status post hysterectomy and left pelvic tumor resection. Tiny subcentimeter left pelvic lymph nodes without evidence of pathologic adenopathy.  Two tiny cysts in the liver. Focally fatty infiltration in the left hepatic lobe.    01/23/2008 - PET Scan:  There is a 2 cm focus of moderately increased uptake within the pelvis just posterior to the bladder. This may represent post-surgical inflammation given recent surgical history, although residual malignancy cannot be excluded.  There is a small focus of intensely increased uptake within the inferior aspect of the left breast adjacent to the chest wall that is highly suggestive of malignancy; biopsy is recommended.  Additional transmission CT findings include a small infiltrate within the apex of the right lung without associated FDG uptake. There is evidence of prior hysterectomy    09/23/2008 - CT Chest:  Stable CT chest with mild diffuse emphysema and pleural thickening in the upper lobes. No evidence of metastatic disease is identified.    09/23/2008 - CT Abdomen/Pelvis:  Stable CT abdomen and pelvis since 4/26/08 with evidence of previous hysterectomy.  No evidence of recurrent pelvic or retroperitoneal tumor.    ---------------------------------------------------------------------------------------------------------------------------------------    History of Ductal carcinoma in situ (DCIS) of left breast:    01/31/2008 - Left Breast Ultrasound/Biopsy:  The mammogram demonstrates suggestion of a subtle lobulated density on the left MLO view seen along  the posterior nipple line and the extreme posterior portion of the breast.  On ultrasound, in the left retroareolar region, 1 cm inferior to the nipple there is an irregular hypoechoic mass for which biopsy was recommended.  Technically successful ultrasound-guided biopsy of the left breast mass.    01/31/2008 - Breast, left, needle core biopsy:  Intermediate grade solid ductal carcinoma in-situ, measuring at least 6 mm in greatest extent, involving four cores, involving a complex sclerosing lesion and sclerosing adenosis    02/26/2008 - Bilateral Breast MRI:  There is a mass in the left retroareolar breast as described above, which corresponds to the patient's known ductal carcinoma in situ.  There are not additional kinetic or morphologic abnormalities on this bilateral breast MRI.    03/06/2008 - Left Partial Mastectomy/sentinel lymph node mapping and biopsy of deep left axillary lymph nodes - Dr. Russell Swanson:  Breast, left, partial mastectomy:  High grade DCIS involving a complex sclerosing lesion, 8.0 mm in greatest extent, similar to previous; DCIS present focally 2 mm of anterior resection margin  Lymph node, sentinel node #1:  2 lymph nodes negative  Lymph node, sentinel node #2:  2 lymph nodes, negative  Lymph node, sentinel node #3:  1 lymph node negative    04/10/2008 - Left Diagnostic Mammogram:  No new masses, suspicious calcifications, or other significant pathology is identified.     04/24/2008 - 06/03/2008 - Completed Radiation course:  Received 5040 cGy in 28 fractions to left breast via External Beam Radiation - EBRT.    07/17/2008 - Left Diagnostic Mammogram:  There are scattered fibroglandular densities. This decreases mammographic sensitivity. There is post-surgical scarring in the left breast.  Benign findings.  Annual mammographic screening is recommended.    01/13/2023 - Bilateral mammogram:  No mammographic evidence of malignancy.    Recommend routine annual screening mammography.    BI-RADS CATEGORY 2: BENIGN FINDINGS.   Management Recommendation: Routine annual mammography.     01/15/2024 - Bilateral mammogram:  BI-RADS 2 - Benign   RECOMMENDATION: Recommend patient return in 1 year for annual screening mammogram.     2025 - Bilateral mammogram:  No new or suspicious mass or calcification   Annual screening mammogram   BIRADS category  2:  Benign findings     History obtained from  PATIENT and CHART    PAST MEDICAL HISTORY  Past Medical History:   Diagnosis Date    Anxiety     Brain cancer     Breast cancer 2008    left    Depression     Fatty liver disease, nonalcoholic     History of colon polyps     Hx of radiation therapy 2008    Hypercholesterolemia     Lung cancer     Osteopenia     Retroperitoneal sarcoma     Vitamin D deficiency       PAST SURGICAL HISTORY  Past Surgical History:   Procedure Laterality Date    BILATERAL SALPINGO OOPHORECTOMY      BREAST BIOPSY Left 2008    positive    BREAST LUMPECTOMY Left 2008    BREAST LUMPECTOMY Left     CARDIAC CATHETERIZATION       pt unsure. done by dr calderon @ [facility]    CHOLECYSTECTOMY      COLONOSCOPY      MASTECTOMY PARTIAL / LUMPECTOMY Left     TONSILLECTOMY      TOTAL ABDOMINAL HYSTERECTOMY        FAMILY HISTORY  family history includes Alzheimer's disease in her mother; Breast cancer (age of onset: 48) in her mother; Breast cancer (age of onset: 79) in her paternal grandmother; Diabetes in her father; Heart disease in her father; Squamous cell carcinoma (age of onset: 41) in her brother.    SOCIAL HISTORY  Social History     Tobacco Use    Smoking status: Former     Current packs/day: 0.00     Average packs/day: 0.8 packs/day for 25.0 years (18.8 ttl pk-yrs)     Types: Cigarettes     Start date: 10/13/1992     Quit date: 10/13/2017     Years since quittin.7     Passive exposure: Past    Smokeless tobacco: Never   Vaping Use    Vaping status: Never Used   Substance Use Topics    Alcohol use: Yes     Comment: Rarely     Drug use: No     ALLERGIES  Adhesive tape and Sulfa antibiotics     MEDICATIONS    Current Outpatient Medications:     adagrasib (KRAZATI) 200 MG, Take 3 tablets by mouth 2 (Two) Times a Day., Disp: , Rfl:     albuterol sulfate  (90 Base) MCG/ACT inhaler, Inhale 2 puffs., Disp: , Rfl:     amLODIPine (NORVASC) 2.5 MG tablet, Take 1 tablet by mouth Daily., Disp: , Rfl:     aspirin 81 MG EC tablet, Take 1 tablet by mouth Daily., Disp: , Rfl:     azelastine (ASTELIN) 0.1 % nasal spray, Administer 1 spray into the nostril(s) as directed by provider., Disp: , Rfl:     azithromycin (Zithromax Z-Hugo) 250 MG tablet, Take 2 tablets by mouth on day 1, then 1 tablet daily on days 2-5, Disp: 6 tablet, Rfl: 0    busPIRone (BUSPAR) 10 MG tablet, TAKE 1 TABLET BY MOUTH 2 TIMES DAILY AS NEEDED (ANXIETY), Disp: , Rfl:     cloNIDine (CATAPRES) 0.1 MG tablet, TAKE BP EVERY 12 HOURS IF BP OVER 155 SYSTOLIC OR 95 DIASTOLIC, Disp: , Rfl:     dexAMETHasone (DECADRON) 4 MG tablet, Take 1 tablet by mouth 2 (Two) Times a Day. To take with chemotherapy, Disp: , Rfl:     ergocalciferol (ERGOCALCIFEROL) 1.25 MG (42287 UT) capsule, Take 1 capsule by mouth 1 (One) Time Per Week., Disp: , Rfl:     ezetimibe (ZETIA) 10 MG tablet, Take 1 tablet by mouth Daily., Disp: , Rfl:     Flovent  MCG/ACT inhaler, INHALE 2 PUFFS INTO THE LUNGS TWO TIMES A DAY, Disp: , Rfl:     fluticasone (FLONASE) 50 MCG/ACT nasal spray, 2 sprays by Each Nare route Daily., Disp: , Rfl:     fluticasone (FLONASE) 50 MCG/ACT nasal spray, 2 sprays., Disp: , Rfl:     hydroCHLOROthiazide (HYDRODIURIL) 25 MG tablet, Take 1 tablet by mouth Every Morning., Disp: , Rfl:     HYDROcodone-acetaminophen (NORCO) 7.5-325 MG per tablet, Take 1 tablet by mouth Every 4 (Four) Hours As Needed for Moderate Pain., Disp: , Rfl:     ibandronate (BONIVA) 150 MG tablet, Take 1 tablet by mouth Every 30 (Thirty) Days., Disp: , Rfl:     Ibandronate Sodium (BONIVA PO), Take 150 mg by mouth  Every 30 (Thirty) Days., Disp: , Rfl:     irbesartan (AVAPRO) 300 MG tablet, Take 1 tablet by mouth Daily., Disp: , Rfl:     isosorbide mononitrate (IMDUR) 30 MG 24 hr tablet, Take 1 tablet by mouth Daily., Disp: , Rfl:     isosorbide mononitrate (IMDUR) 30 MG 24 hr tablet, Take 1 tablet by mouth Daily., Disp: , Rfl:     metoprolol tartrate (LOPRESSOR) 50 MG tablet, Take 1 tablet by mouth Every 12 (Twelve) Hours., Disp: , Rfl:     Morphine (MS CONTIN) 15 MG 12 hr tablet, Take 1 tablet by mouth 2 (Two) Times a Day., Disp: , Rfl:     ondansetron (ZOFRAN) 4 MG tablet, Take 1 tablet by mouth Every 8 (Eight) Hours As Needed., Disp: , Rfl:     Oxymetazoline HCl (Nasal Spray) 0.05 % solution, 2 sprays into the nostril(s) as directed by provider., Disp: , Rfl:     rosuvastatin (CRESTOR) 20 MG tablet, Take 1 tablet by mouth Every Night., Disp: , Rfl:     rosuvastatin (CRESTOR) 20 MG tablet, Take 1 tablet by mouth Every Night., Disp: , Rfl:     venlafaxine XR (EFFEXOR-XR) 150 MG 24 hr capsule, Take 1 capsule by mouth Daily., Disp: , Rfl:     vitamin D (ERGOCALCIFEROL) 1.25 MG (76671 UT) capsule capsule, Take 1 capsule by mouth 1 (One) Time Per Week. Monday, Disp: , Rfl:     The following portions of the patient's history were reviewed and updated as appropriate: allergies, current medications, past family history, past medical history, past social history, past surgical history and problem list.    REVIEW OF SYSTEMS  Review of Systems   Constitutional:  Positive for fatigue.   HENT: Negative.     Eyes:         Glasses    Respiratory: Negative.     Cardiovascular: Negative.    Gastrointestinal: Negative.    Endocrine: Negative.    Genitourinary: Negative.    Musculoskeletal:  Positive for arthralgias (generalized).   Skin:  Positive for wound (fracture to the right arm; in cast).   Allergic/Immunologic: Negative.    Neurological: Negative.    Hematological: Negative.    Psychiatric/Behavioral: Negative.       Implant: None.  "    PHYSICAL EXAM  VITAL SIGNS:   Vitals:    25 1449   BP: 123/62   Pulse: 71   SpO2: Comment: room air   Weight: 59.7 kg (131 lb 9.6 oz)   Height: 157.5 cm (62\")   PainSc: 2    PainLoc: Generalized     Physical Exam    Performance Status: ECOG (2) Ambulatory and capable of self care, unable to carry out work activity, up and about > 50% or waking hours    Clinical Quality Measures  - Pain Documented by Standardized Tool, FPS   Jayla Graves reports a pain score of 2.  Given her pain assessment as noted, treatment options were discussed and the following options were decided upon as a follow-up plan to address the patient's pain: continuation of current treatment plan for pain and use of non-medical modalities (ice, heat, stretching and/or behavior modifications).    - Body Mass Index Screening and Follow-Up Plan  BMI is within normal parameters. No other follow-up for BMI required.    - Tobacco Use: Screening and Cessation Intervention  Social History    Tobacco Use      Smoking status: Former        Packs/day: 0.00        Years: 0.8 packs/day for 25.0 years (18.8 ttl pk-yrs)        Types: Cigarettes        Start date: 10/13/1992        Quit date: 10/13/2017        Years since quittin.7        Passive exposure: Past      Smokeless tobacco: Never    - Advanced Care Planning Advance Care Planning  ACP discussion was held with the patient during this visit. Patient does not have an advance directive, information provided.    - PHQ-2 Depression Screening:  Little interest or pleasure in doing things? Not at all   Feeling down, depressed, or hopeless? Not at all   PHQ-2 Total Score 0     ASSESSMENT AND PLAN  1. 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 Ductal carcinoma in situ (DCIS) of left breast    3. Retroperitoneal sarcoma    4. History of radiation therapy    5. Former smoker      No orders of the defined types were placed in this encounter.    RECOMMENDATIONS: Jayla Graves has been referred to " our office today to discuss radiotherapy recommendations for ***.      We have discussed the indications and rationale of radiation therapy according to the NCCN Guidelines. I have extensively reviewed the risks, benefits and alternatives of therapy and progression of disease in spite of therapy with either local or systemic failure. The option of definitive surgery has also been reviewed as well as surveillance. I have seen, examined and reviewed her medication list, appropriate labs and imaging studies as well as other physician notes. We discussed the goals/plans of care and answered all questions.     After consideration of the diagnostic data and evaluation of the patient, I have recommended to treat *** with *** radiation therapy, I anticipate a dose of *** cGy over *** fractions, final course pending completion of planning.    The patient and her family verbalize understanding of this discussion, voice no further questions and wish to proceed with recommendations. We will simulate treatment fields *** to begin the treatment planning. Continue ongoing management per primary care physician and other specialists.    Thank you for allowing me to assist in this patients care.    There are no Patient Instructions on file for this visit.    No follow-ups on file.    Time Spent: I spent *** minutes caring for Jayla on this date of service. This time includes time spent by me in the following activities: preparing for the visit, reviewing tests, obtaining and/or reviewing a separately obtained history, performing a medically appropriate examination and/or evaluation, counseling and educating the patient/family/caregiver, ordering medications, tests, or procedures, and documenting information in the medical record.   Quiana Cifuentes LPN   07/29/2025                 carcinoma of the lung.  She presents with a painful pathologic fracture of the mid right humerus, and radiographic evidence of disease progression in multiple areas that include the following: Right gluteus, right acetabulum, right ilium and left adrenal gland.  We will plan palliative radiation therapy to multiple sites of osseous metastasis utilizing 2 separate radiation fields, 1 to the right humerus and the other to include the numerous lesions listed above in the right pelvis.  She otherwise remains on systemic therapy that includes Keytruda and Adagrasib (K-ebenezer inhibitor).      We have discussed the indications and rationale of radiation therapy according to the NCCN Guidelines. I have extensively reviewed the risks, benefits and alternatives of therapy and progression of disease in spite of therapy with either local or systemic failure. The option of definitive surgery has also been reviewed as well as surveillance. I have seen, examined and reviewed her medication list, appropriate labs and imaging studies as well as other physician notes. We discussed the goals/plans of care and answered all questions.     I anticipate a dose of 3250 cGy over 13 fractions to the multiple sites in the pelvis, and 3000 cGy over 10 fractions to the right mid humerus, final course pending completion of planning.    The patient and her family verbalize understanding of this discussion, voice no further questions and wish to proceed with recommendations. We will simulate treatment fields tomorrow to begin the treatment planning. Continue ongoing management per primary care physician and other specialists.    Thank you for allowing me to assist in this patients care.    Return in about 1 day (around 7/30/2025) for CT simulation.    Time Spent: I spent 40 minutes caring for Jayla on this date of service. This time includes time spent by me in the following activities: preparing for the visit, reviewing tests, obtaining and/or  reviewing a separately obtained history, performing a medically appropriate examination and/or evaluation, counseling and educating the patient/family/caregiver, ordering medications, tests, or procedures, and documenting information in the medical record.   Mykel Andre MD   07/29/2025

## 2025-07-28 ENCOUNTER — HOSPITAL ENCOUNTER (OUTPATIENT)
Dept: RADIATION ONCOLOGY | Facility: HOSPITAL | Age: 65
Setting detail: RADIATION/ONCOLOGY SERIES
End: 2025-07-28
Payer: COMMERCIAL

## 2025-07-28 ENCOUNTER — TELEPHONE (OUTPATIENT)
Age: 65
End: 2025-07-28

## 2025-07-28 DIAGNOSIS — M84.421A PATHOLOGICAL FRACTURE OF RIGHT HUMERUS, UNSPECIFIED PATHOLOGICAL CAUSE, INITIAL ENCOUNTER: ICD-10-CM

## 2025-07-28 DIAGNOSIS — C34.91 NON-SMALL CELL CANCER OF RIGHT LUNG (HCC): ICD-10-CM

## 2025-07-28 DIAGNOSIS — C34.90 NSCLC METASTATIC TO BRAIN (HCC): Primary | ICD-10-CM

## 2025-07-28 DIAGNOSIS — S42.411B OPEN SUPRACONDYLAR FRACTURE OF RIGHT HUMERUS, INITIAL ENCOUNTER: ICD-10-CM

## 2025-07-28 DIAGNOSIS — C79.31 NSCLC METASTATIC TO BRAIN (HCC): Primary | ICD-10-CM

## 2025-07-29 ENCOUNTER — OFFICE VISIT (OUTPATIENT)
Age: 65
End: 2025-07-29
Payer: COMMERCIAL

## 2025-07-29 VITALS
HEIGHT: 62 IN | BODY MASS INDEX: 24.22 KG/M2 | WEIGHT: 131.6 LBS | HEART RATE: 71 BPM | SYSTOLIC BLOOD PRESSURE: 123 MMHG | DIASTOLIC BLOOD PRESSURE: 62 MMHG

## 2025-07-29 VITALS — HEIGHT: 62 IN | BODY MASS INDEX: 23.55 KG/M2 | WEIGHT: 128 LBS

## 2025-07-29 DIAGNOSIS — C79.31 NSCLC METASTATIC TO BRAIN: ICD-10-CM

## 2025-07-29 DIAGNOSIS — C48.0 RETROPERITONEAL SARCOMA: ICD-10-CM

## 2025-07-29 DIAGNOSIS — C34.91 SQUAMOUS CELL CARCINOMA OF RIGHT LUNG: ICD-10-CM

## 2025-07-29 DIAGNOSIS — C79.51 SECONDARY MALIGNANT NEOPLASM OF BONE: Primary | ICD-10-CM

## 2025-07-29 DIAGNOSIS — Z87.891 FORMER SMOKER: ICD-10-CM

## 2025-07-29 DIAGNOSIS — C34.90 NSCLC METASTATIC TO BRAIN: Primary | ICD-10-CM

## 2025-07-29 DIAGNOSIS — D05.12 DUCTAL CARCINOMA IN SITU (DCIS) OF LEFT BREAST: ICD-10-CM

## 2025-07-29 DIAGNOSIS — M84.521A PATHOLOGICAL FRACTURE OF RIGHT HUMERUS DUE TO NEOPLASTIC DISEASE, INITIAL ENCOUNTER: Primary | ICD-10-CM

## 2025-07-29 DIAGNOSIS — C34.90 NSCLC METASTATIC TO BRAIN: ICD-10-CM

## 2025-07-29 DIAGNOSIS — Z92.3 HISTORY OF RADIATION THERAPY: ICD-10-CM

## 2025-07-29 DIAGNOSIS — C79.31 NSCLC METASTATIC TO BRAIN: Primary | ICD-10-CM

## 2025-07-29 PROCEDURE — 1123F ACP DISCUSS/DSCN MKR DOCD: CPT | Performed by: PHYSICIAN ASSISTANT

## 2025-07-29 PROCEDURE — 99214 OFFICE O/P EST MOD 30 MIN: CPT | Performed by: PHYSICIAN ASSISTANT

## 2025-07-29 PROCEDURE — G0463 HOSPITAL OUTPT CLINIC VISIT: HCPCS | Performed by: RADIOLOGY

## 2025-07-29 RX ORDER — HYDROCODONE BITARTRATE AND ACETAMINOPHEN 7.5; 325 MG/1; MG/1
1 TABLET ORAL EVERY 4 HOURS PRN
COMMUNITY
Start: 2025-06-26

## 2025-07-29 RX ORDER — MORPHINE SULFATE 15 MG/1
15 TABLET, FILM COATED, EXTENDED RELEASE ORAL 2 TIMES DAILY
COMMUNITY
Start: 2025-07-17 | End: 2025-08-17

## 2025-07-29 ASSESSMENT — ENCOUNTER SYMPTOMS: COLOR CHANGE: 0

## 2025-07-29 NOTE — PROGRESS NOTES
[facility]  [facility]  [address] [place] [address]  Dept: [phone]  Dept Fax: [phone]  Loc: [phone]    Michelle Long is a 65 y.o. female who presents today for her medical conditions/complaints as noted below.  Michelle Long is complaining of Consultation (Right Humerus)        HPI:   Patient is a pleasant 65-year-old female presenting to the clinic today for evaluation of an acute right pathological humeral shaft fracture.  She was laying on the couch and when she pushed herself up felt and heard a snap in the arm.  She was seen at [facility] ER with x-rays which I reviewed today in office.  Dr. Billings reviewed these as well.  This demonstrates a nondisplaced pathological fracture of the right humeral shaft.  She was placed in a long-arm splint and sling and is here today for follow-up.        Past Medical History:   Diagnosis Date    Acid reflux     Anxiety     Breast cancer (HCC) 2009    DCIS left    Cancer (HCC)     liposcaarcoma, peritoneal    COPD (chronic obstructive pulmonary disease) ([facility]) 09/02/2021    Emphysema of lung ([facility]) 2013    Gastroesophageal reflux disease without esophagitis 10/20/2021    History of therapeutic radiation 2009    Hyperlipidemia     Hypertension     Lung cancer (HCC)     Mild coronary artery disease 07/18/2019       Past Surgical History:   Procedure Laterality Date    BREAST BIOPSY Left 2009    DCIS    BREAST LUMPECTOMY Left     BRONCHOSCOPY  03/13/2025    BRONCHOSCOPY performed by Mel Allison MD at [facility]    BRONCHOSCOPY  03/13/2025    BRONCHOSCOPY COMPUTER ASSISTED ROBOTIC performed by Mel Allison MD at [facility]    BRONCHOSCOPY  03/13/2025    BRONCHOSCOPY ALVEOLAR LAVAGE ROBOTIC performed by Mel Allison MD at [facility]    BRONCHOSCOPY  03/13/2025    BRONCHOSCOPY ENDOBRONCHIAL ULTRASOUND FINE NEEDLE ASPIRATION performed by

## 2025-07-29 NOTE — ASSESSMENT & PLAN NOTE
Will place the patient in a sling and swath and she is instructed to remain nonweightbearing of the extremity.  She is following up with her oncologist and radiation oncologist this week.  Will see her back in 4 weeks to see how she is progressing.

## 2025-07-30 ENCOUNTER — HOSPITAL ENCOUNTER (OUTPATIENT)
Dept: RADIATION ONCOLOGY | Facility: HOSPITAL | Age: 65
Discharge: HOME | End: 2025-07-30

## 2025-07-30 DIAGNOSIS — C34.90 NSCLC METASTATIC TO BRAIN (HCC): ICD-10-CM

## 2025-07-30 DIAGNOSIS — G89.3 CANCER ASSOCIATED PAIN: ICD-10-CM

## 2025-07-30 DIAGNOSIS — F32.1 CURRENT MODERATE EPISODE OF MAJOR DEPRESSIVE DISORDER, UNSPECIFIED WHETHER RECURRENT (HCC): ICD-10-CM

## 2025-07-30 DIAGNOSIS — C34.91 NON-SMALL CELL CANCER OF RIGHT LUNG (HCC): ICD-10-CM

## 2025-07-30 DIAGNOSIS — G89.3 CANCER RELATED PAIN: ICD-10-CM

## 2025-07-30 DIAGNOSIS — C79.31 NSCLC METASTATIC TO BRAIN (HCC): ICD-10-CM

## 2025-07-30 PROCEDURE — 77263 THER RADIOLOGY TX PLNG CPLX: CPT | Performed by: RADIOLOGY

## 2025-07-30 PROCEDURE — 77334 RADIATION TREATMENT AID(S): CPT | Performed by: RADIOLOGY

## 2025-07-30 RX ORDER — MORPHINE SULFATE 15 MG/1
15 TABLET, FILM COATED, EXTENDED RELEASE ORAL 2 TIMES DAILY
Qty: 60 TABLET | Refills: 0 | Status: SHIPPED | OUTPATIENT
Start: 2025-07-30 | End: 2025-08-01 | Stop reason: DRUGHIGH

## 2025-07-30 RX ORDER — DULOXETIN HYDROCHLORIDE 30 MG/1
30 CAPSULE, DELAYED RELEASE ORAL DAILY
Qty: 30 CAPSULE | Refills: 0 | Status: SHIPPED | OUTPATIENT
Start: 2025-07-30

## 2025-07-30 RX ORDER — HYDROCODONE BITARTRATE AND ACETAMINOPHEN 7.5; 325 MG/1; MG/1
1 TABLET ORAL EVERY 6 HOURS PRN
Qty: 120 TABLET | Refills: 0 | Status: SHIPPED | OUTPATIENT
Start: 2025-07-30 | End: 2025-08-01 | Stop reason: DRUGHIGH

## 2025-08-01 DIAGNOSIS — C79.31 NON-SMALL CELL LUNG CANCER METASTATIC TO BRAIN (HCC): Primary | ICD-10-CM

## 2025-08-01 DIAGNOSIS — C34.90 NON-SMALL CELL LUNG CANCER METASTATIC TO BRAIN (HCC): Primary | ICD-10-CM

## 2025-08-01 DIAGNOSIS — G89.3 CANCER RELATED PAIN: ICD-10-CM

## 2025-08-01 RX ORDER — HYDROCODONE BITARTRATE AND ACETAMINOPHEN 10; 325 MG/1; MG/1
1 TABLET ORAL EVERY 6 HOURS PRN
Qty: 120 TABLET | Refills: 0 | Status: SHIPPED | OUTPATIENT
Start: 2025-08-01 | End: 2025-08-31

## 2025-08-01 RX ORDER — MORPHINE SULFATE 30 MG/1
30 TABLET, FILM COATED, EXTENDED RELEASE ORAL 2 TIMES DAILY
Qty: 60 TABLET | Refills: 0 | Status: SHIPPED | OUTPATIENT
Start: 2025-08-01 | End: 2025-08-31

## 2025-08-04 ENCOUNTER — TELEPHONE (OUTPATIENT)
Dept: HEMATOLOGY | Age: 65
End: 2025-08-04

## 2025-08-05 DIAGNOSIS — C79.31 NON-SMALL CELL LUNG CANCER METASTATIC TO BRAIN (HCC): ICD-10-CM

## 2025-08-05 DIAGNOSIS — C34.91 NON-SMALL CELL CANCER OF RIGHT LUNG (HCC): ICD-10-CM

## 2025-08-05 DIAGNOSIS — C34.90 NSCLC METASTATIC TO BRAIN (HCC): Primary | ICD-10-CM

## 2025-08-05 DIAGNOSIS — C79.31 NSCLC METASTATIC TO BRAIN (HCC): Primary | ICD-10-CM

## 2025-08-05 DIAGNOSIS — C34.91 SQUAMOUS CELL CARCINOMA OF RIGHT LUNG (HCC): ICD-10-CM

## 2025-08-05 DIAGNOSIS — C34.90 NON-SMALL CELL LUNG CANCER METASTATIC TO BRAIN (HCC): ICD-10-CM

## 2025-08-05 PROBLEM — C79.51 SECONDARY MALIGNANT NEOPLASM OF BONE: Status: ACTIVE | Noted: 2025-08-05

## 2025-08-07 ENCOUNTER — CLINICAL DOCUMENTATION (OUTPATIENT)
Dept: HEMATOLOGY | Age: 65
End: 2025-08-07

## 2025-08-07 ENCOUNTER — OFFICE VISIT (OUTPATIENT)
Dept: HEMATOLOGY | Age: 65
End: 2025-08-07
Payer: COMMERCIAL

## 2025-08-07 ENCOUNTER — HOSPITAL ENCOUNTER (OUTPATIENT)
Dept: INFUSION THERAPY | Age: 65
Discharge: HOME | End: 2025-08-07
Payer: COMMERCIAL

## 2025-08-07 ENCOUNTER — APPOINTMENT (OUTPATIENT)
Dept: INFUSION THERAPY | Age: 65
End: 2025-08-07
Payer: COMMERCIAL

## 2025-08-07 VITALS
WEIGHT: 124.7 LBS | OXYGEN SATURATION: 96 % | BODY MASS INDEX: 22.95 KG/M2 | SYSTOLIC BLOOD PRESSURE: 128 MMHG | DIASTOLIC BLOOD PRESSURE: 86 MMHG | HEIGHT: 62 IN | HEART RATE: 77 BPM | TEMPERATURE: 97.2 F

## 2025-08-07 DIAGNOSIS — C79.51 LUNG CANCER METASTATIC TO BONE (HCC): ICD-10-CM

## 2025-08-07 DIAGNOSIS — C79.51 NSCLC METASTATIC TO BONE (HCC): Primary | ICD-10-CM

## 2025-08-07 DIAGNOSIS — C79.31 NSCLC METASTATIC TO BRAIN (HCC): ICD-10-CM

## 2025-08-07 DIAGNOSIS — G89.3 CANCER-RELATED PAIN: ICD-10-CM

## 2025-08-07 DIAGNOSIS — C34.90 NSCLC METASTATIC TO BONE (HCC): Primary | ICD-10-CM

## 2025-08-07 DIAGNOSIS — C34.90 LUNG CANCER METASTATIC TO BONE (HCC): ICD-10-CM

## 2025-08-07 DIAGNOSIS — C34.90 NSCLC METASTATIC TO BRAIN (HCC): Primary | ICD-10-CM

## 2025-08-07 DIAGNOSIS — C34.91 SQUAMOUS CELL CARCINOMA OF RIGHT LUNG (HCC): ICD-10-CM

## 2025-08-07 DIAGNOSIS — N28.9 RENAL IMPAIRMENT: ICD-10-CM

## 2025-08-07 DIAGNOSIS — C79.31 NSCLC METASTATIC TO BRAIN (HCC): Primary | ICD-10-CM

## 2025-08-07 DIAGNOSIS — D75.838 REACTIVE THROMBOCYTOSIS: ICD-10-CM

## 2025-08-07 DIAGNOSIS — C34.91 NON-SMALL CELL CANCER OF RIGHT LUNG (HCC): ICD-10-CM

## 2025-08-07 DIAGNOSIS — Z71.89 CARE PLAN DISCUSSED WITH PATIENT: ICD-10-CM

## 2025-08-07 DIAGNOSIS — C34.90 NSCLC METASTATIC TO BRAIN (HCC): ICD-10-CM

## 2025-08-07 DIAGNOSIS — C34.90 NON-SMALL CELL LUNG CANCER METASTATIC TO BRAIN (HCC): ICD-10-CM

## 2025-08-07 DIAGNOSIS — C79.31 NON-SMALL CELL LUNG CANCER METASTATIC TO BRAIN (HCC): ICD-10-CM

## 2025-08-07 LAB
ALBUMIN SERPL-MCNC: 3.5 G/DL (ref 3.5–5.2)
ALP SERPL-CCNC: 87 U/L (ref 35–104)
ALT SERPL-CCNC: 27 U/L (ref 5–33)
ANION GAP SERPL CALCULATED.3IONS-SCNC: 11 MMOL/L (ref 7–19)
AST SERPL-CCNC: 29 U/L (ref 5–32)
BASOPHILS # BLD: 0.04 K/UL (ref 0–0.2)
BASOPHILS NFR BLD: 0.2 % (ref 0–1)
BILIRUB SERPL-MCNC: <0.2 MG/DL (ref 0–1.2)
BUN SERPL-MCNC: 19 MG/DL (ref 8–23)
CALCIUM SERPL-MCNC: 8.6 MG/DL (ref 8.8–10.2)
CHLORIDE SERPL-SCNC: 107 MMOL/L (ref 98–107)
CO2 SERPL-SCNC: 24 MMOL/L (ref 22–29)
CREAT SERPL-MCNC: 1.1 MG/DL (ref 0.5–0.9)
EOSINOPHIL # BLD: 0.21 K/UL (ref 0–0.6)
EOSINOPHIL NFR BLD: 1.3 % (ref 0–5)
ERYTHROCYTE [DISTWIDTH] IN BLOOD BY AUTOMATED COUNT: 18.6 % (ref 11.5–14.5)
GLUCOSE SERPL-MCNC: 104 MG/DL (ref 70–99)
HCT VFR BLD AUTO: 37 % (ref 37–47)
HGB BLD-MCNC: 10.9 G/DL (ref 12–16)
LYMPHOCYTES # BLD: 0.64 K/UL (ref 1.1–4.5)
LYMPHOCYTES NFR BLD: 3.8 % (ref 20–40)
MCH RBC QN AUTO: 25.8 PG (ref 27–31)
MCHC RBC AUTO-ENTMCNC: 29.5 G/DL (ref 33–37)
MCV RBC AUTO: 87.7 FL (ref 81–99)
MONOCYTES # BLD: 1.51 K/UL (ref 0–0.9)
MONOCYTES NFR BLD: 9.1 % (ref 1–10)
NEUTROPHILS # BLD: 14.13 K/UL (ref 1.5–7.5)
NEUTS SEG NFR BLD: 84.8 % (ref 50–65)
PLATELET # BLD AUTO: 486 K/UL (ref 130–400)
PMV BLD AUTO: 9 FL (ref 9.4–12.3)
POTASSIUM SERPL-SCNC: 5 MMOL/L (ref 3.5–5.1)
PROT SERPL-MCNC: 6.2 G/DL (ref 6.4–8.3)
RBC # BLD AUTO: 4.22 M/UL (ref 4.2–5.4)
SODIUM SERPL-SCNC: 142 MMOL/L (ref 136–145)
WBC # BLD AUTO: 16.67 K/UL (ref 4.8–10.8)

## 2025-08-07 PROCEDURE — G8399 PT W/DXA RESULTS DOCUMENT: HCPCS | Performed by: INTERNAL MEDICINE

## 2025-08-07 PROCEDURE — 3079F DIAST BP 80-89 MM HG: CPT | Performed by: INTERNAL MEDICINE

## 2025-08-07 PROCEDURE — 36415 COLL VENOUS BLD VENIPUNCTURE: CPT

## 2025-08-07 PROCEDURE — G8427 DOCREV CUR MEDS BY ELIG CLIN: HCPCS | Performed by: INTERNAL MEDICINE

## 2025-08-07 PROCEDURE — 80053 COMPREHEN METABOLIC PANEL: CPT

## 2025-08-07 PROCEDURE — G8420 CALC BMI NORM PARAMETERS: HCPCS | Performed by: INTERNAL MEDICINE

## 2025-08-07 PROCEDURE — 99214 OFFICE O/P EST MOD 30 MIN: CPT

## 2025-08-07 PROCEDURE — 1090F PRES/ABSN URINE INCON ASSESS: CPT | Performed by: INTERNAL MEDICINE

## 2025-08-07 PROCEDURE — 3074F SYST BP LT 130 MM HG: CPT | Performed by: INTERNAL MEDICINE

## 2025-08-07 PROCEDURE — G2211 COMPLEX E/M VISIT ADD ON: HCPCS | Performed by: INTERNAL MEDICINE

## 2025-08-07 PROCEDURE — 85025 COMPLETE CBC W/AUTO DIFF WBC: CPT

## 2025-08-07 PROCEDURE — 1036F TOBACCO NON-USER: CPT | Performed by: INTERNAL MEDICINE

## 2025-08-07 PROCEDURE — 99214 OFFICE O/P EST MOD 30 MIN: CPT | Performed by: INTERNAL MEDICINE

## 2025-08-07 PROCEDURE — 1123F ACP DISCUSS/DSCN MKR DOCD: CPT | Performed by: INTERNAL MEDICINE

## 2025-08-07 PROCEDURE — 3017F COLORECTAL CA SCREEN DOC REV: CPT | Performed by: INTERNAL MEDICINE

## 2025-08-13 ENCOUNTER — OFFICE VISIT (OUTPATIENT)
Dept: PALLATIVE CARE | Age: 65
End: 2025-08-13
Payer: COMMERCIAL

## 2025-08-13 DIAGNOSIS — C34.90 NON-SMALL CELL LUNG CANCER METASTATIC TO BRAIN (HCC): ICD-10-CM

## 2025-08-13 DIAGNOSIS — Z51.5 ENCOUNTER FOR PALLIATIVE CARE: ICD-10-CM

## 2025-08-13 DIAGNOSIS — G89.3 CANCER ASSOCIATED PAIN: Primary | ICD-10-CM

## 2025-08-13 DIAGNOSIS — C79.31 NON-SMALL CELL LUNG CANCER METASTATIC TO BRAIN (HCC): ICD-10-CM

## 2025-08-13 PROCEDURE — 99350 HOME/RES VST EST HIGH MDM 60: CPT

## 2025-08-13 PROCEDURE — 1123F ACP DISCUSS/DSCN MKR DOCD: CPT

## 2025-08-20 ENCOUNTER — HOSPITAL ENCOUNTER (INPATIENT)
Age: 65
LOS: 2 days | Discharge: HOME | DRG: 054 | End: 2025-08-22
Attending: STUDENT IN AN ORGANIZED HEALTH CARE EDUCATION/TRAINING PROGRAM | Admitting: HOSPITALIST
Payer: COMMERCIAL

## 2025-08-20 ENCOUNTER — APPOINTMENT (OUTPATIENT)
Dept: MRI IMAGING | Age: 65
DRG: 054 | End: 2025-08-20
Payer: COMMERCIAL

## 2025-08-20 ENCOUNTER — CLINICAL DOCUMENTATION (OUTPATIENT)
Dept: HEMATOLOGY | Age: 65
End: 2025-08-20

## 2025-08-20 DIAGNOSIS — N17.9 AKI (ACUTE KIDNEY INJURY): ICD-10-CM

## 2025-08-20 DIAGNOSIS — C79.31 METASTASIS TO BRAIN (HCC): Primary | ICD-10-CM

## 2025-08-20 PROBLEM — C34.90 LUNG CANCER METASTATIC TO BRAIN (HCC): Status: ACTIVE | Noted: 2025-08-20

## 2025-08-20 PROBLEM — F32.1 CURRENT MODERATE EPISODE OF MAJOR DEPRESSIVE DISORDER (HCC): Status: ACTIVE | Noted: 2025-08-20

## 2025-08-20 PROBLEM — R11.2 NAUSEA AND VOMITING: Status: ACTIVE | Noted: 2025-08-20

## 2025-08-20 PROBLEM — F41.9 ANXIETY: Status: ACTIVE | Noted: 2025-08-20

## 2025-08-20 LAB
ALBUMIN SERPL-MCNC: 3 G/DL (ref 3.5–5.2)
ALP SERPL-CCNC: 281 U/L (ref 35–104)
ALT SERPL-CCNC: 26 U/L (ref 10–35)
ANION GAP SERPL CALCULATED.3IONS-SCNC: 14 MMOL/L (ref 8–16)
AST SERPL-CCNC: 36 U/L (ref 10–35)
BASOPHILS # BLD: 0 K/UL (ref 0–0.2)
BASOPHILS NFR BLD: 0.2 % (ref 0–1)
BILIRUB SERPL-MCNC: 0.4 MG/DL (ref 0.2–1.2)
BUN SERPL-MCNC: 40 MG/DL (ref 8–23)
CALCIUM SERPL-MCNC: 8.9 MG/DL (ref 8.8–10.2)
CHLORIDE SERPL-SCNC: 102 MMOL/L (ref 98–107)
CO2 SERPL-SCNC: 21 MMOL/L (ref 22–29)
CREAT SERPL-MCNC: 1.8 MG/DL (ref 0.5–0.9)
EOSINOPHIL # BLD: 0.2 K/UL (ref 0–0.6)
EOSINOPHIL NFR BLD: 2.5 % (ref 0–5)
ERYTHROCYTE [DISTWIDTH] IN BLOOD BY AUTOMATED COUNT: 17.7 % (ref 11.5–14.5)
GLUCOSE SERPL-MCNC: 88 MG/DL (ref 70–99)
HCT VFR BLD AUTO: 32.4 % (ref 37–47)
HGB BLD-MCNC: 9.8 G/DL (ref 12–16)
IMM GRANULOCYTES # BLD: 0.1 K/UL
LIPASE SERPL-CCNC: 52 U/L (ref 13–60)
LYMPHOCYTES # BLD: 0.3 K/UL (ref 1.1–4.5)
LYMPHOCYTES NFR BLD: 3.6 % (ref 20–40)
MCH RBC QN AUTO: 25.4 PG (ref 27–31)
MCHC RBC AUTO-ENTMCNC: 30.2 G/DL (ref 33–37)
MCV RBC AUTO: 83.9 FL (ref 81–99)
MONOCYTES # BLD: 0.8 K/UL (ref 0–0.9)
MONOCYTES NFR BLD: 9 % (ref 0–10)
NEUTROPHILS # BLD: 7 K/UL (ref 1.5–7.5)
NEUTS SEG NFR BLD: 83.9 % (ref 50–65)
PLATELET # BLD AUTO: 305 K/UL (ref 130–400)
PMV BLD AUTO: 9.7 FL (ref 9.4–12.3)
POTASSIUM SERPL-SCNC: 4.5 MMOL/L (ref 3.5–5.1)
PROT SERPL-MCNC: 6.1 G/DL (ref 6.4–8.3)
RBC # BLD AUTO: 3.86 M/UL (ref 4.2–5.4)
SODIUM SERPL-SCNC: 137 MMOL/L (ref 136–145)
WBC # BLD AUTO: 8.4 K/UL (ref 4.8–10.8)

## 2025-08-20 PROCEDURE — 70553 MRI BRAIN STEM W/O & W/DYE: CPT

## 2025-08-20 PROCEDURE — 36415 COLL VENOUS BLD VENIPUNCTURE: CPT

## 2025-08-20 PROCEDURE — 96374 THER/PROPH/DIAG INJ IV PUSH: CPT

## 2025-08-20 PROCEDURE — 2580000003 HC RX 258

## 2025-08-20 PROCEDURE — 6370000000 HC RX 637 (ALT 250 FOR IP)

## 2025-08-20 PROCEDURE — A9577 INJ MULTIHANCE: HCPCS | Performed by: STUDENT IN AN ORGANIZED HEALTH CARE EDUCATION/TRAINING PROGRAM

## 2025-08-20 PROCEDURE — 6360000002 HC RX W HCPCS: Performed by: INTERNAL MEDICINE

## 2025-08-20 PROCEDURE — 6360000002 HC RX W HCPCS

## 2025-08-20 PROCEDURE — 6360000004 HC RX CONTRAST MEDICATION: Performed by: STUDENT IN AN ORGANIZED HEALTH CARE EDUCATION/TRAINING PROGRAM

## 2025-08-20 PROCEDURE — 2580000003 HC RX 258: Performed by: STUDENT IN AN ORGANIZED HEALTH CARE EDUCATION/TRAINING PROGRAM

## 2025-08-20 PROCEDURE — 80053 COMPREHEN METABOLIC PANEL: CPT

## 2025-08-20 PROCEDURE — 99285 EMERGENCY DEPT VISIT HI MDM: CPT

## 2025-08-20 PROCEDURE — 6360000002 HC RX W HCPCS: Performed by: STUDENT IN AN ORGANIZED HEALTH CARE EDUCATION/TRAINING PROGRAM

## 2025-08-20 PROCEDURE — 1200000000 HC SEMI PRIVATE

## 2025-08-20 PROCEDURE — 83690 ASSAY OF LIPASE: CPT

## 2025-08-20 PROCEDURE — 85025 COMPLETE CBC W/AUTO DIFF WBC: CPT

## 2025-08-20 PROCEDURE — 96375 TX/PRO/DX INJ NEW DRUG ADDON: CPT

## 2025-08-20 PROCEDURE — 99223 1ST HOSP IP/OBS HIGH 75: CPT | Performed by: INTERNAL MEDICINE

## 2025-08-20 RX ORDER — DEXAMETHASONE SODIUM PHOSPHATE 10 MG/ML
4 INJECTION, SOLUTION INTRAMUSCULAR; INTRAVENOUS EVERY 12 HOURS
Status: DISCONTINUED | OUTPATIENT
Start: 2025-08-21 | End: 2025-08-22 | Stop reason: HOSPADM

## 2025-08-20 RX ORDER — CETIRIZINE HYDROCHLORIDE 10 MG/1
10 TABLET ORAL DAILY
COMMUNITY

## 2025-08-20 RX ORDER — POLYETHYLENE GLYCOL 3350 17 G/17G
17 POWDER, FOR SOLUTION ORAL DAILY PRN
Status: DISCONTINUED | OUTPATIENT
Start: 2025-08-20 | End: 2025-08-22 | Stop reason: HOSPADM

## 2025-08-20 RX ORDER — SODIUM CHLORIDE 0.9 % (FLUSH) 0.9 %
5-40 SYRINGE (ML) INJECTION PRN
Status: DISCONTINUED | OUTPATIENT
Start: 2025-08-20 | End: 2025-08-22 | Stop reason: HOSPADM

## 2025-08-20 RX ORDER — ONDANSETRON 2 MG/ML
4 INJECTION INTRAMUSCULAR; INTRAVENOUS EVERY 6 HOURS PRN
Status: DISCONTINUED | OUTPATIENT
Start: 2025-08-20 | End: 2025-08-20

## 2025-08-20 RX ORDER — SODIUM CHLORIDE 9 MG/ML
INJECTION, SOLUTION INTRAVENOUS PRN
Status: DISCONTINUED | OUTPATIENT
Start: 2025-08-20 | End: 2025-08-22 | Stop reason: HOSPADM

## 2025-08-20 RX ORDER — PANTOPRAZOLE SODIUM 40 MG/1
40 TABLET, DELAYED RELEASE ORAL
Status: DISCONTINUED | OUTPATIENT
Start: 2025-08-21 | End: 2025-08-22 | Stop reason: HOSPADM

## 2025-08-20 RX ORDER — DULOXETIN HYDROCHLORIDE 30 MG/1
30 CAPSULE, DELAYED RELEASE ORAL DAILY
Status: DISCONTINUED | OUTPATIENT
Start: 2025-08-20 | End: 2025-08-22 | Stop reason: HOSPADM

## 2025-08-20 RX ORDER — SODIUM CHLORIDE 9 MG/ML
INJECTION, SOLUTION INTRAVENOUS CONTINUOUS
Status: ACTIVE | OUTPATIENT
Start: 2025-08-20 | End: 2025-08-21

## 2025-08-20 RX ORDER — MORPHINE SULFATE 30 MG/1
30 TABLET, FILM COATED, EXTENDED RELEASE ORAL 2 TIMES DAILY
Status: DISCONTINUED | OUTPATIENT
Start: 2025-08-20 | End: 2025-08-22 | Stop reason: HOSPADM

## 2025-08-20 RX ORDER — ROSUVASTATIN CALCIUM 10 MG/1
10 TABLET, COATED ORAL DAILY
Status: DISCONTINUED | OUTPATIENT
Start: 2025-08-21 | End: 2025-08-22 | Stop reason: HOSPADM

## 2025-08-20 RX ORDER — 0.9 % SODIUM CHLORIDE 0.9 %
500 INTRAVENOUS SOLUTION INTRAVENOUS ONCE
Status: COMPLETED | OUTPATIENT
Start: 2025-08-20 | End: 2025-08-20

## 2025-08-20 RX ORDER — ENOXAPARIN SODIUM 100 MG/ML
30 INJECTION SUBCUTANEOUS DAILY
Status: DISCONTINUED | OUTPATIENT
Start: 2025-08-20 | End: 2025-08-21

## 2025-08-20 RX ORDER — ACETAMINOPHEN 650 MG/1
650 SUPPOSITORY RECTAL EVERY 6 HOURS PRN
Status: DISCONTINUED | OUTPATIENT
Start: 2025-08-20 | End: 2025-08-22 | Stop reason: HOSPADM

## 2025-08-20 RX ORDER — DOCUSATE SODIUM 100 MG/1
100 CAPSULE, LIQUID FILLED ORAL DAILY
Status: DISCONTINUED | OUTPATIENT
Start: 2025-08-20 | End: 2025-08-22 | Stop reason: HOSPADM

## 2025-08-20 RX ORDER — ONDANSETRON 2 MG/ML
4 INJECTION INTRAMUSCULAR; INTRAVENOUS ONCE
Status: COMPLETED | OUTPATIENT
Start: 2025-08-20 | End: 2025-08-20

## 2025-08-20 RX ORDER — ONDANSETRON 4 MG/1
4 TABLET, ORALLY DISINTEGRATING ORAL EVERY 8 HOURS PRN
Status: DISCONTINUED | OUTPATIENT
Start: 2025-08-20 | End: 2025-08-20

## 2025-08-20 RX ORDER — ACETAMINOPHEN 325 MG/1
650 TABLET ORAL EVERY 6 HOURS PRN
Status: DISCONTINUED | OUTPATIENT
Start: 2025-08-20 | End: 2025-08-22 | Stop reason: HOSPADM

## 2025-08-20 RX ORDER — ONDANSETRON 2 MG/ML
4 INJECTION INTRAMUSCULAR; INTRAVENOUS EVERY 6 HOURS PRN
Status: DISCONTINUED | OUTPATIENT
Start: 2025-08-20 | End: 2025-08-22 | Stop reason: HOSPADM

## 2025-08-20 RX ORDER — METOCLOPRAMIDE 5 MG/1
5 TABLET ORAL
Status: DISCONTINUED | OUTPATIENT
Start: 2025-08-20 | End: 2025-08-22 | Stop reason: HOSPADM

## 2025-08-20 RX ORDER — ASPIRIN 81 MG/1
81 TABLET ORAL DAILY
Status: DISCONTINUED | OUTPATIENT
Start: 2025-08-20 | End: 2025-08-22 | Stop reason: HOSPADM

## 2025-08-20 RX ORDER — SODIUM CHLORIDE 0.9 % (FLUSH) 0.9 %
5-40 SYRINGE (ML) INJECTION EVERY 12 HOURS SCHEDULED
Status: DISCONTINUED | OUTPATIENT
Start: 2025-08-20 | End: 2025-08-22 | Stop reason: HOSPADM

## 2025-08-20 RX ORDER — 0.9 % SODIUM CHLORIDE 0.9 %
1000 INTRAVENOUS SOLUTION INTRAVENOUS ONCE
Status: COMPLETED | OUTPATIENT
Start: 2025-08-20 | End: 2025-08-20

## 2025-08-20 RX ORDER — MEGESTROL ACETATE 40 MG/ML
400 SUSPENSION ORAL DAILY
Status: DISCONTINUED | OUTPATIENT
Start: 2025-08-20 | End: 2025-08-22 | Stop reason: HOSPADM

## 2025-08-20 RX ORDER — DEXAMETHASONE SODIUM PHOSPHATE 10 MG/ML
4 INJECTION, SOLUTION INTRAMUSCULAR; INTRAVENOUS EVERY 6 HOURS
Status: DISCONTINUED | OUTPATIENT
Start: 2025-08-20 | End: 2025-08-20

## 2025-08-20 RX ORDER — HYDROCODONE BITARTRATE AND ACETAMINOPHEN 10; 325 MG/1; MG/1
1 TABLET ORAL EVERY 6 HOURS PRN
Status: DISCONTINUED | OUTPATIENT
Start: 2025-08-20 | End: 2025-08-22 | Stop reason: HOSPADM

## 2025-08-20 RX ORDER — BUSPIRONE HYDROCHLORIDE 10 MG/1
10 TABLET ORAL 3 TIMES DAILY PRN
Status: DISCONTINUED | OUTPATIENT
Start: 2025-08-20 | End: 2025-08-22 | Stop reason: HOSPADM

## 2025-08-20 RX ADMIN — GADOBENATE DIMEGLUMINE 10 ML: 529 INJECTION, SOLUTION INTRAVENOUS at 13:00

## 2025-08-20 RX ADMIN — METOCLOPRAMIDE 5 MG: 5 TABLET ORAL at 21:26

## 2025-08-20 RX ADMIN — MEGESTROL ACETATE 400 MG: 400 SUSPENSION ORAL at 21:34

## 2025-08-20 RX ADMIN — ONDANSETRON 4 MG: 2 INJECTION, SOLUTION INTRAMUSCULAR; INTRAVENOUS at 12:29

## 2025-08-20 RX ADMIN — SODIUM CHLORIDE: 0.9 INJECTION, SOLUTION INTRAVENOUS at 17:00

## 2025-08-20 RX ADMIN — ENOXAPARIN SODIUM 30 MG: 100 INJECTION SUBCUTANEOUS at 16:11

## 2025-08-20 RX ADMIN — SODIUM CHLORIDE 1000 ML: 0.9 INJECTION, SOLUTION INTRAVENOUS at 13:16

## 2025-08-20 RX ADMIN — SODIUM CHLORIDE 500 ML: 0.9 INJECTION, SOLUTION INTRAVENOUS at 16:10

## 2025-08-20 RX ADMIN — MORPHINE SULFATE 30 MG: 30 TABLET, FILM COATED, EXTENDED RELEASE ORAL at 21:26

## 2025-08-20 RX ADMIN — DEXAMETHASONE SODIUM PHOSPHATE 4 MG: 10 INJECTION, SOLUTION INTRAMUSCULAR; INTRAVENOUS at 14:32

## 2025-08-20 RX ADMIN — HYDROCODONE BITARTRATE AND ACETAMINOPHEN 1 TABLET: 10; 325 TABLET ORAL at 16:56

## 2025-08-20 RX ADMIN — ONDANSETRON 4 MG: 2 INJECTION, SOLUTION INTRAMUSCULAR; INTRAVENOUS at 21:38

## 2025-08-20 RX ADMIN — BUSPIRONE HYDROCHLORIDE 10 MG: 10 TABLET ORAL at 15:14

## 2025-08-20 ASSESSMENT — PAIN DESCRIPTION - DESCRIPTORS
DESCRIPTORS: ACHING
DESCRIPTORS: ACHING

## 2025-08-20 ASSESSMENT — PAIN DESCRIPTION - ORIENTATION
ORIENTATION: RIGHT
ORIENTATION: RIGHT

## 2025-08-20 ASSESSMENT — ENCOUNTER SYMPTOMS
COUGH: 0
NAUSEA: 1
EYE PAIN: 0
EYE REDNESS: 0
SHORTNESS OF BREATH: 0
VOMITING: 1
ABDOMINAL PAIN: 0
DIARRHEA: 1
SORE THROAT: 0
WHEEZING: 0
CHEST TIGHTNESS: 0
COLOR CHANGE: 0

## 2025-08-20 ASSESSMENT — PAIN SCALES - GENERAL
PAINLEVEL_OUTOF10: 4
PAINLEVEL_OUTOF10: 4
PAINLEVEL_OUTOF10: 3

## 2025-08-20 ASSESSMENT — PAIN DESCRIPTION - LOCATION
LOCATION: LEG
LOCATION: ARM

## 2025-08-20 ASSESSMENT — PAIN - FUNCTIONAL ASSESSMENT
PAIN_FUNCTIONAL_ASSESSMENT: 0-10
PAIN_FUNCTIONAL_ASSESSMENT: 0-10
PAIN_FUNCTIONAL_ASSESSMENT: ACTIVITIES ARE NOT PREVENTED
PAIN_FUNCTIONAL_ASSESSMENT: PREVENTS OR INTERFERES SOME ACTIVE ACTIVITIES AND ADLS

## 2025-08-21 PROBLEM — E43 SEVERE MALNUTRITION: Chronic | Status: ACTIVE | Noted: 2025-08-21

## 2025-08-21 LAB
ANION GAP SERPL CALCULATED.3IONS-SCNC: 10 MMOL/L (ref 8–16)
ANISOCYTOSIS BLD QL SMEAR: ABNORMAL
BACTERIA URNS QL MICRO: ABNORMAL /HPF
BASOPHILS # BLD: 0 K/UL (ref 0–0.2)
BASOPHILS NFR BLD: 0 % (ref 0–1)
BILIRUB UR QL STRIP: NEGATIVE
BUN SERPL-MCNC: 34 MG/DL (ref 8–23)
CALCIUM SERPL-MCNC: 8 MG/DL (ref 8.8–10.2)
CHLORIDE SERPL-SCNC: 109 MMOL/L (ref 98–107)
CLARITY UR: ABNORMAL
CO2 SERPL-SCNC: 20 MMOL/L (ref 22–29)
COLOR UR: YELLOW
CREAT SERPL-MCNC: 1.5 MG/DL (ref 0.5–0.9)
CRYSTALS URNS MICRO: ABNORMAL /HPF
EOSINOPHIL # BLD: 0 K/UL (ref 0–0.6)
EOSINOPHIL NFR BLD: 0 % (ref 0–5)
EPI CELLS #/AREA URNS AUTO: 1 /HPF (ref 0–5)
ERYTHROCYTE [DISTWIDTH] IN BLOOD BY AUTOMATED COUNT: 17.9 % (ref 11.5–14.5)
GLUCOSE SERPL-MCNC: 115 MG/DL (ref 70–99)
GLUCOSE UR STRIP.AUTO-MCNC: NEGATIVE MG/DL
HCT VFR BLD AUTO: 28 % (ref 37–47)
HGB BLD-MCNC: 8.4 G/DL (ref 12–16)
HGB UR STRIP.AUTO-MCNC: ABNORMAL MG/L
HYALINE CASTS #/AREA URNS AUTO: 1 /HPF (ref 0–8)
HYPOCHROMIA BLD QL SMEAR: ABNORMAL
IMM GRANULOCYTES # BLD: 0.1 K/UL
KETONES UR STRIP.AUTO-MCNC: NEGATIVE MG/DL
LEUKOCYTE ESTERASE UR QL STRIP.AUTO: ABNORMAL
LYMPHOCYTES # BLD: 0.1 K/UL (ref 1.1–4.5)
LYMPHOCYTES NFR BLD: 2 % (ref 20–40)
MCH RBC QN AUTO: 25.6 PG (ref 27–31)
MCHC RBC AUTO-ENTMCNC: 30 G/DL (ref 33–37)
MCV RBC AUTO: 85.4 FL (ref 81–99)
MONOCYTES # BLD: 0.3 K/UL (ref 0–0.9)
MONOCYTES NFR BLD: 6 % (ref 0–10)
NEUTROPHILS # BLD: 4.7 K/UL (ref 1.5–7.5)
NEUTS BAND NFR BLD MANUAL: 1 % (ref 0–5)
NEUTS SEG NFR BLD: 91 % (ref 50–65)
NITRITE UR QL STRIP.AUTO: POSITIVE
PH UR STRIP.AUTO: 5.5 [PH] (ref 5–8)
PLATELET # BLD AUTO: 282 K/UL (ref 130–400)
PLATELET SLIDE REVIEW: ADEQUATE
PMV BLD AUTO: 9.8 FL (ref 9.4–12.3)
POTASSIUM SERPL-SCNC: 4.9 MMOL/L (ref 3.5–5)
PROT UR STRIP.AUTO-MCNC: 30 MG/DL
RBC # BLD AUTO: 3.28 M/UL (ref 4.2–5.4)
RBC #/AREA URNS AUTO: 6 /HPF (ref 0–4)
SODIUM SERPL-SCNC: 139 MMOL/L (ref 136–145)
SP GR UR STRIP.AUTO: 1.02 (ref 1–1.03)
UROBILINOGEN UR STRIP.AUTO-MCNC: 0.2 E.U./DL
WBC # BLD AUTO: 5.1 K/UL (ref 4.8–10.8)
WBC #/AREA URNS AUTO: 5 /HPF (ref 0–5)

## 2025-08-21 PROCEDURE — 6360000002 HC RX W HCPCS: Performed by: INTERNAL MEDICINE

## 2025-08-21 PROCEDURE — 6360000002 HC RX W HCPCS: Performed by: HOSPITALIST

## 2025-08-21 PROCEDURE — 2500000003 HC RX 250 WO HCPCS

## 2025-08-21 PROCEDURE — 85025 COMPLETE CBC W/AUTO DIFF WBC: CPT

## 2025-08-21 PROCEDURE — 6370000000 HC RX 637 (ALT 250 FOR IP)

## 2025-08-21 PROCEDURE — 36415 COLL VENOUS BLD VENIPUNCTURE: CPT

## 2025-08-21 PROCEDURE — 99232 SBSQ HOSP IP/OBS MODERATE 35: CPT | Performed by: INTERNAL MEDICINE

## 2025-08-21 PROCEDURE — 97530 THERAPEUTIC ACTIVITIES: CPT

## 2025-08-21 PROCEDURE — 97165 OT EVAL LOW COMPLEX 30 MIN: CPT

## 2025-08-21 PROCEDURE — 80048 BASIC METABOLIC PNL TOTAL CA: CPT

## 2025-08-21 PROCEDURE — 1200000000 HC SEMI PRIVATE

## 2025-08-21 PROCEDURE — 97162 PT EVAL MOD COMPLEX 30 MIN: CPT

## 2025-08-21 PROCEDURE — 81001 URINALYSIS AUTO W/SCOPE: CPT

## 2025-08-21 RX ORDER — ENOXAPARIN SODIUM 100 MG/ML
40 INJECTION SUBCUTANEOUS DAILY
Status: DISCONTINUED | OUTPATIENT
Start: 2025-08-21 | End: 2025-08-22 | Stop reason: HOSPADM

## 2025-08-21 RX ADMIN — SODIUM CHLORIDE, PRESERVATIVE FREE 10 ML: 5 INJECTION INTRAVENOUS at 09:43

## 2025-08-21 RX ADMIN — MORPHINE SULFATE 30 MG: 30 TABLET, FILM COATED, EXTENDED RELEASE ORAL at 09:42

## 2025-08-21 RX ADMIN — BUSPIRONE HYDROCHLORIDE 10 MG: 10 TABLET ORAL at 20:26

## 2025-08-21 RX ADMIN — HYDROCODONE BITARTRATE AND ACETAMINOPHEN 1 TABLET: 10; 325 TABLET ORAL at 15:01

## 2025-08-21 RX ADMIN — DEXAMETHASONE SODIUM PHOSPHATE 4 MG: 10 INJECTION, SOLUTION INTRAMUSCULAR; INTRAVENOUS at 02:17

## 2025-08-21 RX ADMIN — MEGESTROL ACETATE 400 MG: 400 SUSPENSION ORAL at 09:41

## 2025-08-21 RX ADMIN — ENOXAPARIN SODIUM 40 MG: 100 INJECTION SUBCUTANEOUS at 15:00

## 2025-08-21 RX ADMIN — PANTOPRAZOLE SODIUM 40 MG: 40 TABLET, DELAYED RELEASE ORAL at 05:25

## 2025-08-21 RX ADMIN — METOCLOPRAMIDE 5 MG: 5 TABLET ORAL at 20:17

## 2025-08-21 RX ADMIN — METOCLOPRAMIDE 5 MG: 5 TABLET ORAL at 13:07

## 2025-08-21 RX ADMIN — MORPHINE SULFATE 30 MG: 30 TABLET, FILM COATED, EXTENDED RELEASE ORAL at 20:31

## 2025-08-21 RX ADMIN — METOCLOPRAMIDE 5 MG: 5 TABLET ORAL at 09:42

## 2025-08-21 RX ADMIN — BUSPIRONE HYDROCHLORIDE 10 MG: 10 TABLET ORAL at 15:01

## 2025-08-21 RX ADMIN — ASPIRIN 81 MG: 81 TABLET, COATED ORAL at 09:43

## 2025-08-21 RX ADMIN — BUSPIRONE HYDROCHLORIDE 10 MG: 10 TABLET ORAL at 05:37

## 2025-08-21 RX ADMIN — ROSUVASTATIN 10 MG: 10 TABLET, FILM COATED ORAL at 20:16

## 2025-08-21 RX ADMIN — DEXAMETHASONE SODIUM PHOSPHATE 4 MG: 10 INJECTION, SOLUTION INTRAMUSCULAR; INTRAVENOUS at 13:07

## 2025-08-21 ASSESSMENT — PAIN - FUNCTIONAL ASSESSMENT
PAIN_FUNCTIONAL_ASSESSMENT: 0-10

## 2025-08-21 ASSESSMENT — PAIN SCALES - GENERAL
PAINLEVEL_OUTOF10: 6
PAINLEVEL_OUTOF10: 1
PAINLEVEL_OUTOF10: 4
PAINLEVEL_OUTOF10: 2
PAINLEVEL_OUTOF10: 10

## 2025-08-21 ASSESSMENT — PAIN DESCRIPTION - ORIENTATION
ORIENTATION: RIGHT

## 2025-08-21 ASSESSMENT — PAIN DESCRIPTION - LOCATION
LOCATION: LEG
LOCATION: ARM
LOCATION: ARM

## 2025-08-21 ASSESSMENT — PAIN DESCRIPTION - DESCRIPTORS
DESCRIPTORS: ACHING;DISCOMFORT
DESCRIPTORS: ACHING;DISCOMFORT

## 2025-08-22 VITALS
WEIGHT: 130.9 LBS | BODY MASS INDEX: 24.09 KG/M2 | DIASTOLIC BLOOD PRESSURE: 71 MMHG | TEMPERATURE: 97.9 F | RESPIRATION RATE: 18 BRPM | HEIGHT: 62 IN | SYSTOLIC BLOOD PRESSURE: 117 MMHG | HEART RATE: 86 BPM | OXYGEN SATURATION: 99 %

## 2025-08-22 DIAGNOSIS — T45.1X5A CHEMOTHERAPY-INDUCED NAUSEA: Primary | ICD-10-CM

## 2025-08-22 DIAGNOSIS — R11.0 CHEMOTHERAPY-INDUCED NAUSEA: Primary | ICD-10-CM

## 2025-08-22 PROBLEM — Z51.5 PALLIATIVE CARE PATIENT: Status: ACTIVE | Noted: 2025-08-22

## 2025-08-22 LAB
ANION GAP SERPL CALCULATED.3IONS-SCNC: 11 MMOL/L (ref 8–16)
ANISOCYTOSIS BLD QL SMEAR: ABNORMAL
BASOPHILS # BLD: 0 K/UL (ref 0–0.2)
BASOPHILS NFR BLD: 0 % (ref 0–1)
BUN SERPL-MCNC: 22 MG/DL (ref 8–23)
CALCIUM SERPL-MCNC: 8.1 MG/DL (ref 8.8–10.2)
CHLORIDE SERPL-SCNC: 111 MMOL/L (ref 98–107)
CO2 SERPL-SCNC: 20 MMOL/L (ref 22–29)
CREAT SERPL-MCNC: 1 MG/DL (ref 0.5–0.9)
DACRYOCYTES BLD QL SMEAR: ABNORMAL
EOSINOPHIL # BLD: 0 K/UL (ref 0–0.6)
EOSINOPHIL NFR BLD: 0 % (ref 0–5)
ERYTHROCYTE [DISTWIDTH] IN BLOOD BY AUTOMATED COUNT: 18.1 % (ref 11.5–14.5)
GLUCOSE SERPL-MCNC: 144 MG/DL (ref 70–99)
HCT VFR BLD AUTO: 28.6 % (ref 37–47)
HGB BLD-MCNC: 8.4 G/DL (ref 12–16)
HYPOCHROMIA BLD QL SMEAR: ABNORMAL
IMM GRANULOCYTES # BLD: 0.1 K/UL
LYMPHOCYTES # BLD: 0.5 K/UL (ref 1.1–4.5)
LYMPHOCYTES NFR BLD: 6 % (ref 20–40)
MCH RBC QN AUTO: 25.2 PG (ref 27–31)
MCHC RBC AUTO-ENTMCNC: 29.4 G/DL (ref 33–37)
MCV RBC AUTO: 85.9 FL (ref 81–99)
MONOCYTES # BLD: 0.2 K/UL (ref 0–0.9)
MONOCYTES NFR BLD: 3 % (ref 0–10)
NEUTROPHILS # BLD: 7.3 K/UL (ref 1.5–7.5)
NEUTS BAND NFR BLD MANUAL: 4 % (ref 0–5)
NEUTS SEG NFR BLD: 87 % (ref 50–65)
OVALOCYTES BLD QL SMEAR: ABNORMAL
PLATELET # BLD AUTO: 336 K/UL (ref 130–400)
PLATELET SLIDE REVIEW: ADEQUATE
PMV BLD AUTO: 9.7 FL (ref 9.4–12.3)
POTASSIUM SERPL-SCNC: 4.6 MMOL/L (ref 3.5–5)
RBC # BLD AUTO: 3.33 M/UL (ref 4.2–5.4)
SODIUM SERPL-SCNC: 142 MMOL/L (ref 136–145)
TOXIC GRANULATION: ABNORMAL
WBC # BLD AUTO: 8 K/UL (ref 4.8–10.8)

## 2025-08-22 PROCEDURE — 6360000002 HC RX W HCPCS: Performed by: HOSPITALIST

## 2025-08-22 PROCEDURE — 99232 SBSQ HOSP IP/OBS MODERATE 35: CPT | Performed by: INTERNAL MEDICINE

## 2025-08-22 PROCEDURE — 94760 N-INVAS EAR/PLS OXIMETRY 1: CPT

## 2025-08-22 PROCEDURE — 36415 COLL VENOUS BLD VENIPUNCTURE: CPT

## 2025-08-22 PROCEDURE — 36592 COLLECT BLOOD FROM PICC: CPT

## 2025-08-22 PROCEDURE — 85025 COMPLETE CBC W/AUTO DIFF WBC: CPT

## 2025-08-22 PROCEDURE — 6360000002 HC RX W HCPCS: Performed by: INTERNAL MEDICINE

## 2025-08-22 PROCEDURE — 2500000003 HC RX 250 WO HCPCS

## 2025-08-22 PROCEDURE — 6370000000 HC RX 637 (ALT 250 FOR IP)

## 2025-08-22 PROCEDURE — 80048 BASIC METABOLIC PNL TOTAL CA: CPT

## 2025-08-22 RX ORDER — ONDANSETRON 4 MG/1
4 TABLET, FILM COATED ORAL EVERY 6 HOURS PRN
Qty: 30 TABLET | Refills: 5 | Status: SHIPPED | OUTPATIENT
Start: 2025-08-22

## 2025-08-22 RX ORDER — DEXAMETHASONE 4 MG/1
4 TABLET ORAL EVERY 12 HOURS
Qty: 60 TABLET | Refills: 0 | Status: SHIPPED | OUTPATIENT
Start: 2025-08-22 | End: 2025-08-22

## 2025-08-22 RX ORDER — DEXAMETHASONE 2 MG/1
2 TABLET ORAL EVERY 12 HOURS
Qty: 60 TABLET | Refills: 0 | Status: SHIPPED | OUTPATIENT
Start: 2025-08-22 | End: 2025-09-21

## 2025-08-22 RX ORDER — PANTOPRAZOLE SODIUM 40 MG/1
40 TABLET, DELAYED RELEASE ORAL
Qty: 30 TABLET | Refills: 1 | Status: SHIPPED | OUTPATIENT
Start: 2025-08-23

## 2025-08-22 RX ORDER — BUSPIRONE HYDROCHLORIDE 10 MG/1
10 TABLET ORAL 3 TIMES DAILY PRN
Qty: 90 TABLET | Refills: 0 | Status: SHIPPED | OUTPATIENT
Start: 2025-08-22

## 2025-08-22 RX ORDER — HEPARIN 100 UNIT/ML
300 SYRINGE INTRAVENOUS PRN
Status: DISCONTINUED | OUTPATIENT
Start: 2025-08-22 | End: 2025-08-22 | Stop reason: HOSPADM

## 2025-08-22 RX ADMIN — HEPARIN 300 UNITS: 100 SYRINGE at 11:15

## 2025-08-22 RX ADMIN — METOCLOPRAMIDE 5 MG: 5 TABLET ORAL at 09:42

## 2025-08-22 RX ADMIN — MORPHINE SULFATE 30 MG: 30 TABLET, FILM COATED, EXTENDED RELEASE ORAL at 09:42

## 2025-08-22 RX ADMIN — ASPIRIN 81 MG: 81 TABLET, COATED ORAL at 09:42

## 2025-08-22 RX ADMIN — SODIUM CHLORIDE, PRESERVATIVE FREE 10 ML: 5 INJECTION INTRAVENOUS at 09:43

## 2025-08-22 RX ADMIN — DEXAMETHASONE SODIUM PHOSPHATE 4 MG: 10 INJECTION, SOLUTION INTRAMUSCULAR; INTRAVENOUS at 02:49

## 2025-08-22 RX ADMIN — HYDROCODONE BITARTRATE AND ACETAMINOPHEN 1 TABLET: 10; 325 TABLET ORAL at 05:55

## 2025-08-22 RX ADMIN — PANTOPRAZOLE SODIUM 40 MG: 40 TABLET, DELAYED RELEASE ORAL at 05:52

## 2025-08-22 RX ADMIN — BUSPIRONE HYDROCHLORIDE 10 MG: 10 TABLET ORAL at 09:48

## 2025-08-22 RX ADMIN — MEGESTROL ACETATE 400 MG: 400 SUSPENSION ORAL at 09:42

## 2025-08-22 ASSESSMENT — PAIN SCALES - GENERAL
PAINLEVEL_OUTOF10: 1
PAINLEVEL_OUTOF10: 8
PAINLEVEL_OUTOF10: 4

## 2025-08-22 ASSESSMENT — PAIN DESCRIPTION - DESCRIPTORS
DESCRIPTORS: STABBING;SHARP
DESCRIPTORS: DULL

## 2025-08-22 ASSESSMENT — PAIN - FUNCTIONAL ASSESSMENT
PAIN_FUNCTIONAL_ASSESSMENT: 0-10
PAIN_FUNCTIONAL_ASSESSMENT: 0-10

## 2025-08-22 ASSESSMENT — PAIN DESCRIPTION - LOCATION
LOCATION: HIP
LOCATION: HIP

## 2025-08-22 ASSESSMENT — PAIN DESCRIPTION - ORIENTATION
ORIENTATION: RIGHT
ORIENTATION: RIGHT

## 2025-08-25 ENCOUNTER — TELEPHONE (OUTPATIENT)
Dept: PRIMARY CARE CLINIC | Age: 65
End: 2025-08-25

## 2025-08-25 ENCOUNTER — HOSPITAL ENCOUNTER (OUTPATIENT)
Dept: RADIATION ONCOLOGY | Facility: HOSPITAL | Age: 65
Discharge: HOME | End: 2025-08-25
Payer: COMMERCIAL

## 2025-08-25 DIAGNOSIS — C34.90 NSCLC METASTATIC TO BRAIN (HCC): ICD-10-CM

## 2025-08-25 DIAGNOSIS — C79.31 NSCLC METASTATIC TO BRAIN (HCC): ICD-10-CM

## 2025-08-25 DIAGNOSIS — R11.2 CHEMOTHERAPY INDUCED NAUSEA AND VOMITING: ICD-10-CM

## 2025-08-25 DIAGNOSIS — C34.91 NON-SMALL CELL CANCER OF RIGHT LUNG (HCC): ICD-10-CM

## 2025-08-25 DIAGNOSIS — T45.1X5A CHEMOTHERAPY INDUCED NAUSEA AND VOMITING: ICD-10-CM

## 2025-08-25 LAB
RAD ONC ARIA COURSE ID: NORMAL
RAD ONC ARIA COURSE LAST TREATMENT DATE: NORMAL
RAD ONC ARIA COURSE START DATE: NORMAL
RAD ONC ARIA COURSE TREATMENT ELAPSED DAYS: 0
RAD ONC ARIA FIRST TREATMENT DATE: NORMAL
RAD ONC ARIA PLAN FRACTIONS TREATED TO DATE: 1
RAD ONC ARIA PLAN FRACTIONS TREATED TO DATE: 1
RAD ONC ARIA PLAN ID: NORMAL
RAD ONC ARIA PLAN ID: NORMAL
RAD ONC ARIA PLAN PRESCRIBED DOSE PER FRACTION: 2.5 GY
RAD ONC ARIA PLAN PRESCRIBED DOSE PER FRACTION: 3 GY
RAD ONC ARIA PLAN PRIMARY REFERENCE POINT: NORMAL
RAD ONC ARIA PLAN PRIMARY REFERENCE POINT: NORMAL
RAD ONC ARIA PLAN TOTAL FRACTIONS PRESCRIBED: 10
RAD ONC ARIA PLAN TOTAL FRACTIONS PRESCRIBED: 13
RAD ONC ARIA PLAN TOTAL PRESCRIBED DOSE: 3000 CGY
RAD ONC ARIA PLAN TOTAL PRESCRIBED DOSE: 3250 CGY
RAD ONC ARIA REFERENCE POINT DOSAGE GIVEN TO DATE: 2.5 GY
RAD ONC ARIA REFERENCE POINT DOSAGE GIVEN TO DATE: 3 GY
RAD ONC ARIA REFERENCE POINT ID: NORMAL
RAD ONC ARIA REFERENCE POINT ID: NORMAL
RAD ONC ARIA REFERENCE POINT SESSION DOSAGE GIVEN: 2.5 GY
RAD ONC ARIA REFERENCE POINT SESSION DOSAGE GIVEN: 3 GY

## 2025-08-25 RX ORDER — OLANZAPINE 10 MG/1
TABLET, FILM COATED ORAL
Qty: 5 TABLET | Refills: 0 | OUTPATIENT
Start: 2025-08-25

## 2025-08-26 ENCOUNTER — HOSPITAL ENCOUNTER (OUTPATIENT)
Dept: RADIATION ONCOLOGY | Facility: HOSPITAL | Age: 65
Discharge: HOME | End: 2025-08-26

## 2025-08-26 ENCOUNTER — OFFICE VISIT (OUTPATIENT)
Age: 65
End: 2025-08-26
Payer: COMMERCIAL

## 2025-08-26 ENCOUNTER — TELEPHONE (OUTPATIENT)
Age: 65
End: 2025-08-26
Payer: COMMERCIAL

## 2025-08-26 ENCOUNTER — OFFICE VISIT (OUTPATIENT)
Dept: INTERNAL MEDICINE | Age: 65
End: 2025-08-26

## 2025-08-26 VITALS
WEIGHT: 130.6 LBS | BODY MASS INDEX: 24.03 KG/M2 | DIASTOLIC BLOOD PRESSURE: 82 MMHG | SYSTOLIC BLOOD PRESSURE: 120 MMHG | HEART RATE: 65 BPM | OXYGEN SATURATION: 97 % | HEIGHT: 62 IN

## 2025-08-26 VITALS
DIASTOLIC BLOOD PRESSURE: 76 MMHG | BODY MASS INDEX: 24.11 KG/M2 | HEIGHT: 62 IN | SYSTOLIC BLOOD PRESSURE: 148 MMHG | WEIGHT: 131 LBS

## 2025-08-26 DIAGNOSIS — M25.511 ACUTE PAIN OF RIGHT SHOULDER: ICD-10-CM

## 2025-08-26 DIAGNOSIS — C79.31 METASTASIS TO BRAIN (HCC): ICD-10-CM

## 2025-08-26 DIAGNOSIS — Z87.891 FORMER SMOKER: ICD-10-CM

## 2025-08-26 DIAGNOSIS — C48.0 RETROPERITONEAL SARCOMA: ICD-10-CM

## 2025-08-26 DIAGNOSIS — I10 PRIMARY HYPERTENSION: ICD-10-CM

## 2025-08-26 DIAGNOSIS — C34.90 NSCLC METASTATIC TO BRAIN: Primary | ICD-10-CM

## 2025-08-26 DIAGNOSIS — C79.31 NSCLC METASTATIC TO BRAIN: Primary | ICD-10-CM

## 2025-08-26 DIAGNOSIS — Z92.3 HISTORY OF RADIATION THERAPY: ICD-10-CM

## 2025-08-26 DIAGNOSIS — C34.91 NON-SMALL CELL CANCER OF RIGHT LUNG (HCC): Primary | ICD-10-CM

## 2025-08-26 DIAGNOSIS — K59.03 DRUG-INDUCED CONSTIPATION: ICD-10-CM

## 2025-08-26 DIAGNOSIS — R94.4 DECREASED CALCULATED GFR: ICD-10-CM

## 2025-08-26 DIAGNOSIS — D05.12 DUCTAL CARCINOMA IN SITU (DCIS) OF LEFT BREAST: ICD-10-CM

## 2025-08-26 DIAGNOSIS — R79.89 ABNORMAL CBC: ICD-10-CM

## 2025-08-26 DIAGNOSIS — E86.0 DEHYDRATION: ICD-10-CM

## 2025-08-26 LAB
RAD ONC ARIA COURSE ID: NORMAL
RAD ONC ARIA COURSE LAST TREATMENT DATE: NORMAL
RAD ONC ARIA COURSE START DATE: NORMAL
RAD ONC ARIA COURSE TREATMENT ELAPSED DAYS: 1
RAD ONC ARIA FIRST TREATMENT DATE: NORMAL
RAD ONC ARIA PLAN FRACTIONS TREATED TO DATE: 2
RAD ONC ARIA PLAN FRACTIONS TREATED TO DATE: 2
RAD ONC ARIA PLAN ID: NORMAL
RAD ONC ARIA PLAN ID: NORMAL
RAD ONC ARIA PLAN PRESCRIBED DOSE PER FRACTION: 2.5 GY
RAD ONC ARIA PLAN PRESCRIBED DOSE PER FRACTION: 3 GY
RAD ONC ARIA PLAN PRIMARY REFERENCE POINT: NORMAL
RAD ONC ARIA PLAN PRIMARY REFERENCE POINT: NORMAL
RAD ONC ARIA PLAN TOTAL FRACTIONS PRESCRIBED: 10
RAD ONC ARIA PLAN TOTAL FRACTIONS PRESCRIBED: 13
RAD ONC ARIA PLAN TOTAL PRESCRIBED DOSE: 3000 CGY
RAD ONC ARIA PLAN TOTAL PRESCRIBED DOSE: 3250 CGY
RAD ONC ARIA REFERENCE POINT DOSAGE GIVEN TO DATE: 5 GY
RAD ONC ARIA REFERENCE POINT DOSAGE GIVEN TO DATE: 6 GY
RAD ONC ARIA REFERENCE POINT ID: NORMAL
RAD ONC ARIA REFERENCE POINT ID: NORMAL
RAD ONC ARIA REFERENCE POINT SESSION DOSAGE GIVEN: 2.5 GY
RAD ONC ARIA REFERENCE POINT SESSION DOSAGE GIVEN: 3 GY

## 2025-08-26 RX ORDER — MEGESTROL ACETATE 40 MG/ML
400 SUSPENSION ORAL DAILY
COMMUNITY
Start: 2025-07-17

## 2025-08-26 RX ORDER — PANTOPRAZOLE SODIUM 40 MG/1
40 TABLET, DELAYED RELEASE ORAL DAILY
COMMUNITY
Start: 2025-08-23

## 2025-08-26 RX ORDER — DEXAMETHASONE 2 MG/1
2 TABLET ORAL 2 TIMES DAILY
COMMUNITY
Start: 2025-08-22 | End: 2025-09-22

## 2025-08-26 RX ORDER — MORPHINE SULFATE 30 MG/1
30 TABLET, FILM COATED, EXTENDED RELEASE ORAL 2 TIMES DAILY
COMMUNITY
Start: 2025-08-01 | End: 2025-09-01

## 2025-08-26 RX ORDER — PROMETHAZINE HYDROCHLORIDE 25 MG/1
25 TABLET ORAL EVERY 6 HOURS PRN
COMMUNITY
Start: 2025-08-22

## 2025-08-26 ASSESSMENT — ENCOUNTER SYMPTOMS
SORE THROAT: 0
CHEST TIGHTNESS: 0
ABDOMINAL PAIN: 0
COUGH: 0
CONSTIPATION: 0
WHEEZING: 0

## 2025-08-27 ENCOUNTER — OFFICE VISIT (OUTPATIENT)
Age: 65
End: 2025-08-27
Payer: COMMERCIAL

## 2025-08-27 ENCOUNTER — HOSPITAL ENCOUNTER (OUTPATIENT)
Dept: RADIATION ONCOLOGY | Facility: HOSPITAL | Age: 65
Discharge: HOME | End: 2025-08-27

## 2025-08-27 DIAGNOSIS — M84.521A PATHOLOGICAL FRACTURE OF RIGHT HUMERUS DUE TO NEOPLASTIC DISEASE, INITIAL ENCOUNTER: Primary | ICD-10-CM

## 2025-08-27 LAB
RAD ONC ARIA COURSE ID: NORMAL
RAD ONC ARIA COURSE LAST TREATMENT DATE: NORMAL
RAD ONC ARIA COURSE START DATE: NORMAL
RAD ONC ARIA COURSE TREATMENT ELAPSED DAYS: 2
RAD ONC ARIA FIRST TREATMENT DATE: NORMAL
RAD ONC ARIA PLAN FRACTIONS TREATED TO DATE: 3
RAD ONC ARIA PLAN FRACTIONS TREATED TO DATE: 3
RAD ONC ARIA PLAN ID: NORMAL
RAD ONC ARIA PLAN ID: NORMAL
RAD ONC ARIA PLAN PRESCRIBED DOSE PER FRACTION: 2.5 GY
RAD ONC ARIA PLAN PRESCRIBED DOSE PER FRACTION: 3 GY
RAD ONC ARIA PLAN PRIMARY REFERENCE POINT: NORMAL
RAD ONC ARIA PLAN PRIMARY REFERENCE POINT: NORMAL
RAD ONC ARIA PLAN TOTAL FRACTIONS PRESCRIBED: 10
RAD ONC ARIA PLAN TOTAL FRACTIONS PRESCRIBED: 13
RAD ONC ARIA PLAN TOTAL PRESCRIBED DOSE: 3000 CGY
RAD ONC ARIA PLAN TOTAL PRESCRIBED DOSE: 3250 CGY
RAD ONC ARIA REFERENCE POINT DOSAGE GIVEN TO DATE: 7.5 GY
RAD ONC ARIA REFERENCE POINT DOSAGE GIVEN TO DATE: 9 GY
RAD ONC ARIA REFERENCE POINT ID: NORMAL
RAD ONC ARIA REFERENCE POINT ID: NORMAL
RAD ONC ARIA REFERENCE POINT SESSION DOSAGE GIVEN: 2.5 GY
RAD ONC ARIA REFERENCE POINT SESSION DOSAGE GIVEN: 3 GY

## 2025-08-27 PROCEDURE — 1123F ACP DISCUSS/DSCN MKR DOCD: CPT | Performed by: ORTHOPAEDIC SURGERY

## 2025-08-27 PROCEDURE — 99213 OFFICE O/P EST LOW 20 MIN: CPT | Performed by: ORTHOPAEDIC SURGERY

## 2025-08-28 ENCOUNTER — HOSPITAL ENCOUNTER (OUTPATIENT)
Dept: CT IMAGING | Age: 65
Discharge: HOME | End: 2025-08-28
Payer: COMMERCIAL

## 2025-08-28 ENCOUNTER — HOSPITAL ENCOUNTER (OUTPATIENT)
Dept: RADIATION ONCOLOGY | Facility: HOSPITAL | Age: 65
Discharge: HOME | End: 2025-08-28

## 2025-08-28 DIAGNOSIS — C79.31 NSCLC METASTATIC TO BRAIN (HCC): ICD-10-CM

## 2025-08-28 DIAGNOSIS — C34.90 NSCLC METASTATIC TO BRAIN (HCC): ICD-10-CM

## 2025-08-28 DIAGNOSIS — C34.91 NON-SMALL CELL CANCER OF RIGHT LUNG (HCC): ICD-10-CM

## 2025-08-28 LAB
CREAT SERPL-MCNC: 1.2 MG/DL (ref 0.3–1.3)
RAD ONC ARIA COURSE ID: NORMAL
RAD ONC ARIA COURSE LAST TREATMENT DATE: NORMAL
RAD ONC ARIA COURSE START DATE: NORMAL
RAD ONC ARIA COURSE TREATMENT ELAPSED DAYS: 3
RAD ONC ARIA FIRST TREATMENT DATE: NORMAL
RAD ONC ARIA PLAN FRACTIONS TREATED TO DATE: 4
RAD ONC ARIA PLAN FRACTIONS TREATED TO DATE: 4
RAD ONC ARIA PLAN ID: NORMAL
RAD ONC ARIA PLAN ID: NORMAL
RAD ONC ARIA PLAN PRESCRIBED DOSE PER FRACTION: 2.5 GY
RAD ONC ARIA PLAN PRESCRIBED DOSE PER FRACTION: 3 GY
RAD ONC ARIA PLAN PRIMARY REFERENCE POINT: NORMAL
RAD ONC ARIA PLAN PRIMARY REFERENCE POINT: NORMAL
RAD ONC ARIA PLAN TOTAL FRACTIONS PRESCRIBED: 10
RAD ONC ARIA PLAN TOTAL FRACTIONS PRESCRIBED: 13
RAD ONC ARIA PLAN TOTAL PRESCRIBED DOSE: 3000 CGY
RAD ONC ARIA PLAN TOTAL PRESCRIBED DOSE: 3250 CGY
RAD ONC ARIA REFERENCE POINT DOSAGE GIVEN TO DATE: 10 GY
RAD ONC ARIA REFERENCE POINT DOSAGE GIVEN TO DATE: 12 GY
RAD ONC ARIA REFERENCE POINT ID: NORMAL
RAD ONC ARIA REFERENCE POINT ID: NORMAL
RAD ONC ARIA REFERENCE POINT SESSION DOSAGE GIVEN: 2.5 GY
RAD ONC ARIA REFERENCE POINT SESSION DOSAGE GIVEN: 3 GY

## 2025-08-28 PROCEDURE — 74177 CT ABD & PELVIS W/CONTRAST: CPT

## 2025-08-28 PROCEDURE — 71260 CT THORAX DX C+: CPT

## 2025-08-28 PROCEDURE — 82565 ASSAY OF CREATININE: CPT

## 2025-08-28 PROCEDURE — 6360000004 HC RX CONTRAST MEDICATION: Performed by: INTERNAL MEDICINE

## 2025-08-28 RX ORDER — IOPAMIDOL 755 MG/ML
75 INJECTION, SOLUTION INTRAVASCULAR
Status: COMPLETED | OUTPATIENT
Start: 2025-08-28 | End: 2025-08-28

## 2025-08-28 RX ADMIN — IOPAMIDOL 75 ML: 755 INJECTION, SOLUTION INTRAVENOUS at 09:54

## 2025-08-29 ENCOUNTER — OFFICE VISIT (OUTPATIENT)
Dept: NEUROSURGERY | Facility: CLINIC | Age: 65
End: 2025-08-29
Payer: COMMERCIAL

## 2025-08-29 ENCOUNTER — HOSPITAL ENCOUNTER (OUTPATIENT)
Dept: RADIATION ONCOLOGY | Facility: HOSPITAL | Age: 65
Discharge: HOME | End: 2025-08-29

## 2025-08-29 VITALS — BODY MASS INDEX: 24.11 KG/M2 | WEIGHT: 131 LBS | HEIGHT: 62 IN

## 2025-08-29 DIAGNOSIS — Z87.891 FORMER SMOKER: ICD-10-CM

## 2025-08-29 DIAGNOSIS — C79.31 SECONDARY MALIGNANT NEOPLASM OF BRAIN: Primary | ICD-10-CM

## 2025-08-29 LAB
RAD ONC ARIA COURSE ID: NORMAL
RAD ONC ARIA COURSE LAST TREATMENT DATE: NORMAL
RAD ONC ARIA COURSE START DATE: NORMAL
RAD ONC ARIA COURSE TREATMENT ELAPSED DAYS: 4
RAD ONC ARIA FIRST TREATMENT DATE: NORMAL
RAD ONC ARIA PLAN FRACTIONS TREATED TO DATE: 5
RAD ONC ARIA PLAN FRACTIONS TREATED TO DATE: 5
RAD ONC ARIA PLAN ID: NORMAL
RAD ONC ARIA PLAN ID: NORMAL
RAD ONC ARIA PLAN PRESCRIBED DOSE PER FRACTION: 2.5 GY
RAD ONC ARIA PLAN PRESCRIBED DOSE PER FRACTION: 3 GY
RAD ONC ARIA PLAN PRIMARY REFERENCE POINT: NORMAL
RAD ONC ARIA PLAN PRIMARY REFERENCE POINT: NORMAL
RAD ONC ARIA PLAN TOTAL FRACTIONS PRESCRIBED: 10
RAD ONC ARIA PLAN TOTAL FRACTIONS PRESCRIBED: 13
RAD ONC ARIA PLAN TOTAL PRESCRIBED DOSE: 3000 CGY
RAD ONC ARIA PLAN TOTAL PRESCRIBED DOSE: 3250 CGY
RAD ONC ARIA REFERENCE POINT DOSAGE GIVEN TO DATE: 12.5 GY
RAD ONC ARIA REFERENCE POINT DOSAGE GIVEN TO DATE: 15 GY
RAD ONC ARIA REFERENCE POINT ID: NORMAL
RAD ONC ARIA REFERENCE POINT ID: NORMAL
RAD ONC ARIA REFERENCE POINT SESSION DOSAGE GIVEN: 2.5 GY
RAD ONC ARIA REFERENCE POINT SESSION DOSAGE GIVEN: 3 GY

## 2025-08-29 PROCEDURE — 99204 OFFICE O/P NEW MOD 45 MIN: CPT | Performed by: NEUROLOGICAL SURGERY

## 2025-09-02 ENCOUNTER — TELEPHONE (OUTPATIENT)
Dept: HEMATOLOGY | Age: 65
End: 2025-09-02

## 2025-09-02 DIAGNOSIS — C79.31 NSCLC METASTATIC TO BRAIN (HCC): ICD-10-CM

## 2025-09-02 DIAGNOSIS — C34.91 NON-SMALL CELL CANCER OF RIGHT LUNG (HCC): Primary | ICD-10-CM

## 2025-09-02 DIAGNOSIS — C34.90 NSCLC METASTATIC TO BRAIN (HCC): ICD-10-CM

## 2025-09-03 ENCOUNTER — OFFICE VISIT (OUTPATIENT)
Age: 65
End: 2025-09-03
Payer: COMMERCIAL

## 2025-09-03 VITALS — BODY MASS INDEX: 23.89 KG/M2 | HEIGHT: 62 IN

## 2025-09-03 DIAGNOSIS — C79.31 NON-SMALL CELL LUNG CANCER METASTATIC TO BRAIN (HCC): ICD-10-CM

## 2025-09-03 DIAGNOSIS — G89.3 CANCER RELATED PAIN: ICD-10-CM

## 2025-09-03 DIAGNOSIS — C34.90 NON-SMALL CELL LUNG CANCER METASTATIC TO BRAIN (HCC): ICD-10-CM

## 2025-09-03 DIAGNOSIS — M84.521D PATHOLOGICAL FRACTURE OF RIGHT HUMERUS DUE TO NEOPLASTIC DISEASE WITH ROUTINE HEALING, SUBSEQUENT ENCOUNTER: Primary | ICD-10-CM

## 2025-09-03 PROCEDURE — 1123F ACP DISCUSS/DSCN MKR DOCD: CPT | Performed by: PHYSICIAN ASSISTANT

## 2025-09-03 PROCEDURE — 99213 OFFICE O/P EST LOW 20 MIN: CPT | Performed by: PHYSICIAN ASSISTANT

## 2025-09-03 RX ORDER — MORPHINE SULFATE 30 MG/1
30 TABLET, FILM COATED, EXTENDED RELEASE ORAL 2 TIMES DAILY
Qty: 60 TABLET | Refills: 0 | Status: ON HOLD | OUTPATIENT
Start: 2025-09-03 | End: 2025-10-03

## 2025-09-03 RX ORDER — ROSUVASTATIN CALCIUM 40 MG/1
40 TABLET, COATED ORAL DAILY
Qty: 90 TABLET | Refills: 1 | Status: ON HOLD | OUTPATIENT
Start: 2025-09-03

## 2025-09-03 ASSESSMENT — ENCOUNTER SYMPTOMS: COLOR CHANGE: 0

## 2025-09-04 ENCOUNTER — CLINICAL DOCUMENTATION (OUTPATIENT)
Dept: OTHER | Age: 65
End: 2025-09-04

## 2025-09-04 ENCOUNTER — OFFICE VISIT (OUTPATIENT)
Dept: HEMATOLOGY | Age: 65
End: 2025-09-04

## 2025-09-04 ENCOUNTER — HOSPITAL ENCOUNTER (OUTPATIENT)
Dept: INFUSION THERAPY | Age: 65
Discharge: HOME | End: 2025-09-04
Payer: COMMERCIAL

## 2025-09-04 VITALS
SYSTOLIC BLOOD PRESSURE: 107 MMHG | TEMPERATURE: 97.6 F | HEIGHT: 62 IN | RESPIRATION RATE: 18 BRPM | WEIGHT: 131 LBS | HEART RATE: 72 BPM | DIASTOLIC BLOOD PRESSURE: 61 MMHG | OXYGEN SATURATION: 95 % | BODY MASS INDEX: 24.11 KG/M2

## 2025-09-04 DIAGNOSIS — Z71.89 CARE PLAN DISCUSSED WITH PATIENT: ICD-10-CM

## 2025-09-04 DIAGNOSIS — Z51.11 CHEMOTHERAPY MANAGEMENT, ENCOUNTER FOR: ICD-10-CM

## 2025-09-04 DIAGNOSIS — B37.0 OROPHARYNGEAL CANDIDIASIS: ICD-10-CM

## 2025-09-04 DIAGNOSIS — C79.31 NSCLC METASTATIC TO BRAIN (HCC): ICD-10-CM

## 2025-09-04 DIAGNOSIS — C79.51 NSCLC METASTATIC TO BONE (HCC): ICD-10-CM

## 2025-09-04 DIAGNOSIS — C79.31 NON-SMALL CELL LUNG CANCER METASTATIC TO BRAIN (HCC): Primary | ICD-10-CM

## 2025-09-04 DIAGNOSIS — C34.90 NSCLC METASTATIC TO BRAIN (HCC): ICD-10-CM

## 2025-09-04 DIAGNOSIS — C34.90 NON-SMALL CELL LUNG CANCER METASTATIC TO BRAIN (HCC): Primary | ICD-10-CM

## 2025-09-04 DIAGNOSIS — C34.91 NON-SMALL CELL CANCER OF RIGHT LUNG (HCC): Primary | ICD-10-CM

## 2025-09-04 DIAGNOSIS — C34.90 NSCLC METASTATIC TO BONE (HCC): ICD-10-CM

## 2025-09-04 DIAGNOSIS — C34.91 NON-SMALL CELL CANCER OF RIGHT LUNG (HCC): ICD-10-CM

## 2025-09-04 LAB
ALBUMIN SERPL-MCNC: 3.1 G/DL (ref 3.5–5.2)
ALP SERPL-CCNC: 119 U/L (ref 35–104)
ALT SERPL-CCNC: 34 U/L (ref 5–33)
ANION GAP SERPL CALCULATED.3IONS-SCNC: 11 MMOL/L (ref 7–19)
AST SERPL-CCNC: 34 U/L (ref 5–32)
BASOPHILS # BLD: 0.02 K/UL (ref 0–0.2)
BASOPHILS NFR BLD: 0.2 % (ref 0–1)
BILIRUB SERPL-MCNC: 0.3 MG/DL (ref 0–1.2)
BUN SERPL-MCNC: 32 MG/DL (ref 8–23)
CALCIUM SERPL-MCNC: 7.3 MG/DL (ref 8.8–10.2)
CHLORIDE SERPL-SCNC: 105 MMOL/L (ref 98–107)
CO2 SERPL-SCNC: 25 MMOL/L (ref 22–29)
CREAT SERPL-MCNC: 1.3 MG/DL (ref 0.5–0.9)
EOSINOPHIL # BLD: 0 K/UL (ref 0–0.6)
EOSINOPHIL NFR BLD: 0 % (ref 0–5)
ERYTHROCYTE [DISTWIDTH] IN BLOOD BY AUTOMATED COUNT: 19.8 % (ref 11.5–14.5)
GLUCOSE SERPL-MCNC: 108 MG/DL (ref 70–99)
HCT VFR BLD AUTO: 34.2 % (ref 37–47)
HGB BLD-MCNC: 10.4 G/DL (ref 12–16)
LYMPHOCYTES # BLD: 0.09 K/UL (ref 1.1–4.5)
LYMPHOCYTES NFR BLD: 0.8 % (ref 20–40)
MCH RBC QN AUTO: 26.1 PG (ref 27–31)
MCHC RBC AUTO-ENTMCNC: 30.4 G/DL (ref 33–37)
MCV RBC AUTO: 85.9 FL (ref 81–99)
MONOCYTES # BLD: 0.88 K/UL (ref 0–0.9)
MONOCYTES NFR BLD: 7.3 % (ref 1–10)
NEUTROPHILS # BLD: 10.87 K/UL (ref 1.5–7.5)
NEUTS SEG NFR BLD: 90.6 % (ref 50–65)
PLATELET # BLD AUTO: 216 K/UL (ref 130–400)
PMV BLD AUTO: 8.5 FL (ref 9.4–12.3)
POTASSIUM SERPL-SCNC: 3.9 MMOL/L (ref 3.5–5.1)
PROT SERPL-MCNC: 5.1 G/DL (ref 6.4–8.3)
RBC # BLD AUTO: 3.98 M/UL (ref 4.2–5.4)
SODIUM SERPL-SCNC: 141 MMOL/L (ref 136–145)
WBC # BLD AUTO: 11.99 K/UL (ref 4.8–10.8)

## 2025-09-04 PROCEDURE — 96372 THER/PROPH/DIAG INJ SC/IM: CPT

## 2025-09-04 PROCEDURE — 6360000002 HC RX W HCPCS: Performed by: INTERNAL MEDICINE

## 2025-09-04 PROCEDURE — 36415 COLL VENOUS BLD VENIPUNCTURE: CPT

## 2025-09-04 PROCEDURE — 36591 DRAW BLOOD OFF VENOUS DEVICE: CPT

## 2025-09-04 PROCEDURE — 85025 COMPLETE CBC W/AUTO DIFF WBC: CPT

## 2025-09-04 PROCEDURE — 80053 COMPREHEN METABOLIC PANEL: CPT

## 2025-09-04 PROCEDURE — 2500000003 HC RX 250 WO HCPCS: Performed by: INTERNAL MEDICINE

## 2025-09-04 RX ORDER — HEPARIN 100 UNIT/ML
500 SYRINGE INTRAVENOUS PRN
Status: DISCONTINUED | OUTPATIENT
Start: 2025-09-04 | End: 2025-09-05 | Stop reason: HOSPADM

## 2025-09-04 RX ORDER — SODIUM CHLORIDE 0.9 % (FLUSH) 0.9 %
5-40 SYRINGE (ML) INJECTION PRN
OUTPATIENT
Start: 2025-09-04

## 2025-09-04 RX ORDER — FLUCONAZOLE 100 MG/1
200 TABLET ORAL DAILY
Qty: 20 TABLET | Refills: 0 | Status: ON HOLD | OUTPATIENT
Start: 2025-09-04 | End: 2025-09-14

## 2025-09-04 RX ORDER — SODIUM CHLORIDE 9 MG/ML
25 INJECTION, SOLUTION INTRAVENOUS PRN
OUTPATIENT
Start: 2025-09-04

## 2025-09-04 RX ORDER — HEPARIN 100 UNIT/ML
500 SYRINGE INTRAVENOUS PRN
OUTPATIENT
Start: 2025-09-04

## 2025-09-04 RX ORDER — SODIUM CHLORIDE 0.9 % (FLUSH) 0.9 %
5-40 SYRINGE (ML) INJECTION PRN
Status: DISCONTINUED | OUTPATIENT
Start: 2025-09-04 | End: 2025-09-05 | Stop reason: HOSPADM

## 2025-09-04 RX ADMIN — HEPARIN 500 UNITS: 100 SYRINGE at 09:44

## 2025-09-04 RX ADMIN — SODIUM CHLORIDE, PRESERVATIVE FREE 10 ML: 5 INJECTION INTRAVENOUS at 09:44

## 2025-09-04 RX ADMIN — DENOSUMAB 120 MG: 120 INJECTION SUBCUTANEOUS at 11:09

## 2025-09-06 PROBLEM — E83.51 HYPOCALCEMIA: Status: ACTIVE | Noted: 2025-09-06

## 2025-09-06 PROBLEM — B37.81 CANDIDAL ESOPHAGITIS (HCC): Status: ACTIVE | Noted: 2025-09-06

## (undated) DEVICE — TUBE ET 8.5MM NSL ORAL BASIC CUF INTMED MURPHY EYE RADPQ

## (undated) DEVICE — NEEDLE ASPIR WITH SYRINGE 22GA L700MM US GUID TREAT DST END FOR

## (undated) DEVICE — SUTURE PROL SZ 2-0 L36IN NONABSORBABLE BLU V-7 L26MM 1/2 8977H

## (undated) DEVICE — CURAVIEW LED LARYNGOSCOPE BLADE & HANDLE,DISPOSABLE,MILLER 2: Brand: CURAPLEX

## (undated) DEVICE — PROVE COVER: Brand: UNBRANDED

## (undated) DEVICE — LIQUIBAND RAPID ADHESIVE 36/CS 0.8ML: Brand: MEDLINE

## (undated) DEVICE — NEPTUNE E-SEP SMOKE EVACUATION PENCIL, COATED, 70MM BLADE, ROCKER SWITCH: Brand: NEPTUNE E-SEP

## (undated) DEVICE — BIOPSY NEEDLE, 21G: Brand: FLEXISION

## (undated) DEVICE — VALVE SHEATH BRONCHOSCOPE

## (undated) DEVICE — SENSOR PLSE OXMTR AD CBL L3FT ADH TRANSMISSIVE

## (undated) DEVICE — ENDO KIT,LOURDES HOSPITAL: Brand: MEDLINE INDUSTRIES, INC.

## (undated) DEVICE — SOLUTION IRRIG 1000ML 0.9% SOD CHL USP POUR PLAS BTL

## (undated) DEVICE — VISION PROBE ADAPTER AND SUCTION ADAPTER

## (undated) DEVICE — SYRINGE MED 20ML STD CLR PLAS LUERSLIP TIP N CTRL DISP

## (undated) DEVICE — PACK,UNIVERSAL,NO GOWNS: Brand: MEDLINE

## (undated) DEVICE — GOWN, ORBIS, XXLARGE, STERILE: Brand: MEDLINE

## (undated) DEVICE — SUTURE VICRYL + SZ 3-0 L27IN ABSRB UD L26MM SH 1/2 CIR VCP416H

## (undated) DEVICE — SWIVEL CONNECTOR

## (undated) DEVICE — GLOVE SURG SZ 75 CRM LTX FREE POLYISOPRENE POLYMER BEAD ANTI

## (undated) DEVICE — MINI-FORCEPS: Brand: COREDX™

## (undated) DEVICE — SENSOR SPO2 ADULT SOFT COVIDIEN-NELLCOR COMPATABLE

## (undated) DEVICE — INTENDED FOR TISSUE SEPARATION, AND OTHER PROCEDURES THAT REQUIRE A SHARP SURGICAL BLADE TO PUNCTURE OR CUT.: Brand: BARD-PARKER ® CARBON RIB-BACK BLADES

## (undated) DEVICE — FORCEPS BX 240CM 2.4MM L NDL RAD JAW 4 M00513334

## (undated) DEVICE — DRAPE C ARM W42XL120IN W POLY STRP W OUT LENS

## (undated) DEVICE — SINGLE USE SUCTION VALVE MAJ-209: Brand: SINGLE USE SUCTION VALVE (STERILE)

## (undated) DEVICE — MINOR CDS: Brand: MEDLINE INDUSTRIES, INC.

## (undated) DEVICE — GLOVE SURG SZ 8 L12IN FNGR THK79MIL GRN LTX FREE

## (undated) DEVICE — SUTURE MONOCRYL SZ 4-0 L18IN ABSRB UD L19MM PS-2 3/8 CIR PRIM Y496G

## (undated) DEVICE — Device: Brand: ION